# Patient Record
Sex: MALE | Race: WHITE | NOT HISPANIC OR LATINO | Employment: OTHER | URBAN - METROPOLITAN AREA
[De-identification: names, ages, dates, MRNs, and addresses within clinical notes are randomized per-mention and may not be internally consistent; named-entity substitution may affect disease eponyms.]

---

## 2017-08-15 ENCOUNTER — HOSPITAL ENCOUNTER (OUTPATIENT)
Dept: FAMILY MEDICINE CLINIC | Facility: CLINIC | Age: 62
Setting detail: SPECIMEN
Discharge: HOME | End: 2017-08-15
Attending: HOSPITALIST | Admitting: HOSPITALIST

## 2017-08-15 LAB
ALBUMIN SERPL-MCNC: 4.2 G/DL (ref 3.5–4.8)
ALBUMIN/GLOB SERPL: 1.7 {RATIO} (ref 1–1.7)
ALP SERPL-CCNC: 72 IU/L (ref 32–91)
ALT SERPL-CCNC: 29 IU/L (ref 17–63)
ANION GAP SERPL CALC-SCNC: 14.2 MMOL/L (ref 10–20)
AST SERPL-CCNC: 28 IU/L (ref 15–41)
BASOPHILS # BLD AUTO: 0.1 10*3/UL (ref 0–0.2)
BASOPHILS NFR BLD AUTO: 1 % (ref 0–2)
BILIRUB SERPL-MCNC: 1.5 MG/DL (ref 0.3–1.2)
BUN SERPL-MCNC: 20 MG/DL (ref 8–20)
BUN/CREAT SERPL: 22.2 (ref 6.2–20.3)
CALCIUM SERPL-MCNC: 8.8 MG/DL (ref 8.9–10.3)
CHLORIDE SERPL-SCNC: 104 MMOL/L (ref 101–111)
CONV CO2: 26 MMOL/L (ref 22–32)
CONV TOTAL PROTEIN: 6.7 G/DL (ref 6.1–7.9)
CREAT UR-MCNC: 0.9 MG/DL (ref 0.7–1.2)
DIFFERENTIAL METHOD BLD: (no result)
EOSINOPHIL # BLD AUTO: 0.2 10*3/UL (ref 0–0.3)
EOSINOPHIL # BLD AUTO: 2 % (ref 0–3)
ERYTHROCYTE [DISTWIDTH] IN BLOOD BY AUTOMATED COUNT: 13.8 % (ref 11.5–14.5)
FOLATE SERPL-MCNC: 23.3 NG/ML (ref 5.9–24.8)
GLOBULIN UR ELPH-MCNC: 2.5 G/DL (ref 2.5–3.8)
GLUCOSE SERPL-MCNC: 107 MG/DL (ref 65–99)
HCT VFR BLD AUTO: 43.8 % (ref 40–54)
HGB BLD-MCNC: 14.8 G/DL (ref 14–18)
LYMPHOCYTES # BLD AUTO: 0.9 10*3/UL (ref 0.8–4.8)
LYMPHOCYTES NFR BLD AUTO: 10 % (ref 18–42)
MCH RBC QN AUTO: 28.9 PG (ref 26–32)
MCHC RBC AUTO-ENTMCNC: 33.8 G/DL (ref 32–36)
MCV RBC AUTO: 85.6 FL (ref 80–94)
MONOCYTES # BLD AUTO: 0.8 10*3/UL (ref 0.1–1.3)
MONOCYTES NFR BLD AUTO: 8 % (ref 2–11)
NEUTROPHILS # BLD AUTO: 7.6 10*3/UL (ref 2.3–8.6)
NEUTROPHILS NFR BLD AUTO: 79 % (ref 50–75)
NRBC BLD AUTO-RTO: 0 /100{WBCS}
NRBC/RBC NFR BLD MANUAL: 0 10*3/UL
PLATELET # BLD AUTO: 138 10*3/UL (ref 150–450)
PMV BLD AUTO: 9.1 FL (ref 7.4–10.4)
POTASSIUM SERPL-SCNC: 4.2 MMOL/L (ref 3.6–5.1)
PSA SERPL-MCNC: 5.51 NG/ML (ref 0–4)
RBC # BLD AUTO: 5.12 10*6/UL (ref 4.6–6)
SODIUM SERPL-SCNC: 140 MMOL/L (ref 136–144)
TSH SERPL-ACNC: 1.64 UIU/ML (ref 0.34–5.6)
VIT B12 SERPL-MCNC: 404 PG/ML (ref 180–914)
WBC # BLD AUTO: 9.7 10*3/UL (ref 4.5–11.5)

## 2017-09-18 ENCOUNTER — HOSPITAL ENCOUNTER (OUTPATIENT)
Dept: FAMILY MEDICINE CLINIC | Facility: CLINIC | Age: 62
Setting detail: SPECIMEN
Discharge: HOME | End: 2017-09-18
Attending: HOSPITALIST | Admitting: HOSPITALIST

## 2017-09-18 LAB — PSA SERPL-MCNC: 4.61 NG/ML (ref 0–4)

## 2017-11-13 ENCOUNTER — HOSPITAL ENCOUNTER (OUTPATIENT)
Dept: FAMILY MEDICINE CLINIC | Facility: CLINIC | Age: 62
Setting detail: SPECIMEN
Discharge: HOME | End: 2017-11-13
Attending: HOSPITALIST | Admitting: HOSPITALIST

## 2018-05-05 ENCOUNTER — HOSPITAL ENCOUNTER (OUTPATIENT)
Dept: LAB | Facility: HOSPITAL | Age: 63
Discharge: HOME | End: 2018-05-05
Attending: INTERNAL MEDICINE | Admitting: INTERNAL MEDICINE

## 2018-05-05 LAB
ALBUMIN SERPL-MCNC: 4 G/DL (ref 3.5–4.8)
ALBUMIN/GLOB SERPL: 2 {RATIO} (ref 1–1.7)
ALP SERPL-CCNC: 67 IU/L (ref 32–91)
ALT SERPL-CCNC: 34 IU/L (ref 17–63)
ANION GAP SERPL CALC-SCNC: 11.5 MMOL/L (ref 10–20)
AST SERPL-CCNC: 26 IU/L (ref 15–41)
BASOPHILS # BLD AUTO: 0.1 10*3/UL (ref 0–0.2)
BASOPHILS NFR BLD AUTO: 1 % (ref 0–2)
BILIRUB SERPL-MCNC: 0.4 MG/DL (ref 0.3–1.2)
BUN SERPL-MCNC: 18 MG/DL (ref 8–20)
BUN/CREAT SERPL: 22.5 (ref 6.2–20.3)
CALCIUM SERPL-MCNC: 8.7 MG/DL (ref 8.9–10.3)
CHLORIDE SERPL-SCNC: 104 MMOL/L (ref 101–111)
CONV CO2: 25 MMOL/L (ref 22–32)
CONV TOTAL PROTEIN: 6 G/DL (ref 6.1–7.9)
CREAT UR-MCNC: 0.8 MG/DL (ref 0.7–1.2)
DIFFERENTIAL METHOD BLD: (no result)
EOSINOPHIL # BLD AUTO: 0.2 10*3/UL (ref 0–0.3)
EOSINOPHIL # BLD AUTO: 2 % (ref 0–3)
ERYTHROCYTE [DISTWIDTH] IN BLOOD BY AUTOMATED COUNT: 13.7 % (ref 11.5–14.5)
GLOBULIN UR ELPH-MCNC: 2 G/DL (ref 2.5–3.8)
GLUCOSE SERPL-MCNC: 189 MG/DL (ref 65–99)
HCT VFR BLD AUTO: 45.8 % (ref 40–54)
HGB BLD-MCNC: 14.8 G/DL (ref 14–18)
IRON SERPL-MCNC: 52 UG/DL (ref 45–182)
LYMPHOCYTES # BLD AUTO: 0.9 10*3/UL (ref 0.8–4.8)
LYMPHOCYTES NFR BLD AUTO: 9 % (ref 18–42)
MAGNESIUM SERPL-MCNC: 2 MG/DL (ref 1.8–2.5)
MCH RBC QN AUTO: 28.2 PG (ref 26–32)
MCHC RBC AUTO-ENTMCNC: 32.3 G/DL (ref 32–36)
MCV RBC AUTO: 87.2 FL (ref 80–94)
MONOCYTES # BLD AUTO: 0.6 10*3/UL (ref 0.1–1.3)
MONOCYTES NFR BLD AUTO: 6 % (ref 2–11)
NEUTROPHILS # BLD AUTO: 8.1 10*3/UL (ref 2.3–8.6)
NEUTROPHILS NFR BLD AUTO: 82 % (ref 50–75)
NRBC BLD AUTO-RTO: 0 /100{WBCS}
NRBC/RBC NFR BLD MANUAL: 0 10*3/UL
PLATELET # BLD AUTO: 134 10*3/UL (ref 150–450)
PMV BLD AUTO: 8.4 FL (ref 7.4–10.4)
POTASSIUM SERPL-SCNC: 3.5 MMOL/L (ref 3.6–5.1)
RBC # BLD AUTO: 5.25 10*6/UL (ref 4.6–6)
SODIUM SERPL-SCNC: 137 MMOL/L (ref 136–144)
WBC # BLD AUTO: 9.8 10*3/UL (ref 4.5–11.5)

## 2019-01-15 ENCOUNTER — OFFICE (AMBULATORY)
Dept: URBAN - METROPOLITAN AREA CLINIC 64 | Facility: CLINIC | Age: 64
End: 2019-01-15

## 2019-01-15 VITALS
WEIGHT: 197 LBS | HEIGHT: 64 IN | SYSTOLIC BLOOD PRESSURE: 106 MMHG | HEART RATE: 75 BPM | DIASTOLIC BLOOD PRESSURE: 60 MMHG

## 2019-01-15 DIAGNOSIS — G70.00 MYASTHENIA GRAVIS WITHOUT (ACUTE) EXACERBATION: ICD-10-CM

## 2019-01-15 DIAGNOSIS — K62.5 HEMORRHAGE OF ANUS AND RECTUM: ICD-10-CM

## 2019-01-15 DIAGNOSIS — K86.2 CYST OF PANCREAS: ICD-10-CM

## 2019-01-15 PROCEDURE — 99203 OFFICE O/P NEW LOW 30 MIN: CPT | Performed by: INTERNAL MEDICINE

## 2019-02-05 ENCOUNTER — OFFICE (AMBULATORY)
Dept: URBAN - METROPOLITAN AREA PATHOLOGY 4 | Facility: PATHOLOGY | Age: 64
End: 2019-02-05
Payer: COMMERCIAL

## 2019-02-05 ENCOUNTER — ON CAMPUS - OUTPATIENT (AMBULATORY)
Dept: URBAN - METROPOLITAN AREA HOSPITAL 2 | Facility: HOSPITAL | Age: 64
End: 2019-02-05

## 2019-02-05 VITALS
SYSTOLIC BLOOD PRESSURE: 121 MMHG | DIASTOLIC BLOOD PRESSURE: 68 MMHG | DIASTOLIC BLOOD PRESSURE: 70 MMHG | SYSTOLIC BLOOD PRESSURE: 103 MMHG | DIASTOLIC BLOOD PRESSURE: 86 MMHG | HEART RATE: 56 BPM | RESPIRATION RATE: 18 BRPM | HEART RATE: 68 BPM | RESPIRATION RATE: 15 BRPM | HEART RATE: 61 BPM | SYSTOLIC BLOOD PRESSURE: 116 MMHG | HEART RATE: 65 BPM | OXYGEN SATURATION: 96 % | SYSTOLIC BLOOD PRESSURE: 120 MMHG | SYSTOLIC BLOOD PRESSURE: 114 MMHG | DIASTOLIC BLOOD PRESSURE: 78 MMHG | HEIGHT: 64 IN | OXYGEN SATURATION: 94 % | DIASTOLIC BLOOD PRESSURE: 66 MMHG | HEART RATE: 59 BPM | DIASTOLIC BLOOD PRESSURE: 71 MMHG | OXYGEN SATURATION: 97 % | WEIGHT: 190 LBS | HEART RATE: 57 BPM | TEMPERATURE: 97.5 F | SYSTOLIC BLOOD PRESSURE: 102 MMHG | SYSTOLIC BLOOD PRESSURE: 105 MMHG | SYSTOLIC BLOOD PRESSURE: 119 MMHG | RESPIRATION RATE: 16 BRPM | OXYGEN SATURATION: 98 %

## 2019-02-05 DIAGNOSIS — K63.5 POLYP OF COLON: ICD-10-CM

## 2019-02-05 DIAGNOSIS — K62.5 HEMORRHAGE OF ANUS AND RECTUM: ICD-10-CM

## 2019-02-05 DIAGNOSIS — K57.30 DIVERTICULOSIS OF LARGE INTESTINE WITHOUT PERFORATION OR ABS: ICD-10-CM

## 2019-02-05 DIAGNOSIS — D12.2 BENIGN NEOPLASM OF ASCENDING COLON: ICD-10-CM

## 2019-02-05 DIAGNOSIS — K64.1 SECOND DEGREE HEMORRHOIDS: ICD-10-CM

## 2019-02-05 LAB
GI HISTOLOGY: A. UNSPECIFIED: (no result)
GI HISTOLOGY: PDF REPORT: (no result)

## 2019-02-05 PROCEDURE — 88305 TISSUE EXAM BY PATHOLOGIST: CPT | Performed by: INTERNAL MEDICINE

## 2019-02-05 PROCEDURE — 45380 COLONOSCOPY AND BIOPSY: CPT | Performed by: INTERNAL MEDICINE

## 2019-06-25 ENCOUNTER — OFFICE VISIT (OUTPATIENT)
Dept: CARDIOLOGY | Facility: CLINIC | Age: 64
End: 2019-06-25

## 2019-06-25 VITALS
WEIGHT: 195.2 LBS | HEART RATE: 54 BPM | DIASTOLIC BLOOD PRESSURE: 58 MMHG | BODY MASS INDEX: 29.58 KG/M2 | HEIGHT: 68 IN | SYSTOLIC BLOOD PRESSURE: 116 MMHG

## 2019-06-25 DIAGNOSIS — I47.1 PSVT (PAROXYSMAL SUPRAVENTRICULAR TACHYCARDIA) (HCC): ICD-10-CM

## 2019-06-25 DIAGNOSIS — I63.9 CEREBROVASCULAR ACCIDENT (CVA), UNSPECIFIED MECHANISM (HCC): ICD-10-CM

## 2019-06-25 DIAGNOSIS — R00.1 BRADYCARDIA, SINUS: ICD-10-CM

## 2019-06-25 DIAGNOSIS — I10 ESSENTIAL HYPERTENSION: Primary | ICD-10-CM

## 2019-06-25 PROBLEM — I47.10 PSVT (PAROXYSMAL SUPRAVENTRICULAR TACHYCARDIA): Status: ACTIVE | Noted: 2019-06-25

## 2019-06-25 PROCEDURE — 99213 OFFICE O/P EST LOW 20 MIN: CPT | Performed by: INTERNAL MEDICINE

## 2019-06-25 PROCEDURE — 93000 ELECTROCARDIOGRAM COMPLETE: CPT | Performed by: INTERNAL MEDICINE

## 2019-06-25 RX ORDER — ESCITALOPRAM OXALATE 10 MG/1
TABLET ORAL EVERY 24 HOURS
COMMUNITY
Start: 2018-02-09 | End: 2019-09-03 | Stop reason: SDUPTHER

## 2019-06-25 RX ORDER — SOTALOL HYDROCHLORIDE 80 MG/1
TABLET ORAL EVERY 12 HOURS
COMMUNITY
Start: 2018-09-20 | End: 2020-02-03 | Stop reason: SDUPTHER

## 2019-06-25 RX ORDER — MYCOPHENOLATE MOFETIL 500 MG/1
TABLET ORAL EVERY 12 HOURS
COMMUNITY
Start: 2017-08-29 | End: 2020-02-03 | Stop reason: SDUPTHER

## 2019-06-25 RX ORDER — AMLODIPINE BESYLATE AND ATORVASTATIN CALCIUM 5; 20 MG/1; MG/1
TABLET, FILM COATED ORAL EVERY 24 HOURS
COMMUNITY
Start: 2018-03-29 | End: 2019-08-19 | Stop reason: SDUPTHER

## 2019-06-25 RX ORDER — OMEPRAZOLE 40 MG/1
1 CAPSULE, DELAYED RELEASE ORAL EVERY 24 HOURS
COMMUNITY
Start: 2017-07-27 | End: 2020-01-13

## 2019-06-25 RX ORDER — PYRIDOSTIGMINE BROMIDE 180 MG/1
TABLET, EXTENDED RELEASE ORAL 3 TIMES DAILY
COMMUNITY
Start: 2018-08-07 | End: 2019-11-13 | Stop reason: SDUPTHER

## 2019-06-25 RX ORDER — LOSARTAN POTASSIUM AND HYDROCHLOROTHIAZIDE 12.5; 1 MG/1; MG/1
TABLET ORAL EVERY 24 HOURS
COMMUNITY
Start: 2017-12-01 | End: 2019-12-01 | Stop reason: SDUPTHER

## 2019-06-25 RX ORDER — PYRIDOSTIGMINE BROMIDE 60 MG/1
TABLET ORAL DAILY PRN
COMMUNITY
Start: 2014-05-02 | End: 2019-06-27 | Stop reason: SDUPTHER

## 2019-06-25 RX ORDER — TAMSULOSIN HYDROCHLORIDE 0.4 MG/1
1 CAPSULE ORAL EVERY 24 HOURS
COMMUNITY
Start: 2018-06-25 | End: 2020-02-03 | Stop reason: SDUPTHER

## 2019-06-25 NOTE — PROGRESS NOTES
Cardiology Office Visit Note      Referring physician:  Gaston    HPI:     Mr. Alejandre is a very pleasant 64-year-old gentleman presents annual FU prior history of PSVT as well as history of TIA/CVA with minimal residual neuropathy.  He is also known to have dyslipidemia, GERD, and myasthenia gravis.        Past Medical History:   Diagnosis Date   • CVA (cerebral vascular accident) (CMS/HCC)    • History of echocardiogram     EF 55%. No significant valvular flow abnormalities.    • History of stress test 11/2010    Stress Myoview: No ischemia 11/2010   • Hypertension    • Myasthenia gravis (CMS/HCC)    • Pancreatitis    • Paroxysmal atrial fibrillation (CMS/HCC)    • Stroke (CMS/HCC) 02/2014   • Supraventricular tachycardia (CMS/HCC)    • TIA (transient ischemic attack) 2008       Past Surgical History:   Procedure Laterality Date   • TONSILLECTOMY     • VASECTOMY           Current Outpatient Medications:   •  amLODIPine-atorvastatin (CADUET) 5-20 MG per tablet, Daily., Disp: , Rfl:   •  aspirin (ASPIRIN LOW DOSE) 81 MG tablet, Daily., Disp: , Rfl:   •  escitalopram (LEXAPRO) 10 MG tablet, Daily., Disp: , Rfl:   •  losartan-hydrochlorothiazide (HYZAAR) 100-12.5 MG per tablet, Daily., Disp: , Rfl:   •  mycophenolate (CELLCEPT) 500 MG tablet, Every 12 (Twelve) Hours., Disp: , Rfl:   •  omeprazole (priLOSEC) 40 MG capsule, 1 capsule Daily., Disp: , Rfl:   •  pyridostigmine (MESTINON) 180 MG CR tablet, 3 (Three) Times a Day., Disp: , Rfl:   •  pyridostigmine (MESTINON) 60 MG tablet, Daily As Needed., Disp: , Rfl:   •  sotalol (BETAPACE) 80 MG tablet, Every 12 (Twelve) Hours., Disp: , Rfl:   •  tamsulosin (FLOMAX) 0.4 MG capsule 24 hr capsule, 1 capsule Daily., Disp: , Rfl:     Social History     Socioeconomic History   • Marital status:      Spouse name: Not on file   • Number of children: Not on file   • Years of education: Not on file   • Highest education level: Not on file   Tobacco Use   • Smoking  status: Never Smoker   Substance and Sexual Activity   • Alcohol use: Yes   • Drug use: No       Family History   Problem Relation Age of Onset   • Hypertension Mother    • Stroke Mother    • Other Mother         Cerebral Bleed   • Hypertension Father    • Other Father          Review of Systems   Constitution: Negative for decreased appetite, diaphoresis and weakness.   HENT: Negative for congestion, hearing loss and nosebleeds.    Cardiovascular: Negative for chest pain, claudication, dyspnea on exertion, irregular heartbeat, leg swelling, near-syncope, orthopnea, palpitations, paroxysmal nocturnal dyspnea and syncope.   Respiratory: Negative for cough, shortness of breath and sleep disturbances due to breathing.    Endocrine: Negative for polyuria.   Hematologic/Lymphatic: Does not bruise/bleed easily.   Skin: Negative for itching and rash.   Musculoskeletal: Negative for back pain, muscle weakness and myalgias.   Gastrointestinal: Negative for abdominal pain, change in bowel habit and nausea.   Genitourinary: Negative for dysuria, flank pain, frequency and hesitancy.   Neurological: Negative for dizziness and tremors.   Psychiatric/Behavioral: Negative for altered mental status. The patient does not have insomnia.        Patient denies fever, cough, weight loss, hematuria or bowel changes.  Remainder of 12 point review of systems entirely negative.    Objective     Vitals:    06/25/19 1003   BP: 116/58   Pulse: 54           Physical Exam              Problem List Items Addressed This Visit        Cardiovascular and Mediastinum    HTN (hypertension) - Primary    Relevant Medications    losartan-hydrochlorothiazide (HYZAAR) 100-12.5 MG per tablet    sotalol (BETAPACE) 80 MG tablet    PSVT (paroxysmal supraventricular tachycardia) (CMS/HCC)    Relevant Medications    amLODIPine-atorvastatin (CADUET) 5-20 MG per tablet    sotalol (BETAPACE) 80 MG tablet    CVA (cerebral vascular accident) (CMS/HCC)    Bradycardia,  sinus    Relevant Medications    amLODIPine-atorvastatin (CADUET) 5-20 MG per tablet    sotalol (BETAPACE) 80 MG tablet                Parvin Childers RN  6/25/2019 10:04 AM

## 2019-06-25 NOTE — PROGRESS NOTES
Cardiology Office Visit Note      Referring physician:  Gaston  Reason For Followup:   HTN  PSVT  TIA/CVA, distant    HPI:  Enrrique Alejandre is a 64 y.o. male.  Presents annual FU prior hx HTN, PSVT, TIA/CVA with minimal residual neuropathy, dyslipidemia, GERD and myasthenia gravis.    Enrrique returns today with no specific cardiopulmonary issues.  He specifically denies any chest pain, palpitation, lightheadedness, dizziness or increasing shortness of air.  He claims his blood pressure is been well regulated.  He continues to enjoy excellent functional activity status and is thought to be compliant with medications as listed and reviewed in detail today.    Past Medical History:   Diagnosis Date   • CVA (cerebral vascular accident) (CMS/HCC)    • History of echocardiogram     EF 55%. No significant valvular flow abnormalities.    • History of stress test 11/2010    Stress Myoview: No ischemia 11/2010   • Hypertension    • Myasthenia gravis (CMS/HCC)    • Pancreatitis    • Paroxysmal atrial fibrillation (CMS/HCC)    • Stroke (CMS/HCC) 02/2014   • Supraventricular tachycardia (CMS/HCC)    • TIA (transient ischemic attack) 2008       Past Surgical History:   Procedure Laterality Date   • TONSILLECTOMY     • VASECTOMY           Current Outpatient Medications:   •  amLODIPine-atorvastatin (CADUET) 5-20 MG per tablet, Daily., Disp: , Rfl:   •  aspirin (ASPIRIN LOW DOSE) 81 MG tablet, Daily., Disp: , Rfl:   •  escitalopram (LEXAPRO) 10 MG tablet, Daily., Disp: , Rfl:   •  losartan-hydrochlorothiazide (HYZAAR) 100-12.5 MG per tablet, Daily., Disp: , Rfl:   •  mycophenolate (CELLCEPT) 500 MG tablet, Every 12 (Twelve) Hours., Disp: , Rfl:   •  omeprazole (priLOSEC) 40 MG capsule, 1 capsule Daily., Disp: , Rfl:   •  pyridostigmine (MESTINON) 180 MG CR tablet, 3 (Three) Times a Day., Disp: , Rfl:   •  pyridostigmine (MESTINON) 60 MG tablet, Daily As Needed., Disp: , Rfl:   •  sotalol (BETAPACE) 80 MG tablet, Every 12  (Twelve) Hours., Disp: , Rfl:   •  tamsulosin (FLOMAX) 0.4 MG capsule 24 hr capsule, 1 capsule Daily., Disp: , Rfl:    **He is actively taking his sotalol 80 mg - 1 tablet in a.m. and 1/2 tablet p.m.    Social History     Socioeconomic History   • Marital status:      Spouse name: Not on file   • Number of children: Not on file   • Years of education: Not on file   • Highest education level: Not on file   Tobacco Use   • Smoking status: Never Smoker   Substance and Sexual Activity   • Alcohol use: Yes   • Drug use: No       Family History   Problem Relation Age of Onset   • Hypertension Mother    • Stroke Mother    • Other Mother         Cerebral Bleed   • Hypertension Father    • Other Father          ROS    Patient denies fever, cough, weight loss, hematuria or bowel changes.  Remainder of 12 point review of systems entirely negative.    Objective     Vitals:    06/25/19 1003   BP: 116/58   Pulse: 54           Physical Exam   Constitutional: He is oriented to person, place, and time. He appears well-developed and well-nourished. No distress.   HENT:   Head: Normocephalic and atraumatic.   Mouth/Throat: Oropharynx is clear and moist.   Eyes: EOM are normal. No scleral icterus.   Neck: Neck supple. No JVD present. No tracheal deviation present. No thyromegaly present.   Cardiovascular: Normal rate, regular rhythm, normal heart sounds, intact distal pulses and normal pulses. Exam reveals no gallop and no friction rub.   No murmur heard.  Pulmonary/Chest: Breath sounds normal. He exhibits no tenderness.   Abdominal: Soft. Bowel sounds are normal. He exhibits no mass. There is no tenderness.   Musculoskeletal: Normal range of motion. He exhibits no edema.   Neurological: He is alert and oriented to person, place, and time. He has normal strength. No sensory deficit.   Skin: No rash noted.   Psychiatric: He has a normal mood and affect. Thought content normal.         EKG: Normal EKG aside from sinus bradycardia  with heart rate 54 bpm    Assessment:     Problem List Items Addressed This Visit        Cardiovascular and Mediastinum    HTN (hypertension) - Primary    Relevant Medications    losartan-hydrochlorothiazide (HYZAAR) 100-12.5 MG per tablet    sotalol (BETAPACE) 80 MG tablet  Amlodipine 5 mg (combined with atorvastatin) daily    PSVT (paroxysmal supraventricular tachycardia) (CMS/HCC)    Relevant Medications        sotalol (BETAPACE) 80 MG tablet    CVA (cerebral vascular accident) (CMS/HCC)    Bradycardia, sinus    Relevant Medications        sotalol (BETAPACE) 80 MG tablet    Myasthenia gravis-Per primary          Plan:   Decrease sotalol dose today to 40 mg of menses equals 1/2 tablet) twice daily  Continue all other medications as listed and reviewed in detail today.  Encouraged weight reduction with rather progressive exercise program  Return to clinic 1 year or sooner if needed      JAQUELIN Martinez MD  6/25/2019 10:40 AM

## 2019-06-27 RX ORDER — PYRIDOSTIGMINE BROMIDE 60 MG/1
TABLET ORAL
Qty: 90 TABLET | Refills: 2 | Status: SHIPPED | OUTPATIENT
Start: 2019-06-27 | End: 2019-08-19 | Stop reason: SDUPTHER

## 2019-07-28 PROBLEM — E83.42 HYPOMAGNESEMIA: Status: ACTIVE | Noted: 2018-05-04

## 2019-07-28 PROBLEM — R97.20 HIGH PROSTATE SPECIFIC ANTIGEN (PSA): Status: ACTIVE | Noted: 2017-08-16

## 2019-07-28 PROBLEM — E55.9 VITAMIN D DEFICIENCY: Status: ACTIVE | Noted: 2017-08-16

## 2019-07-28 PROBLEM — R00.1 BRADYCARDIA: Status: ACTIVE | Noted: 2018-06-25

## 2019-07-30 ENCOUNTER — OFFICE VISIT (OUTPATIENT)
Dept: FAMILY MEDICINE CLINIC | Facility: CLINIC | Age: 64
End: 2019-07-30

## 2019-07-30 VITALS
WEIGHT: 193.6 LBS | OXYGEN SATURATION: 96 % | TEMPERATURE: 98.5 F | HEIGHT: 64 IN | BODY MASS INDEX: 33.05 KG/M2 | DIASTOLIC BLOOD PRESSURE: 68 MMHG | SYSTOLIC BLOOD PRESSURE: 124 MMHG | RESPIRATION RATE: 16 BRPM | HEART RATE: 57 BPM

## 2019-07-30 DIAGNOSIS — R81 GLUCOSURIA: Primary | ICD-10-CM

## 2019-07-30 DIAGNOSIS — M54.10 RADICULAR LOW BACK PAIN: ICD-10-CM

## 2019-07-30 DIAGNOSIS — E55.9 VITAMIN D DEFICIENCY: ICD-10-CM

## 2019-07-30 LAB
25(OH)D3 SERPL-MCNC: 26 NG/ML (ref 30–100)
ALBUMIN SERPL-MCNC: 3.9 G/DL (ref 3.5–4.8)
ALBUMIN/GLOB SERPL: 2 G/DL (ref 1–1.7)
ALP SERPL-CCNC: 70 U/L (ref 32–91)
ALT SERPL W P-5'-P-CCNC: 40 U/L (ref 17–63)
ANION GAP SERPL CALCULATED.3IONS-SCNC: 11.9 MMOL/L (ref 5–15)
AST SERPL-CCNC: 33 U/L (ref 15–41)
BILIRUB SERPL-MCNC: 0.9 MG/DL (ref 0.3–1.2)
BUN BLD-MCNC: 19 MG/DL (ref 8–20)
BUN/CREAT SERPL: 17.3 (ref 6.2–20.3)
CALCIUM SPEC-SCNC: 9.1 MG/DL (ref 8.9–10.3)
CHLORIDE SERPL-SCNC: 104 MMOL/L (ref 101–111)
CO2 SERPL-SCNC: 27 MMOL/L (ref 22–32)
CREAT BLD-MCNC: 1.1 MG/DL (ref 0.7–1.2)
FOLATE SERPL-MCNC: >24.8 NG/ML (ref 5.9–24.8)
GFR SERPL CREATININE-BSD FRML MDRD: 67 ML/MIN/1.73
GLOBULIN UR ELPH-MCNC: 2 GM/DL (ref 2.5–3.8)
GLUCOSE BLD-MCNC: 144 MG/DL (ref 65–99)
HBA1C MFR BLD: 5.4 % (ref 3.5–5.6)
POTASSIUM BLD-SCNC: 3.9 MMOL/L (ref 3.6–5.1)
PROT SERPL-MCNC: 5.9 G/DL (ref 6.1–7.9)
SODIUM BLD-SCNC: 139 MMOL/L (ref 136–144)
TSH SERPL DL<=0.05 MIU/L-ACNC: 1.9 MIU/ML (ref 0.34–5.6)
VIT B12 BLD-MCNC: >1500 PG/ML (ref 180–914)

## 2019-07-30 PROCEDURE — 99214 OFFICE O/P EST MOD 30 MIN: CPT | Performed by: INTERNAL MEDICINE

## 2019-07-30 PROCEDURE — 83036 HEMOGLOBIN GLYCOSYLATED A1C: CPT | Performed by: INTERNAL MEDICINE

## 2019-07-30 PROCEDURE — 82607 VITAMIN B-12: CPT | Performed by: INTERNAL MEDICINE

## 2019-07-30 PROCEDURE — 82746 ASSAY OF FOLIC ACID SERUM: CPT | Performed by: INTERNAL MEDICINE

## 2019-07-30 PROCEDURE — 84443 ASSAY THYROID STIM HORMONE: CPT | Performed by: INTERNAL MEDICINE

## 2019-07-30 PROCEDURE — 80053 COMPREHEN METABOLIC PANEL: CPT | Performed by: INTERNAL MEDICINE

## 2019-07-30 PROCEDURE — 82306 VITAMIN D 25 HYDROXY: CPT | Performed by: INTERNAL MEDICINE

## 2019-07-30 NOTE — PROGRESS NOTES
"Rooming Tab(CC,VS,Pt Hx,Fall Screen)  Chief Complaint   Patient presents with   • Leg Pain       Subjective    Pt here for right thigh numbness- that is excruciating with change of position- radiates to groin area. No benefit with standing, laying. There morning to night. Doesn't radiate past the knee. takes tylenol and may help some- but not resolve the problem- flares up from time to time. No motor difference. No hip joint pain.  sleeps on either side and doesn't bother it   sees urology for PSA- which was good- however had sugar in urine  I have reviewed and updated his medications, medical history and problem list during today's office visit.     Patient Care Team:  Alexis Feldman MD as PCP - General    Problem List Tab  Medications Tab  Synopsis Tab  Chart Review Tab  Care Everywhere Tab  Immunizations Tab  Patient History Tab    Social History     Tobacco Use   • Smoking status: Never Smoker   • Smokeless tobacco: Never Used   Substance Use Topics   • Alcohol use: Yes       Review of Systems   Constitutional: Negative for fatigue and fever.   HENT: Negative for congestion.    Respiratory: Negative for apnea, cough and wheezing.    Cardiovascular: Negative for chest pain.   Gastrointestinal: Negative for abdominal distention.   Musculoskeletal: Positive for arthralgias and gait problem.   Neurological: Negative for syncope.   Psychiatric/Behavioral: Negative for behavioral problems.       Objective     Rooming Tab(CC,VS,Pt Hx,Fall Screen)  /68 (BP Location: Right arm, Patient Position: Sitting)   Pulse 57   Temp 98.5 °F (36.9 °C) (Oral)   Resp 16   Ht 162.6 cm (64\")   Wt 87.8 kg (193 lb 9.6 oz)   SpO2 96%   BMI 33.23 kg/m²     Body mass index is 33.23 kg/m².    Physical Exam   Constitutional: He is oriented to person, place, and time. He appears well-developed and well-nourished.   HENT:   Head: Normocephalic and atraumatic.   Right Ear: Tympanic membrane normal.   Left Ear: Tympanic " membrane normal.   Eyes: Pupils are equal, round, and reactive to light.   Neck: Normal range of motion. Neck supple.   Cardiovascular: Normal rate and regular rhythm.   No murmur heard.  Pulmonary/Chest: Effort normal and breath sounds normal.   Abdominal: Soft. Bowel sounds are normal. He exhibits no distension.   Musculoskeletal:   Back mild tender midline L4-S1- negative straight leg. Decreased sensation lateral thigh   Neurological: He is oriented to person, place, and time.   Skin: Capillary refill takes less than 2 seconds.   Nursing note and vitals reviewed.       Statin Choice Calculator  Data Reviewed:               Lab Results   Component Value Date    BUN 19 07/30/2019    CREATININE 1.10 07/30/2019    EGFRIFNONA 67 07/30/2019     07/30/2019    K 3.9 07/30/2019     07/30/2019    CALCIUM 9.1 07/30/2019    ALBUMIN 3.90 07/30/2019    BILITOT 0.9 07/30/2019    ALKPHOS 70 07/30/2019    AST 33 07/30/2019    ALT 40 07/30/2019    TSH 1.900 07/30/2019    SPQC24IZ 26.0 (L) 07/30/2019      Assessment/Plan   Order Review Tab  Health Maintenance Tab  Patient Plan/Order Tab  Diagnoses and all orders for this visit:    1. Glucosuria (Primary)  Comments:  will check labs as +glucose spilling. not on any DM meds  Orders:  -     Comprehensive Metabolic Panel  -     TSH  -     Vitamin B12 & Folate  -     Hemoglobin A1c    2. Vitamin D deficiency  Comments:  restart meds, check level  Orders:  -     Vitamin D 25 Hydroxy    3. Radicular low back pain  -     XR Spine Lumbar 2 or 3 View; Future  -     Gabapentin, Once-Daily, 300 MG tablet; Take 300 mg by mouth Daily.  Dispense: 30 tablet; Refill: 2        Wrapup Tab  Return in about 3 months (around 10/30/2019).         They were informed of the diagnosis and treatment plan and directed to f/u for any further problems or concerns.

## 2019-08-01 DIAGNOSIS — M54.10 RADICULAR LOW BACK PAIN: ICD-10-CM

## 2019-08-19 RX ORDER — AMLODIPINE BESYLATE AND ATORVASTATIN CALCIUM 5; 20 MG/1; MG/1
TABLET, FILM COATED ORAL
Qty: 90 TABLET | Refills: 4 | Status: SHIPPED | OUTPATIENT
Start: 2019-08-19 | End: 2020-11-12

## 2019-08-19 RX ORDER — PYRIDOSTIGMINE BROMIDE 60 MG/1
TABLET ORAL
Qty: 90 TABLET | Refills: 1 | Status: SHIPPED | OUTPATIENT
Start: 2019-08-19 | End: 2021-01-19

## 2019-09-03 RX ORDER — ESCITALOPRAM OXALATE 10 MG/1
TABLET ORAL
Qty: 30 TABLET | Refills: 1 | Status: SHIPPED | OUTPATIENT
Start: 2019-09-03 | End: 2019-11-03 | Stop reason: SDUPTHER

## 2019-10-23 DIAGNOSIS — M54.10 RADICULAR LOW BACK PAIN: ICD-10-CM

## 2019-10-23 RX ORDER — GABAPENTIN 300 MG/1
CAPSULE ORAL
Qty: 30 CAPSULE | Refills: 1 | Status: SHIPPED | OUTPATIENT
Start: 2019-10-23 | End: 2019-12-23

## 2019-10-31 ENCOUNTER — OFFICE VISIT (OUTPATIENT)
Dept: FAMILY MEDICINE CLINIC | Facility: CLINIC | Age: 64
End: 2019-10-31

## 2019-10-31 VITALS
TEMPERATURE: 98.8 F | SYSTOLIC BLOOD PRESSURE: 130 MMHG | HEART RATE: 76 BPM | BODY MASS INDEX: 33.64 KG/M2 | WEIGHT: 196 LBS | RESPIRATION RATE: 8 BRPM | DIASTOLIC BLOOD PRESSURE: 72 MMHG

## 2019-10-31 DIAGNOSIS — E11.69 TYPE 2 DIABETES MELLITUS WITH OTHER SPECIFIED COMPLICATION, WITHOUT LONG-TERM CURRENT USE OF INSULIN (HCC): ICD-10-CM

## 2019-10-31 DIAGNOSIS — I47.1 PSVT (PAROXYSMAL SUPRAVENTRICULAR TACHYCARDIA) (HCC): ICD-10-CM

## 2019-10-31 DIAGNOSIS — G70.00 MYASTHENIA GRAVIS (HCC): ICD-10-CM

## 2019-10-31 DIAGNOSIS — I10 ESSENTIAL HYPERTENSION: Primary | ICD-10-CM

## 2019-10-31 LAB
ALBUMIN SERPL-MCNC: 4.2 G/DL (ref 3.5–5.2)
ALBUMIN/GLOB SERPL: 1.7 G/DL
ALP SERPL-CCNC: 78 U/L (ref 39–117)
ALT SERPL W P-5'-P-CCNC: 29 U/L (ref 1–41)
ANION GAP SERPL CALCULATED.3IONS-SCNC: 11.9 MMOL/L (ref 5–15)
AST SERPL-CCNC: 21 U/L (ref 1–40)
BASOPHILS # BLD AUTO: 0.04 10*3/MM3 (ref 0–0.2)
BASOPHILS NFR BLD AUTO: 0.5 % (ref 0–1.5)
BILIRUB SERPL-MCNC: 0.5 MG/DL (ref 0.2–1.2)
BUN BLD-MCNC: 17 MG/DL (ref 8–23)
BUN/CREAT SERPL: 21.8 (ref 7–25)
CALCIUM SPEC-SCNC: 8.4 MG/DL (ref 8.6–10.5)
CHLORIDE SERPL-SCNC: 98 MMOL/L (ref 98–107)
CO2 SERPL-SCNC: 27.1 MMOL/L (ref 22–29)
CREAT BLD-MCNC: 0.78 MG/DL (ref 0.76–1.27)
DEPRECATED RDW RBC AUTO: 40.8 FL (ref 37–54)
EOSINOPHIL # BLD AUTO: 0.14 10*3/MM3 (ref 0–0.4)
EOSINOPHIL NFR BLD AUTO: 1.6 % (ref 0.3–6.2)
ERYTHROCYTE [DISTWIDTH] IN BLOOD BY AUTOMATED COUNT: 13.2 % (ref 12.3–15.4)
GFR SERPL CREATININE-BSD FRML MDRD: 100 ML/MIN/1.73
GLOBULIN UR ELPH-MCNC: 2.5 GM/DL
GLUCOSE BLD-MCNC: 129 MG/DL (ref 65–99)
HBA1C MFR BLD: 5.3 % (ref 3.5–5.6)
HCT VFR BLD AUTO: 42.8 % (ref 37.5–51)
HGB BLD-MCNC: 13.8 G/DL (ref 13–17.7)
IMM GRANULOCYTES # BLD AUTO: 0.03 10*3/MM3 (ref 0–0.05)
IMM GRANULOCYTES NFR BLD AUTO: 0.3 % (ref 0–0.5)
LYMPHOCYTES # BLD AUTO: 0.64 10*3/MM3 (ref 0.7–3.1)
LYMPHOCYTES NFR BLD AUTO: 7.5 % (ref 19.6–45.3)
MCH RBC QN AUTO: 27.6 PG (ref 26.6–33)
MCHC RBC AUTO-ENTMCNC: 32.2 G/DL (ref 31.5–35.7)
MCV RBC AUTO: 85.6 FL (ref 79–97)
MONOCYTES # BLD AUTO: 0.59 10*3/MM3 (ref 0.1–0.9)
MONOCYTES NFR BLD AUTO: 6.9 % (ref 5–12)
NEUTROPHILS # BLD AUTO: 7.15 10*3/MM3 (ref 1.7–7)
NEUTROPHILS NFR BLD AUTO: 83.2 % (ref 42.7–76)
NRBC BLD AUTO-RTO: 0 /100 WBC (ref 0–0.2)
PLATELET # BLD AUTO: 194 10*3/MM3 (ref 140–450)
PMV BLD AUTO: 10.7 FL (ref 6–12)
POTASSIUM BLD-SCNC: 3.9 MMOL/L (ref 3.5–5.2)
PROT SERPL-MCNC: 6.7 G/DL (ref 6–8.5)
RBC # BLD AUTO: 5 10*6/MM3 (ref 4.14–5.8)
SODIUM BLD-SCNC: 137 MMOL/L (ref 136–145)
TSH SERPL DL<=0.05 MIU/L-ACNC: 1.39 UIU/ML (ref 0.27–4.2)
WBC NRBC COR # BLD: 8.59 10*3/MM3 (ref 3.4–10.8)

## 2019-10-31 PROCEDURE — 83036 HEMOGLOBIN GLYCOSYLATED A1C: CPT | Performed by: HOSPITALIST

## 2019-10-31 PROCEDURE — 36415 COLL VENOUS BLD VENIPUNCTURE: CPT | Performed by: HOSPITALIST

## 2019-10-31 PROCEDURE — 85025 COMPLETE CBC W/AUTO DIFF WBC: CPT | Performed by: HOSPITALIST

## 2019-10-31 PROCEDURE — 99213 OFFICE O/P EST LOW 20 MIN: CPT | Performed by: HOSPITALIST

## 2019-10-31 PROCEDURE — 80053 COMPREHEN METABOLIC PANEL: CPT | Performed by: HOSPITALIST

## 2019-10-31 PROCEDURE — 84443 ASSAY THYROID STIM HORMONE: CPT | Performed by: HOSPITALIST

## 2019-10-31 NOTE — PROGRESS NOTES
Rooming Tab(CC,VS,Pt Hx,Fall Screen)    Patient Care Team:  Alexis Feldman MD as PCP - General    Chief Complaint   Patient presents with   • Follow-up     3 month       Subjective     History was provided by the patient.  Still with pain in lower back and right thigh but much better.  Still on his meds for Myesthenia flares.  Having some shortwinded with heavy exertion    Patient states other than the above problems, they are doing ok overall and has no other significant complaints    I have reviewed and updated his medications, medical/family/social history and problem list during today's office visit.       Problem List Tab  Medications Tab  Synopsis Tab  Chart Review Tab  Care Everywhere Tab  Immunizations Tab  Patient History Tab    Social History     Tobacco Use   • Smoking status: Never Smoker   • Smokeless tobacco: Never Used   Substance Use Topics   • Alcohol use: Yes       Review of Systems   Constitutional: Negative for chills and fever.   Respiratory: Negative for chest tightness and shortness of breath.    Cardiovascular: Negative for chest pain.   Gastrointestinal: Negative for abdominal pain.   Musculoskeletal: Negative for arthralgias and myalgias.   Neurological: Negative for headache.       Objective     Rooming Tab(CC,VS,Pt Hx,Fall Screen)  /72 (BP Location: Left arm, Patient Position: Sitting, Cuff Size: Adult)   Pulse 76   Temp 98.8 °F (37.1 °C) (Oral)   Resp 8   Wt 88.9 kg (196 lb)   BMI 33.64 kg/m²     Wt Readings from Last 4 Encounters:   10/31/19 88.9 kg (196 lb)   07/30/19 87.8 kg (193 lb 9.6 oz)   06/25/19 88.5 kg (195 lb 3.2 oz)   06/25/18 87.5 kg (192 lb 16 oz)       Body mass index is 33.64 kg/m².    Physical Exam   Constitutional: He appears well-developed and well-nourished.   HENT:   Head: Normocephalic.   Cardiovascular: Normal rate and regular rhythm.   No murmur heard.  Pulmonary/Chest: Breath sounds normal. He has no wheezes. He has no rales.   Abdominal:  Soft. He exhibits no distension. There is no tenderness.   Skin: Skin is warm and dry.        Statin Choice Calculator  Data Reviewed:                     Assessment/Plan   Order Review Tab  Health Maintenance Tab  Patient Plan/Order Tab  Diagnoses and all orders for this visit:    1. Essential hypertension (Primary)  -     Comprehensive Metabolic Panel  -     Hemoglobin A1c  -     CBC & Differential  -     TSH    2. Myasthenia gravis (CMS/HCC)  -     Comprehensive Metabolic Panel  -     Hemoglobin A1c  -     CBC & Differential  -     TSH    3. PSVT (paroxysmal supraventricular tachycardia) (CMS/[facility])  -     Comprehensive Metabolic Panel  -     Hemoglobin A1c  -     CBC & Differential  -     TSH    4. Type 2 diabetes mellitus with other specified complication, without long-term current use of insulin (CMS/[facility])  -     Comprehensive Metabolic Panel  -     Hemoglobin A1c  -     CBC & Differential  -     TSH        We went over all of their concerns they brought up during this office visit and they were informed of the diagnosis and treatment plan.  They were directed to f/u for any further problems or concerns.  They were given the opportunity to ask questions related to the visit.  I reinforced the need to keep up on their routine health maintenance items and healthy lifestyle.        Orders Placed This Encounter   Procedures   • Comprehensive Metabolic Panel   • Hemoglobin A1c   • TSH   • CBC & Differential     Order Specific Question:   Manual Differential     Answer:   No     Wrapup Tab  Return in about 3 months (around 1/31/2020).

## 2019-11-03 RX ORDER — ESCITALOPRAM OXALATE 10 MG/1
TABLET ORAL
Qty: 30 TABLET | Refills: 0 | Status: SHIPPED | OUTPATIENT
Start: 2019-11-03 | End: 2019-12-01 | Stop reason: SDUPTHER

## 2019-11-04 RX ORDER — ESCITALOPRAM OXALATE 10 MG/1
TABLET ORAL
Qty: 30 TABLET | Refills: 0 | Status: SHIPPED | OUTPATIENT
Start: 2019-11-04 | End: 2020-01-03

## 2019-11-13 RX ORDER — PYRIDOSTIGMINE BROMIDE 180 MG/1
TABLET, EXTENDED RELEASE ORAL
Qty: 270 TABLET | Refills: 4 | Status: SHIPPED | OUTPATIENT
Start: 2019-11-13 | End: 2020-11-25 | Stop reason: SDUPTHER

## 2019-12-01 RX ORDER — ESCITALOPRAM OXALATE 10 MG/1
TABLET ORAL
Qty: 30 TABLET | Refills: 0 | Status: SHIPPED | OUTPATIENT
Start: 2019-12-01 | End: 2020-02-03

## 2019-12-02 RX ORDER — LOSARTAN POTASSIUM AND HYDROCHLOROTHIAZIDE 12.5; 1 MG/1; MG/1
TABLET ORAL
Qty: 30 TABLET | Refills: 2 | Status: SHIPPED | OUTPATIENT
Start: 2019-12-02 | End: 2020-02-03 | Stop reason: SDUPTHER

## 2019-12-21 DIAGNOSIS — M54.10 RADICULAR LOW BACK PAIN: ICD-10-CM

## 2019-12-23 RX ORDER — GABAPENTIN 300 MG/1
CAPSULE ORAL
Qty: 30 CAPSULE | Refills: 0 | Status: SHIPPED | OUTPATIENT
Start: 2019-12-23 | End: 2020-01-06

## 2020-01-03 RX ORDER — ESCITALOPRAM OXALATE 10 MG/1
TABLET ORAL
Qty: 30 TABLET | Refills: 0 | Status: SHIPPED | OUTPATIENT
Start: 2020-01-03 | End: 2020-02-03 | Stop reason: SDUPTHER

## 2020-01-04 DIAGNOSIS — M54.10 RADICULAR LOW BACK PAIN: ICD-10-CM

## 2020-01-06 RX ORDER — GABAPENTIN 300 MG/1
CAPSULE ORAL
Qty: 30 CAPSULE | Refills: 0 | Status: SHIPPED | OUTPATIENT
Start: 2020-01-06 | End: 2020-02-03 | Stop reason: SDUPTHER

## 2020-01-13 RX ORDER — OMEPRAZOLE 40 MG/1
CAPSULE, DELAYED RELEASE ORAL
Qty: 90 CAPSULE | Refills: 4 | Status: SHIPPED | OUTPATIENT
Start: 2020-01-13 | End: 2021-09-13 | Stop reason: SDUPTHER

## 2020-02-03 ENCOUNTER — OFFICE VISIT (OUTPATIENT)
Dept: FAMILY MEDICINE CLINIC | Facility: CLINIC | Age: 65
End: 2020-02-03

## 2020-02-03 VITALS
WEIGHT: 201 LBS | BODY MASS INDEX: 34.31 KG/M2 | DIASTOLIC BLOOD PRESSURE: 70 MMHG | HEART RATE: 68 BPM | SYSTOLIC BLOOD PRESSURE: 120 MMHG | RESPIRATION RATE: 8 BRPM | HEIGHT: 64 IN | OXYGEN SATURATION: 98 % | TEMPERATURE: 98.3 F

## 2020-02-03 DIAGNOSIS — I47.1 PSVT (PAROXYSMAL SUPRAVENTRICULAR TACHYCARDIA) (HCC): ICD-10-CM

## 2020-02-03 DIAGNOSIS — I10 ESSENTIAL HYPERTENSION: Primary | ICD-10-CM

## 2020-02-03 DIAGNOSIS — N40.1 BENIGN PROSTATIC HYPERPLASIA WITH NOCTURIA: ICD-10-CM

## 2020-02-03 DIAGNOSIS — M79.89 SWELLING OF FINGER OF LEFT HAND: ICD-10-CM

## 2020-02-03 DIAGNOSIS — G70.00 MYASTHENIA GRAVIS (HCC): ICD-10-CM

## 2020-02-03 DIAGNOSIS — R35.1 BENIGN PROSTATIC HYPERPLASIA WITH NOCTURIA: ICD-10-CM

## 2020-02-03 DIAGNOSIS — F41.9 ANXIETY AND DEPRESSION: ICD-10-CM

## 2020-02-03 DIAGNOSIS — M54.10 RADICULAR LOW BACK PAIN: ICD-10-CM

## 2020-02-03 DIAGNOSIS — F32.A ANXIETY AND DEPRESSION: ICD-10-CM

## 2020-02-03 LAB
ALBUMIN SERPL-MCNC: 4.2 G/DL (ref 3.5–5.2)
ALBUMIN/GLOB SERPL: 1.9 G/DL
ALP SERPL-CCNC: 76 U/L (ref 39–117)
ALT SERPL W P-5'-P-CCNC: 32 U/L (ref 1–41)
ANION GAP SERPL CALCULATED.3IONS-SCNC: 13.3 MMOL/L (ref 5–15)
AST SERPL-CCNC: 22 U/L (ref 1–40)
BASOPHILS # BLD AUTO: 0.08 10*3/MM3 (ref 0–0.2)
BASOPHILS NFR BLD AUTO: 0.8 % (ref 0–1.5)
BILIRUB SERPL-MCNC: 0.3 MG/DL (ref 0.2–1.2)
BUN BLD-MCNC: 19 MG/DL (ref 8–23)
BUN/CREAT SERPL: 21.1 (ref 7–25)
CALCIUM SPEC-SCNC: 9 MG/DL (ref 8.6–10.5)
CHLORIDE SERPL-SCNC: 102 MMOL/L (ref 98–107)
CO2 SERPL-SCNC: 24.7 MMOL/L (ref 22–29)
CREAT BLD-MCNC: 0.9 MG/DL (ref 0.76–1.27)
DEPRECATED RDW RBC AUTO: 41.6 FL (ref 37–54)
EOSINOPHIL # BLD AUTO: 0.13 10*3/MM3 (ref 0–0.4)
EOSINOPHIL NFR BLD AUTO: 1.4 % (ref 0.3–6.2)
ERYTHROCYTE [DISTWIDTH] IN BLOOD BY AUTOMATED COUNT: 13.3 % (ref 12.3–15.4)
GFR SERPL CREATININE-BSD FRML MDRD: 85 ML/MIN/1.73
GLOBULIN UR ELPH-MCNC: 2.2 GM/DL
GLUCOSE BLD-MCNC: 141 MG/DL (ref 65–99)
HCT VFR BLD AUTO: 43.7 % (ref 37.5–51)
HGB BLD-MCNC: 14.5 G/DL (ref 13–17.7)
IMM GRANULOCYTES # BLD AUTO: 0.06 10*3/MM3 (ref 0–0.05)
IMM GRANULOCYTES NFR BLD AUTO: 0.6 % (ref 0–0.5)
LYMPHOCYTES # BLD AUTO: 0.85 10*3/MM3 (ref 0.7–3.1)
LYMPHOCYTES NFR BLD AUTO: 8.9 % (ref 19.6–45.3)
MCH RBC QN AUTO: 28.7 PG (ref 26.6–33)
MCHC RBC AUTO-ENTMCNC: 33.2 G/DL (ref 31.5–35.7)
MCV RBC AUTO: 86.4 FL (ref 79–97)
MONOCYTES # BLD AUTO: 0.71 10*3/MM3 (ref 0.1–0.9)
MONOCYTES NFR BLD AUTO: 7.4 % (ref 5–12)
NEUTROPHILS # BLD AUTO: 7.72 10*3/MM3 (ref 1.7–7)
NEUTROPHILS NFR BLD AUTO: 80.9 % (ref 42.7–76)
NRBC BLD AUTO-RTO: 0 /100 WBC (ref 0–0.2)
PLATELET # BLD AUTO: 170 10*3/MM3 (ref 140–450)
PMV BLD AUTO: 10.8 FL (ref 6–12)
POTASSIUM BLD-SCNC: 4.4 MMOL/L (ref 3.5–5.2)
PROT SERPL-MCNC: 6.4 G/DL (ref 6–8.5)
RBC # BLD AUTO: 5.06 10*6/MM3 (ref 4.14–5.8)
SODIUM BLD-SCNC: 140 MMOL/L (ref 136–145)
WBC NRBC COR # BLD: 9.55 10*3/MM3 (ref 3.4–10.8)

## 2020-02-03 PROCEDURE — 80053 COMPREHEN METABOLIC PANEL: CPT | Performed by: FAMILY MEDICINE

## 2020-02-03 PROCEDURE — 85025 COMPLETE CBC W/AUTO DIFF WBC: CPT | Performed by: FAMILY MEDICINE

## 2020-02-03 PROCEDURE — 99214 OFFICE O/P EST MOD 30 MIN: CPT | Performed by: FAMILY MEDICINE

## 2020-02-03 RX ORDER — LOSARTAN POTASSIUM AND HYDROCHLOROTHIAZIDE 12.5; 1 MG/1; MG/1
1 TABLET ORAL DAILY
Qty: 90 TABLET | Refills: 3 | Status: SHIPPED | OUTPATIENT
Start: 2020-02-03 | End: 2021-01-20 | Stop reason: HOSPADM

## 2020-02-03 RX ORDER — GABAPENTIN 300 MG/1
300 CAPSULE ORAL DAILY
Qty: 90 CAPSULE | Refills: 3 | Status: SHIPPED | OUTPATIENT
Start: 2020-02-03 | End: 2020-02-28

## 2020-02-03 RX ORDER — TAMSULOSIN HYDROCHLORIDE 0.4 MG/1
1 CAPSULE ORAL EVERY 24 HOURS
Qty: 90 CAPSULE | Refills: 3 | Status: SHIPPED | OUTPATIENT
Start: 2020-02-03 | End: 2023-02-22

## 2020-02-03 RX ORDER — MYCOPHENOLATE MOFETIL 500 MG/1
500 TABLET ORAL 2 TIMES DAILY
Qty: 180 TABLET | Refills: 3 | Status: SHIPPED | OUTPATIENT
Start: 2020-02-03 | End: 2020-05-14 | Stop reason: SDUPTHER

## 2020-02-03 RX ORDER — SOTALOL HYDROCHLORIDE 80 MG/1
40 TABLET ORAL EVERY 12 HOURS
Qty: 90 TABLET | Refills: 3 | Status: SHIPPED | OUTPATIENT
Start: 2020-02-03 | End: 2021-03-08

## 2020-02-03 RX ORDER — ESCITALOPRAM OXALATE 10 MG/1
10 TABLET ORAL DAILY
Qty: 90 TABLET | Refills: 3 | Status: SHIPPED | OUTPATIENT
Start: 2020-02-03 | End: 2021-01-27

## 2020-02-03 NOTE — PROGRESS NOTES
Chief Complaint   Patient presents with   • Follow-up     medication     HPI  Enrrique Alejandre is a 65 y.o. male that presents for follow-up of medications    HTN: 120/70. Taking amlodipine 5, losartan 100, HCTZ 12.5    PSVT: compliant w/ sotalol 80 BID. No palpitations in a few years. Follows w/ cardiology- Dr Martinez.     Myasthenia gravis: gets tired as day goes on. No lazy eye, breathing issues, swallowing issues. Taking mycophenolate 500mg BID, pyridostigmine 180 TID and pyridostigmine 60 Q4 PRN fatigue. Not requiring many PRNs.    Anxiety/depression: related to work, which is stressful. Happy w/ control on Lexapro 10mg daily    BPH: occasionally gets up at night to urinate. Much improved since starting tamsulosin 0.4mg daily 2 years ago. Follows w/ Dr Pena of Urology.    Chronic back pain (DDD): previously having radiculopathy to R leg. Managed well w/ gabapentin 300 daily    Finger swelling: progressive swelling of L index finger on each side of PIP. 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 They told him it would return and it has. Interested in referral    Review of Systems   Constitutional: Negative for chills, fever and unexpected weight loss.   HENT: Negative for congestion, rhinorrhea and sore throat.    Eyes: Negative for visual disturbance.   Respiratory: Negative for cough and shortness of breath.    Cardiovascular: Negative for chest pain and palpitations.   Gastrointestinal: Negative for abdominal pain, constipation, diarrhea, nausea and vomiting.   Genitourinary: Negative for difficulty urinating and dysuria.   Musculoskeletal: Positive for joint swelling. Negative for arthralgias.   Skin: Negative for rash and skin lesions.   Neurological: Negative for weakness and headache.   Psychiatric/Behavioral: Negative for depressed mood. The patient is not nervous/anxious.      The following portions of the patient's history were reviewed and updated as appropriate: problem list,  ICU Stay Summary Documentation:    Chava Guidry  Is a 60 year old male with PMH of extensive psychiatric history, paroxysmal Aflutter on coumadin, COPD, HTN who was transferred  from [place] on 10/29 for concern for respiratory failure. Pt was admitted to [facility] after he fell down in the group home. He was found to have Na of 118. His weakness progressed and required 7L of NC overnight. Dr Trujillo was consulted and agreed for further evaluation/ transfer. Pt on arrival- was belly breathing. RR mid 30's. We did NIF which was 16 and  cc. Pt denied fever or chills. Denies cough. No viral illness. No diarrhea. He was able to move his upper extremity better than lower extremity. Pt agreed for intubation. He has a guardian also, tried to contact but number not working. Pt was intubated on 10/29.      On 10/30, MRI demonstrated severe cervical edema and stenosis; taken by NS for emergent decompression; post-op uneventful, still weak.  On 10/31:Was on Levophed at 5 mcg/hr this AM.  Still reflexive movement in the lower extremity.  Bronchoscopy was done for persistent left lower lobe lung collapse despite chest PT and mucolytics. Purulent and copious secretion in both right and left lung, specially in the left lower lobe. Sample sent for lab.  On 11/1, Pt has excessive secretion and Fio2- increased to 70%. Bronch was done and suctioned.  Glycopyrrolate was added.  On 11/2:ICU Pt was bronched x 2 for excessive secretion and hypoxia.  Few staph aureus from the BAL.  On 11/4 : underwent trach and peg.  Pt was Fio2 40% dropped his Spo2 to mid 80's and increased Fio2- to 60%, PEEP is at 10.  CXR ordered and showed bibasilar air space opacity.  BAL (11/2/18) growing -gram positive bacili.  BAL (10/31/18)- MSSA.  Vanc was changed to.  Cefepime.  On 11/5, Bl cx and resp cx sent.  Pt had also significant dark brown secretion around the trach.  And high residual.  Abdominal distention - TF was held.  Failed weaning  past medical history, past surgical history, allergies, current medications, past social history and past family history.    Problem List Tab  Patient History Tab  Immunizations Tab  Medications Tab  Chart Review Tab  Care Everywhere Tab  Synopsis Tab    PE  Vitals:    02/03/20 0757   BP: 120/70   Pulse: 68   Resp: 8   Temp: 98.3 °F (36.8 °C)   SpO2: 98%     Body mass index is 34.5 kg/m².  General: Well nourished, NAD  Head: AT/NC  Eyes: EOMI, anicteric sclera  ENT: MMM w/o erythema  Neck: Supple, no thyromegaly or LAD  Resp: CTAB, SCR, BS equal  CV: RRR w/o m/r/g; 2+ pulses  GI: Soft, NT/ND, +BS. Obese  MSK: FROM. L index finger w/ swelling of the pad of the finger on both side of PIP. No discrete mass appreciated. No erythema. Mild limitation on ROM  Skin: Warm, dry, intact  Neuro: Alert and oriented. No focal deficits  Psych: Appropriate mood and affect    Imaging  No Images in the past 120 days found..    Assessment/Plan   Enrrique Alejandre is a 65 y.o. male that presents for   Chief Complaint   Patient presents with   • Follow-up     medication     Enrrique was seen today for follow-up.    Diagnoses and all orders for this visit:    Essential hypertension: 120/70 today. No changes  -     losartan-hydrochlorothiazide (HYZAAR) 100-12.5 MG per tablet; Take 1 tablet by mouth Daily.  - Cont home Caduet 5/20    PSVT (paroxysmal supraventricular tachycardia) (CMS/HCC)  -     sotalol (BETAPACE) 80 MG tablet; Take 0.5 tablets by mouth Every 12 (Twelve) Hours.  - Cont CARDS f/u- Dr Martinez    Myasthenia gravis (CMS/HCC): well controlled  -     mycophenolate (CELLCEPT) 500 MG tablet; Take 1 tablet by mouth 2 (Two) Times a Day.  - Cont home pyridostigmine  -     CBC & Differential  -     Comprehensive Metabolic Panel    Anxiety and depression: well controlled  -     Cont escitalopram (LEXAPRO) 10 MG tablet; Take 1 tablet by mouth Daily.    Benign prostatic hyperplasia with nocturia  -     tamsulosin (FLOMAX) 0.4 MG capsule  trials - due to desaturations.    On 11/6, afebrile, no events overnight.  On 11/7, overnight had severe episode of desaturation, went up to 100% peep of 15.  In am, down to 70% and peep of 11.  Us chest with bilateral moderate effusions.  Still spiking fever.  On 11/8, No events overnight. cxr with RLL collapse. mucmyst and lung recruitement maneuvers.  GI consulted for black tarry stools.  Underwent egd with findings consistent erosive esophagitis and questionable barrets, as well as ulcer near peg tube most likely source or oozing.  On 11/10, complete white out of L lung with increasing O2 requirements, underwent emergent bronchoscopy with resolution of collapse.  On 11/11, still copious secretions.  C/o face, chest and abdominal pain with nodding.  Noted purulent drainage and erythema around trachestomy.  RLL collapse, bronchoscopy performed emergently after O2 dropped to 86%.  On 11/12, down to 45%, peep 9, secretions copious, but less. C/o pain around trach site. Still with black tarry stools.      24-hour Summary:    11/12: Slow vent wean with FIO2 45% and PEEP 9. Trach evaluated by surgery who recommended local wound care and off loading to prevent further pressure injury.     Plan for 11/12/2018:    - LTAC placement. Can transfer on propofol. Pulmonary issues will continued to require pulmonary care. Infections have been addressed.     1. Acute flaccid paralysis             Initially unclear etiology, later found on MRI to be due to trauma and cervical cord edema             Neurosurgery- [facility]             [facility] on [facility] - [facility]     2. Cervical stenosis with acute cord contusion                Neurosurgery- [facility]             Emergent anterior decompression             S/P trach and peg              3. Atrial fibrillation             Rate controlled     Hold Coumadin due to persistent melena     4. Hyponatremia- resolved               7. UTI with enterobacter             Rocephin for total 5 days -  "24 hr capsule; Take 1 capsule by mouth Daily.    Radicular low back pain  -     gabapentin (NEURONTIN) 300 MG capsule; Take 1 capsule by mouth Daily.    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 Not in chart. He was told it would come back and it has grown to the point where it is limiting some ROM. Will refer back to hand surgery  -     Ambulatory Referral to Hand Surgery    F/U in 6 months for annual physical      " completed course 11/2     Zosyn and fluconazole for sepsis       7. Acute hypoxic respiratory failure- this is due to phrenic nerve involvement likely from cervical spine nerve compression and also atelectasis   Continues to have excessive secretion-   Bronchoscopy 10/31  Bronchoscopy x 2 on 11/1   Bronch- 11/10; 11/11  S/P trach and PEG   Lung recruitment, mucomyst     8 MSSA Pneumonia; healthcare associated pneumonia   ID recommendation:  · Fluconazole to end on November 12  · Zosyn to end November 13  · Stool for C. Diff  · May need oral Vancomycin if C. Diff positive     9. Ileus- resolved    10. Bilateral pleural effusions    S/p thoracentesis on 11/11      Above plan was developed by Dr. Whitehead.

## 2020-02-28 DIAGNOSIS — M54.10 RADICULAR LOW BACK PAIN: ICD-10-CM

## 2020-02-28 RX ORDER — GABAPENTIN 300 MG/1
CAPSULE ORAL
Qty: 30 CAPSULE | Refills: 0 | Status: SHIPPED | OUTPATIENT
Start: 2020-02-28 | End: 2021-03-08

## 2020-05-14 ENCOUNTER — TELEPHONE (OUTPATIENT)
Dept: FAMILY MEDICINE CLINIC | Facility: CLINIC | Age: 65
End: 2020-05-14

## 2020-05-14 DIAGNOSIS — G70.00 MYASTHENIA GRAVIS (HCC): ICD-10-CM

## 2020-05-14 RX ORDER — MYCOPHENOLATE MOFETIL 500 MG/1
500 TABLET ORAL 2 TIMES DAILY
Qty: 180 TABLET | Refills: 3 | Status: SHIPPED | OUTPATIENT
Start: 2020-05-14 | End: 2021-01-19

## 2020-05-14 NOTE — TELEPHONE ENCOUNTER
NEO CALLED TO SEE IF THE RX FOR mycophenolate (CELLCEPT) 500 MG tablet CAN GE SENT TO [facility] FOR A 90 DAY SUPPLY? LAST TIME IT WAS SENT TO [facility], BUT IT IS MUCH CHEAPER THROUGH [facility].      [employer]

## 2020-05-29 ENCOUNTER — OFFICE VISIT (OUTPATIENT)
Dept: FAMILY MEDICINE CLINIC | Facility: CLINIC | Age: 65
End: 2020-05-29

## 2020-05-29 DIAGNOSIS — J06.9 UPPER RESPIRATORY TRACT INFECTION, UNSPECIFIED TYPE: Primary | ICD-10-CM

## 2020-05-29 PROCEDURE — 99213 OFFICE O/P EST LOW 20 MIN: CPT | Performed by: PHYSICIAN ASSISTANT

## 2020-05-29 RX ORDER — DOXYCYCLINE 100 MG/1
100 CAPSULE ORAL 2 TIMES DAILY
Qty: 20 CAPSULE | Refills: 0 | Status: SHIPPED | OUTPATIENT
Start: 2020-05-29 | End: 2020-06-29

## 2020-05-29 RX ORDER — PREDNISONE 20 MG/1
20 TABLET ORAL 2 TIMES DAILY
Qty: 10 TABLET | Refills: 0 | Status: SHIPPED | OUTPATIENT
Start: 2020-05-29 | End: 2020-06-29

## 2020-05-29 NOTE — PROGRESS NOTES
Subjective   Enrrique Alejandre is a 65 y.o. male.     History of Present Illness patient and I had a phone visit to discuss his cough and sinus congestion.  Patient states that he began to develop this cough approximately 2 days ago which has caused him to feel mildly short of air.  He complains of chest tightness but denies any wheezing.  He denies chest pain or radiation of the pain into his extremities or jaw.  He denies fever but does complain of occasional myalgia.  He denies any exposure to COVID-19.  He is currently taking CellCept.  He does have an appointment scheduled in June to see his cardiologist for his paroxysmal supraventricular tachycardia.  He denies any tachycardia or palpitations at this time.  Denies ear pain or adenopathy.    The following portions of the patient's history were reviewed and updated as appropriate: allergies, current medications, past family history, past medical history, past social history, past surgical history and problem list.    Review of Systems   Constitutional: Positive for chills. Negative for fever.   HENT: Positive for sinus pressure. Negative for congestion, ear discharge, ear pain, sore throat and swollen glands.    Eyes: Negative for blurred vision and visual disturbance.   Respiratory: Positive for cough and chest tightness. Negative for wheezing.    Cardiovascular: Negative for chest pain and leg swelling.   Gastrointestinal: Negative for nausea and vomiting.   Neurological: Negative for weakness and headache.   Hematological: Negative for adenopathy. Does not bruise/bleed easily.       Objective   Physical Exam  Unable to perform as this was done via telephone visit.    Assessment/Plan   Problems Addressed this Visit           Acute upper respiratory infection-this visit was done over the phone.  Patient does complain of cough and congestion.  He denies fever but he is on CellCept.  He does complain of occasional myalgia.  My suspicion for COVID-19 is low but  if he develops fever he should report to the hospital for further testing.  If his chest tightness and cough develops into chest pain he is to call the clinic to either have an EKG performed or to go to the emergency department.  Due to his immunocompromise state I will prescribe doxycycline 100 mg twice daily for 10 days.  Of note he is currently on Bactrim for a small skin infection from a surgical procedure.  He just began the Bactrim approximately 2 days ago so I will have him stop the Bactrim and take the doxycycline for both the skin infection and possible upper respiratory bacterial infection.

## 2020-06-29 ENCOUNTER — OFFICE VISIT (OUTPATIENT)
Dept: CARDIOLOGY | Facility: CLINIC | Age: 65
End: 2020-06-29

## 2020-06-29 VITALS
SYSTOLIC BLOOD PRESSURE: 118 MMHG | DIASTOLIC BLOOD PRESSURE: 62 MMHG | HEIGHT: 64 IN | WEIGHT: 196 LBS | HEART RATE: 60 BPM | OXYGEN SATURATION: 98 % | BODY MASS INDEX: 33.46 KG/M2

## 2020-06-29 DIAGNOSIS — I48.0 PAROXYSMAL ATRIAL FIBRILLATION (HCC): ICD-10-CM

## 2020-06-29 DIAGNOSIS — R00.1 BRADYCARDIA, SINUS: ICD-10-CM

## 2020-06-29 DIAGNOSIS — I47.1 PSVT (PAROXYSMAL SUPRAVENTRICULAR TACHYCARDIA) (HCC): ICD-10-CM

## 2020-06-29 DIAGNOSIS — R00.1 BRADYCARDIA: ICD-10-CM

## 2020-06-29 DIAGNOSIS — I63.9 CEREBROVASCULAR ACCIDENT (CVA), UNSPECIFIED MECHANISM (HCC): ICD-10-CM

## 2020-06-29 DIAGNOSIS — I10 ESSENTIAL HYPERTENSION: Primary | ICD-10-CM

## 2020-06-29 DIAGNOSIS — J06.9 UPPER RESPIRATORY TRACT INFECTION, UNSPECIFIED TYPE: ICD-10-CM

## 2020-06-29 PROCEDURE — 93000 ELECTROCARDIOGRAM COMPLETE: CPT | Performed by: INTERNAL MEDICINE

## 2020-06-29 PROCEDURE — 99213 OFFICE O/P EST LOW 20 MIN: CPT | Performed by: INTERNAL MEDICINE

## 2020-06-29 NOTE — PROGRESS NOTES
Cardiology Office Visit Note      Referring physician: Sergio Martinez MD  Reason For Followup: 1 year follow up     HPI:  Enrrique Alejandre is a 65 y.o. male.   Presents annual FU prior hx HTN, PSVT, TIA/CVA with minimal residual neuropathy, dyslipidemia, GERD and myasthenia gravis.    Enrrique returns today with no specific cardiopulmonary issues.   He specifically denies any chest pain, palpitation, lightheadedness, dizziness or increasing shortness of air.  He claims his blood pressure is been well regulated.  Enrrique works full-time and enjoys all sorts of outdoor activities especially hunting.  He continues to enjoy excellent functional activity status and is thought to be compliant with medications as listed and reviewed in detail today.        Past Medical History:   Diagnosis Date   • CVA (cerebral vascular accident) (CMS/HCC)    • High prostate specific antigen (PSA) 8/16/2017   • History of echocardiogram     EF 55%. No significant valvular flow abnormalities.    • History of stress test 11/2010    Stress Myoview: No ischemia 11/2010   • HTN (hypertension) 6/25/2019   • Hypertension    • Myasthenia gravis (CMS/HCC)    • Pancreatitis    • Paroxysmal atrial fibrillation (CMS/HCC)    • PSVT (paroxysmal supraventricular tachycardia) (CMS/HCC) 6/25/2019   • Stroke (CMS/HCC) 02/2014   • Supraventricular tachycardia (CMS/HCC)    • TIA (transient ischemic attack) 2008       Past Surgical History:   Procedure Laterality Date   • FINGER SURGERY     • TONSILLECTOMY     • VASECTOMY           Current Outpatient Medications:   •  amLODIPine-atorvastatin (CADUET) 5-20 MG per tablet, TAKE 1 TABLET DAILY, Disp: 90 tablet, Rfl: 4  •  aspirin (ASPIRIN LOW DOSE) 81 MG tablet, Daily., Disp: , Rfl:   •  escitalopram (LEXAPRO) 10 MG tablet, Take 1 tablet by mouth Daily., Disp: 90 tablet, Rfl: 3  •  gabapentin (NEURONTIN) 300 MG capsule, TAKE ONE CAPSULE BY MOUTH DAILY, Disp: 30 capsule, Rfl: 0  •   losartan-hydrochlorothiazide (HYZAAR) 100-12.5 MG per tablet, Take 1 tablet by mouth Daily., Disp: 90 tablet, Rfl: 3  •  mycophenolate (CellCept) 500 MG tablet, Take 1 tablet by mouth 2 (Two) Times a Day., Disp: 180 tablet, Rfl: 3  •  omeprazole (priLOSEC) 40 MG capsule, TAKE 1 CAPSULE DAILY, Disp: 90 capsule, Rfl: 4  •  pyridostigmine (MESTINON) 180 MG CR tablet, TAKE 1 TABLET THREE TIMES A DAY, Disp: 270 tablet, Rfl: 4  •  pyridostigmine (MESTINON) 60 MG tablet, TAKE ONE TABLET BY MOUTH EVERY 4 HOURS AS NEEDED, Disp: 90 tablet, Rfl: 1  •  sotalol (BETAPACE) 80 MG tablet, Take 0.5 tablets by mouth Every 12 (Twelve) Hours., Disp: 90 tablet, Rfl: 3  •  tamsulosin (FLOMAX) 0.4 MG capsule 24 hr capsule, Take 1 capsule by mouth Daily., Disp: 90 capsule, Rfl: 3    Social History     Socioeconomic History   • Marital status:      Spouse name: Not on file   • Number of children: Not on file   • Years of education: Not on file   • Highest education level: Not on file   Tobacco Use   • Smoking status: Never Smoker   • Smokeless tobacco: Never Used   Substance and Sexual Activity   • Alcohol use: Yes   • Drug use: No   • Sexual activity: Defer       Family History   Problem Relation Age of Onset   • Hypertension Mother    • Stroke Mother    • Other Mother         Cerebral Bleed   • Hypertension Father    • Other Father          Review of Systems   General: denies fever, chills, anorexia, weight loss  Eyes: denies blurring, diplopia  Ear/Nose/Throat: denies ear pain, nosebleeds, hoarseness  Cardiovascular: See HPI  Respiratory: denies excessive sputum, hemoptysis, wheezing  Gastrointestinal: denies nausea, vomiting, change in bowel habits, abdominal pain  Genitourinary: No hematuria dysuria or nocturia  Musculoskeletal: No significant arthralgias or myalgias; remains very functionally active  Skin: denies rashes, itching, suspicious lesions  Neurologic: denies focal neuro deficits; myasthenia remains under good  "control  Psychiatric: denies depression, anxiety  Endocrine: denies cold intolerance, heat intolerance  Hematologic/Lymphatic: denies abnormal bruising, bleeding  Allergic/Immunologic: denies urticaria or persistent infections      Objective     Visit Vitals  /62   Pulse 60   Ht 162.6 cm (64.02\")   Wt 88.9 kg (196 lb)   SpO2 98%   BMI 33.63 kg/m²           Physical Exam  General:     Mildly Obese, well developed,, in no acute distress.    Head:     normocephalic and atraumatic.    Eyes:    PERRL/EOM intact, conjunctiva and sclera clear with out nystagmus.    Neck:    no jvd or bruits  Chest Wall:    no deformities   Lungs:    clear bilaterally to auscultation with adequate global airflow   Heart:    non-displaced PMI; regular rate and rhythm, normal S1, S2 without murmurs, rubs, or gallops  Abdomen:  Soft, nontender without HSM  Msk:    no deformity; adequate R OM  Pulses:    pulses normal in all 4 extremities.    Extremities:    no clubbing, cyanosis, edema   Neurologic:    no focal sensory or motor deficits  Skin:    intact without lesions or rashes.    Psych:    alert and cooperative; normal mood and affect; normal attention span and concentration.            ECG 12 Lead  Date/Time: 6/29/2020 8:42 AM  Performed by: JAQUELIN Martinez MD  Authorized by: JAQUELIN Martinez MD   Comparison: compared with previous ECG   Similar to previous ECG  Comparison to previous ECG: SB  HR 54  Rhythm: sinus rhythm              Assessment:   Problems Addressed this Visit        Cardiovascular and Mediastinum    HTN (hypertension) - Primary    -Remains well-regulated on current medications        PSVT (paroxysmal supraventricular tachycardia) (CMS/HCC)    Relevant Orders    ECG 12 Lead  -Maintained in sinus rhythm on low-dose sotalol    CVA (cerebral vascular accident) (CMS/HCC)    No overt residual clinical neuropathy        Bradycardia, sinus    -Improved/resolved with decreasing dose of Betapace            Paroxysmal atrial " fibrillation (CMS/HCC)    Relevant Orders    ECG 12 Lead  --Maintaining sinus rhythm on low-dose Betapace; not on anticoagulant therapy                       Plan:  Continue current medications as listed reviewed in detail with patient.  Hemodynamically well compensated and symptom-free with excellent functional activity status.  I have made no changes to current medical regimen recommend no further cardiac studies at this time.  We will be happy to see Enrrique on an annual basis or sooner if needed.    6/29/2020 13:09    This report was generated using the Dragon voice recognition system.

## 2020-06-30 NOTE — ASSESSMENT & PLAN NOTE
Maintaining sinus rhythm on low-dose Betapace; not requiring anticoagulant therapy with low chads score

## 2020-07-04 ENCOUNTER — ANESTHESIA EVENT (OUTPATIENT)
Dept: PERIOP | Facility: HOSPITAL | Age: 65
End: 2020-07-04

## 2020-07-04 ENCOUNTER — HOSPITAL ENCOUNTER (OUTPATIENT)
Facility: HOSPITAL | Age: 65
Discharge: HOME | End: 2020-07-05
Attending: EMERGENCY MEDICINE | Admitting: FAMILY MEDICINE

## 2020-07-04 ENCOUNTER — APPOINTMENT (OUTPATIENT)
Dept: ULTRASOUND IMAGING | Facility: HOSPITAL | Age: 65
End: 2020-07-04

## 2020-07-04 ENCOUNTER — ANESTHESIA (OUTPATIENT)
Dept: PERIOP | Facility: HOSPITAL | Age: 65
End: 2020-07-04

## 2020-07-04 ENCOUNTER — APPOINTMENT (OUTPATIENT)
Dept: CT IMAGING | Facility: HOSPITAL | Age: 65
End: 2020-07-04

## 2020-07-04 DIAGNOSIS — K81.9 CHOLECYSTITIS: Primary | ICD-10-CM

## 2020-07-04 LAB
ALBUMIN SERPL-MCNC: 4.4 G/DL (ref 3.5–5.2)
ALBUMIN/GLOB SERPL: 2.2 G/DL
ALP SERPL-CCNC: 72 U/L (ref 39–117)
ALT SERPL W P-5'-P-CCNC: 27 U/L (ref 1–41)
ANION GAP SERPL CALCULATED.3IONS-SCNC: 11 MMOL/L (ref 5–15)
APTT PPP: 25.5 SECONDS (ref 24–31)
AST SERPL-CCNC: 28 U/L (ref 1–40)
BASOPHILS # BLD AUTO: 0.1 10*3/MM3 (ref 0–0.2)
BASOPHILS NFR BLD AUTO: 1 % (ref 0–1.5)
BILIRUB SERPL-MCNC: 0.6 MG/DL (ref 0.2–1.2)
BUN SERPL-MCNC: 21 MG/DL (ref 8–23)
BUN SERPL-MCNC: ABNORMAL MG/DL
BUN/CREAT SERPL: ABNORMAL
CALCIUM SPEC-SCNC: 9.1 MG/DL (ref 8.6–10.5)
CHLORIDE SERPL-SCNC: 103 MMOL/L (ref 98–107)
CO2 SERPL-SCNC: 24 MMOL/L (ref 22–29)
CREAT SERPL-MCNC: 0.89 MG/DL (ref 0.76–1.27)
DEPRECATED RDW RBC AUTO: 41.1 FL (ref 37–54)
EOSINOPHIL # BLD AUTO: 0.1 10*3/MM3 (ref 0–0.4)
EOSINOPHIL NFR BLD AUTO: 1.2 % (ref 0.3–6.2)
ERYTHROCYTE [DISTWIDTH] IN BLOOD BY AUTOMATED COUNT: 14.2 % (ref 12.3–15.4)
GFR SERPL CREATININE-BSD FRML MDRD: 86 ML/MIN/1.73
GLOBULIN UR ELPH-MCNC: 2 GM/DL
GLUCOSE SERPL-MCNC: 131 MG/DL (ref 65–99)
HCT VFR BLD AUTO: 42.4 % (ref 37.5–51)
HGB BLD-MCNC: 14.3 G/DL (ref 13–17.7)
HOLD SPECIMEN: NORMAL
INR PPP: 0.99 (ref 0.9–1.1)
LIPASE SERPL-CCNC: 53 U/L (ref 13–60)
LYMPHOCYTES # BLD AUTO: 0.9 10*3/MM3 (ref 0.7–3.1)
LYMPHOCYTES NFR BLD AUTO: 9.8 % (ref 19.6–45.3)
MCH RBC QN AUTO: 28.1 PG (ref 26.6–33)
MCHC RBC AUTO-ENTMCNC: 33.6 G/DL (ref 31.5–35.7)
MCV RBC AUTO: 83.5 FL (ref 79–97)
MONOCYTES # BLD AUTO: 0.5 10*3/MM3 (ref 0.1–0.9)
MONOCYTES NFR BLD AUTO: 5.2 % (ref 5–12)
NEUTROPHILS NFR BLD AUTO: 8 10*3/MM3 (ref 1.7–7)
NEUTROPHILS NFR BLD AUTO: 82.8 % (ref 42.7–76)
NRBC BLD AUTO-RTO: 0 /100 WBC (ref 0–0.2)
PLATELET # BLD AUTO: 161 10*3/MM3 (ref 140–450)
PMV BLD AUTO: 7.6 FL (ref 6–12)
POTASSIUM SERPL-SCNC: 3.8 MMOL/L (ref 3.5–5.2)
PROT SERPL-MCNC: 6.4 G/DL (ref 6–8.5)
PROTHROMBIN TIME: 10.4 SECONDS (ref 9.6–11.7)
RBC # BLD AUTO: 5.08 10*6/MM3 (ref 4.14–5.8)
SODIUM SERPL-SCNC: 138 MMOL/L (ref 136–145)
WBC # BLD AUTO: 9.7 10*3/MM3 (ref 3.4–10.8)

## 2020-07-04 PROCEDURE — 25010000002 PIPERACILLIN SOD-TAZOBACTAM PER 1 G: Performed by: EMERGENCY MEDICINE

## 2020-07-04 PROCEDURE — G0378 HOSPITAL OBSERVATION PER HR: HCPCS

## 2020-07-04 PROCEDURE — 25010000003 CEFAZOLIN PER 500 MG: Performed by: ANESTHESIOLOGY

## 2020-07-04 PROCEDURE — 76705 ECHO EXAM OF ABDOMEN: CPT

## 2020-07-04 PROCEDURE — 99284 EMERGENCY DEPT VISIT MOD MDM: CPT

## 2020-07-04 PROCEDURE — 25010000002 MORPHINE PER 10 MG

## 2020-07-04 PROCEDURE — 96375 TX/PRO/DX INJ NEW DRUG ADDON: CPT

## 2020-07-04 PROCEDURE — 47562 LAPAROSCOPIC CHOLECYSTECTOMY: CPT | Performed by: SURGERY

## 2020-07-04 PROCEDURE — 85610 PROTHROMBIN TIME: CPT | Performed by: EMERGENCY MEDICINE

## 2020-07-04 PROCEDURE — 85730 THROMBOPLASTIN TIME PARTIAL: CPT | Performed by: EMERGENCY MEDICINE

## 2020-07-04 PROCEDURE — 85025 COMPLETE CBC W/AUTO DIFF WBC: CPT | Performed by: EMERGENCY MEDICINE

## 2020-07-04 PROCEDURE — 83690 ASSAY OF LIPASE: CPT | Performed by: EMERGENCY MEDICINE

## 2020-07-04 PROCEDURE — 99219 PR INITIAL OBSERVATION CARE/DAY 50 MINUTES: CPT | Performed by: FAMILY MEDICINE

## 2020-07-04 PROCEDURE — 0 IOPAMIDOL PER 1 ML: Performed by: EMERGENCY MEDICINE

## 2020-07-04 PROCEDURE — 25010000002 PROPOFOL 10 MG/ML EMULSION: Performed by: ANESTHESIOLOGY

## 2020-07-04 PROCEDURE — 25010000002 MORPHINE PER 10 MG: Performed by: EMERGENCY MEDICINE

## 2020-07-04 PROCEDURE — 74177 CT ABD & PELVIS W/CONTRAST: CPT

## 2020-07-04 PROCEDURE — 88304 TISSUE EXAM BY PATHOLOGIST: CPT | Performed by: SURGERY

## 2020-07-04 PROCEDURE — 80053 COMPREHEN METABOLIC PANEL: CPT | Performed by: EMERGENCY MEDICINE

## 2020-07-04 PROCEDURE — 63710000001 MYCOPHENOLATE MOFETIL PER 250 MG: Performed by: SURGERY

## 2020-07-04 PROCEDURE — 96376 TX/PRO/DX INJ SAME DRUG ADON: CPT

## 2020-07-04 PROCEDURE — 25010000002 PIPERACILLIN SOD-TAZOBACTAM PER 1 G: Performed by: SURGERY

## 2020-07-04 PROCEDURE — 99204 OFFICE O/P NEW MOD 45 MIN: CPT | Performed by: SURGERY

## 2020-07-04 PROCEDURE — 25010000002 FENTANYL CITRATE (PF) 100 MCG/2ML SOLUTION: Performed by: ANESTHESIOLOGY

## 2020-07-04 PROCEDURE — 25010000002 ONDANSETRON PER 1 MG

## 2020-07-04 PROCEDURE — 96365 THER/PROPH/DIAG IV INF INIT: CPT

## 2020-07-04 DEVICE — SEAL HEMO SURG ARISTA/AH ABS/PWDR 3GM: Type: IMPLANTABLE DEVICE | Site: ABDOMEN | Status: FUNCTIONAL

## 2020-07-04 RX ORDER — PROMETHAZINE HYDROCHLORIDE 25 MG/1
25 TABLET ORAL ONCE AS NEEDED
Status: DISCONTINUED | OUTPATIENT
Start: 2020-07-04 | End: 2020-07-04 | Stop reason: HOSPADM

## 2020-07-04 RX ORDER — MORPHINE SULFATE 4 MG/ML
2 INJECTION, SOLUTION INTRAMUSCULAR; INTRAVENOUS
Status: DISCONTINUED | OUTPATIENT
Start: 2020-07-04 | End: 2020-07-05 | Stop reason: HOSPADM

## 2020-07-04 RX ORDER — PROMETHAZINE HYDROCHLORIDE 25 MG/1
25 SUPPOSITORY RECTAL ONCE AS NEEDED
Status: DISCONTINUED | OUTPATIENT
Start: 2020-07-04 | End: 2020-07-04 | Stop reason: HOSPADM

## 2020-07-04 RX ORDER — SODIUM CHLORIDE, SODIUM LACTATE, POTASSIUM CHLORIDE, CALCIUM CHLORIDE 600; 310; 30; 20 MG/100ML; MG/100ML; MG/100ML; MG/100ML
INJECTION, SOLUTION INTRAVENOUS CONTINUOUS PRN
Status: DISCONTINUED | OUTPATIENT
Start: 2020-07-04 | End: 2020-07-04 | Stop reason: SURG

## 2020-07-04 RX ORDER — MORPHINE SULFATE 4 MG/ML
INJECTION, SOLUTION INTRAMUSCULAR; INTRAVENOUS
Status: COMPLETED
Start: 2020-07-04 | End: 2020-07-04

## 2020-07-04 RX ORDER — HYDROCODONE BITARTRATE AND ACETAMINOPHEN 5; 325 MG/1; MG/1
1 TABLET ORAL EVERY 6 HOURS PRN
Status: DISCONTINUED | OUTPATIENT
Start: 2020-07-04 | End: 2020-07-05 | Stop reason: HOSPADM

## 2020-07-04 RX ORDER — ONDANSETRON 2 MG/ML
INJECTION INTRAMUSCULAR; INTRAVENOUS
Status: COMPLETED
Start: 2020-07-04 | End: 2020-07-04

## 2020-07-04 RX ORDER — SODIUM CHLORIDE 0.9 % (FLUSH) 0.9 %
10 SYRINGE (ML) INJECTION EVERY 12 HOURS SCHEDULED
Status: DISCONTINUED | OUTPATIENT
Start: 2020-07-04 | End: 2020-07-05 | Stop reason: HOSPADM

## 2020-07-04 RX ORDER — ONDANSETRON 2 MG/ML
4 INJECTION INTRAMUSCULAR; INTRAVENOUS ONCE AS NEEDED
Status: DISCONTINUED | OUTPATIENT
Start: 2020-07-04 | End: 2020-07-04 | Stop reason: HOSPADM

## 2020-07-04 RX ORDER — PROMETHAZINE HYDROCHLORIDE 25 MG/ML
6.25 INJECTION, SOLUTION INTRAMUSCULAR; INTRAVENOUS ONCE AS NEEDED
Status: DISCONTINUED | OUTPATIENT
Start: 2020-07-04 | End: 2020-07-04 | Stop reason: HOSPADM

## 2020-07-04 RX ORDER — FENTANYL CITRATE 50 UG/ML
INJECTION, SOLUTION INTRAMUSCULAR; INTRAVENOUS AS NEEDED
Status: DISCONTINUED | OUTPATIENT
Start: 2020-07-04 | End: 2020-07-04 | Stop reason: SURG

## 2020-07-04 RX ORDER — SODIUM CHLORIDE 0.9 % (FLUSH) 0.9 %
10 SYRINGE (ML) INJECTION AS NEEDED
Status: DISCONTINUED | OUTPATIENT
Start: 2020-07-04 | End: 2020-07-05 | Stop reason: HOSPADM

## 2020-07-04 RX ORDER — MORPHINE SULFATE 4 MG/ML
2 INJECTION, SOLUTION INTRAMUSCULAR; INTRAVENOUS
Status: DISCONTINUED | OUTPATIENT
Start: 2020-07-04 | End: 2020-07-04 | Stop reason: HOSPADM

## 2020-07-04 RX ORDER — ROCURONIUM BROMIDE 10 MG/ML
INJECTION, SOLUTION INTRAVENOUS AS NEEDED
Status: DISCONTINUED | OUTPATIENT
Start: 2020-07-04 | End: 2020-07-04 | Stop reason: SURG

## 2020-07-04 RX ORDER — AMLODIPINE BESYLATE 5 MG/1
5 TABLET ORAL
Status: DISCONTINUED | OUTPATIENT
Start: 2020-07-04 | End: 2020-07-05 | Stop reason: HOSPADM

## 2020-07-04 RX ORDER — GLYCOPYRROLATE 1 MG/5 ML
SYRINGE (ML) INTRAVENOUS AS NEEDED
Status: DISCONTINUED | OUTPATIENT
Start: 2020-07-04 | End: 2020-07-04 | Stop reason: SURG

## 2020-07-04 RX ORDER — MYCOPHENOLATE MOFETIL 250 MG/1
500 CAPSULE ORAL EVERY 12 HOURS SCHEDULED
Status: DISCONTINUED | OUTPATIENT
Start: 2020-07-04 | End: 2020-07-05 | Stop reason: HOSPADM

## 2020-07-04 RX ORDER — MORPHINE SULFATE 4 MG/ML
4 INJECTION, SOLUTION INTRAMUSCULAR; INTRAVENOUS ONCE
Status: COMPLETED | OUTPATIENT
Start: 2020-07-04 | End: 2020-07-04

## 2020-07-04 RX ORDER — CEFAZOLIN SODIUM 1 G/3ML
INJECTION, POWDER, FOR SOLUTION INTRAMUSCULAR; INTRAVENOUS AS NEEDED
Status: DISCONTINUED | OUTPATIENT
Start: 2020-07-04 | End: 2020-07-04 | Stop reason: SURG

## 2020-07-04 RX ORDER — HYDROMORPHONE HCL 110MG/55ML
0.25 PATIENT CONTROLLED ANALGESIA SYRINGE INTRAVENOUS
Status: DISCONTINUED | OUTPATIENT
Start: 2020-07-04 | End: 2020-07-04 | Stop reason: HOSPADM

## 2020-07-04 RX ORDER — PYRIDOSTIGMINE BROMIDE 180 MG/1
180 TABLET, EXTENDED RELEASE ORAL EVERY 8 HOURS
Status: DISCONTINUED | OUTPATIENT
Start: 2020-07-04 | End: 2020-07-04

## 2020-07-04 RX ORDER — ONDANSETRON 2 MG/ML
4 INJECTION INTRAMUSCULAR; INTRAVENOUS EVERY 6 HOURS PRN
Status: DISCONTINUED | OUTPATIENT
Start: 2020-07-04 | End: 2020-07-05 | Stop reason: HOSPADM

## 2020-07-04 RX ORDER — PANTOPRAZOLE SODIUM 40 MG/1
40 TABLET, DELAYED RELEASE ORAL EVERY MORNING
Status: DISCONTINUED | OUTPATIENT
Start: 2020-07-04 | End: 2020-07-05 | Stop reason: HOSPADM

## 2020-07-04 RX ORDER — MEPERIDINE HYDROCHLORIDE 25 MG/ML
12.5 INJECTION INTRAMUSCULAR; INTRAVENOUS; SUBCUTANEOUS
Status: DISCONTINUED | OUTPATIENT
Start: 2020-07-04 | End: 2020-07-04 | Stop reason: HOSPADM

## 2020-07-04 RX ORDER — ONDANSETRON 2 MG/ML
4 INJECTION INTRAMUSCULAR; INTRAVENOUS ONCE
Status: COMPLETED | OUTPATIENT
Start: 2020-07-04 | End: 2020-07-04

## 2020-07-04 RX ORDER — GABAPENTIN 300 MG/1
300 CAPSULE ORAL DAILY
Status: DISCONTINUED | OUTPATIENT
Start: 2020-07-04 | End: 2020-07-05 | Stop reason: HOSPADM

## 2020-07-04 RX ORDER — TAMSULOSIN HYDROCHLORIDE 0.4 MG/1
0.8 CAPSULE ORAL DAILY
Status: DISCONTINUED | OUTPATIENT
Start: 2020-07-04 | End: 2020-07-05 | Stop reason: HOSPADM

## 2020-07-04 RX ORDER — SOTALOL HYDROCHLORIDE 80 MG/1
40 TABLET ORAL EVERY 12 HOURS
Status: DISCONTINUED | OUTPATIENT
Start: 2020-07-04 | End: 2020-07-05 | Stop reason: HOSPADM

## 2020-07-04 RX ORDER — EPHEDRINE SULFATE/0.9% NACL/PF 25 MG/5 ML
SYRINGE (ML) INTRAVENOUS AS NEEDED
Status: DISCONTINUED | OUTPATIENT
Start: 2020-07-04 | End: 2020-07-04 | Stop reason: SURG

## 2020-07-04 RX ORDER — PYRIDOSTIGMINE BROMIDE 60 MG/1
180 TABLET ORAL EVERY 8 HOURS SCHEDULED
Status: DISCONTINUED | OUTPATIENT
Start: 2020-07-04 | End: 2020-07-04

## 2020-07-04 RX ORDER — ESCITALOPRAM OXALATE 10 MG/1
10 TABLET ORAL DAILY
Status: DISCONTINUED | OUTPATIENT
Start: 2020-07-04 | End: 2020-07-05 | Stop reason: HOSPADM

## 2020-07-04 RX ORDER — BUPIVACAINE HYDROCHLORIDE 2.5 MG/ML
INJECTION, SOLUTION INFILTRATION; PERINEURAL AS NEEDED
Status: DISCONTINUED | OUTPATIENT
Start: 2020-07-04 | End: 2020-07-04 | Stop reason: HOSPADM

## 2020-07-04 RX ORDER — ASPIRIN 81 MG/1
81 TABLET, CHEWABLE ORAL DAILY
Status: DISCONTINUED | OUTPATIENT
Start: 2020-07-04 | End: 2020-07-05 | Stop reason: HOSPADM

## 2020-07-04 RX ORDER — PYRIDOSTIGMINE BROMIDE 180 MG/1
180 TABLET, EXTENDED RELEASE ORAL EVERY 8 HOURS
Status: DISCONTINUED | OUTPATIENT
Start: 2020-07-04 | End: 2020-07-05 | Stop reason: HOSPADM

## 2020-07-04 RX ORDER — PROPOFOL 10 MG/ML
VIAL (ML) INTRAVENOUS AS NEEDED
Status: DISCONTINUED | OUTPATIENT
Start: 2020-07-04 | End: 2020-07-04 | Stop reason: SURG

## 2020-07-04 RX ADMIN — SOTALOL HYDROCHLORIDE 40 MG: 80 TABLET ORAL at 21:57

## 2020-07-04 RX ADMIN — PANTOPRAZOLE SODIUM 40 MG: 40 TABLET, DELAYED RELEASE ORAL at 21:55

## 2020-07-04 RX ADMIN — ROCURONIUM BROMIDE 20 MG: 10 INJECTION, SOLUTION INTRAVENOUS at 12:55

## 2020-07-04 RX ADMIN — FENTANYL CITRATE 100 MCG: 50 INJECTION, SOLUTION INTRAMUSCULAR; INTRAVENOUS at 12:08

## 2020-07-04 RX ADMIN — MORPHINE SULFATE 4 MG: 4 INJECTION INTRAVENOUS at 04:33

## 2020-07-04 RX ADMIN — TAMSULOSIN HYDROCHLORIDE 0.8 MG: 0.4 CAPSULE ORAL at 21:56

## 2020-07-04 RX ADMIN — PROPOFOL 200 MG: 10 INJECTION, EMULSION INTRAVENOUS at 12:09

## 2020-07-04 RX ADMIN — Medication 10 MG: at 12:24

## 2020-07-04 RX ADMIN — CEFAZOLIN 2 G: 1 INJECTION, POWDER, FOR SOLUTION INTRAMUSCULAR; INTRAVENOUS at 12:36

## 2020-07-04 RX ADMIN — Medication 0.5 MG: at 12:45

## 2020-07-04 RX ADMIN — MORPHINE SULFATE 4 MG: 4 INJECTION INTRAVENOUS at 06:00

## 2020-07-04 RX ADMIN — PIPERACILLIN AND TAZOBACTAM 3.38 G: 3; .375 INJECTION, POWDER, FOR SOLUTION INTRAVENOUS at 23:48

## 2020-07-04 RX ADMIN — ONDANSETRON 4 MG: 2 INJECTION INTRAMUSCULAR; INTRAVENOUS at 04:33

## 2020-07-04 RX ADMIN — PYRIDOSTIGMINE BROMIDE 180 MG: 180 TABLET, EXTENDED RELEASE ORAL at 16:05

## 2020-07-04 RX ADMIN — SUGAMMADEX 200 MG: 100 INJECTION, SOLUTION INTRAVENOUS at 13:23

## 2020-07-04 RX ADMIN — ESCITALOPRAM OXALATE 10 MG: 10 TABLET ORAL at 21:55

## 2020-07-04 RX ADMIN — IOPAMIDOL 100 ML: 755 INJECTION, SOLUTION INTRAVENOUS at 05:08

## 2020-07-04 RX ADMIN — MYCOPHENOLATE MOFETIL 500 MG: 250 CAPSULE ORAL at 21:56

## 2020-07-04 RX ADMIN — LOSARTAN POTASSIUM: 50 TABLET, FILM COATED ORAL at 16:04

## 2020-07-04 RX ADMIN — HYDROCODONE BITARTRATE AND ACETAMINOPHEN 1 TABLET: 5; 325 TABLET ORAL at 16:04

## 2020-07-04 RX ADMIN — PIPERACILLIN AND TAZOBACTAM 3.38 G: 3; .375 INJECTION, POWDER, FOR SOLUTION INTRAVENOUS at 05:58

## 2020-07-04 RX ADMIN — Medication 10 ML: at 21:56

## 2020-07-04 RX ADMIN — PYRIDOSTIGMINE BROMIDE 180 MG: 180 TABLET, EXTENDED RELEASE ORAL at 23:48

## 2020-07-04 RX ADMIN — AMLODIPINE BESYLATE 5 MG: 5 TABLET ORAL at 16:04

## 2020-07-04 RX ADMIN — CEFAZOLIN SODIUM 2 G: 1 INJECTION, POWDER, FOR SOLUTION INTRAMUSCULAR; INTRAVENOUS at 15:35

## 2020-07-04 RX ADMIN — MORPHINE SULFATE 4 MG: 4 INJECTION, SOLUTION INTRAMUSCULAR; INTRAVENOUS at 04:33

## 2020-07-04 RX ADMIN — SODIUM CHLORIDE, SODIUM LACTATE, POTASSIUM CHLORIDE, AND CALCIUM CHLORIDE: .6; .31; .03; .02 INJECTION, SOLUTION INTRAVENOUS at 12:00

## 2020-07-04 RX ADMIN — GABAPENTIN 300 MG: 300 CAPSULE ORAL at 21:55

## 2020-07-04 RX ADMIN — PIPERACILLIN AND TAZOBACTAM 3.38 G: 3; .375 INJECTION, POWDER, FOR SOLUTION INTRAVENOUS at 16:04

## 2020-07-04 NOTE — OP NOTE
Operative Report:    Patient Name:  Enrrique Alejandre  YOB: 1955    Date of Surgery:  7/4/2020     Indications: 65-year-old man presented the emergency department chief complaint of epigastric and right upper quadrant abdominal pain associated with nausea.  On his work-up he was found to have acute cholecystitis.  I counseled him about the natural history of this disease process the treatment options and the surgical management of his condition.  After discussion about the risks and benefits of surgery he understood and was willing to proceed.    Pre-op Diagnosis:   Cholecystitis [K81.9]       Post-Op Diagnosis Codes:     * Cholecystitis [K81.9]    Procedure/CPT® Codes:      Procedure(s):  CHOLECYSTECTOMY LAPAROSCOPIC    Staff:  Surgeon(s):  Yogesh Presley MD         Anesthesia: General    Estimated Blood Loss: 30    Implants:    Implant Name Type Inv. Item Serial No.  Lot No. LRB No. Used   SE[employer] AH 3GM - [id number] Implant [employer] AH 3GM  [employer] [id number] N/A 1       Specimen:          Specimens     ID Source Type Tests Collected By Collected At Frozen?      A Gallbladder Tissue · TISSUE PATHOLOGY EXAM   Yogesh Presley MD 7/4/20 1220      Description: gallbladder              Findings: Distended gallbladder with moderate to severe inflammation and edema at the base the gallbladder    Complications: None    Description of Procedure: Patient was taken to the operating room placed on the operating table in the supine position under general anesthesia.  His abdomen was prepped and draped in normal sterile fashion.  Timeout was performed identifying the correct patient procedure site of operation.  I began the operation by entering the abdomen through an infraumbilical skin incision.  The umbilical stalk was dissected free from an approximately 1 cm umbilical hernia.  The abdomen was entered through this umbilical hernia  defect.  The Torres trocar was placed the abdomen insufflated the camera introduced there is no evidence of injury to underlying structures.  An epigastric and 2 right subcostal 5 mm ports were placed under laparoscopic visualization.  The gallbladder was grasped retracted towards the right upper quadrant.  Omental adhesions were dissected free from the gallbladder during this process the distended gallbladder was entered and evacuated bile and sludge.  There was significant edema and inflammation in this region.  I did the majority of the dissection bluntly and with some mild cautery.  I was able to create a window between the gallbladder and the liver and use this to dissected down to the infundibulum.  As the infundibulum narrowed I decided to cease the dissection towards the farhad and proceed to remove the remainder of the distal gallbladder off the liver.  Once this was done an Endoloop was placed around the infundibulum ligated and the gallbladder was cut free.  It was placed in Endo Catch bag and removed from the abdomen.  The right upper quadrant was then thoroughly irrigated and suctioned.  Hemostasis on the liver was achieved with the Bovie.  I used Luis to aid with hemostasis.  The laparoscopic ports were serially removed evidence of abdominal wall bleeding.  I then closed the umbilical hernia primarily using interrupted 0 Ethibond sutures.  The skin of these incisions were closed with 4-0 Vicryl suture and skin affix was placed on the wounds.       Yogesh Presley MD     Date: 7/4/2020  Time: 13:37    This note was created using Dragon Voice Recognition software.

## 2020-07-04 NOTE — ED PROVIDER NOTES
Subjective   Pleasant 65-year-old male complains of severe right upper quadrant pain associate with nausea and vomiting, onset at 1:30 AM.  No clear aggravating alleviating factors.          Review of Systems   Gastrointestinal: Positive for abdominal pain, nausea and vomiting.   All other systems reviewed and are negative.      Past Medical History:   Diagnosis Date   • CVA (cerebral vascular accident) (CMS/[facility])    • High prostate specific antigen (PSA) 8/16/2017   • History of echocardiogram     EF 55%. No significant valvular flow abnormalities.    • History of stress test 11/2010    Stress Myoview: No ischemia 11/2010   • HTN (hypertension) 6/25/2019   • Hypertension    • Myasthenia gravis (CMS/[facility])    • Pancreatitis    • Paroxysmal atrial fibrillation (CMS/[facility])    • PSVT (paroxysmal supraventricular tachycardia) (CMS/[facility]) 6/25/2019   • Stroke (CMS/[facility]) 02/2014   • Supraventricular tachycardia (CMS/[facility])    • TIA (transient ischemic attack) 2008       No Known Allergies    Past Surgical History:   Procedure Laterality Date   • FINGER SURGERY     • TONSILLECTOMY     • VASECTOMY         Family History   Problem Relation Age of Onset   • Hypertension Mother    • Stroke Mother    • Other Mother         Cerebral Bleed   • Hypertension Father    • Other Father        Social History     Socioeconomic History   • Marital status:      Spouse name: Not on file   • Number of children: Not on file   • Years of education: Not on file   • Highest education level: Not on file   Tobacco Use   • Smoking status: Never Smoker   • Smokeless tobacco: Never Used   Substance and Sexual Activity   • Alcohol use: Not Currently   • Drug use: No   • Sexual activity: Defer           Objective   Physical Exam   Constitutional: He is oriented to person, place, and time. He appears well-developed and well-nourished.   HENT:   Head: Normocephalic and atraumatic.   Mouth/Throat: Oropharynx is clear and moist.   Eyes: Pupils are equal,  round, and reactive to light. Conjunctivae are normal.   Neck: Normal range of motion. Neck supple.   Cardiovascular: Normal rate, regular rhythm and normal heart sounds.   Pulmonary/Chest: Effort normal and breath sounds normal.   Abdominal: Soft. Bowel sounds are normal. He exhibits no distension.   Mild right upper quadrant tenderness to palpation, no rebound or guarding   Musculoskeletal: Normal range of motion. He exhibits no edema or tenderness.   Neurological: He is alert and oriented to person, place, and time.   Skin: Skin is warm and dry. Capillary refill takes less than 2 seconds.   Psychiatric: He has a normal mood and affect. His behavior is normal.       Procedures           ED Course                                           MDM  Number of Diagnoses or Management Options  Cholecystitis:   Diagnosis management comments: Results for orders placed or performed during the hospital encounter of 07/04/20  -Comprehensive Metabolic Panel       Result                      Value             Ref Range           Glucose                     131 (H)           65 - 99 mg/dL       BUN                                                               Creatinine                  0.89              0.76 - 1.27 *       Sodium                      138               136 - 145 mm*       Potassium                   3.8               3.5 - 5.2 mm*       Chloride                    103               98 - 107 mmo*       CO2                         24.0              22.0 - 29.0 *       Calcium                     9.1               8.6 - 10.5 m*       Total Protein               6.4               6.0 - 8.5 g/*       Albumin                     4.40              3.50 - 5.20 *       ALT (SGPT)                  27                1 - 41 U/L          AST (SGOT)                  28                1 - 40 U/L          Alkaline Phosphatase        72                39 - 117 U/L        Total Bilirubin             0.6               0.2 - 1.2  mg*       eGFR Non  Amer       86                >60 mL/min/1*       Globulin                    2.0               gm/dL               A/G Ratio                   2.2               g/dL                BUN/Creatinine Ratio                                              Anion Gap                   11.0              5.0 - 15.0 m*  -Protime-INR       Result                      Value             Ref Range           Protime                     10.4              9.6 - 11.7 S*       INR                         0.99              0.90 - 1.10    -aPTT       Result                      Value             Ref Range           PTT                         25.5              24.0 - 31.0 *  -Lipase       Result                      Value             Ref Range           Lipase                      53                13 - 60 U/L    -CBC Auto Differential       Result                      Value             Ref Range           WBC                         9.70              3.40 - 10.80*       RBC                         5.08              4.14 - 5.80 *       Hemoglobin                  14.3              13.0 - 17.7 *       Hematocrit                  42.4              37.5 - 51.0 %       MCV                         83.5              79.0 - 97.0 *       MCH                         28.1              26.6 - 33.0 *       MCHC                        33.6              31.5 - 35.7 *       RDW                         14.2              12.3 - 15.4 %       RDW-SD                      41.1              37.0 - 54.0 *       MPV                         7.6               6.0 - 12.0 fL       Platelets                   161               140 - 450 10*       Neutrophil %                82.8 (H)          42.7 - 76.0 %       Lymphocyte %                9.8 (L)           19.6 - 45.3 %       Monocyte %                  5.2               5.0 - 12.0 %        Eosinophil %                1.2               0.3 - 6.2 %         Basophil %                  1.0                0.0 - 1.5 %         Neutrophils, Absolute       8.00 (H)          1.70 - 7.00 *       Lymphocytes, Absolute       0.90              0.70 - 3.10 *       Monocytes, Absolute         0.50              0.10 - 0.90 *       Eosinophils, Absolute       0.10              0.00 - 0.40 *       Basophils, Absolute         0.10              0.00 - 0.20 *       nRBC                        0.0               0.0 - 0.2 /1*  -BUN       Result                      Value             Ref Range           BUN                         21                8 - 23 mg/dL   -Gold Top - SST       Result                      Value             Ref Range           Extra Tube                                                    Hold for add-ons.  Ct Abdomen Pelvis With Contrast    Result Date: 7/4/2020   1.  Distended gallbladder with minimal wall thickening adjacent fat stranding. Findings most likely represent acute cholecystitis. Consider ultrasound if there is clinical ambiguity. 2.  Chronic findings as above.. Electronically signed by:  Josué Diaz M.D.  7/4/2020 3:20 AM      Dr. King accepts for Dr Martinez       Amount and/or Complexity of Data Reviewed  Clinical lab tests: reviewed  Tests in the radiology section of CPT®: reviewed        Final diagnoses:   Cholecystitis            Chucho Orozco MD  07/04/20 0549

## 2020-07-04 NOTE — ANESTHESIA PREPROCEDURE EVALUATION
Anesthesia Evaluation     Patient summary reviewed and Nursing notes reviewed   NPO Solid Status: > 6 hours  NPO Liquid Status: > 6 hours           Airway   Mallampati: I  TM distance: >3 FB  Neck ROM: full  No difficulty expected  Dental - normal exam     Pulmonary - normal exam   (+) recent URI,   Cardiovascular - normal exam    (+) hypertension well controlled 2 medications or greater, dysrhythmias Atrial Fib,       Neuro/Psych  (+) TIA, CVA, numbness, psychiatric history Depression,     GI/Hepatic/Renal/Endo - negative ROS     Musculoskeletal (-) negative ROS    Abdominal  - normal exam    Bowel sounds: normal.   Substance History - negative use     OB/GYN negative ob/gyn ROS         Other                        Anesthesia Plan    ASA 3 - emergent     general     intravenous induction     Anesthetic plan, all risks, benefits, and alternatives have been provided, discussed and informed consent has been obtained with: patient.

## 2020-07-04 NOTE — H&P
Patient Care Team:  Sergio Martinez MD as PCP - General (Internal Medicine)    Chief complaint RUQ pain    HPI  Enrrique Alejandre is a 65 y.o. male with history of CVA, hypertension, and myasthenia gravis who presents with right upper quadrant pain.  Patient reports earlier in the week he had an episode of diarrhea but not think much of this.  Last night he had severe right upper quadrant pain rated 8-9 out of 10 that awoke him from sleep.  He initially thought the pain was secondary to gas but it would not resolve.  Due to increased concern he decided to present to the [facility] ED.  In the [facility] emergency department, patient obtain CT abdomen pelvis that showed slight gallbladder distention and wall thickening with stranding.  Patient received Zosyn, morphine, and Zofran.  He was admitted for further evaluation management of right upper quadrant pain concerning for acute cholecystitis.    This morning, patient has been evaluated by general surgery who agrees that this is consistent with acute cholecystitis.  Patient has not eaten since yesterday evening and will plan to take for cholecystectomy today.  Upon exam this morning, patient denies any right upper quadrant pain or nausea.  He denies this being exacerbated by eating.  Patient Dors is the medication that he received emergency department help significantly.  He is ready for surgery and has no further questions at this time.    Review of Systems  Review of Systems   Constitutional: Negative for chills, fever and unexpected weight loss.   HENT: Negative for congestion, rhinorrhea and sore throat.    Eyes: Negative for visual disturbance.   Respiratory: Negative for cough and shortness of breath.    Cardiovascular: Negative for chest pain and palpitations.   Gastrointestinal: Positive for abdominal pain, diarrhea and nausea. Negative for constipation and vomiting.   Genitourinary: Negative for difficulty urinating and dysuria.   Musculoskeletal: Negative for  arthralgias and joint swelling.   Skin: Negative for rash and skin lesions.   Neurological: Negative for weakness and headache.   Psychiatric/Behavioral: Negative for depressed mood. The patient is not nervous/anxious.        History  Past Medical History:   Diagnosis Date   • CVA (cerebral vascular accident) (CMS/HCC)    • High prostate specific antigen (PSA) 8/16/2017   • History of echocardiogram     EF 55%. No significant valvular flow abnormalities.    • History of stress test 11/2010    Stress Myoview: No ischemia 11/2010   • HTN (hypertension) 6/25/2019   • Hypertension    • Myasthenia gravis (CMS/HCC)    • Pancreatitis    • Paroxysmal atrial fibrillation (CMS/HCC)    • PSVT (paroxysmal supraventricular tachycardia) (CMS/HCC) 6/25/2019   • Stroke (CMS/[facility]) 02/2014   • Supraventricular tachycardia (CMS/[facility])    • TIA (transient ischemic attack) 2008     Past Surgical History:   Procedure Laterality Date   • FINGER SURGERY     • TONSILLECTOMY     • VASECTOMY       Patient has no known allergies.  Medications Prior to Admission   Medication Sig Dispense Refill Last Dose   • amLODIPine-atorvastatin (CADUET) 5-20 MG per tablet TAKE 1 TABLET DAILY 90 tablet 4 7/3/2020 at Unknown time   • aspirin (ASPIRIN LOW DOSE) 81 MG tablet Daily.   7/3/2020 at Unknown time   • escitalopram (LEXAPRO) 10 MG tablet Take 1 tablet by mouth Daily. 90 tablet 3 7/3/2020 at Unknown time   • gabapentin (NEURONTIN) 300 MG capsule TAKE ONE CAPSULE BY MOUTH DAILY 30 capsule 0 7/3/2020 at Unknown time   • losartan-hydrochlorothiazide (HYZAAR) 100-12.5 MG per tablet Take 1 tablet by mouth Daily. 90 tablet 3 7/3/2020 at Unknown time   • mycophenolate (CellCept) 500 MG tablet Take 1 tablet by mouth 2 (Two) Times a Day. 180 tablet 3 7/3/2020 at Unknown time   • omeprazole (priLOSEC) 40 MG capsule TAKE 1 CAPSULE DAILY 90 capsule 4 7/3/2020 at Unknown time   • pyridostigmine (MESTINON) 180 MG CR tablet TAKE 1 TABLET THREE TIMES A  tablet 4  7/3/2020 at Unknown time   • pyridostigmine (MESTINON) 60 MG tablet TAKE ONE TABLET BY MOUTH EVERY 4 HOURS AS NEEDED 90 tablet 1 7/3/2020 at Unknown time   • sotalol (BETAPACE) 80 MG tablet Take 0.5 tablets by mouth Every 12 (Twelve) Hours. 90 tablet 3 7/3/2020 at Unknown time   • tamsulosin (FLOMAX) 0.4 MG capsule 24 hr capsule Take 1 capsule by mouth Daily. (Patient taking differently: Take 1 capsule by mouth Every 12 (Twelve) Hours.) 90 capsule 3 7/3/2020 at Unknown time     Social History     Socioeconomic History   • Marital status:      Spouse name: Not on file   • Number of children: Not on file   • Years of education: Not on file   • Highest education level: Not on file   Tobacco Use   • Smoking status: Never Smoker   • Smokeless tobacco: Never Used   Substance and Sexual Activity   • Alcohol use: Not Currently   • Drug use: No   • Sexual activity: Defer     Family History   Problem Relation Age of Onset   • Hypertension Mother    • Stroke Mother    • Other Mother         Cerebral Bleed   • Hypertension Father    • Other Father        Vital Signs:   Temp:  [97.8 °F (36.6 °C)-98.3 °F (36.8 °C)] 98.3 °F (36.8 °C)  Heart Rate:  [59-67] 62  Resp:  [15-18] 18  BP: (114-145)/(61-84) 114/61    Physical Exam:  General: NAD  Head: AT/NC  Eyes: PERRL, EOMI, anicteric sclera  ENT: MMM w/o erythema.  Neck: Supple, no thyromegaly or LAD  Lungs: CTAB, SCR, BS equal  Heart: RRR w/o m/r/g, 2+ pulses, no edema  Abd: Soft, ND, Bowel sounds positive.  Right upper quadrant tenderness to palpation.  Positive Cobb  Ext: FROM, no deformity or edema  Skin: Warm, dry, intact  Neuro: A&O. CN grossly intact, no focal deficits  Psych: Appropriate mood and affect    Results Review:   Lab Results (last 24 hours)     Procedure Component Value Units Date/Time    Extra Tubes [[id number]] Collected:  07/04/20 0412    Specimen:  Blood, Venous Line Updated:  07/04/20 0515    Narrative:       The following orders were created for panel  order Extra Tubes.  Procedure                               Abnormality         Status                     ---------                               -----------         ------                     Gold Top - SST[[id number]]                                   Final result                 Please view results for these tests on the individual orders.    Gold Top - SST [[id number]] Collected:  07/04/20 0412    Specimen:  Blood Updated:  07/04/20 0515     Extra Tube Hold for add-ons.     Comment: Auto resulted.       BUN [[id number]]  (Normal) Collected:  07/04/20 0412    Specimen:  Blood Updated:  07/04/20 0446     BUN 21 mg/dL     Comprehensive Metabolic Panel [[id number]]  (Abnormal) Collected:  07/04/20 0412    Specimen:  Blood Updated:  07/04/20 0444     Glucose 131 mg/dL      BUN --     Comment: Testing performed by alternate method        Creatinine 0.89 mg/dL      Sodium 138 mmol/L      Potassium 3.8 mmol/L      Chloride 103 mmol/L      CO2 24.0 mmol/L      Calcium 9.1 mg/dL      Total Protein 6.4 g/dL      Albumin 4.40 g/dL      ALT (SGPT) 27 U/L      AST (SGOT) 28 U/L      Alkaline Phosphatase 72 U/L      Total Bilirubin 0.6 mg/dL      eGFR Non African Amer 86 mL/min/1.73      Globulin 2.0 gm/dL      A/G Ratio 2.2 g/dL      BUN/Creatinine Ratio --     Comment: Testing not performed        Anion Gap 11.0 mmol/L     Narrative:       GFR Normal >60  Chronic Kidney Disease <60  Kidney Failure <15      Lipase [[id number]]  (Normal) Collected:  07/04/20 0412    Specimen:  Blood Updated:  07/04/20 0443     Lipase 53 U/L     Protime-INR [[id number]]  (Normal) Collected:  07/04/20 0412    Specimen:  Blood Updated:  07/04/20 0427     Protime 10.4 Seconds      INR 0.99    aPTT [[id number]]  (Normal) Collected:  07/04/20 0412    Specimen:  Blood Updated:  07/04/20 0427     PTT 25.5 seconds     CBC & Differential [[id number]] Collected:  07/04/20 0412    Specimen:  Blood Updated:  07/04/20 0419    Narrative:       The following orders  were created for panel order CBC & Differential.  Procedure                               Abnormality         Status                     ---------                               -----------         ------                     CBC Auto Differential[[id number]]        Abnormal            Final result                 Please view results for these tests on the individual orders.    CBC Auto Differential [[id number]]  (Abnormal) Collected:  07/04/20 0412    Specimen:  Blood Updated:  07/04/20 0419     WBC 9.70 10*3/mm3      RBC 5.08 10*6/mm3      Hemoglobin 14.3 g/dL      Hematocrit 42.4 %      MCV 83.5 fL      MCH 28.1 pg      MCHC 33.6 g/dL      RDW 14.2 %      RDW-SD 41.1 fl      MPV 7.6 fL      Platelets 161 10*3/mm3      Neutrophil % 82.8 %      Lymphocyte % 9.8 %      Monocyte % 5.2 %      Eosinophil % 1.2 %      Basophil % 1.0 %      Neutrophils, Absolute 8.00 10*3/mm3      Lymphocytes, Absolute 0.90 10*3/mm3      Monocytes, Absolute 0.50 10*3/mm3      Eosinophils, Absolute 0.10 10*3/mm3      Basophils, Absolute 0.10 10*3/mm3      nRBC 0.0 /100 WBC         Imaging Results (Last 24 Hours)     Procedure Component Value Units Date/Time    US Gallbladder [[id number]] Collected:  07/04/20 1025     Updated:  07/04/20 1030    Narrative:       US GALLBLADDER-     Date of Exam: 7/4/2020 9:15 AM     Indication: pain ruq; K81.9-Cholecystitis, unspecified.     Comparison: CT abdomen pelvis 07/04/2020     Technique: Transverse and sagittal ultrasound images of the right upper  quadrant were obtained. Doppler evaluation was also conducted.     FINDINGS:  Visualized portions of the head and body of the pancreas are normal.   Bowel gas limits evaluation of the pancreatic tail.     Liver has homogenous echogenicity and normal echotexture.  No focal  hepatic lesions.  Portal and hepatic veins are patent and have normal  flow direction and waveforms.      Gallbladder is distended. No stones are seen. The wall is upper limits  of normal  in thickness measuring about 4 mm. No pericholecystic fluid.  The biliary system is nondilated. Negative sonographic Cobb sign.     The right kidney is sonographically normal.    The common bile duct measures 4 mm.             Impression:       Gallbladder wall is upper limits normal in thickness. No pericholecystic  fluid or gallstones. Negative sonographic Cobb sign. Acalculous  cholecystitis is thought to be unlikely given this appearance. Correlate  with clinical exam and laboratory parameters..     Electronically Signed By-Batool Garcia MD On:7/4/2020 10:28 AM  This report was finalized on [id number] by  Batool Garcia MD.    CT Abdomen Pelvis With Contrast [[id number]] Collected:  07/04/20 0315     Updated:  07/04/20 0522    Narrative:       EXAMINATION:  CT ABDOMEN PELVIS W CONTRAST    DATE: 7/4/2020 4:57 AM    INDICATION: Right upper quadrant abdominal pain, nausea ;    COMPARISON: March 25, 2015 unenhanced CT abdomen and pelvis.    PROCEDURE:  CT of the abdomen and pelvis was performed following the intravenous administration of 100 ml Isovue 370.  CT dose lowering techniques were used, to include: automated exposure control, adjustment for patient size, and or use of iterative   reconstruction.    FINDINGS:    Visualized lower chest: Moderate coronary artery atherosclerotic calcifications.     ABDOMEN:    Liver and Biliary system:  Subcentimeter hypodensities are present; these are too small to characterize. No biliary ductal dilation.    Gallbladder:  The gallbladder is distended. There is some fat stranding about the gallbladder. No radiopaque gallstone..    Spleen:  Subcentimeter.    Pancreas:  Pancreas divisum..    Adrenal glands:  Normal.    Kidneys and ureters:  Kidneys enhance symmetrically. No suspicious mass. No hydronephrosis.    Aorta/IVC:  Minimal atherosclerotic calcifications. No aortic aneurysm or dissection.  IVC normal.    Lymph nodes:  No lymphadenopathy.    Gastrointestinal tract:   Stomach normal.  Small bowel nondilated.  Appendix normal. No colonic wall thickening or dilation.    Peritoneum/Mesentery: No pneumoperitoneum.  No ascites.    Abdominal wall: Moderate fat-containing left inguinal hernia..    PELVIS:    Urinary bladder: 11 mm bladder calculus..    Reproductive organs: Markedly enlarged prostate gland.    Lymph Nodes: No pelvic lymphadenopathy.    BONES:  Unremarkable.    ADDITIONAL SIGNIFICANT FINDINGS:  None.        Impression:          1.  Distended gallbladder with minimal wall thickening adjacent fat stranding. Findings most likely represent acute cholecystitis. Consider ultrasound if there is clinical ambiguity.  2.  Chronic findings as above..          Electronically signed by:  Josué Diaz M.D.    7/4/2020 3:20 AM        No orders to display        Assessment/Plan:  Enrrique Alejandre is a 65 y.o. male with history of CVA, hypertension, and myasthenia gravis who presents with right upper quadrant pain, concerning for acute cholecystitis    Active Problems:    Cholecystitis: CT abdomen with concern for gallbladder distention, mild wall thickening, and fat stranding.  General surgery consulted and plan for cholecystectomy now.   - NPO   -General surgery consulted, appreciate recommendations    --Headed to the OR now   -Defer further need for antibiotics to surgery   -Morphine and Zofran as needed   -CBC and CMP daily    Hypertension:    -Continue home amlodipine 5, losartan 100, hydrochlorothiazide 12.5 daily    PSVT:    - Continue home sotalol    Myasthenia gravis:    - Continue home mycophenolate 500 twice daily and for to stick mean 180 mg 3 times daily    Anxiety:    - Continue home escitalopram 10 mg daily    BPH:    - Continue Flomax 0.8 mg daily    FENGI  -NPO for cholecystectomy  -Okay for regular diet afterwards    PPX: Lovenox tomorrow  Full code    Dispo: Admit for further management of acute cholecystitis.  Potential discharge tomorrow pending cholecystectomy  today.  Do not anticipate any discharge needs    Sergio Martinez MD  07/04/20  11:17

## 2020-07-04 NOTE — CONSULTS
IP Consult to General Surgery  Consult performed by: Yogesh Presley MD  Consult ordered by: Chucho Orozco MD  Reason for consult: acute cholecystitis          General Surgery Consult Note      Subjective     65-year-old man admitted to the emergency department chief complaint of epigastric and right upper quadrant abdominal pain that woke him from sleep at 1:30 in the morning.  The pain was sharp and progressive.  It is now intermittent since he has been getting pain medication it was associated with nausea and vomiting.  He has never had this particular pain before.  He says over the last week he has had some intermittent loose stool.  He denies any fevers or chills.  He has a history of myasthenia gravis and a history of pancreatitis believed to have been caused by medications to treat his myasthenia several years ago.  On arrival to the emergency department he was found to have a tender abdominal exam.  No significant leukocytosis but he did have a shift.  CT scan demonstrates a distended gallbladder with some mild pericholecystic inflammation suggestive of acute cholecystitis.  I have been asked to see him for management of this condition    History  Past Medical History:   Diagnosis Date   • CVA (cerebral vascular accident) (CMS/HCC)    • High prostate specific antigen (PSA) 8/16/2017   • History of echocardiogram     EF 55%. No significant valvular flow abnormalities.    • History of stress test 11/2010    Stress Myoview: No ischemia 11/2010   • HTN (hypertension) 6/25/2019   • Hypertension    • Myasthenia gravis (CMS/HCC)    • Pancreatitis    • Paroxysmal atrial fibrillation (CMS/HCC)    • PSVT (paroxysmal supraventricular tachycardia) (CMS/HCC) 6/25/2019   • Stroke (CMS/HCC) 02/2014   • Supraventricular tachycardia (CMS/HCC)    • TIA (transient ischemic attack) 2008     Past Surgical History:   Procedure Laterality Date   • FINGER SURGERY     • TONSILLECTOMY     • VASECTOMY       Family History   Problem  Relation Age of Onset   • Hypertension Mother    • Stroke Mother    • Other Mother         Cerebral Bleed   • Hypertension Father    • Other Father      Social History     Tobacco Use   • Smoking status: Never Smoker   • Smokeless tobacco: Never Used   Substance Use Topics   • Alcohol use: Not Currently   • Drug use: No     Medications Prior to Admission   Medication Sig Dispense Refill Last Dose   • amLODIPine-atorvastatin (CADUET) 5-20 MG per tablet TAKE 1 TABLET DAILY 90 tablet 4 7/3/2020 at Unknown time   • aspirin (ASPIRIN LOW DOSE) 81 MG tablet Daily.   7/3/2020 at Unknown time   • escitalopram (LEXAPRO) 10 MG tablet Take 1 tablet by mouth Daily. 90 tablet 3 7/3/2020 at Unknown time   • gabapentin (NEURONTIN) 300 MG capsule TAKE ONE CAPSULE BY MOUTH DAILY 30 capsule 0 7/3/2020 at Unknown time   • losartan-hydrochlorothiazide (HYZAAR) 100-12.5 MG per tablet Take 1 tablet by mouth Daily. 90 tablet 3 7/3/2020 at Unknown time   • mycophenolate (CellCept) 500 MG tablet Take 1 tablet by mouth 2 (Two) Times a Day. 180 tablet 3 7/3/2020 at Unknown time   • omeprazole (priLOSEC) 40 MG capsule TAKE 1 CAPSULE DAILY 90 capsule 4 7/3/2020 at Unknown time   • pyridostigmine (MESTINON) 180 MG CR tablet TAKE 1 TABLET THREE TIMES A  tablet 4 7/3/2020 at Unknown time   • pyridostigmine (MESTINON) 60 MG tablet TAKE ONE TABLET BY MOUTH EVERY 4 HOURS AS NEEDED 90 tablet 1 7/3/2020 at Unknown time   • sotalol (BETAPACE) 80 MG tablet Take 0.5 tablets by mouth Every 12 (Twelve) Hours. 90 tablet 3 7/3/2020 at Unknown time   • tamsulosin (FLOMAX) 0.4 MG capsule 24 hr capsule Take 1 capsule by mouth Daily. (Patient taking differently: Take 1 capsule by mouth Every 12 (Twelve) Hours.) 90 capsule 3 7/3/2020 at Unknown time     Allergies:  Patient has no known allergies.    Review of Systems   Constitutional: Negative for chills and fever.   HENT: Negative for sore throat and trouble swallowing.    Eyes: Negative for blurred vision  and double vision.   Respiratory: Negative for cough and shortness of breath.    Cardiovascular: Negative for chest pain and leg swelling.   Gastrointestinal: Positive for abdominal pain, diarrhea, nausea and vomiting. Negative for abdominal distention, blood in stool and constipation.   Genitourinary: Negative for dysuria and hematuria.   Skin: Negative for rash and bruise.   Neurological: Negative for dizziness and confusion.   Psychiatric/Behavioral: Negative for agitation and depressed mood.        Objective     Vital Signs  Temp:  [97.8 °F (36.6 °C)-98.3 °F (36.8 °C)] 98.3 °F (36.8 °C)  Heart Rate:  [59-67] 62  Resp:  [15-18] 18  BP: (114-145)/(61-84) 114/61    Physical Exam   Constitutional: He appears well-developed and well-nourished.   HENT:   Head: Normocephalic and atraumatic.   Eyes: Conjunctivae are normal. No scleral icterus.   Neck: No tracheal deviation present.   Cardiovascular: Normal rate and intact distal pulses.   Pulmonary/Chest: Effort normal. No respiratory distress. He has no wheezes.   Abdominal: Soft. He exhibits no distension.   Focal tenderness to palpation right upper quadrant without peritonitis   Musculoskeletal: He exhibits no edema or tenderness.   Lymphadenopathy:     He has no cervical adenopathy.   Neurological: He is alert. No cranial nerve deficit.   Skin: Skin is warm and dry.   Psychiatric: He has a normal mood and affect. His behavior is normal.       Results Review:  Lab Results (last 24 hours)     Procedure Component Value Units Date/Time    Extra Tubes [[id number]] Collected:  07/04/20 0412    Specimen:  Blood, Venous Line Updated:  07/04/20 0515    Narrative:       The following orders were created for panel order Extra Tubes.  Procedure                               Abnormality         Status                     ---------                               -----------         ------                     [facility][[id number]]                                   Final result                  Please view results for these tests on the individual orders.    [facility] - [facility] [[id number]] Collected:  07/04/20 0412    Specimen:  Blood Updated:  07/04/20 0515     Extra Tube Hold for add-ons.     Comment: Auto resulted.       BUN [[id number]]  (Normal) Collected:  07/04/20 0412    Specimen:  Blood Updated:  07/04/20 0446     BUN 21 mg/dL     Comprehensive Metabolic Panel [[id number]]  (Abnormal) Collected:  07/04/20 0412    Specimen:  Blood Updated:  07/04/20 0444     Glucose 131 mg/dL      BUN --     Comment: Testing performed by alternate method        Creatinine 0.89 mg/dL      Sodium 138 mmol/L      Potassium 3.8 mmol/L      Chloride 103 mmol/L      CO2 24.0 mmol/L      Calcium 9.1 mg/dL      Total Protein 6.4 g/dL      Albumin 4.40 g/dL      ALT (SGPT) 27 U/L      AST (SGOT) 28 U/L      Alkaline Phosphatase 72 U/L      Total Bilirubin 0.6 mg/dL      eGFR Non African Amer 86 mL/min/1.73      Globulin 2.0 gm/dL      A/G Ratio 2.2 g/dL      BUN/Creatinine Ratio --     Comment: Testing not performed        Anion Gap 11.0 mmol/L     Narrative:       GFR Normal >60  Chronic Kidney Disease <60  Kidney Failure <15      Lipase [[id number]]  (Normal) Collected:  07/04/20 0412    Specimen:  Blood Updated:  07/04/20 0443     Lipase 53 U/L     Protime-INR [[id number]]  (Normal) Collected:  07/04/20 0412    Specimen:  Blood Updated:  07/04/20 0427     Protime 10.4 Seconds      INR 0.99    aPTT [[id number]]  (Normal) Collected:  07/04/20 0412    Specimen:  Blood Updated:  07/04/20 0427     PTT 25.5 seconds     CBC & Differential [[id number]] Collected:  07/04/20 0412    Specimen:  Blood Updated:  07/04/20 0419    Narrative:       The following orders were created for panel order CBC & Differential.  Procedure                               Abnormality         Status                     ---------                               -----------         ------                     CBC Auto Differential[[id number]]         Abnormal            Final result                 Please view results for these tests on the individual orders.    CBC Auto Differential [[id number]]  (Abnormal) Collected:  07/04/20 0412    Specimen:  Blood Updated:  07/04/20 0419     WBC 9.70 10*3/mm3      RBC 5.08 10*6/mm3      Hemoglobin 14.3 g/dL      Hematocrit 42.4 %      MCV 83.5 fL      MCH 28.1 pg      MCHC 33.6 g/dL      RDW 14.2 %      RDW-SD 41.1 fl      MPV 7.6 fL      Platelets 161 10*3/mm3      Neutrophil % 82.8 %      Lymphocyte % 9.8 %      Monocyte % 5.2 %      Eosinophil % 1.2 %      Basophil % 1.0 %      Neutrophils, Absolute 8.00 10*3/mm3      Lymphocytes, Absolute 0.90 10*3/mm3      Monocytes, Absolute 0.50 10*3/mm3      Eosinophils, Absolute 0.10 10*3/mm3      Basophils, Absolute 0.10 10*3/mm3      nRBC 0.0 /100 WBC         Imaging Results (Last 24 Hours)     Procedure Component Value Units Date/Time    CT Abdomen Pelvis With Contrast [[id number]] Collected:  07/04/20 0315     Updated:  07/04/20 0522    Narrative:       EXAMINATION:  CT ABDOMEN PELVIS W CONTRAST    DATE: 7/4/2020 4:57 AM    INDICATION: Right upper quadrant abdominal pain, nausea ;    COMPARISON: March 25, 2015 unenhanced CT abdomen and pelvis.    PROCEDURE:  CT of the abdomen and pelvis was performed following the intravenous administration of 100 ml Isovue 370.  CT dose lowering techniques were used, to include: automated exposure control, adjustment for patient size, and or use of iterative   reconstruction.    FINDINGS:    Visualized lower chest: Moderate coronary artery atherosclerotic calcifications.     ABDOMEN:    Liver and Biliary system:  Subcentimeter hypodensities are present; these are too small to characterize. No biliary ductal dilation.    Gallbladder:  The gallbladder is distended. There is some fat stranding about the gallbladder. No radiopaque gallstone..    Spleen:  Subcentimeter.    Pancreas:  Pancreas divisum..    Adrenal glands:  Normal.    Kidneys and  ureters:  Kidneys enhance symmetrically. No suspicious mass. No hydronephrosis.    Aorta/IVC:  Minimal atherosclerotic calcifications. No aortic aneurysm or dissection.  IVC normal.    Lymph nodes:  No lymphadenopathy.    Gastrointestinal tract:  Stomach normal.  Small bowel nondilated.  Appendix normal. No colonic wall thickening or dilation.    Peritoneum/Mesentery: No pneumoperitoneum.  No ascites.    Abdominal wall: Moderate fat-containing left inguinal hernia..    PELVIS:    Urinary bladder: 11 mm bladder calculus..    Reproductive organs: Markedly enlarged prostate gland.    Lymph Nodes: No pelvic lymphadenopathy.    BONES:  Unremarkable.    ADDITIONAL SIGNIFICANT FINDINGS:  None.        Impression:          1.  Distended gallbladder with minimal wall thickening adjacent fat stranding. Findings most likely represent acute cholecystitis. Consider ultrasound if there is clinical ambiguity.  2.  Chronic findings as above..          Electronically signed by:  Josué Diaz M.D.    7/4/2020 3:20 AM          I reviewed the patient's other test results and agree with the interpretation  I reviewed the patient's new imaging results and agree with the interpretation.    Assessment/Plan     Active Problems:    Cholecystitis    I counseled him about the anticipated diagnosis of acute cholecystitis.  Although no gallstones were seen on his ultrasound he says this is been worked up in the past and he had biliary dyskinesia.  I talked to him about alternative diagnosis and additional work-up that we could perform if necessary.  However based on his story his physical exam findings and the CT scan I believe he has acute cholecystitis.  I talked to him about the risks of surgery including but not limited to bleeding scarring bruising hernia incidental injury including common bile duct injury and the possibility for open surgery.  We talked about the expected recovery after these operations.  After discussion he understands  the risk of surgery and wishes to proceed with cholecystectomy    This note was created using Dragon Voice Recognition software.    Yogesh Presley MD  07/04/20  09:02

## 2020-07-04 NOTE — PLAN OF CARE
Problem: Patient Care Overview  Goal: Plan of Care Review  Outcome: Ongoing (interventions implemented as appropriate)  Flowsheets (Taken 7/4/2020 4980)  Progress: improving  Plan of Care Reviewed With: patient; spouse  Outcome Summary: Pt. post-op lab sergio. Patient complains of incisional pain only. Incisions are held with skin adhesion with no bleeding or drainage noted. Will monitor closely.  Goal: Individualization and Mutuality  Outcome: Ongoing (interventions implemented as appropriate)  Goal: Discharge Needs Assessment  Outcome: Ongoing (interventions implemented as appropriate)  Goal: Interprofessional Rounds/Family Conf  Outcome: Ongoing (interventions implemented as appropriate)     Problem: Pain, Acute (Adult)  Goal: Identify Related Risk Factors and Signs and Symptoms  Outcome: Ongoing (interventions implemented as appropriate)  Goal: Acceptable Pain Control/Comfort Level  Outcome: Ongoing (interventions implemented as appropriate)

## 2020-07-04 NOTE — ANESTHESIA PROCEDURE NOTES
Airway  Urgency: elective    Date/Time: 7/4/2020 12:09 PM  Airway not difficult    General Information and Staff    Patient location during procedure: OR    Indications and Patient Condition  Indications for airway management: airway protection    Preoxygenated: yes  MILS maintained throughout  Mask difficulty assessment: 0 - not attempted    Final Airway Details  Final airway type: endotracheal airway      Successful airway: ETT    Successful intubation technique: direct laryngoscopy  Blade: Bill  Blade size: 3  ETT size (mm): 7.0  Cormack-Lehane Classification: grade I - full view of glottis  Placement verified by: chest auscultation and capnometry   Measured from: lips  ETT/EBT  to lips (cm): 23  Number of attempts at approach: 1  Assessment: lips, teeth, and gum same as pre-op and atraumatic intubation

## 2020-07-04 NOTE — ANESTHESIA POSTPROCEDURE EVALUATION
Patient: Enrrique Alejandre    Procedure Summary     Date:  07/04/20 Room / Location:  [facility] OR 08 / [facility] MAIN OR    Anesthesia Start:  1200 Anesthesia Stop:  1357    Procedure:  CHOLECYSTECTOMY LAPAROSCOPIC (N/A Abdomen) Diagnosis:       Cholecystitis      (Cholecystitis [K81.9])    Surgeon:  Yogesh Presley MD Provider:  Mian Burgos DO    Anesthesia Type:  general ASA Status:  3 - Emergent          Anesthesia Type: general    Vitals  Vitals Value Taken Time   /55 7/4/2020  1:58 PM   Temp     Pulse 84 7/4/2020  1:59 PM   Resp     SpO2 99 % 7/4/2020  1:59 PM   Vitals shown include unvalidated device data.        Post Anesthesia Care and Evaluation    Patient location during evaluation: PACU  Patient participation: complete - patient participated  Level of consciousness: awake  Pain scale: See nurse's notes for pain score.  Pain management: adequate  Airway patency: patent  Anesthetic complications: No anesthetic complications  PONV Status: none  Cardiovascular status: acceptable  Respiratory status: acceptable  Hydration status: acceptable    Comments: Patient seen and examined postoperatively; vital signs stable; SpO2 greater than or equal to 90%; cardiopulmonary status stable; nausea/vomiting adequately controlled; pain adequately controlled; no apparent anesthesia complications; patient discharged from anesthesia care when discharge criteria were met

## 2020-07-05 ENCOUNTER — READMISSION MANAGEMENT (OUTPATIENT)
Dept: CALL CENTER | Facility: HOSPITAL | Age: 65
End: 2020-07-05

## 2020-07-05 VITALS
RESPIRATION RATE: 16 BRPM | WEIGHT: 190.7 LBS | HEART RATE: 69 BPM | OXYGEN SATURATION: 94 % | BODY MASS INDEX: 32.56 KG/M2 | DIASTOLIC BLOOD PRESSURE: 63 MMHG | TEMPERATURE: 99 F | SYSTOLIC BLOOD PRESSURE: 111 MMHG | HEIGHT: 64 IN

## 2020-07-05 LAB
ALBUMIN SERPL-MCNC: 3.8 G/DL (ref 3.5–5.2)
ALBUMIN/GLOB SERPL: 1.9 G/DL
ALP SERPL-CCNC: 59 U/L (ref 39–117)
ALT SERPL W P-5'-P-CCNC: 53 U/L (ref 1–41)
ANION GAP SERPL CALCULATED.3IONS-SCNC: 10 MMOL/L (ref 5–15)
AST SERPL-CCNC: 46 U/L (ref 1–40)
BASOPHILS # BLD AUTO: 0 10*3/MM3 (ref 0–0.2)
BASOPHILS NFR BLD AUTO: 0 % (ref 0–1.5)
BILIRUB SERPL-MCNC: 0.9 MG/DL (ref 0.2–1.2)
BUN SERPL-MCNC: 16 MG/DL (ref 8–23)
BUN SERPL-MCNC: ABNORMAL MG/DL
BUN/CREAT SERPL: ABNORMAL
CALCIUM SPEC-SCNC: 8 MG/DL (ref 8.6–10.5)
CHLORIDE SERPL-SCNC: 101 MMOL/L (ref 98–107)
CO2 SERPL-SCNC: 26 MMOL/L (ref 22–29)
CREAT SERPL-MCNC: 1.1 MG/DL (ref 0.76–1.27)
DEPRECATED RDW RBC AUTO: 41.6 FL (ref 37–54)
EOSINOPHIL # BLD AUTO: 0 10*3/MM3 (ref 0–0.4)
EOSINOPHIL NFR BLD AUTO: 0 % (ref 0.3–6.2)
ERYTHROCYTE [DISTWIDTH] IN BLOOD BY AUTOMATED COUNT: 14.2 % (ref 12.3–15.4)
GFR SERPL CREATININE-BSD FRML MDRD: 67 ML/MIN/1.73
GLOBULIN UR ELPH-MCNC: 2 GM/DL
GLUCOSE SERPL-MCNC: 147 MG/DL (ref 65–99)
HCT VFR BLD AUTO: 40.6 % (ref 37.5–51)
HGB BLD-MCNC: 13.5 G/DL (ref 13–17.7)
LYMPHOCYTES # BLD AUTO: 0.8 10*3/MM3 (ref 0.7–3.1)
LYMPHOCYTES NFR BLD AUTO: 4.9 % (ref 19.6–45.3)
MCH RBC QN AUTO: 28 PG (ref 26.6–33)
MCHC RBC AUTO-ENTMCNC: 33.2 G/DL (ref 31.5–35.7)
MCV RBC AUTO: 84.3 FL (ref 79–97)
MONOCYTES # BLD AUTO: 1.1 10*3/MM3 (ref 0.1–0.9)
MONOCYTES NFR BLD AUTO: 6.5 % (ref 5–12)
NEUTROPHILS NFR BLD AUTO: 14.4 10*3/MM3 (ref 1.7–7)
NEUTROPHILS NFR BLD AUTO: 88.6 % (ref 42.7–76)
NRBC BLD AUTO-RTO: 0 /100 WBC (ref 0–0.2)
PLATELET # BLD AUTO: 146 10*3/MM3 (ref 140–450)
PMV BLD AUTO: 7.8 FL (ref 6–12)
POTASSIUM SERPL-SCNC: 3.5 MMOL/L (ref 3.5–5.2)
PROT SERPL-MCNC: 5.8 G/DL (ref 6–8.5)
RBC # BLD AUTO: 4.81 10*6/MM3 (ref 4.14–5.8)
SODIUM SERPL-SCNC: 137 MMOL/L (ref 136–145)
WBC # BLD AUTO: 16.3 10*3/MM3 (ref 3.4–10.8)

## 2020-07-05 PROCEDURE — 85025 COMPLETE CBC W/AUTO DIFF WBC: CPT | Performed by: SURGERY

## 2020-07-05 PROCEDURE — 63710000001 MYCOPHENOLATE MOFETIL PER 250 MG: Performed by: SURGERY

## 2020-07-05 PROCEDURE — G0378 HOSPITAL OBSERVATION PER HR: HCPCS

## 2020-07-05 PROCEDURE — 99217 PR OBSERVATION CARE DISCHARGE MANAGEMENT: CPT | Performed by: FAMILY MEDICINE

## 2020-07-05 PROCEDURE — 25010000002 PIPERACILLIN SOD-TAZOBACTAM PER 1 G: Performed by: SURGERY

## 2020-07-05 PROCEDURE — 80053 COMPREHEN METABOLIC PANEL: CPT | Performed by: SURGERY

## 2020-07-05 PROCEDURE — 99024 POSTOP FOLLOW-UP VISIT: CPT | Performed by: SURGERY

## 2020-07-05 RX ORDER — HYDROCODONE BITARTRATE AND ACETAMINOPHEN 5; 325 MG/1; MG/1
1 TABLET ORAL EVERY 6 HOURS PRN
Qty: 30 TABLET | Refills: 0 | Status: SHIPPED | OUTPATIENT
Start: 2020-07-05 | End: 2020-08-10

## 2020-07-05 RX ORDER — AMOXICILLIN AND CLAVULANATE POTASSIUM 875; 125 MG/1; MG/1
1 TABLET, FILM COATED ORAL 2 TIMES DAILY
Qty: 10 TABLET | Refills: 0 | Status: SHIPPED | OUTPATIENT
Start: 2020-07-05 | End: 2020-07-10

## 2020-07-05 RX ADMIN — MYCOPHENOLATE MOFETIL 500 MG: 250 CAPSULE ORAL at 09:13

## 2020-07-05 RX ADMIN — PANTOPRAZOLE SODIUM 40 MG: 40 TABLET, DELAYED RELEASE ORAL at 06:29

## 2020-07-05 RX ADMIN — GABAPENTIN 300 MG: 300 CAPSULE ORAL at 09:12

## 2020-07-05 RX ADMIN — ESCITALOPRAM OXALATE 10 MG: 10 TABLET ORAL at 09:13

## 2020-07-05 RX ADMIN — TAMSULOSIN HYDROCHLORIDE 0.8 MG: 0.4 CAPSULE ORAL at 09:13

## 2020-07-05 RX ADMIN — AMLODIPINE BESYLATE 5 MG: 5 TABLET ORAL at 09:12

## 2020-07-05 RX ADMIN — PIPERACILLIN AND TAZOBACTAM 3.38 G: 3; .375 INJECTION, POWDER, FOR SOLUTION INTRAVENOUS at 09:14

## 2020-07-05 RX ADMIN — LOSARTAN POTASSIUM: 50 TABLET, FILM COATED ORAL at 09:13

## 2020-07-05 RX ADMIN — Medication 10 ML: at 09:16

## 2020-07-05 RX ADMIN — ASPIRIN 81 MG 81 MG: 81 TABLET ORAL at 09:12

## 2020-07-05 RX ADMIN — PYRIDOSTIGMINE BROMIDE 180 MG: 180 TABLET, EXTENDED RELEASE ORAL at 09:16

## 2020-07-05 RX ADMIN — SOTALOL HYDROCHLORIDE 40 MG: 80 TABLET ORAL at 09:12

## 2020-07-05 NOTE — OUTREACH NOTE
Prep Survey      Responses   [facility] patient discharged from?  [facility]   Is LACE score < 7 ?  Yes   Eligibility  [facility]  [facility]   Date of Admission  07/04/20   Date of Discharge  07/05/20   Discharge Disposition  Home or Self Care   Discharge diagnosis  Lap Negrita   COVID-19 Test Status  Not tested   Does the patient have one of the following disease processes/diagnoses(primary or secondary)?  General Surgery   Does the patient have Home health ordered?  No   Is there a DME ordered?  No   Prep survey completed?  Yes          Karissa Pena RN

## 2020-07-05 NOTE — PROGRESS NOTES
General Surgery Progress Note     LOS: 0 days   Patient Care Team:  Sergio Martinez MD as PCP - General (Internal Medicine)    Subjective     Interval History:   No significant nausea or vomiting fevers or chills.  Tolerating a diet.  Passing flatus.  Independently ambulatory.  Voiding spontaneously.    Objective     Vital Signs  Temp:  [97.1 °F (36.2 °C)-100.1 °F (37.8 °C)] 99 °F (37.2 °C)  Heart Rate:  [69-85] 69  Resp:  [12-16] 16  BP: (111-138)/(55-74) 111/63    Physical Exam   Constitutional: He appears well-developed and well-nourished.   HENT:   Head: Normocephalic and atraumatic.   Abdominal:   Soft mild appropriate tenderness to palpation some bruising around his incisions no evidence of infection          Results Review:    Lab Results (last 24 hours)     Procedure Component Value Units Date/Time    BUN [[id number]]  (Normal) Collected:  07/05/20 0333    Specimen:  Blood Updated:  07/05/20 0832     BUN 16 mg/dL     Comprehensive Metabolic Panel [[id number]]  (Abnormal) Collected:  07/05/20 0333    Specimen:  Blood Updated:  07/05/20 0415     Glucose 147 mg/dL      BUN --     Comment: Testing performed by alternate method        Creatinine 1.10 mg/dL      Sodium 137 mmol/L      Potassium 3.5 mmol/L      Chloride 101 mmol/L      CO2 26.0 mmol/L      Calcium 8.0 mg/dL      Total Protein 5.8 g/dL      Albumin 3.80 g/dL      ALT (SGPT) 53 U/L      AST (SGOT) 46 U/L      Alkaline Phosphatase 59 U/L      Total Bilirubin 0.9 mg/dL      eGFR Non African Amer 67 mL/min/1.73      Globulin 2.0 gm/dL      A/G Ratio 1.9 g/dL      BUN/Creatinine Ratio --     Comment: Testing not performed        Anion Gap 10.0 mmol/L     Narrative:       GFR Normal >60  Chronic Kidney Disease <60  Kidney Failure <15      CBC & Differential [[id number]] Collected:  07/05/20 0333    Specimen:  Blood Updated:  07/05/20 0357    Narrative:       The following orders were created for panel order CBC & Differential.  Procedure                                Abnormality         Status                     ---------                               -----------         ------                     CBC Auto Differential[[id number]]        Abnormal            Final result                 Please view results for these tests on the individual orders.    CBC Auto Differential [[id number]]  (Abnormal) Collected:  07/05/20 0333    Specimen:  Blood Updated:  07/05/20 0357     WBC 16.30 10*3/mm3      RBC 4.81 10*6/mm3      Hemoglobin 13.5 g/dL      Hematocrit 40.6 %      MCV 84.3 fL      MCH 28.0 pg      MCHC 33.2 g/dL      RDW 14.2 %      RDW-SD 41.6 fl      MPV 7.8 fL      Platelets 146 10*3/mm3      Neutrophil % 88.6 %      Lymphocyte % 4.9 %      Monocyte % 6.5 %      Eosinophil % 0.0 %      Basophil % 0.0 %      Neutrophils, Absolute 14.40 10*3/mm3      Lymphocytes, Absolute 0.80 10*3/mm3      Monocytes, Absolute 1.10 10*3/mm3      Eosinophils, Absolute 0.00 10*3/mm3      Basophils, Absolute 0.00 10*3/mm3      nRBC 0.0 /100 WBC         Imaging Results (Last 24 Hours)     ** No results found for the last 24 hours. **         I reviewed the patient's new clinical results.    Medication Review:    Current Facility-Administered Medications:   •  amLODIPine (NORVASC) tablet 5 mg, 5 mg, Oral, Q24H, Yogesh Presley MD, 5 mg at 07/05/20 0912  •  aspirin chewable tablet 81 mg, 81 mg, Oral, Daily, Yogesh Presley MD, 81 mg at 07/05/20 0912  •  enoxaparin (LOVENOX) syringe 40 mg, 40 mg, Subcutaneous, Q24H, Yogesh Presley MD  •  escitalopram (LEXAPRO) tablet 10 mg, 10 mg, Oral, Daily, Yogesh Presley MD, 10 mg at 07/05/20 0913  •  gabapentin (NEURONTIN) capsule 300 mg, 300 mg, Oral, Daily, Yogesh Presley MD, 300 mg at 07/05/20 0912  •  HYDROcodone-acetaminophen (NORCO) 5-325 MG per tablet 1 tablet, 1 tablet, Oral, Q6H PRN, Yogesh Presley MD, 1 tablet at 07/04/20 1604  •  losartan (COZAAR) 100 mg, hydroCHLOROthiazide (HYDRODIURIL) 12.5 mg for HYZAAR 100-12.5, , Oral,  Daily, Yogesh Presley MD  •  Morphine sulfate (PF) injection 2 mg, 2 mg, Intravenous, Q3H PRN, Yogesh Presley MD  •  mycophenolate (CELLCEPT) capsule 500 mg, 500 mg, Oral, Q12H, Yogesh Presley MD, 500 mg at 07/05/20 0913  •  ondansetron (ZOFRAN) injection 4 mg, 4 mg, Intravenous, Q6H PRN, Yogesh Presley MD  •  pantoprazole (PROTONIX) EC tablet 40 mg, 40 mg, Oral, QAM, Yogesh Presley MD, 40 mg at 07/05/20 0629  •  pyridostigmine (MESTINON) CR tablet 180 mg, 180 mg, Oral, Q8H, Sergio Martinez MD, 180 mg at 07/05/20 0916  •  sodium chloride 0.9 % flush 10 mL, 10 mL, Intravenous, Q12H, Yogesh Presley MD, 10 mL at 07/05/20 0916  •  sodium chloride 0.9 % flush 10 mL, 10 mL, Intravenous, PRN, Yogesh Presley MD  •  sotalol (BETAPACE) tablet 40 mg, 40 mg, Oral, Q12H, Yogesh Presley MD, 40 mg at 07/05/20 0912  •  tamsulosin (FLOMAX) 24 hr capsule 0.8 mg, 0.8 mg, Oral, Daily, Yogesh Presley MD, 0.8 mg at 07/05/20 0913    Assessment/Plan     Active Problems:    Cholecystitis      Postop day 1 laparoscopic cholecystectomy for acute cholecystitis  Symptomatically he seems to be doing pretty well.  Leukocytosis likely reactive inflammatory process at the surgical site.  If he is discharged today recommend a 5-day course total of antibiotics since he is immunosuppressed.   No lifting more than 15 pounds for 1 week  Follow-up with me in 2 weeks with a telephone visit  Okay to shower, no tub soaking    Yogesh Presley MD  12:57      This note was created using Dragon Voice Recognition software.    Yogesh Presley MD  07/05/20  12:55

## 2020-07-05 NOTE — DISCHARGE INSTRUCTIONS
Patient will need follow-up with PCP in 1 to 2 weeks with repeat labs at that time.  Please follow-up with surgery as recommended

## 2020-07-05 NOTE — DISCHARGE SUMMARY
Date of Discharge:  7/5/2020    Discharge Diagnosis: Cholecystitis    Presenting Problem/History of Present Illness  Active Hospital Problems    Diagnosis  POA   • Cholecystitis [K81.9]  Yes      Resolved Hospital Problems   No resolved problems to display.      Please see admission H&P for full details.  In brief, patient presented to the [facility] ED due to right upper quadrant pain that awakened him from sleep.  Patient felt the pain was gas but was unrelenting.  He presented to the [facility] ED where CT abdomen/pelvis revealed gallbladder enlargement with mild wall thickening and fat stranding.  Patient dose of antibiotics and surgery was consulted.  He was admitted for further evaluation and management of presumed cholecystitis    Hospital Course  Patient is a 65 y.o. male history of CVA, myasthenia gravis and hypertension presented with right upper quadrant pain consistent with acute cholecystitis.  Surgery was consulted from emergency department who mainly made the patient n.p.o.  He had not had anything to eat for several hours and patient was taken to the OR noon same day.  Patient had uneventful laparoscopic cholecystectomy.  He was continued on Zosyn for 24 hours by general surgery.  Patient is doing well on postop day 1 with minimal residual right upper quadrant pain.  He will be discharged home with Norco and 5 days Augmentin as well as instruction to follow-up with PCP and general surgery.  He will need a CBC and CMP in follow-up to assess improvement in leukocytosis and mild elevation of liver enzymes.    Procedures Performed    Procedure(s):  CHOLECYSTECTOMY LAPAROSCOPIC  -------------------     Consults:   Consults     Date and Time Order Name Status Description    7/4/2020 0538 IP Consult to General Surgery Completed     7/4/2020 0538 IP Consult to Family Practice Completed           Pertinent Test Results:   Lab Results (last 48 hours)     Procedure Component Value Units Date/Time    BUN [[id number]]   (Normal) Collected:  07/05/20 0333    Specimen:  Blood Updated:  07/05/20 0832     BUN 16 mg/dL     Comprehensive Metabolic Panel [[id number]]  (Abnormal) Collected:  07/05/20 0333    Specimen:  Blood Updated:  07/05/20 0415     Glucose 147 mg/dL      BUN --     Comment: Testing performed by alternate method        Creatinine 1.10 mg/dL      Sodium 137 mmol/L      Potassium 3.5 mmol/L      Chloride 101 mmol/L      CO2 26.0 mmol/L      Calcium 8.0 mg/dL      Total Protein 5.8 g/dL      Albumin 3.80 g/dL      ALT (SGPT) 53 U/L      AST (SGOT) 46 U/L      Alkaline Phosphatase 59 U/L      Total Bilirubin 0.9 mg/dL      eGFR Non African Amer 67 mL/min/1.73      Globulin 2.0 gm/dL      A/G Ratio 1.9 g/dL      BUN/Creatinine Ratio --     Comment: Testing not performed        Anion Gap 10.0 mmol/L     Narrative:       GFR Normal >60  Chronic Kidney Disease <60  Kidney Failure <15      CBC & Differential [[id number]] Collected:  07/05/20 0333    Specimen:  Blood Updated:  07/05/20 0357    Narrative:       The following orders were created for panel order CBC & Differential.  Procedure                               Abnormality         Status                     ---------                               -----------         ------                     CBC Auto Differential[[id number]]        Abnormal            Final result                 Please view results for these tests on the individual orders.    CBC Auto Differential [[id number]]  (Abnormal) Collected:  07/05/20 0333    Specimen:  Blood Updated:  07/05/20 0357     WBC 16.30 10*3/mm3      RBC 4.81 10*6/mm3      Hemoglobin 13.5 g/dL      Hematocrit 40.6 %      MCV 84.3 fL      MCH 28.0 pg      MCHC 33.2 g/dL      RDW 14.2 %      RDW-SD 41.6 fl      MPV 7.8 fL      Platelets 146 10*3/mm3      Neutrophil % 88.6 %      Lymphocyte % 4.9 %      Monocyte % 6.5 %      Eosinophil % 0.0 %      Basophil % 0.0 %      Neutrophils, Absolute 14.40 10*3/mm3      Lymphocytes, Absolute 0.80  10*3/mm3      Monocytes, Absolute 1.10 10*3/mm3      Eosinophils, Absolute 0.00 10*3/mm3      Basophils, Absolute 0.00 10*3/mm3      nRBC 0.0 /100 WBC     Extra Tubes [[id number]] Collected:  07/04/20 0412    Specimen:  Blood, Venous Line Updated:  07/04/20 0515    Narrative:       The following orders were created for panel order Extra Tubes.  Procedure                               Abnormality         Status                     ---------                               -----------         ------                     Gold Top - SST[[id number]]                                   Final result                 Please view results for these tests on the individual orders.    Gold Top - SST [[id number]] Collected:  07/04/20 0412    Specimen:  Blood Updated:  07/04/20 0515     Extra Tube Hold for add-ons.     Comment: Auto resulted.       BUN [[id number]]  (Normal) Collected:  07/04/20 0412    Specimen:  Blood Updated:  07/04/20 0446     BUN 21 mg/dL     Comprehensive Metabolic Panel [[id number]]  (Abnormal) Collected:  07/04/20 0412    Specimen:  Blood Updated:  07/04/20 0444     Glucose 131 mg/dL      BUN --     Comment: Testing performed by alternate method        Creatinine 0.89 mg/dL      Sodium 138 mmol/L      Potassium 3.8 mmol/L      Chloride 103 mmol/L      CO2 24.0 mmol/L      Calcium 9.1 mg/dL      Total Protein 6.4 g/dL      Albumin 4.40 g/dL      ALT (SGPT) 27 U/L      AST (SGOT) 28 U/L      Alkaline Phosphatase 72 U/L      Total Bilirubin 0.6 mg/dL      eGFR Non African Amer 86 mL/min/1.73      Globulin 2.0 gm/dL      A/G Ratio 2.2 g/dL      BUN/Creatinine Ratio --     Comment: Testing not performed        Anion Gap 11.0 mmol/L     Narrative:       GFR Normal >60  Chronic Kidney Disease <60  Kidney Failure <15      Lipase [[id number]]  (Normal) Collected:  07/04/20 0412    Specimen:  Blood Updated:  07/04/20 0443     Lipase 53 U/L     Protime-INR [[id number]]  (Normal) Collected:  07/04/20 0412    Specimen:  Blood  Updated:  07/04/20 0427     Protime 10.4 Seconds      INR 0.99    aPTT [[id number]]  (Normal) Collected:  07/04/20 0412    Specimen:  Blood Updated:  07/04/20 0427     PTT 25.5 seconds     CBC & Differential [[id number]] Collected:  07/04/20 0412    Specimen:  Blood Updated:  07/04/20 0419    Narrative:       The following orders were created for panel order CBC & Differential.  Procedure                               Abnormality         Status                     ---------                               -----------         ------                     CBC Auto Differential[[id number]]        Abnormal            Final result                 Please view results for these tests on the individual orders.    CBC Auto Differential [[id number]]  (Abnormal) Collected:  07/04/20 0412    Specimen:  Blood Updated:  07/04/20 0419     WBC 9.70 10*3/mm3      RBC 5.08 10*6/mm3      Hemoglobin 14.3 g/dL      Hematocrit 42.4 %      MCV 83.5 fL      MCH 28.1 pg      MCHC 33.6 g/dL      RDW 14.2 %      RDW-SD 41.1 fl      MPV 7.6 fL      Platelets 161 10*3/mm3      Neutrophil % 82.8 %      Lymphocyte % 9.8 %      Monocyte % 5.2 %      Eosinophil % 1.2 %      Basophil % 1.0 %      Neutrophils, Absolute 8.00 10*3/mm3      Lymphocytes, Absolute 0.90 10*3/mm3      Monocytes, Absolute 0.50 10*3/mm3      Eosinophils, Absolute 0.10 10*3/mm3      Basophils, Absolute 0.10 10*3/mm3      nRBC 0.0 /100 WBC         Imaging Results (Last 7 Days)     Procedure Component Value Units Date/Time    US Gallbladder [[id number]] Collected:  07/04/20 1025     Updated:  07/04/20 1030    Narrative:       US GALLBLADDER-     Date of Exam: 7/4/2020 9:15 AM     Indication: pain ruq; K81.9-Cholecystitis, unspecified.     Comparison: CT abdomen pelvis 07/04/2020     Technique: Transverse and sagittal ultrasound images of the right upper  quadrant were obtained. Doppler evaluation was also conducted.     FINDINGS:  Visualized portions of the head and body of the pancreas  are normal.   Bowel gas limits evaluation of the pancreatic tail.     Liver has homogenous echogenicity and normal echotexture.  No focal  hepatic lesions.  Portal and hepatic veins are patent and have normal  flow direction and waveforms.      Gallbladder is distended. No stones are seen. The wall is upper limits  of normal in thickness measuring about 4 mm. No pericholecystic fluid.  The biliary system is nondilated. Negative sonographic Cobb sign.     The right kidney is sonographically normal.    The common bile duct measures 4 mm.             Impression:       Gallbladder wall is upper limits normal in thickness. No pericholecystic  fluid or gallstones. Negative sonographic Cobb sign. Acalculous  cholecystitis is thought to be unlikely given this appearance. Correlate  with clinical exam and laboratory parameters..     Electronically Signed By-Batool Garcia MD On:7/4/2020 10:28 AM  This report was finalized on [id number] by  Batool Garcia MD.    CT Abdomen Pelvis With Contrast [[id number]] Collected:  07/04/20 0315     Updated:  07/04/20 0522    Narrative:       EXAMINATION:  CT ABDOMEN PELVIS W CONTRAST    DATE: 7/4/2020 4:57 AM    INDICATION: Right upper quadrant abdominal pain, nausea ;    COMPARISON: March 25, 2015 unenhanced CT abdomen and pelvis.    PROCEDURE:  CT of the abdomen and pelvis was performed following the intravenous administration of 100 ml Isovue 370.  CT dose lowering techniques were used, to include: automated exposure control, adjustment for patient size, and or use of iterative   reconstruction.    FINDINGS:    Visualized lower chest: Moderate coronary artery atherosclerotic calcifications.     ABDOMEN:    Liver and Biliary system:  Subcentimeter hypodensities are present; these are too small to characterize. No biliary ductal dilation.    Gallbladder:  The gallbladder is distended. There is some fat stranding about the gallbladder. No radiopaque gallstone..    Spleen:   Subcentimeter.    Pancreas:  Pancreas divisum..    Adrenal glands:  Normal.    Kidneys and ureters:  Kidneys enhance symmetrically. No suspicious mass. No hydronephrosis.    Aorta/IVC:  Minimal atherosclerotic calcifications. No aortic aneurysm or dissection.  IVC normal.    Lymph nodes:  No lymphadenopathy.    Gastrointestinal tract:  Stomach normal.  Small bowel nondilated.  Appendix normal. No colonic wall thickening or dilation.    Peritoneum/Mesentery: No pneumoperitoneum.  No ascites.    Abdominal wall: Moderate fat-containing left inguinal hernia..    PELVIS:    Urinary bladder: 11 mm bladder calculus..    Reproductive organs: Markedly enlarged prostate gland.    Lymph Nodes: No pelvic lymphadenopathy.    BONES:  Unremarkable.    ADDITIONAL SIGNIFICANT FINDINGS:  None.        Impression:          1.  Distended gallbladder with minimal wall thickening adjacent fat stranding. Findings most likely represent acute cholecystitis. Consider ultrasound if there is clinical ambiguity.  2.  Chronic findings as above..          Electronically signed by:  Josué Diaz M.D.    7/4/2020 3:20 AM        No orders to display       Condition on Discharge: Stable, improved    Vital Signs  Temp:  [97.1 °F (36.2 °C)-100.1 °F (37.8 °C)] 99 °F (37.2 °C)  Heart Rate:  [56-85] 69  Resp:  [12-16] 16  BP: (106-138)/(55-74) 111/63    Physical Exam:  HEENT: NC/AT, no significant changes  Lungs: CTAB  Heart: RRR  Abd: Soft, Bowel sounds positive.  Diffuse abdominal tenderness with four 1 inch incisions to the central and right side abdomen.  Incisions are well approximated with glue in place.  No drainage.  Continues to have right upper quadrant tenderness  Ext: no significant findings  Neuro: CN grossly intact, no focal deficits    Discharge Disposition: Home    Discharge Medications     Discharge Medications      ASK your doctor about these medications      Instructions Start Date   amLODIPine-atorvastatin 5-20 MG per  tablet  Commonly known as:  CADUET   TAKE 1 TABLET DAILY      Aspirin Low Dose 81 MG tablet  Generic drug:  aspirin   Daily      escitalopram 10 MG tablet  Commonly known as:  LEXAPRO   10 mg, Oral, Daily      gabapentin 300 MG capsule  Commonly known as:  NEURONTIN   TAKE ONE CAPSULE BY MOUTH DAILY      losartan-hydrochlorothiazide 100-12.5 MG per tablet  Commonly known as:  HYZAAR   1 tablet, Oral, Daily      mycophenolate 500 MG tablet  Commonly known as:  CellCept   500 mg, Oral, 2 Times Daily      omeprazole 40 MG capsule  Commonly known as:  priLOSEC   TAKE 1 CAPSULE DAILY      pyridostigmine 60 MG tablet  Commonly known as:  MESTINON   TAKE ONE TABLET BY MOUTH EVERY 4 HOURS AS NEEDED      pyridostigmine 180 MG CR tablet  Commonly known as:  MESTINON   TAKE 1 TABLET THREE TIMES A DAY      sotalol 80 MG tablet  Commonly known as:  BETAPACE   40 mg, Oral, Every 12 Hours      tamsulosin 0.4 MG capsule 24 hr capsule  Commonly known as:  FLOMAX   0.4 mg, Oral, Every 24 Hours           Discharge Diet: Heart healthy diet    Activity at Discharge: As tolerated    Follow-up Appointments  Future Appointments   Date Time Provider Department Center   8/10/2020 10:15 AM Sergio Martinez MD [facility] None   6/29/2021  8:30 AM JAQUELIN Martinez MD [facility]     Test Results Pending at Discharge   Order Current Status    Tissue Pathology Exam Collected (07/04/20 1220)           Sergio Martinez MD  07/05/20  09:43    Time Spent: 35 minutes spent reviewing labs and data, seeing patient, coordinating care w/ surgery, preparing discharge medications/instructions and documentation

## 2020-07-06 ENCOUNTER — TRANSITIONAL CARE MANAGEMENT TELEPHONE ENCOUNTER (OUTPATIENT)
Dept: CALL CENTER | Facility: HOSPITAL | Age: 65
End: 2020-07-06

## 2020-07-06 NOTE — OUTREACH NOTE
Call Center TCM Note      Responses   [facility] patient discharged from?  [facility]   Does the patient have one of the following disease processes/diagnoses(primary or secondary)?  General Surgery   TCM attempt successful?  Yes   Call start time  1426   Call end time  1429   Meds reviewed with patient/caregiver?  Yes   Is the patient having any side effects they believe may be caused by any medication additions or changes?  No   Does the patient have all medications related to this admission filled (includes all antibiotics, pain medications, etc.)  Yes   Is the patient taking all medications as directed (includes completed medication regime)?  Yes   Does the patient have a follow up appointment scheduled with their surgeon?  No   What is preventing the patient from scheduling follow up appointments?  Haven't had time   Nursing Interventions  Educated patient on importance of making appointment, Advised patient to make appointment   Has the patient kept scheduled appointments due by today?  N/A   Comments  PATIENT STATES HE WILL CALL TO MAKE HIS FOLLOW UP APPOINTMENT WITH DR. SADLER. PATIENT HAS A FOLLOW UP APPOINTMENT WITH HIS PCP SCHEDULED FOR 8/10   Has home health visited the patient within 72 hours of discharge?  N/A   Did the patient receive a copy of their discharge instructions?  Yes   Nursing interventions  Reviewed instructions with patient   What is the patient's perception of their health status since discharge?  Improving   Nursing interventions  Nurse provided patient education   Is the patient /caregiver able to teach back basic post-op care?  Continue use of incentive spirometry at least 1 week post discharge, Practice 'cough and deep breath', Drive as instructed by MD in discharge instructions, Take showers only when approved by MD-sponge bathe until then, No tub bath, swimming, or hot tub until instructed by MD, Keep incision areas clean,dry and protected, Do not remove steri-strips, Lifting as  instructed by MD in discharge instructions   Is the patient/caregiver able to teach back signs and symptoms of incisional infection?  Increased redness, swelling or pain at the incisonal site, Increased drainage or bleeding, Incisional warmth, Pus or odor from incision, Fever   Is the patient/caregiver able to teach back steps to recovery at home?  Set small, achievable goals for return to baseline health, Rest and rebuild strength, gradually increase activity, Make a list of questions for surgeon's appointment   Is the patient/caregiver able to teach back the hierarchy of who to call/visit for symptoms/problems? PCP, Specialist, Home health nurse, Urgent Care, ED, 911  Yes   TCM call completed?  Yes          Sabiha Alexandre LPN    7/6/2020, 14:31

## 2020-07-07 LAB
LAB AP CASE REPORT: NORMAL
PATH REPORT.FINAL DX SPEC: NORMAL
PATH REPORT.GROSS SPEC: NORMAL

## 2020-07-09 ENCOUNTER — NURSE TRIAGE (OUTPATIENT)
Dept: CALL CENTER | Facility: HOSPITAL | Age: 65
End: 2020-07-09

## 2020-07-09 NOTE — TELEPHONE ENCOUNTER
"Caller had GB surgery 7/4.  He states that his naval incision is red, swollen, hard, and painful.  He states that it is the size of a hard boiled egg and also feels like one.  He has tried to contact his surgeon but only got the answering machine.  Recommended calling back, leaving a detailed message about symptoms and that if he has not received a call back by 1330 he will go to an urgent care for evaluation.    Reason for Disposition  • [1] Incision looks infected (spreading redness, pain) AND [2] large red area (> 2 in. or 5 cm)    Additional Information  • Negative: [1] Major abdominal surgical incision AND [2] wound gaping open AND [3] visible internal organs  • Negative: Sounds like a life-threatening emergency to the triager  • Negative: [1] Bleeding from incision AND [2] won't stop after 10 minutes of direct pressure  • Negative: [1] Widespread rash AND [2] bright red, sunburn-like  • Negative: Severe pain in the incision  • Negative: [1] Incision gaping open AND [2] < 48 hours since wound re-opened  • Negative: [1] Incision gaping open AND [2] length of opening > 2 inches (5 cm)  • Negative: Patient sounds very sick or weak to the triager  • Negative: Sounds like a serious complication to the triager  • Negative: Fever > 100.4 F (38.0 C)  • Negative: [1] Incision looks infected (spreading redness, pain) AND [2] fever > 99.5 F (37.5 C)    Answer Assessment - Initial Assessment Questions  1. SYMPTOM: \"What's the main symptom you're concerned about?\" (e.g., redness, pain, drainage)      Swelling and pain  2. ONSET: \"When did swelling and pain  start?\"      yesterday  3. SURGERY: \"What surgery was performed?\"      GB  4. DATE of SURGERY: \"When was surgery performed?\"       7/4/20  5. INCISION SITE: \"Where is the incision located?\"       Naval incision  6. REDNESS: \"Is there any redness at the incision site?\" If yes, ask: \"How wide across is the redness?\" (Inches, centimeters)       yes  7. PAIN: \"Is there any " "pain?\" If so, ask: \"How bad is it?\"  (Scale 1-10; or mild, moderate, severe)      Yes, moderate  8. BLEEDING: \"Is there any bleeding?\" If so, ask: \"How much?\" and \"Where?\"      no  9. DRAINAGE: \"Is there any drainage from the incision site?\" If yes, ask: \"What color and how much?\" (e.g., red, cloudy, pus; drops, teaspoon)      no  10. FEVER: \"Do you have a fever?\" If so, ask: \"What is your temperature, how was it measured, and when did it start?\"        Low grade  11. OTHER SYMPTOMS: \"Do you have any other symptoms?\" (e.g., shaking chills, weakness, rash elsewhere on body)        no    Protocols used: POST-OP INCISION SYMPTOMS AND QUESTIONS-ADULT-AH      "

## 2020-07-10 ENCOUNTER — DOCUMENTATION (OUTPATIENT)
Dept: SURGERY | Facility: CLINIC | Age: 65
End: 2020-07-10

## 2020-07-10 ENCOUNTER — TELEPHONE (OUTPATIENT)
Dept: SURGERY | Facility: CLINIC | Age: 65
End: 2020-07-10

## 2020-07-10 ENCOUNTER — OFFICE VISIT (OUTPATIENT)
Dept: FAMILY MEDICINE CLINIC | Facility: CLINIC | Age: 65
End: 2020-07-10

## 2020-07-10 VITALS
TEMPERATURE: 97.8 F | OXYGEN SATURATION: 18 % | SYSTOLIC BLOOD PRESSURE: 124 MMHG | RESPIRATION RATE: 18 BRPM | WEIGHT: 191.2 LBS | BODY MASS INDEX: 32.82 KG/M2 | HEART RATE: 79 BPM | DIASTOLIC BLOOD PRESSURE: 66 MMHG

## 2020-07-10 DIAGNOSIS — K42.9 UMBILICAL HERNIA WITHOUT OBSTRUCTION AND WITHOUT GANGRENE: Primary | ICD-10-CM

## 2020-07-10 PROCEDURE — 99496 TRANSJ CARE MGMT HIGH F2F 7D: CPT | Performed by: PHYSICIAN ASSISTANT

## 2020-07-10 NOTE — PROGRESS NOTES
Subjective   Enrrique Alejandre is a 65 y.o. male.     Chief Complaint   Patient presents with   • Transitional Care Management     gallbladder surgery       /66 (BP Location: Right arm, Patient Position: Sitting, Cuff Size: Adult)   Pulse 79   Temp 97.8 °F (36.6 °C) (Temporal)   Resp 18   Wt 86.7 kg (191 lb 3.2 oz)   SpO2 (!) 18%   BMI 32.82 kg/m²     BP Readings from Last 3 Encounters:   07/10/20 124/66   07/05/20 111/63   06/29/20 118/62       Wt Readings from Last 3 Encounters:   07/10/20 86.7 kg (191 lb 3.2 oz)   07/05/20 86.5 kg (190 lb 11.2 oz)   06/29/20 88.9 kg (196 lb)       HPI patient presents to the clinic with complaints of umbilical abdominal pain that is been present for the past 1 to 2 days.  Patient had a recent cholecystectomy performed on 7/4/2020.  He states the surgery went well but when he went home he noticed a large amount of bruising in his abdominal area and also a painful mass in the umbilical region.  His surgery was performed laparoscopically and he was discharged on the same day.  He is having normal bowel movements and is not vomiting.  He denies fevers or chills.  He denies cough, shortness of air, lower extremity pain or edema.  His surgeon is Dr. Presley and he is unsure when his follow-up for his surgery will be.  He states that he attempted to call the office but was unable to get through at this time.    The following portions of the patient's history were reviewed and updated as appropriate: allergies, current medications, past family history, past medical history, past social history, past surgical history and problem list.    Review of Systems   Constitutional: Negative for activity change, appetite change and fatigue.   HENT: Negative for congestion, rhinorrhea and sore throat.    Eyes: Negative for blurred vision, pain and visual disturbance.   Respiratory: Negative for cough, chest tightness and shortness of breath.    Cardiovascular: Negative for chest pain and  leg swelling.   Gastrointestinal: Negative for abdominal pain, blood in stool and nausea.        Pain and mass at the umbilicus    Endocrine: Negative for polydipsia and polyuria.   Genitourinary: Negative for dysuria and urgency.   Musculoskeletal: Negative for arthralgias and back pain.   Skin: Negative for rash and bruise.   Allergic/Immunologic: Negative for environmental allergies.   Neurological: Negative for headache and confusion.   Hematological: Negative for adenopathy. Does not bruise/bleed easily.   Psychiatric/Behavioral: Negative for stress. The patient is not nervous/anxious.        Objective   Physical Exam   Constitutional: He is oriented to person, place, and time. He appears well-developed and well-nourished.   HENT:   Head: Normocephalic and atraumatic.   Eyes: Pupils are equal, round, and reactive to light. Conjunctivae and EOM are normal.   Neck: Normal range of motion. Neck supple.   Cardiovascular: Normal rate and regular rhythm.   No murmur heard.  Pulmonary/Chest: Effort normal and breath sounds normal.   Abdominal: Soft. Bowel sounds are normal. There is no tenderness. A hernia (non reducible mass located at the umbilicus. ) is present.   Significant amount of lower abdominal bruising 3 small incisions from previous laparoscopic surgery with no surrounding erythema or drainage.   Musculoskeletal: Normal range of motion. He exhibits no deformity.   Lymphadenopathy:     He has no cervical adenopathy.   Neurological: He is alert and oriented to person, place, and time. No cranial nerve deficit.   Skin: Skin is warm and dry. Capillary refill takes less than 2 seconds. No rash noted.   Psychiatric: He has a normal mood and affect. His behavior is normal. Judgment and thought content normal.         Diagnoses and all orders for this visit:    1. Umbilical hernia without obstruction, without gangrene (Primary)  -     US Abdomen Limited; Future    Spoke with colleague from Dr. Presley.  Dr. Presley's  office called the patient while he was in the office and spoke to him and arranged for follow-up on the office Tuesday.  He is to contact Dr. Presley's office if his condition worsens.  He is to watch for fevers, worsening abdominal pain, constipation, or vomiting.    Return if symptoms worsen or fail to improve.

## 2020-07-10 NOTE — PROGRESS NOTES
General Surgery:    Patient called office to ask about umbilical incision after sergio. Was sent to answering service, so decided to see pcp as the office I believe is closer.    Says he has been doing pretty well after the operation. No fevers or chills, nausea or vomiting. Is gradually getting diet back. Passing flatus and having bowel function. He says the umbilical incision is swollen and about 3/10 pain. No redness or drainage.     He has an umbilical hernia that I we through for port pacement and closed with interrupted ethibond sutures. Firmness and swelling is typical after that process. I suspect his incision is healing normally. We discussed the clinical signs of bowel obstruction that could suggest and alternate problem, and we discussed the warning signs of when to go to the ER this weekend.     Will have him follow up on Tuesday for post op check.   Yogesh Presley MD  14:37

## 2020-07-10 NOTE — TELEPHONE ENCOUNTER
DR GARCIA CALLED ABOUT MR HÉCTOR WHO HAD LAP CHOLECYSTECTOMY LAST WEEKEND BY DR SADLER. SHE STATED THAT DR LEE HAD CALLED HER STATING PATIENT WAS HAVING SOME PROBLEMS. SHE ASKED THAT I CALL PATIENT TO TAKE CARE OF THIS. I CALLED PATIENT, HE WAS AT DR LEE OFFICE WHEN I CALLED. HE STATED THAT HE IS HAVING PAIN AND PROTRUSION AT HIS NAVEL AREA. HE SATED THAT THE PA TOLD HIM THIS COULD BE A STRANGULATED UMBILICAL HERNIA. I CONTACTED DR SADLER FOR FURTHER INSTRUCTIONS. I GAVE DR SADLER PATIENT'S PHONE NUMBER TO CONTACT PATIENT. DR SADLER DID SEND ME A MSG TO PUT THE PATIENT ON THE OFFICE SCHEDULE FOR Tuesday. I CALLED THE PATIENT TO SCHEDULE AN APPOINTMENT FOR 07/14/20 @ 9:20 AM- HAD TO LEAVE A MSG ON HIS VM REGARDING THIS APPOINTMENT.

## 2020-07-14 ENCOUNTER — OFFICE VISIT (OUTPATIENT)
Dept: SURGERY | Facility: CLINIC | Age: 65
End: 2020-07-14

## 2020-07-14 VITALS
DIASTOLIC BLOOD PRESSURE: 65 MMHG | WEIGHT: 196.2 LBS | HEART RATE: 74 BPM | OXYGEN SATURATION: 95 % | HEIGHT: 64 IN | TEMPERATURE: 97.3 F | SYSTOLIC BLOOD PRESSURE: 113 MMHG | BODY MASS INDEX: 33.49 KG/M2

## 2020-07-14 DIAGNOSIS — K81.9 CHOLECYSTITIS: Primary | ICD-10-CM

## 2020-07-14 PROCEDURE — 99024 POSTOP FOLLOW-UP VISIT: CPT | Performed by: SURGERY

## 2020-07-14 NOTE — PROGRESS NOTES
"Subjective   Enrrique Alejandre is a 65 y.o. male.   65-year-old gentleman who is just over a week out from laparoscopic cholecystectomy for acute cholecystitis.  He says he has been doing pretty well denies any fevers or chills nausea or vomiting he has been having regular bowel function softer than normal but no significant diarrhea urgency.  He did have quite a bit of bruising after his operation extending over his entire abdomen that has largely resolved.  His umbilical incision which was the site of a umbilical hernia that was closed primarily had quite a bit of swelling which she sought medical attention on Friday.  I talked him over the phone and sounded like normal postoperative healing.  Today he says that the inflammation at that site has gone down he has not had any spontaneous drainage it is mildly sore to palpation.    Objective   /65 (BP Location: Right arm, Patient Position: Sitting, Cuff Size: Adult)   Pulse 74   Temp 97.3 °F (36.3 °C) (Oral)   Ht 162.6 cm (64\")   Wt 89 kg (196 lb 3.2 oz)   SpO2 95%   BMI 33.68 kg/m²   Physical Exam   Constitutional: He appears well-developed and well-nourished.   HENT:   Head: Normocephalic and atraumatic.   Abdominal:   Soft appropriately tender incisions are healing well there is diffuse ecchymosis around the abdomen.  The umbilical site has some purple discoloration mild tenderness to palpation appropriate for the umbilical hernia repair.  No evidence of infection   Neurological: No cranial nerve deficit.   Skin: Skin is warm and dry.   Psychiatric: He has a normal mood and affect. His behavior is normal.       Assessment/Plan   Enrrique was seen today for post-op.    Diagnoses and all orders for this visit:    Cholecystitis    Status post laparoscopic cholecystectomy for acute cholecystitis.  Overall seems to be recovering well from the operation.  The bruising and induration along the incision should resolve over the next few weeks.  Okay to return to " work without restrictions.  Follow-up as needed.    Yogesh Presley MD  7/14/2020  9:38 AM    This note was created using Dragon Voice Recognition software.

## 2020-08-10 ENCOUNTER — OFFICE VISIT (OUTPATIENT)
Dept: FAMILY MEDICINE CLINIC | Facility: CLINIC | Age: 65
End: 2020-08-10

## 2020-08-10 VITALS
SYSTOLIC BLOOD PRESSURE: 102 MMHG | BODY MASS INDEX: 32.44 KG/M2 | HEIGHT: 64 IN | DIASTOLIC BLOOD PRESSURE: 72 MMHG | HEART RATE: 54 BPM | RESPIRATION RATE: 16 BRPM | WEIGHT: 190 LBS | OXYGEN SATURATION: 98 % | TEMPERATURE: 98.2 F

## 2020-08-10 DIAGNOSIS — I63.9 CEREBROVASCULAR ACCIDENT (CVA), UNSPECIFIED MECHANISM (HCC): ICD-10-CM

## 2020-08-10 DIAGNOSIS — G70.00 MYASTHENIA GRAVIS (HCC): ICD-10-CM

## 2020-08-10 DIAGNOSIS — I10 ESSENTIAL HYPERTENSION: Primary | ICD-10-CM

## 2020-08-10 DIAGNOSIS — I47.1 PSVT (PAROXYSMAL SUPRAVENTRICULAR TACHYCARDIA) (HCC): ICD-10-CM

## 2020-08-10 DIAGNOSIS — R97.20 HIGH PROSTATE SPECIFIC ANTIGEN (PSA): ICD-10-CM

## 2020-08-10 DIAGNOSIS — Z00.00 PREVENTATIVE HEALTH CARE: ICD-10-CM

## 2020-08-10 PROCEDURE — G0402 INITIAL PREVENTIVE EXAM: HCPCS | Performed by: FAMILY MEDICINE

## 2020-08-10 PROCEDURE — 90732 PPSV23 VACC 2 YRS+ SUBQ/IM: CPT | Performed by: FAMILY MEDICINE

## 2020-08-10 PROCEDURE — 99213 OFFICE O/P EST LOW 20 MIN: CPT | Performed by: FAMILY MEDICINE

## 2020-08-10 PROCEDURE — G0009 ADMIN PNEUMOCOCCAL VACCINE: HCPCS | Performed by: FAMILY MEDICINE

## 2020-08-10 RX ORDER — DOXYCYCLINE HYCLATE 100 MG/1
100 CAPSULE ORAL 2 TIMES DAILY
COMMUNITY
End: 2021-01-19

## 2020-08-10 NOTE — PROGRESS NOTES
The ABCs of the Annual Wellness Visit  Initial Medicare Wellness Visit    Chief Complaint   Patient presents with   • Welcome To Medicare       Subjective   History of Present Illness:  Enrrique Alejandre is a 65 y.o. male who presents for an Initial Medicare Wellness Visit.    Elevated PSA: Enlarged prostate w/ previous negative biopsy. Getting up at night to urinate- one to three times per night. Taking Flomax BID. Follows w/ Jean, taking doxycycline for prostatitis    HTN: 102/72 today. Maintained on amlodipine 5mg, losartan/HCTZ 100/12.5mg daily. Not taking BP at home. No LH/dizziness    MG: occasional tiredness late in the day. NO difficulty breathing, drooping eyes, etc.. Maintained on mycophenolate 500 BID and pyridostigmine 180 TID w/ 60mg PRN. He does note some increased tearing, runny nose, and looser stools.     PSVT: no issues in quite some time. Follows w/ [facility]- [facility]. Taking sotalol 40mg BID    HEALTH RISK ASSESSMENT    Recent Hospitalizations:  Recently treated at the following:  [facility]   - CHOLECYSTECTOMY 7/2020    Current Medical Providers:  Patient Care Team:  Sergio Martinez MD as PCP - General (Internal Medicine)    Smoking Status:  Social History     Tobacco Use   Smoking Status Never Smoker   Smokeless Tobacco Never Used       Alcohol Consumption:  Social History     Substance and Sexual Activity   Alcohol Use Not Currently       Depression Screen:   PHQ-2/PHQ-9 Depression Screening 8/10/2020   Little interest or pleasure in doing things 0   Feeling down, depressed, or hopeless 0   Total Score 0     Fall Risk Screen:  STEADI Fall Risk Assessment has not been completed.  - No falls in the last year    Health Habits and Functional and Cognitive Screening:  Functional & Cognitive Status 8/10/2020   Do you have difficulty preparing food and eating? No   Do you have difficulty bathing yourself, getting dressed or grooming yourself? No   Do you have difficulty using the  toilet? No   Do you have difficulty moving around from place to place? No   Do you have trouble with steps or getting out of a bed or a chair? No   Current Diet Well Balanced Diet   Dental Exam Up to date   Eye Exam Up to date   Exercise (times per week) 3 times per week   Current Exercise Activities Include Yard Work   Do you need help using the phone?  No   Are you deaf or do you have serious difficulty hearing?  Yes   Do you need help with transportation? No   Do you need help shopping? No   Do you need help preparing meals?  No   Do you need help with housework?  No   Do you need help with laundry? No   Do you need help taking your medications? No   Do you need help managing money? No   Do you ever drive or ride in a car without wearing a seat belt? No   Have you felt unusual stress, anger or loneliness in the last month? No   Who do you live with? Spouse   If you need help, do you have trouble finding someone available to you? No   Have you been bothered in the last four weeks by sexual problems? Yes   Do you have difficulty concentrating, remembering or making decisions? No   - Reports ED but not interested in medication    Does the patient have evidence of cognitive impairment? No    Asprin use counseling:Taking ASA appropriately as indicated    Age-appropriate Screening Schedule:  Refer to the list below for future screening recommendations based on patient's age, sex and/or medical conditions. Orders for these recommended tests are listed in the plan section. The patient has been provided with a written plan.    Health Maintenance   Topic Date Due   • URINE MICROALBUMIN  1955   • TDAP/TD VACCINES (1 - Tdap) 01/08/1966   • ZOSTER VACCINE (1 of 2) 01/08/2005   • DIABETIC FOOT EXAM  06/10/2019   • DIABETIC EYE EXAM  06/10/2019   • HEMOGLOBIN A1C  04/30/2020   • INFLUENZA VACCINE  08/01/2020   • COLONOSCOPY  02/05/2024          The following portions of the patient's history were reviewed and updated as  appropriate: allergies, current medications, past family history, past medical history, past social history, past surgical history and problem list.    Outpatient Medications Prior to Visit   Medication Sig Dispense Refill   • amLODIPine-atorvastatin (CADUET) 5-20 MG per tablet TAKE 1 TABLET DAILY 90 tablet 4   • aspirin (ASPIRIN LOW DOSE) 81 MG tablet Daily.     • doxycycline (VIBRAMYCIN) 100 MG capsule Take 100 mg by mouth 2 (Two) Times a Day.     • escitalopram (LEXAPRO) 10 MG tablet Take 1 tablet by mouth Daily. 90 tablet 3   • gabapentin (NEURONTIN) 300 MG capsule TAKE ONE CAPSULE BY MOUTH DAILY 30 capsule 0   • losartan-hydrochlorothiazide (HYZAAR) 100-12.5 MG per tablet Take 1 tablet by mouth Daily. 90 tablet 3   • mycophenolate (CellCept) 500 MG tablet Take 1 tablet by mouth 2 (Two) Times a Day. 180 tablet 3   • omeprazole (priLOSEC) 40 MG capsule TAKE 1 CAPSULE DAILY 90 capsule 4   • pyridostigmine (MESTINON) 180 MG CR tablet TAKE 1 TABLET THREE TIMES A  tablet 4   • pyridostigmine (MESTINON) 60 MG tablet TAKE ONE TABLET BY MOUTH EVERY 4 HOURS AS NEEDED 90 tablet 1   • sotalol (BETAPACE) 80 MG tablet Take 0.5 tablets by mouth Every 12 (Twelve) Hours. 90 tablet 3   • tamsulosin (FLOMAX) 0.4 MG capsule 24 hr capsule Take 1 capsule by mouth Daily. (Patient taking differently: Take 1 capsule by mouth Every 12 (Twelve) Hours.) 90 capsule 3   • HYDROcodone-acetaminophen (NORCO) 5-325 MG per tablet Take 1 tablet by mouth Every 6 (Six) Hours As Needed for Moderate Pain  or Severe Pain . 30 tablet 0     No facility-administered medications prior to visit.        Patient Active Problem List   Diagnosis   • HTN (hypertension)   • PSVT (paroxysmal supraventricular tachycardia) (CMS/HCC)   • CVA (cerebral vascular accident) (CMS/HCC)   • Adjustment disorder with depressed mood   • Chronic pancreatitis (CMS/HCC)   • Hematuria   • High prostate specific antigen (PSA)   • Hypomagnesemia   • Vitamin D deficiency   •  "Myasthenia gravis (CMS/[facility])   • Radicular low back pain   • Glucosuria   • Anxiety and depression   • Benign prostatic hyperplasia with nocturia   • Upper respiratory infection   • Cholecystitis       Advanced Care Planning:  ACP discussion was held with the patient during this visit. Patient has an advance directive in EMR which is still valid.     Review of Systems   Constitutional: Negative for chills, fatigue and unexpected weight change.   HENT: Negative for congestion and rhinorrhea.    Eyes: Positive for discharge (Increased tearing). Negative for visual disturbance.   Respiratory: Negative for cough and shortness of breath.    Cardiovascular: Negative for chest pain and palpitations.   Gastrointestinal: Negative for abdominal pain, constipation, diarrhea, nausea and vomiting.   Genitourinary: Positive for difficulty urinating.   Musculoskeletal: Positive for arthralgias. Negative for joint swelling.   Skin: Negative for rash.   Neurological: Negative for weakness and headaches.   Psychiatric/Behavioral: Negative for dysphoric mood. The patient is not nervous/anxious.        Compared to one year ago, the patient feels his physical health is the same.  Compared to one year ago, the patient feels his mental health is the same.    Reviewed chart for potential of high risk medication in the elderly: yes  Reviewed chart for potential of harmful drug interactions in the elderly:yes    Objective         Vitals:    08/10/20 1033   BP: 102/72   BP Location: Left arm   Patient Position: Sitting   Cuff Size: Adult   Pulse: 54   Resp: 16   Temp: 98.2 °F (36.8 °C)   TempSrc: Temporal   SpO2: 98%   Weight: 86.2 kg (190 lb)   Height: 162.6 cm (64\")       Body mass index is 32.61 kg/m².  Discussed the patient's BMI with him. The BMI is above average; BMI management plan is completed.    Physical Exam   Constitutional: He is oriented to person, place, and time. He appears well-developed and well-nourished. No distress. "   HENT:   Head: Normocephalic and atraumatic.   Right Ear: External ear normal.   Left Ear: External ear normal.   Nose: Nose normal.   Mouth/Throat: Oropharynx is clear and moist. No oropharyngeal exudate.   Eyes: Pupils are equal, round, and reactive to light. Conjunctivae and EOM are normal. Right eye exhibits no discharge. Left eye exhibits no discharge. No scleral icterus.   Neck: Normal range of motion. Neck supple. No thyromegaly present.   No carotid bruit   Cardiovascular: Normal rate, regular rhythm, normal heart sounds and intact distal pulses.   No murmur heard.  Pulmonary/Chest: Effort normal and breath sounds normal. No respiratory distress. He has no wheezes. He has no rales.   Abdominal: Soft. Bowel sounds are normal. He exhibits no distension. There is no tenderness.   Obese   Musculoskeletal: Normal range of motion. He exhibits no edema or deformity.   Lymphadenopathy:     He has no cervical adenopathy.   Neurological: He is alert and oriented to person, place, and time. No cranial nerve deficit or sensory deficit.   Skin: Skin is warm and dry. Capillary refill takes less than 2 seconds. No rash noted.   Psychiatric: He has a normal mood and affect. His behavior is normal. Thought content normal.         Assessment/Plan   Medicare Risks and Personalized Health Plan  CMS Preventative Services Quick Reference  Advance Directive Discussion  Cardiovascular risk  Chronic Pain   Colon Cancer Screening  Dementia/Memory   Depression/Dysphoria  Diabetic Lab Screening   Fall Risk  Hearing Problem  Immunizations Discussed/Encouraged (specific immunizations; Pneumococcal 23 and Shingrix )  Obesity/Overweight   Prostate Cancer Screening     The above risks/problems have been discussed with the patient.  Pertinent information has been shared with the patient in the After Visit Summary.  Follow up plans and orders are seen below in the Assessment/Plan Section.    Diagnoses and all orders for this visit:    1.  Essential hypertension (Primary): 102/72 today.  Blood pressure is low normal and patient was counseled today regarding need to reduce medication if he is developing lightheadedness or dizziness   -Continue home meds for now-amlodipine 5 mg daily, losartan 1 mg daily, hydrochlorothiazide 12.5 mg daily    2. PSVT (paroxysmal supraventricular tachycardia) (CMS/[facility]): Heart rate 54 today.  Patient again counseled regarding the need to call for symptoms of fatigue, lightheadedness, dizziness as he may need to reduce the sotalol.  However, he is asymptomatic at this time   -Continue cardiology [id number]   -Continue sotalol 40 mg twice daily    3. Cerebrovascular accident (CVA), unspecified mechanism (CMS/[facility]): No residual deficits on exam today.   -Continue home aspirin 81 mg daily and atorvastatin 20 mg daily    4. Myasthenia gravis (CMS/[facility]): Patient reports some mild increased hearing and oral secretions.  Curious that he is being overmedicated with pyridostigmine.  I have recommended that he substitute his afternoon dose of pravastatin aiming for 60 mg tablets of 180 mg tablet.  This will effectively reduce his daily dosing from 540mg to 420 mg.  He was instructed to return to his usual dosing developing ptosis or shortness of breath   -Start pyridostigmine 60 mg every afternoon 180 mg    5. High prostate specific antigen (PSA): History of negative biopsy.  We will repeat PSA today  -     PSA Diagnostic    6. Preventative health care  -     CBC & Differential  -     Comprehensive Metabolic Panel  -     Hemoglobin A1c  -     Lipid Panel  -     T4, Free  -     TSH  -     Vitamin D 25 Hydroxy  -     Vitamin B12  -     Pneumococcal Polysaccharide Vaccine 23-Valent Greater Than or Equal To 3yo Subcutaneous / IM  - Counseled regarding diet, exercise, weight loss, and preventative health maintenance items/immunizations below    Follow Up:  Return in about 6 months (around 2/10/2021) for Recheck.     An After Visit  Summary and PPPS were given to the patient.     Preventative:  Colonoscopy: 2/2019, due in 5 years  PSA: 12- follows w/ urology. Will repeat today  Shingles: Recommended  Pneumonia: Pneumovax ordered  Tdap: 2013  Influenza: Recommended    Follow-up in 6 months for recheck

## 2020-08-12 ENCOUNTER — LAB (OUTPATIENT)
Dept: FAMILY MEDICINE CLINIC | Facility: CLINIC | Age: 65
End: 2020-08-12

## 2020-08-12 PROCEDURE — 83036 HEMOGLOBIN GLYCOSYLATED A1C: CPT | Performed by: FAMILY MEDICINE

## 2020-08-12 PROCEDURE — 36415 COLL VENOUS BLD VENIPUNCTURE: CPT | Performed by: FAMILY MEDICINE

## 2020-08-12 PROCEDURE — 85025 COMPLETE CBC W/AUTO DIFF WBC: CPT | Performed by: FAMILY MEDICINE

## 2020-08-12 PROCEDURE — 80053 COMPREHEN METABOLIC PANEL: CPT | Performed by: FAMILY MEDICINE

## 2020-08-12 PROCEDURE — 84443 ASSAY THYROID STIM HORMONE: CPT | Performed by: FAMILY MEDICINE

## 2020-08-12 PROCEDURE — 80061 LIPID PANEL: CPT | Performed by: FAMILY MEDICINE

## 2020-08-12 PROCEDURE — 84153 ASSAY OF PSA TOTAL: CPT | Performed by: FAMILY MEDICINE

## 2020-08-12 PROCEDURE — 84439 ASSAY OF FREE THYROXINE: CPT | Performed by: FAMILY MEDICINE

## 2020-08-12 PROCEDURE — 82607 VITAMIN B-12: CPT | Performed by: FAMILY MEDICINE

## 2020-08-12 PROCEDURE — 82306 VITAMIN D 25 HYDROXY: CPT | Performed by: FAMILY MEDICINE

## 2020-08-13 LAB
25(OH)D3 SERPL-MCNC: 86.6 NG/ML (ref 30–100)
ALBUMIN SERPL-MCNC: 4.2 G/DL (ref 3.5–5.2)
ALBUMIN/GLOB SERPL: 1.9 G/DL
ALP SERPL-CCNC: 65 U/L (ref 39–117)
ALT SERPL W P-5'-P-CCNC: 24 U/L (ref 1–41)
ANION GAP SERPL CALCULATED.3IONS-SCNC: 7 MMOL/L (ref 5–15)
AST SERPL-CCNC: 25 U/L (ref 1–40)
BASOPHILS # BLD AUTO: 0.04 10*3/MM3 (ref 0–0.2)
BASOPHILS NFR BLD AUTO: 0.5 % (ref 0–1.5)
BILIRUB SERPL-MCNC: 0.9 MG/DL (ref 0–1.2)
BUN SERPL-MCNC: 17 MG/DL (ref 8–23)
BUN/CREAT SERPL: 16.8 (ref 7–25)
CALCIUM SPEC-SCNC: 9.1 MG/DL (ref 8.6–10.5)
CHLORIDE SERPL-SCNC: 104 MMOL/L (ref 98–107)
CHOLEST SERPL-MCNC: 96 MG/DL (ref 0–200)
CO2 SERPL-SCNC: 27 MMOL/L (ref 22–29)
CREAT SERPL-MCNC: 1.01 MG/DL (ref 0.76–1.27)
DEPRECATED RDW RBC AUTO: 43.1 FL (ref 37–54)
EOSINOPHIL # BLD AUTO: 0.16 10*3/MM3 (ref 0–0.4)
EOSINOPHIL NFR BLD AUTO: 2 % (ref 0.3–6.2)
ERYTHROCYTE [DISTWIDTH] IN BLOOD BY AUTOMATED COUNT: 13.7 % (ref 12.3–15.4)
GFR SERPL CREATININE-BSD FRML MDRD: 74 ML/MIN/1.73
GLOBULIN UR ELPH-MCNC: 2.2 GM/DL
GLUCOSE SERPL-MCNC: 95 MG/DL (ref 65–99)
HBA1C MFR BLD: 5.3 % (ref 3.5–5.6)
HCT VFR BLD AUTO: 40.8 % (ref 37.5–51)
HDLC SERPL-MCNC: 34 MG/DL (ref 40–60)
HGB BLD-MCNC: 13.4 G/DL (ref 13–17.7)
IMM GRANULOCYTES # BLD AUTO: 0.03 10*3/MM3 (ref 0–0.05)
IMM GRANULOCYTES NFR BLD AUTO: 0.4 % (ref 0–0.5)
LDLC SERPL CALC-MCNC: 40 MG/DL (ref 0–100)
LDLC/HDLC SERPL: 1.17 {RATIO}
LYMPHOCYTES # BLD AUTO: 1.18 10*3/MM3 (ref 0.7–3.1)
LYMPHOCYTES NFR BLD AUTO: 14.8 % (ref 19.6–45.3)
MCH RBC QN AUTO: 28.2 PG (ref 26.6–33)
MCHC RBC AUTO-ENTMCNC: 32.8 G/DL (ref 31.5–35.7)
MCV RBC AUTO: 85.7 FL (ref 79–97)
MONOCYTES # BLD AUTO: 0.64 10*3/MM3 (ref 0.1–0.9)
MONOCYTES NFR BLD AUTO: 8 % (ref 5–12)
NEUTROPHILS NFR BLD AUTO: 5.92 10*3/MM3 (ref 1.7–7)
NEUTROPHILS NFR BLD AUTO: 74.3 % (ref 42.7–76)
NRBC BLD AUTO-RTO: 0 /100 WBC (ref 0–0.2)
PLATELET # BLD AUTO: 174 10*3/MM3 (ref 140–450)
PMV BLD AUTO: 10.5 FL (ref 6–12)
POTASSIUM SERPL-SCNC: 4.1 MMOL/L (ref 3.5–5.2)
PROT SERPL-MCNC: 6.4 G/DL (ref 6–8.5)
PSA SERPL-MCNC: 5.51 NG/ML (ref 0–4)
RBC # BLD AUTO: 4.76 10*6/MM3 (ref 4.14–5.8)
SODIUM SERPL-SCNC: 138 MMOL/L (ref 136–145)
T4 FREE SERPL-MCNC: 0.92 NG/DL (ref 0.93–1.7)
TRIGL SERPL-MCNC: 111 MG/DL (ref 0–150)
TSH SERPL DL<=0.05 MIU/L-ACNC: 1.88 UIU/ML (ref 0.27–4.2)
VIT B12 BLD-MCNC: >2000 PG/ML (ref 211–946)
VLDLC SERPL-MCNC: 22.2 MG/DL (ref 5–40)
WBC # BLD AUTO: 7.97 10*3/MM3 (ref 3.4–10.8)

## 2020-08-18 ENCOUNTER — TELEPHONE (OUTPATIENT)
Dept: FAMILY MEDICINE CLINIC | Facility: CLINIC | Age: 65
End: 2020-08-18

## 2020-08-18 NOTE — TELEPHONE ENCOUNTER
PATIENT HAD QUESTIONS REGARDING LAB RESULTS    CONCERNED ABOUT B12 LEVELS    SAID HE TAKES MULTI VITAMINS AND WONDERING IF HE SHOULD CONTINUE TAKING THEM AND IF B12 LEVEL IS SOMETHING HE SHOULD WORRY ABOUT    [phone]

## 2020-08-18 NOTE — TELEPHONE ENCOUNTER
Left message on machine. Told him nothing to worry about. Ok to take multivitamin but not to take any extra vit b12

## 2020-11-12 RX ORDER — AMLODIPINE BESYLATE AND ATORVASTATIN CALCIUM 5; 20 MG/1; MG/1
TABLET, FILM COATED ORAL
Qty: 90 TABLET | Refills: 3 | Status: SHIPPED | OUTPATIENT
Start: 2020-11-12 | End: 2021-04-23 | Stop reason: SDUPTHER

## 2020-11-25 RX ORDER — PYRIDOSTIGMINE BROMIDE 180 MG/1
180 TABLET, EXTENDED RELEASE ORAL 3 TIMES DAILY
Qty: 270 TABLET | Refills: 4 | Status: SHIPPED | OUTPATIENT
Start: 2020-11-25 | End: 2020-12-07 | Stop reason: SDUPTHER

## 2020-12-07 RX ORDER — PYRIDOSTIGMINE BROMIDE 180 MG/1
180 TABLET, EXTENDED RELEASE ORAL 3 TIMES DAILY
Qty: 270 TABLET | Refills: 4 | Status: ON HOLD | OUTPATIENT
Start: 2020-12-07 | End: 2021-01-20 | Stop reason: SDUPTHER

## 2020-12-07 NOTE — TELEPHONE ENCOUNTER
PATIENT'S WIFE, NEO CALLED FOR MED REFILL    pyridostigmine (MESTINON) 180 MG CR tablet    90 DAY SUPPLY    [facility] - [place], [place] - [address] [place] [address] - [phone] [facility] [phone]   [phone]    CALL BACK NUMBER [phone]    THIS WAS CALLED IN LAST WEEK AT [facility]. THIS WILL BE A NEW PRESCRIPTION UNDER DR. LEE

## 2021-01-12 PROBLEM — Z20.822 SUSPECTED COVID-19 VIRUS INFECTION: Status: ACTIVE | Noted: 2021-01-12

## 2021-01-12 PROCEDURE — U0003 INFECTIOUS AGENT DETECTION BY NUCLEIC ACID (DNA OR RNA); SEVERE ACUTE RESPIRATORY SYNDROME CORONAVIRUS 2 (SARS-COV-2) (CORONAVIRUS DISEASE [COVID-19]), AMPLIFIED PROBE TECHNIQUE, MAKING USE OF HIGH THROUGHPUT TECHNOLOGIES AS DESCRIBED BY CMS-2020-01-R: HCPCS | Performed by: NURSE PRACTITIONER

## 2021-01-13 ENCOUNTER — TELEPHONE (OUTPATIENT)
Dept: URGENT CARE | Facility: CLINIC | Age: 66
End: 2021-01-13

## 2021-01-14 NOTE — TELEPHONE ENCOUNTER
Called this patient and notified him of his positive coronavirus test.  He was reminded he would need to stay on home quarantine for 10 days from the time his symptoms started.  He was instructed to stay 6 feet away from others in his home.  He was instructed to wear a mask if he is in the same room even if he is 6 feet away.  He was told to keep his hands clean and away from his face.

## 2021-01-18 ENCOUNTER — TELEPHONE (OUTPATIENT)
Dept: FAMILY MEDICINE CLINIC | Facility: CLINIC | Age: 66
End: 2021-01-18

## 2021-01-18 NOTE — TELEPHONE ENCOUNTER
We can get him a note if need be. How high is his fever? If fever greater than 101 or feels unable to go back to work w/ residual symptoms after 10 days of illness, then I recommend he be seen

## 2021-01-18 NOTE — TELEPHONE ENCOUNTER
Patient called and his supposed to come off quarantine on 1/19/21 but he is still experiencing fever, cough and headaches. Would like a call back to advise what he needs to do, does he need to be seen or not. He is supposed to return to work tomorrow. Please advise asap at [phone].

## 2021-01-19 ENCOUNTER — APPOINTMENT (OUTPATIENT)
Dept: CT IMAGING | Facility: HOSPITAL | Age: 66
End: 2021-01-19

## 2021-01-19 ENCOUNTER — OFFICE VISIT (OUTPATIENT)
Dept: FAMILY MEDICINE CLINIC | Facility: CLINIC | Age: 66
End: 2021-01-19

## 2021-01-19 ENCOUNTER — APPOINTMENT (OUTPATIENT)
Dept: GENERAL RADIOLOGY | Facility: HOSPITAL | Age: 66
End: 2021-01-19

## 2021-01-19 ENCOUNTER — HOSPITAL ENCOUNTER (OUTPATIENT)
Facility: HOSPITAL | Age: 66
Setting detail: OBSERVATION
Discharge: HOME | End: 2021-01-20
Attending: HOSPITALIST | Admitting: INTERNAL MEDICINE

## 2021-01-19 VITALS
HEIGHT: 64 IN | DIASTOLIC BLOOD PRESSURE: 50 MMHG | HEART RATE: 65 BPM | BODY MASS INDEX: 32.44 KG/M2 | OXYGEN SATURATION: 97 % | WEIGHT: 190 LBS | TEMPERATURE: 98.2 F | RESPIRATION RATE: 17 BRPM | SYSTOLIC BLOOD PRESSURE: 90 MMHG

## 2021-01-19 DIAGNOSIS — R50.9 FEVER, UNSPECIFIED FEVER CAUSE: ICD-10-CM

## 2021-01-19 DIAGNOSIS — U07.1 COVID-19: ICD-10-CM

## 2021-01-19 DIAGNOSIS — R07.9 CHEST PAIN, UNSPECIFIED TYPE: ICD-10-CM

## 2021-01-19 DIAGNOSIS — U07.1 COVID-19 VIRUS INFECTION: ICD-10-CM

## 2021-01-19 DIAGNOSIS — I95.1 HYPOTENSION, POSTURAL: ICD-10-CM

## 2021-01-19 DIAGNOSIS — R55 SYNCOPE, UNSPECIFIED SYNCOPE TYPE: Primary | ICD-10-CM

## 2021-01-19 DIAGNOSIS — R55 SYNCOPE, UNSPECIFIED SYNCOPE TYPE: ICD-10-CM

## 2021-01-19 DIAGNOSIS — I95.9 HYPOTENSION, UNSPECIFIED HYPOTENSION TYPE: Primary | ICD-10-CM

## 2021-01-19 DIAGNOSIS — R42 ORTHOSTATIC DIZZINESS: ICD-10-CM

## 2021-01-19 DIAGNOSIS — D69.6 THROMBOCYTOPENIA (HCC): ICD-10-CM

## 2021-01-19 PROBLEM — I10 ESSENTIAL HYPERTENSION: Chronic | Status: ACTIVE | Noted: 2019-06-25

## 2021-01-19 LAB
ALBUMIN SERPL-MCNC: 3.7 G/DL (ref 3.5–5.2)
ALBUMIN/GLOB SERPL: 1.4 G/DL
ALP SERPL-CCNC: 77 U/L (ref 39–117)
ALT SERPL W P-5'-P-CCNC: 29 U/L (ref 1–41)
ANION GAP SERPL CALCULATED.3IONS-SCNC: 9 MMOL/L (ref 5–15)
AST SERPL-CCNC: 30 U/L (ref 1–40)
BASOPHILS # BLD AUTO: 0 10*3/MM3 (ref 0–0.2)
BASOPHILS NFR BLD AUTO: 0.3 % (ref 0–1.5)
BILIRUB BLD-MCNC: ABNORMAL MG/DL
BILIRUB SERPL-MCNC: 0.7 MG/DL (ref 0–1.2)
BUN SERPL-MCNC: 20 MG/DL (ref 8–23)
BUN/CREAT SERPL: 19.2 (ref 7–25)
CALCIUM SPEC-SCNC: 8.4 MG/DL (ref 8.6–10.5)
CHLORIDE SERPL-SCNC: 102 MMOL/L (ref 98–107)
CLARITY, POC: ABNORMAL
CO2 SERPL-SCNC: 26 MMOL/L (ref 22–29)
COLOR UR: ABNORMAL
CREAT SERPL-MCNC: 1.04 MG/DL (ref 0.76–1.27)
D DIMER PPP FEU-MCNC: 0.35 MG/L (FEU) (ref 0–0.59)
DEPRECATED RDW RBC AUTO: 41.1 FL (ref 37–54)
EOSINOPHIL # BLD AUTO: 0 10*3/MM3 (ref 0–0.4)
EOSINOPHIL NFR BLD AUTO: 0.2 % (ref 0.3–6.2)
ERYTHROCYTE [DISTWIDTH] IN BLOOD BY AUTOMATED COUNT: 14.2 % (ref 12.3–15.4)
GFR SERPL CREATININE-BSD FRML MDRD: 71 ML/MIN/1.73
GLOBULIN UR ELPH-MCNC: 2.6 GM/DL
GLUCOSE BLDC GLUCOMTR-MCNC: 129 MG/DL (ref 70–105)
GLUCOSE SERPL-MCNC: 123 MG/DL (ref 65–99)
GLUCOSE UR STRIP-MCNC: NEGATIVE MG/DL
HCT VFR BLD AUTO: 40.4 % (ref 37.5–51)
HGB BLD-MCNC: 13.6 G/DL (ref 13–17.7)
HOLD SPECIMEN: NORMAL
HOLD SPECIMEN: NORMAL
KETONES UR QL: NEGATIVE
LEUKOCYTE EST, POC: NEGATIVE
LYMPHOCYTES # BLD AUTO: 0.4 10*3/MM3 (ref 0.7–3.1)
LYMPHOCYTES NFR BLD AUTO: 7.4 % (ref 19.6–45.3)
MCH RBC QN AUTO: 27.9 PG (ref 26.6–33)
MCHC RBC AUTO-ENTMCNC: 33.5 G/DL (ref 31.5–35.7)
MCV RBC AUTO: 83.3 FL (ref 79–97)
MONOCYTES # BLD AUTO: 0.4 10*3/MM3 (ref 0.1–0.9)
MONOCYTES NFR BLD AUTO: 7.8 % (ref 5–12)
NEUTROPHILS NFR BLD AUTO: 4 10*3/MM3 (ref 1.7–7)
NEUTROPHILS NFR BLD AUTO: 84.3 % (ref 42.7–76)
NITRITE UR-MCNC: NEGATIVE MG/ML
NRBC BLD AUTO-RTO: 0.1 /100 WBC (ref 0–0.2)
PH UR: 6 [PH] (ref 5–8)
PLATELET # BLD AUTO: 77 10*3/MM3 (ref 140–450)
PMV BLD AUTO: 9.3 FL (ref 6–12)
POTASSIUM SERPL-SCNC: 3.7 MMOL/L (ref 3.5–5.2)
PROT SERPL-MCNC: 6.3 G/DL (ref 6–8.5)
PROT UR STRIP-MCNC: ABNORMAL MG/DL
RBC # BLD AUTO: 4.85 10*6/MM3 (ref 4.14–5.8)
RBC # UR STRIP: ABNORMAL /UL
SODIUM SERPL-SCNC: 137 MMOL/L (ref 136–145)
SP GR UR: 1.02 (ref 1–1.03)
TROPONIN T SERPL-MCNC: <0.01 NG/ML (ref 0–0.03)
UROBILINOGEN UR QL: NORMAL
WBC # BLD AUTO: 4.7 10*3/MM3 (ref 3.4–10.8)
WHOLE BLOOD HOLD SPECIMEN: NORMAL

## 2021-01-19 PROCEDURE — 82962 GLUCOSE BLOOD TEST: CPT

## 2021-01-19 PROCEDURE — 25010000002 HEPARIN (PORCINE) PER 1000 UNITS: Performed by: INTERNAL MEDICINE

## 2021-01-19 PROCEDURE — 70450 CT HEAD/BRAIN W/O DYE: CPT

## 2021-01-19 PROCEDURE — 84484 ASSAY OF TROPONIN QUANT: CPT | Performed by: NURSE PRACTITIONER

## 2021-01-19 PROCEDURE — 99284 EMERGENCY DEPT VISIT MOD MDM: CPT

## 2021-01-19 PROCEDURE — G0378 HOSPITAL OBSERVATION PER HR: HCPCS

## 2021-01-19 PROCEDURE — 96360 HYDRATION IV INFUSION INIT: CPT

## 2021-01-19 PROCEDURE — 71045 X-RAY EXAM CHEST 1 VIEW: CPT

## 2021-01-19 PROCEDURE — 85379 FIBRIN DEGRADATION QUANT: CPT | Performed by: NURSE PRACTITIONER

## 2021-01-19 PROCEDURE — 85025 COMPLETE CBC W/AUTO DIFF WBC: CPT | Performed by: NURSE PRACTITIONER

## 2021-01-19 PROCEDURE — 96372 THER/PROPH/DIAG INJ SC/IM: CPT

## 2021-01-19 PROCEDURE — 99214 OFFICE O/P EST MOD 30 MIN: CPT | Performed by: FAMILY MEDICINE

## 2021-01-19 PROCEDURE — 96361 HYDRATE IV INFUSION ADD-ON: CPT

## 2021-01-19 PROCEDURE — 99220 PR INITIAL OBSERVATION CARE/DAY 70 MINUTES: CPT | Performed by: INTERNAL MEDICINE

## 2021-01-19 PROCEDURE — 93005 ELECTROCARDIOGRAM TRACING: CPT | Performed by: NURSE PRACTITIONER

## 2021-01-19 PROCEDURE — 81003 URINALYSIS AUTO W/O SCOPE: CPT | Performed by: FAMILY MEDICINE

## 2021-01-19 PROCEDURE — 80053 COMPREHEN METABOLIC PANEL: CPT | Performed by: NURSE PRACTITIONER

## 2021-01-19 RX ORDER — GABAPENTIN 300 MG/1
300 CAPSULE ORAL NIGHTLY
Status: DISCONTINUED | OUTPATIENT
Start: 2021-01-19 | End: 2021-01-20 | Stop reason: HOSPADM

## 2021-01-19 RX ORDER — SODIUM CHLORIDE 0.9 % (FLUSH) 0.9 %
10 SYRINGE (ML) INJECTION AS NEEDED
Status: DISCONTINUED | OUTPATIENT
Start: 2021-01-19 | End: 2021-01-20 | Stop reason: HOSPADM

## 2021-01-19 RX ORDER — HEPARIN SODIUM 5000 [USP'U]/ML
5000 INJECTION, SOLUTION INTRAVENOUS; SUBCUTANEOUS EVERY 12 HOURS SCHEDULED
Status: DISCONTINUED | OUTPATIENT
Start: 2021-01-19 | End: 2021-01-20 | Stop reason: HOSPADM

## 2021-01-19 RX ORDER — TAMSULOSIN HYDROCHLORIDE 0.4 MG/1
0.4 CAPSULE ORAL DAILY
Status: DISCONTINUED | OUTPATIENT
Start: 2021-01-20 | End: 2021-01-20 | Stop reason: HOSPADM

## 2021-01-19 RX ORDER — SODIUM CHLORIDE 0.9 % (FLUSH) 0.9 %
10 SYRINGE (ML) INJECTION EVERY 12 HOURS SCHEDULED
Status: DISCONTINUED | OUTPATIENT
Start: 2021-01-19 | End: 2021-01-20 | Stop reason: HOSPADM

## 2021-01-19 RX ORDER — PYRIDOSTIGMINE BROMIDE 60 MG/1
180 TABLET ORAL EVERY 8 HOURS SCHEDULED
Status: DISCONTINUED | OUTPATIENT
Start: 2021-01-19 | End: 2021-01-19

## 2021-01-19 RX ORDER — ASPIRIN 81 MG/1
324 TABLET, CHEWABLE ORAL ONCE
Status: COMPLETED | OUTPATIENT
Start: 2021-01-19 | End: 2021-01-19

## 2021-01-19 RX ORDER — ESCITALOPRAM OXALATE 10 MG/1
10 TABLET ORAL DAILY
Status: DISCONTINUED | OUTPATIENT
Start: 2021-01-20 | End: 2021-01-20 | Stop reason: HOSPADM

## 2021-01-19 RX ORDER — ACETAMINOPHEN 650 MG/1
650 SUPPOSITORY RECTAL EVERY 4 HOURS PRN
Status: DISCONTINUED | OUTPATIENT
Start: 2021-01-19 | End: 2021-01-20 | Stop reason: HOSPADM

## 2021-01-19 RX ORDER — SOTALOL HYDROCHLORIDE 80 MG/1
40 TABLET ORAL EVERY 12 HOURS
Status: DISCONTINUED | OUTPATIENT
Start: 2021-01-19 | End: 2021-01-20 | Stop reason: HOSPADM

## 2021-01-19 RX ORDER — SODIUM CHLORIDE, SODIUM LACTATE, POTASSIUM CHLORIDE, CALCIUM CHLORIDE 600; 310; 30; 20 MG/100ML; MG/100ML; MG/100ML; MG/100ML
125 INJECTION, SOLUTION INTRAVENOUS CONTINUOUS
Status: DISCONTINUED | OUTPATIENT
Start: 2021-01-19 | End: 2021-01-20 | Stop reason: HOSPADM

## 2021-01-19 RX ORDER — BENZONATATE 100 MG/1
200 CAPSULE ORAL 3 TIMES DAILY
Status: DISCONTINUED | OUTPATIENT
Start: 2021-01-19 | End: 2021-01-20 | Stop reason: HOSPADM

## 2021-01-19 RX ORDER — PANTOPRAZOLE SODIUM 40 MG/1
40 TABLET, DELAYED RELEASE ORAL DAILY
Status: DISCONTINUED | OUTPATIENT
Start: 2021-01-20 | End: 2021-01-20 | Stop reason: HOSPADM

## 2021-01-19 RX ORDER — ACETAMINOPHEN 160 MG/5ML
325 SOLUTION ORAL EVERY 4 HOURS PRN
Status: DISCONTINUED | OUTPATIENT
Start: 2021-01-19 | End: 2021-01-20 | Stop reason: HOSPADM

## 2021-01-19 RX ORDER — GUAIFENESIN/DEXTROMETHORPHAN 100-10MG/5
20 SYRUP ORAL EVERY 4 HOURS PRN
Status: DISCONTINUED | OUTPATIENT
Start: 2021-01-19 | End: 2021-01-20 | Stop reason: HOSPADM

## 2021-01-19 RX ORDER — ASPIRIN 81 MG/1
81 TABLET ORAL DAILY
Status: DISCONTINUED | OUTPATIENT
Start: 2021-01-20 | End: 2021-01-20 | Stop reason: HOSPADM

## 2021-01-19 RX ORDER — PYRIDOSTIGMINE BROMIDE 60 MG/1
60 TABLET ORAL EVERY 8 HOURS SCHEDULED
Status: DISCONTINUED | OUTPATIENT
Start: 2021-01-19 | End: 2021-01-20 | Stop reason: HOSPADM

## 2021-01-19 RX ORDER — ACETAMINOPHEN 325 MG/1
325 TABLET ORAL EVERY 4 HOURS PRN
Status: DISCONTINUED | OUTPATIENT
Start: 2021-01-19 | End: 2021-01-20 | Stop reason: HOSPADM

## 2021-01-19 RX ADMIN — SODIUM CHLORIDE, POTASSIUM CHLORIDE, SODIUM LACTATE AND CALCIUM CHLORIDE 1000 ML: 600; 310; 30; 20 INJECTION, SOLUTION INTRAVENOUS at 15:36

## 2021-01-19 RX ADMIN — Medication 10 ML: at 21:27

## 2021-01-19 RX ADMIN — ACETAMINOPHEN 325 MG: 325 TABLET, FILM COATED ORAL at 21:25

## 2021-01-19 RX ADMIN — PYRIDOSTIGMINE BROMIDE 60 MG: 60 TABLET ORAL at 21:26

## 2021-01-19 RX ADMIN — GUAIFENESIN AND DEXTROMETHORPHAN 20 ML: 100; 10 SYRUP ORAL at 21:28

## 2021-01-19 RX ADMIN — BENZONATATE 200 MG: 100 CAPSULE ORAL at 21:26

## 2021-01-19 RX ADMIN — SOTALOL HYDROCHLORIDE 40 MG: 80 TABLET ORAL at 21:26

## 2021-01-19 RX ADMIN — SODIUM CHLORIDE, POTASSIUM CHLORIDE, SODIUM LACTATE AND CALCIUM CHLORIDE 125 ML/HR: 600; 310; 30; 20 INJECTION, SOLUTION INTRAVENOUS at 21:31

## 2021-01-19 RX ADMIN — HEPARIN SODIUM 5000 UNITS: 5000 INJECTION INTRAVENOUS; SUBCUTANEOUS at 21:25

## 2021-01-19 RX ADMIN — ASPIRIN 324 MG: 81 TABLET, CHEWABLE ORAL at 15:37

## 2021-01-19 RX ADMIN — TAMSULOSIN HYDROCHLORIDE 0.4 MG: 0.4 CAPSULE ORAL at 21:28

## 2021-01-19 RX ADMIN — GABAPENTIN 300 MG: 300 CAPSULE ORAL at 21:26

## 2021-01-19 RX ADMIN — ESCITALOPRAM OXALATE 10 MG: 10 TABLET ORAL at 21:27

## 2021-01-19 NOTE — ED PROVIDER NOTES
Subjective   66-year-old Covid positive on 1/8/2021 patient reports with a persistent headache, fever with T-max 101, cough, lightheadedness, syncopal episode yesterday, diffuse upper chest pain that is worse with deep inspiration and cough, and nausea.  He reports that he did have some episodes of diarrhea that resolved 3 days ago.  He reports that he was sent in by his primary care physician due to hypotension.  He denies dyspnea.  He does report a history of tachycardia and MG.    1. Location: diffuse upper chest  2. Quality: pleuritic  3. Severity: moderate  4. Worsening factors: denies  5. Alleviating factors: rest  6. Onset: 11 days  7. Radiation: denies  8. Frequency: intermittent  9. Co-morbidities: Past Medical History:  8/16/2017: High prostate specific antigen (PSA)  No date: History of echocardiogram      Comment:  EF 55%. No significant valvular flow abnormalities.   11/2010: History of stress test      Comment:  Stress Myoview: No ischemia 11/2010 6/25/2019: HTN (hypertension)  No date: Myasthenia gravis (CMS/[facility])  No date: Pancreatitis  6/25/2019: PSVT (paroxysmal supraventricular tachycardia) (CMS/[facility])  02/2014: Stroke (CMS/[facility])  No date: Supraventricular tachycardia (CMS/[facility])  1/12/2021: Suspected COVID-19 virus infection  2008: TIA (transient ischemic attack)  10. Source: patient            Review of Systems   Constitutional: Positive for fatigue and fever. Negative for activity change, chills and diaphoresis.   HENT: Negative for ear discharge, rhinorrhea and trouble swallowing.    Eyes: Negative for photophobia and visual disturbance.   Respiratory: Negative for cough, chest tightness, shortness of breath and wheezing.    Cardiovascular: Positive for chest pain. Negative for palpitations and leg swelling.   Gastrointestinal: Negative for abdominal pain, nausea and vomiting.   Skin: Negative for color change, pallor and rash.   Neurological: Positive for dizziness and headaches. Negative for  syncope, weakness and numbness.   Hematological: Does not bruise/bleed easily.   All other systems reviewed and are negative.      Past Medical History:   Diagnosis Date   • High prostate specific antigen (PSA) 8/16/2017   • History of echocardiogram     EF 55%. No significant valvular flow abnormalities.    • History of stress test 11/2010    Stress Myoview: No ischemia 11/2010   • HTN (hypertension) 6/25/2019   • Myasthenia gravis (CMS/HCC)    • Pancreatitis    • PSVT (paroxysmal supraventricular tachycardia) (CMS/HCC) 6/25/2019   • Stroke (CMS/HCC) 02/2014   • Supraventricular tachycardia (CMS/HCC)    • Suspected COVID-19 virus infection 1/12/2021   • TIA (transient ischemic attack) 2008       No Known Allergies    Past Surgical History:   Procedure Laterality Date   • CHOLECYSTECTOMY N/A 7/4/2020    Procedure: CHOLECYSTECTOMY LAPAROSCOPIC;  Surgeon: Yogesh Presley MD;  Location: [facility] MAIN OR;  Service: General;  Laterality: N/A;   • FINGER SURGERY     • PROSTATE BIOPSY  2019    [facility]   • TONSILLECTOMY     • VASECTOMY         Family History   Problem Relation Age of Onset   • Hypertension Mother    • Stroke Mother    • Other Mother         Cerebral Bleed   • Hypertension Father    • Other Father        Social History     Socioeconomic History   • Marital status:      Spouse name: Not on file   • Number of children: Not on file   • Years of education: Not on file   • Highest education level: Not on file   Tobacco Use   • Smoking status: Never Smoker   • Smokeless tobacco: Never Used   Substance and Sexual Activity   • Alcohol use: Not Currently   • Drug use: No   • Sexual activity: Not Currently           Objective   Physical Exam  Vitals signs and nursing note reviewed.   Constitutional:       General: He is awake. He is not in acute distress.     Appearance: Normal appearance. He is well-developed and normal weight. He is ill-appearing. He is not toxic-appearing.   HENT:       Head: Normocephalic and atraumatic. No raccoon eyes or Garnica's sign.      Jaw: There is normal jaw occlusion.      Right Ear: External ear normal. No drainage.      Left Ear: External ear normal. No drainage.      Nose: Nose normal.      Mouth/Throat:      Lips: Pink. No lesions.      Mouth: Mucous membranes are moist.      Pharynx: Oropharynx is clear.   Eyes:      General: Lids are normal. Vision grossly intact. Gaze aligned appropriately.      Extraocular Movements: Extraocular movements intact.      Conjunctiva/sclera: Conjunctivae normal.      Pupils: Pupils are equal, round, and reactive to light.      Slit lamp exam:     Right eye: No hyphema.      Left eye: No hyphema.   Neck:      Musculoskeletal: Full passive range of motion without pain, normal range of motion and neck supple. Normal range of motion. No erythema, neck rigidity or spinous process tenderness.      Thyroid: No thyromegaly.      Vascular: No JVD.      Trachea: Trachea and phonation normal. No tracheal deviation.   Cardiovascular:      Rate and Rhythm: Normal rate and regular rhythm.      Pulses: Normal pulses.           Radial pulses are 2+ on the right side and 2+ on the left side.        Dorsalis pedis pulses are 2+ on the right side and 2+ on the left side.        Posterior tibial pulses are 2+ on the right side and 2+ on the left side.      Heart sounds: Normal heart sounds, S1 normal and S2 normal. Heart sounds not distant. No murmur. No friction rub. No gallop.    Pulmonary:      Effort: Pulmonary effort is normal. No respiratory distress.      Breath sounds: Normal breath sounds. No wheezing or rales.   Chest:      Chest wall: Tenderness present.   Abdominal:      General: Abdomen is flat. Bowel sounds are normal. There is no distension.      Palpations: Abdomen is soft. There is no mass.      Tenderness: There is no abdominal tenderness. There is no guarding or rebound.      Hernia: No hernia is present.   Musculoskeletal: Normal  range of motion.   Skin:     General: Skin is warm and dry.      Capillary Refill: Capillary refill takes less than 2 seconds.   Neurological:      General: No focal deficit present.      Mental Status: He is alert and oriented to person, place, and time.      GCS: GCS eye subscore is 4. GCS verbal subscore is 5. GCS motor subscore is 6.      Cranial Nerves: Cranial nerves are intact. No cranial nerve deficit.      Sensory: Sensation is intact. No sensory deficit.      Motor: Motor function is intact. No abnormal muscle tone.   Psychiatric:         Mood and Affect: Mood normal.         Behavior: Behavior normal. Behavior is cooperative.         Thought Content: Thought content normal.         Judgment: Judgment normal.         Procedures           ED Course  ED Course as of Jan 19 1755   Tue Jan 19, 2021   1607 Sinus rhythm rate of 63.  Compared to previous EKG from 11/11/2016 sinus rhythm rate of 66.  Interpreted by Dr. Rowley and reviewed by me.   ECG 12 Lead [AL]      ED Course User Index  [AL] Magige Dela Cruz, NP      Ct Head Without Contrast    Result Date: 1/19/2021  No acute intercranial findings. 2. Mild ethmoid and left frontal sinus mucosal thickening.  Electronically Signed By-Radha Yates MD On:1/19/2021 4:44 PM This report was finalized on [id number] by  Radha Yates MD.    Xr Chest 1 View    Result Date: 1/19/2021  No acute chest finding.  Electronically Signed By-Radha Yates MD On:1/19/2021 3:57 PM This report was finalized on [id number] by  Radha Yates MD.    Medications   sodium chloride 0.9 % flush 10 mL (has no administration in time range)   aspirin chewable tablet 324 mg (324 mg Oral Given 1/19/21 1537)   lactated ringers bolus 1,000 mL (1,000 mL Intravenous New Bag 1/19/21 1536)     Labs Reviewed   COMPREHENSIVE METABOLIC PANEL - Abnormal; Notable for the following components:       Result Value    Glucose 123 (*)     Calcium 8.4 (*)     All other components within normal  limits    Narrative:     GFR Normal >60  Chronic Kidney Disease <60  Kidney Failure <15     CBC WITH AUTO DIFFERENTIAL - Abnormal; Notable for the following components:    Platelets 77 (*)     Neutrophil % 84.3 (*)     Lymphocyte % 7.4 (*)     Eosinophil % 0.2 (*)     Lymphocytes, Absolute 0.40 (*)     All other components within normal limits   POCT GLUCOSE FINGERSTICK - Abnormal; Notable for the following components:    Glucose 129 (*)     All other components within normal limits   TROPONIN (IN-HOUSE) - Normal    Narrative:     Troponin T Reference Range:  <= 0.03 ng/mL-   Negative for AMI  >0.03 ng/mL-     Abnormal for myocardial necrosis.  Clinicians would have to utilize clinical acumen, EKG, Troponin and serial changes to determine if it is an Acute Myocardial Infarction or myocardial injury due to an underlying chronic condition.       Results may be falsely decreased if patient taking Biotin.     D-DIMER, QUANTITATIVE - Normal    Narrative:     Reference Range  --------------------------------------------------------------------     < 0.50   Negative Predictive Value  0.50-0.59   Indeterminate    >= 0.60   Probable VTE             A very low percentage of patients with DVT may yield D-Dimer results   below the cut-off of 0.50 mg/L FEU.  This is known to be more   prevalent in patients with distal DVT.             Results of this test should always be interpreted in conjunction with   the patient's medical history, clinical presentation and other   findings.  Clinical diagnosis should not be based on the result of   INNOVANCE D-Dimer alone.   RAINBOW DRAW    Narrative:     The following orders were created for panel order [place] Draw.  Procedure                               Abnormality         Status                     ---------                               -----------         ------                     Light Blue Top[[id number]]                                   Final result               Green Top  (Gel)[[id number]]                                  Final result               Lavender Top[[id number]]                                     Final result               Gold Top - SST[[id number]]                                   Final result                 Please view results for these tests on the individual orders.   TROPONIN (IN-HOUSE)   POCT GLUCOSE FINGERSTICK   CBC AND DIFFERENTIAL    Narrative:     The following orders were created for panel order CBC & Differential.  Procedure                               Abnormality         Status                     ---------                               -----------         ------                     CBC Auto Differential[[id number]]        Abnormal            Final result                 Please view results for these tests on the individual orders.   LIGHT BLUE TOP   GREEN TOP   LAVENDER TOP   GOLD TOP - SST   EXTRA TUBES    Narrative:     The following orders were created for panel order Extra Tubes.  Procedure                               Abnormality         Status                     ---------                               -----------         ------                     Light Blue Top[[id number]]                                                              Light Blue Top[[id number]]                                   Final result               Light Blue Top[[id number]]                                   Final result                 Please view results for these tests on the individual orders.   LIGHT BLUE TOP   LIGHT BLUE TOP                                          MDM  Number of Diagnoses or Management Options  Chest pain, unspecified type:   COVID-19:   Fever, unspecified fever cause:   Orthostatic dizziness:   Syncope, unspecified syncope type:   Thrombocytopenia (CMS/[facility]):   Diagnosis management comments: Chart Review: Patient is seen today, 1/19/2021 by PCP Dr. King for relation of persistent symptoms from being diagnosed with Covid on 1/8/2021.  He was found to have  orthostatic hypotension and was sent to the ER for further evaluation.  Comorbidity: Past Medical History:  8/16/2017: High prostate specific antigen (PSA)  No date: History of echocardiogram      Comment:  EF 55%. No significant valvular flow abnormalities.   11/2010: History of stress test      Comment:  Stress Myoview: No ischemia 11/2010 6/25/2019: HTN (hypertension)  No date: Myasthenia gravis (CMS/[facility])  No date: Pancreatitis  6/25/2019: PSVT (paroxysmal supraventricular tachycardia) (CMS/[facility])  02/2014: Stroke (CMS/[facility])  No date: Supraventricular tachycardia (CMS/[facility])  1/12/2021: Suspected COVID-19 virus infection  2008: TIA (transient ischemic attack)  Imaging: Was interpreted by physician and reviewed by myself: Ct Head Without Contrast    Result Date: 1/19/2021  No acute intercranial findings. 2. Mild ethmoid and left frontal sinus mucosal thickening.  Electronically Signed By-Radha Yates MD On:1/19/2021 4:44 PM This report was finalized on [id number] by  Radha Yates MD.    Xr Chest 1 View    Result Date: 1/19/2021  No acute chest finding.  Electronically Signed By-Radha Yates MD On:1/19/2021 3:57 PM This report was finalized on [id number] by  Radha Yates MD.    Patient undressed and placed in gown for exam.  Appropriate PPE worn during patient exam. 66-year-old Covid positive on 1/8/2021 patient reports with a persistent headache, fever with T-max 101, cough, lightheadedness, syncopal episode yesterday, diffuse upper chest pain that is worse with deep inspiration and cough, and nausea.  He reports that he did have some episodes of diarrhea that resolved 3 days ago.  He reports that he was sent in by his primary care physician due to hypotension.  He denies dyspnea.  He does report a history of tachycardia and MG.  IV established and labs obtained.  Orthostatic vital signs obtained.  She was given lactated Ringer's 1 L bolus.  Nothing neuro found on exam.  Given patient's history of CVA,  CT of the head obtained with the above findings.  Chest x-ray obtained with the above findings. CMP unremarkable.  CBC was significant for thrombocytopenia otherwise essentially unremarkable.  D-dimer within normal limits.  Troponin within normal limits.  No significant change on EKG.  Chest x-ray and CT showed no acute findings patient will be admitted for syncope work-up.  Dr. Fitzgerald accepted admission.    Disposition/Treatment: Discussed results with patient, verbalized understanding.  Agreeable with plan of care.  Patient was stable upon admission.    EMR Dragon transcription disclaimer:  Some of this encounter note is an electronic transcription translation of spoken language to printed text. The electronic translation of spoken language may permit erroneous, or at times, nonsensical words or phrases to be inadvertently transcribed; Although I have reviewed the note for such errors some may still exist.              Amount and/or Complexity of Data Reviewed  Clinical lab tests: reviewed  Tests in the radiology section of CPT®: reviewed  Tests in the medicine section of CPT®: reviewed  Decide to obtain previous medical records or to obtain history from someone other than the patient: yes    Patient Progress  Patient progress: stable      Final diagnoses:   Syncope, unspecified syncope type   COVID-19   Fever, unspecified fever cause   Chest pain, unspecified type   Thrombocytopenia (CMS/[facility])   Orthostatic dizziness            Maggie Dela Cruz, NP  01/19/21 4200

## 2021-01-19 NOTE — PROGRESS NOTES
"Chief Complaint  Exposure To Known Illness    Subjective          Enrrique Alejandre presents to [facility] for   67 y/o M with hx of HTN, mild obesity, now here for COVID positive with persistent symptoms.  Pt had a fever yesterday at 8pm of 101 orally and complains of persistent cough, pleuritic chest pain, and frontal headache.  Pt c/o dizziness when sitting.  Pt blacked out 2 days ago.  His BP standing today is 82/45.  He is on sotolol, losartan, HCTZ, and amlodipine.  He denies palpitations.  He is also on cellcept for Myesthenia gravis.      URI   This is a new problem. The current episode started 1 to 4 weeks ago. Associated symptoms include coughing, joint pain, rhinorrhea and sinus pain. Pertinent negatives include no diarrhea, dysuria, nausea or vomiting. He has tried acetaminophen and decongestant for the symptoms. The treatment provided moderate relief.       Objective   Vital Signs:   BP 90/50   Pulse 65   Temp 98.2 °F (36.8 °C)   Resp 17   Ht 162.6 cm (64\")   Wt 86.2 kg (190 lb)   SpO2 97%   BMI 32.61 kg/m²     Physical Exam  Vitals signs and nursing note reviewed.   Constitutional:       General: He is not in acute distress.     Appearance: Normal appearance. He is obese. He is not ill-appearing or toxic-appearing.   HENT:      Head: Normocephalic and atraumatic.      Mouth/Throat:      Mouth: Mucous membranes are moist.      Pharynx: Posterior oropharyngeal erythema present.   Cardiovascular:      Rate and Rhythm: Normal rate and regular rhythm.      Pulses: Normal pulses.      Heart sounds: Normal heart sounds. No murmur.   Pulmonary:      Effort: Pulmonary effort is normal. No respiratory distress.      Breath sounds: Normal breath sounds. No wheezing, rhonchi or rales.      Comments: Cough with deep breath  Abdominal:      General: Abdomen is flat. There is no distension.      Palpations: Abdomen is soft.      Tenderness: There is no abdominal tenderness. "   Neurological:      Mental Status: He is alert.   Psychiatric:         Mood and Affect: Mood normal.         Behavior: Behavior normal.        Result Review :   The following data was reviewed by: Lucrecia King MD on 01/19/2021:  Covid Tests    Common Labsle 1/12/21   COVID19 Detected (A)   (A) Abnormal value       Comments are available for some flowsheets but are not being displayed.                     Assessment and Plan    Problem List Items Addressed This Visit     None      Visit Diagnoses     Postural urinary incontinence    -  Primary    Relevant Orders    POCT urinalysis dipstick, automated (Completed)    Hypotension, postural        COVID-19 virus infection        Syncope, unspecified syncope type              Follow Up     Return for sending pt to ED.  Called and spoke to ED triage. .  Patient was given instructions and counseling regarding his condition or for health maintenance advice. Please see specific information pulled into the AVS if appropriate.

## 2021-01-19 NOTE — TELEPHONE ENCOUNTER
Gave message to patient at 10:44am and scheduled an appt with Dr Lucrecia King today at 1pm.  Still with cough and fever following Covid.

## 2021-01-20 ENCOUNTER — APPOINTMENT (OUTPATIENT)
Dept: CARDIOLOGY | Facility: HOSPITAL | Age: 66
End: 2021-01-20

## 2021-01-20 ENCOUNTER — READMISSION MANAGEMENT (OUTPATIENT)
Dept: CALL CENTER | Facility: HOSPITAL | Age: 66
End: 2021-01-20

## 2021-01-20 VITALS
WEIGHT: 184.97 LBS | HEART RATE: 66 BPM | RESPIRATION RATE: 16 BRPM | SYSTOLIC BLOOD PRESSURE: 117 MMHG | BODY MASS INDEX: 31.58 KG/M2 | TEMPERATURE: 98.5 F | DIASTOLIC BLOOD PRESSURE: 70 MMHG | OXYGEN SATURATION: 98 % | HEIGHT: 64 IN

## 2021-01-20 LAB
ALBUMIN SERPL-MCNC: 3.7 G/DL (ref 3.5–5.2)
ALBUMIN/GLOB SERPL: 1.9 G/DL
ALP SERPL-CCNC: 73 U/L (ref 39–117)
ALT SERPL W P-5'-P-CCNC: 26 U/L (ref 1–41)
ANION GAP SERPL CALCULATED.3IONS-SCNC: 7 MMOL/L (ref 5–15)
AST SERPL-CCNC: 27 U/L (ref 1–40)
BASOPHILS # BLD AUTO: 0 10*3/MM3 (ref 0–0.2)
BASOPHILS NFR BLD AUTO: 0.3 % (ref 0–1.5)
BH CV XLRA MEAS LEFT CCA RATIO VEL: 99.9 CM/SEC
BH CV XLRA MEAS LEFT DIST CCA PSV: -90 CM/SEC
BH CV XLRA MEAS LEFT DIST ICA EDV: -20.9 CM/SEC
BH CV XLRA MEAS LEFT DIST ICA PSV: -71.7 CM/SEC
BH CV XLRA MEAS LEFT ICA RATIO VEL: -71.7 CM/SEC
BH CV XLRA MEAS LEFT ICA/CCA RATIO: -0.72
BH CV XLRA MEAS LEFT PROX CCA EDV: 14.3 CM/SEC
BH CV XLRA MEAS LEFT PROX CCA PSV: 99.9 CM/SEC
BH CV XLRA MEAS LEFT PROX ECA PSV: -103 CM/SEC
BH CV XLRA MEAS LEFT PROX ICA EDV: -14.3 CM/SEC
BH CV XLRA MEAS LEFT PROX ICA PSV: -67 CM/SEC
BH CV XLRA MEAS LEFT PROX SCLA PSV: 199 CM/SEC
BH CV XLRA MEAS LEFT VERTEBRAL A PSV: 62.1 CM/SEC
BH CV XLRA MEAS RIGHT CCA RATIO VEL: 98.2 CM/SEC
BH CV XLRA MEAS RIGHT DIST CCA EDV: 14.5 CM/SEC
BH CV XLRA MEAS RIGHT DIST CCA PSV: 75.1 CM/SEC
BH CV XLRA MEAS RIGHT DIST ICA EDV: -23.6 CM/SEC
BH CV XLRA MEAS RIGHT DIST ICA PSV: -72.1 CM/SEC
BH CV XLRA MEAS RIGHT ICA RATIO VEL: -96.3 CM/SEC
BH CV XLRA MEAS RIGHT ICA/CCA RATIO: -0.98
BH CV XLRA MEAS RIGHT PROX CCA EDV: 15.5 CM/SEC
BH CV XLRA MEAS RIGHT PROX CCA PSV: 98.2 CM/SEC
BH CV XLRA MEAS RIGHT PROX ECA PSV: -109 CM/SEC
BH CV XLRA MEAS RIGHT PROX ICA EDV: -20.5 CM/SEC
BH CV XLRA MEAS RIGHT PROX ICA PSV: -96.3 CM/SEC
BH CV XLRA MEAS RIGHT PROX SCLA PSV: 172 CM/SEC
BH CV XLRA MEAS RIGHT VERTEBRAL A PSV: 48.5 CM/SEC
BILIRUB SERPL-MCNC: 0.6 MG/DL (ref 0–1.2)
BUN SERPL-MCNC: 10 MG/DL (ref 8–23)
BUN/CREAT SERPL: 13 (ref 7–25)
CALCIUM SPEC-SCNC: 8.1 MG/DL (ref 8.6–10.5)
CHLORIDE SERPL-SCNC: 102 MMOL/L (ref 98–107)
CK SERPL-CCNC: 368 U/L (ref 20–200)
CO2 SERPL-SCNC: 28 MMOL/L (ref 22–29)
CREAT SERPL-MCNC: 0.77 MG/DL (ref 0.76–1.27)
CRP SERPL-MCNC: 0.43 MG/DL (ref 0–0.5)
D DIMER PPP FEU-MCNC: 0.36 MG/L (FEU) (ref 0–0.59)
DEPRECATED RDW RBC AUTO: 41.6 FL (ref 37–54)
EOSINOPHIL # BLD AUTO: 0 10*3/MM3 (ref 0–0.4)
EOSINOPHIL NFR BLD AUTO: 0.2 % (ref 0.3–6.2)
ERYTHROCYTE [DISTWIDTH] IN BLOOD BY AUTOMATED COUNT: 14.2 % (ref 12.3–15.4)
FERRITIN SERPL-MCNC: 624.9 NG/ML (ref 30–400)
FIBRINOGEN PPP-MCNC: 382 MG/DL (ref 210–450)
GFR SERPL CREATININE-BSD FRML MDRD: 101 ML/MIN/1.73
GLOBULIN UR ELPH-MCNC: 2 GM/DL
GLUCOSE SERPL-MCNC: 99 MG/DL (ref 65–99)
HCT VFR BLD AUTO: 37.8 % (ref 37.5–51)
HGB BLD-MCNC: 12.7 G/DL (ref 13–17.7)
LDH SERPL-CCNC: 234 U/L (ref 135–225)
LYMPHOCYTES # BLD AUTO: 0.5 10*3/MM3 (ref 0.7–3.1)
LYMPHOCYTES NFR BLD AUTO: 14.7 % (ref 19.6–45.3)
MCH RBC QN AUTO: 28 PG (ref 26.6–33)
MCHC RBC AUTO-ENTMCNC: 33.6 G/DL (ref 31.5–35.7)
MCV RBC AUTO: 83.4 FL (ref 79–97)
MONOCYTES # BLD AUTO: 0.4 10*3/MM3 (ref 0.1–0.9)
MONOCYTES NFR BLD AUTO: 12.5 % (ref 5–12)
NEUTROPHILS NFR BLD AUTO: 2.6 10*3/MM3 (ref 1.7–7)
NEUTROPHILS NFR BLD AUTO: 72.3 % (ref 42.7–76)
NRBC BLD AUTO-RTO: 0.1 /100 WBC (ref 0–0.2)
PLATELET # BLD AUTO: 72 10*3/MM3 (ref 140–450)
PMV BLD AUTO: 9.5 FL (ref 6–12)
POTASSIUM SERPL-SCNC: 4 MMOL/L (ref 3.5–5.2)
PROT SERPL-MCNC: 5.7 G/DL (ref 6–8.5)
RBC # BLD AUTO: 4.54 10*6/MM3 (ref 4.14–5.8)
SODIUM SERPL-SCNC: 137 MMOL/L (ref 136–145)
WBC # BLD AUTO: 3.5 10*3/MM3 (ref 3.4–10.8)

## 2021-01-20 PROCEDURE — 86140 C-REACTIVE PROTEIN: CPT | Performed by: INTERNAL MEDICINE

## 2021-01-20 PROCEDURE — 96361 HYDRATE IV INFUSION ADD-ON: CPT

## 2021-01-20 PROCEDURE — G0378 HOSPITAL OBSERVATION PER HR: HCPCS

## 2021-01-20 PROCEDURE — 85025 COMPLETE CBC W/AUTO DIFF WBC: CPT | Performed by: INTERNAL MEDICINE

## 2021-01-20 PROCEDURE — 96372 THER/PROPH/DIAG INJ SC/IM: CPT

## 2021-01-20 PROCEDURE — 93325 DOPPLER ECHO COLOR FLOW MAPG: CPT | Performed by: INTERNAL MEDICINE

## 2021-01-20 PROCEDURE — 93308 TTE F-UP OR LMTD: CPT

## 2021-01-20 PROCEDURE — 93880 EXTRACRANIAL BILAT STUDY: CPT

## 2021-01-20 PROCEDURE — 83615 LACTATE (LD) (LDH) ENZYME: CPT | Performed by: INTERNAL MEDICINE

## 2021-01-20 PROCEDURE — 82728 ASSAY OF FERRITIN: CPT | Performed by: INTERNAL MEDICINE

## 2021-01-20 PROCEDURE — 25010000002 HEPARIN (PORCINE) PER 1000 UNITS: Performed by: INTERNAL MEDICINE

## 2021-01-20 PROCEDURE — 93325 DOPPLER ECHO COLOR FLOW MAPG: CPT

## 2021-01-20 PROCEDURE — 85384 FIBRINOGEN ACTIVITY: CPT | Performed by: INTERNAL MEDICINE

## 2021-01-20 PROCEDURE — 99217 PR OBSERVATION CARE DISCHARGE MANAGEMENT: CPT | Performed by: INTERNAL MEDICINE

## 2021-01-20 PROCEDURE — 82550 ASSAY OF CK (CPK): CPT | Performed by: INTERNAL MEDICINE

## 2021-01-20 PROCEDURE — 93308 TTE F-UP OR LMTD: CPT | Performed by: INTERNAL MEDICINE

## 2021-01-20 PROCEDURE — 85379 FIBRIN DEGRADATION QUANT: CPT | Performed by: INTERNAL MEDICINE

## 2021-01-20 PROCEDURE — 80053 COMPREHEN METABOLIC PANEL: CPT | Performed by: INTERNAL MEDICINE

## 2021-01-20 RX ORDER — PYRIDOSTIGMINE BROMIDE 180 MG/1
180 TABLET, EXTENDED RELEASE ORAL 2 TIMES DAILY
Qty: 270 TABLET | Refills: 4 | Status: SHIPPED | OUTPATIENT
Start: 2021-01-20 | End: 2021-01-20 | Stop reason: SDUPTHER

## 2021-01-20 RX ORDER — PYRIDOSTIGMINE BROMIDE 180 MG/1
180 TABLET, EXTENDED RELEASE ORAL 2 TIMES DAILY
Qty: 270 TABLET | Refills: 4 | Status: SHIPPED | OUTPATIENT
Start: 2021-01-20 | End: 2021-08-02 | Stop reason: SDUPTHER

## 2021-01-20 RX ADMIN — TAMSULOSIN HYDROCHLORIDE 0.4 MG: 0.4 CAPSULE ORAL at 09:13

## 2021-01-20 RX ADMIN — PYRIDOSTIGMINE BROMIDE 60 MG: 60 TABLET ORAL at 13:17

## 2021-01-20 RX ADMIN — PYRIDOSTIGMINE BROMIDE 60 MG: 60 TABLET ORAL at 09:13

## 2021-01-20 RX ADMIN — HEPARIN SODIUM 5000 UNITS: 5000 INJECTION INTRAVENOUS; SUBCUTANEOUS at 09:14

## 2021-01-20 RX ADMIN — Medication 10 ML: at 09:13

## 2021-01-20 RX ADMIN — ASPIRIN 81 MG: 81 TABLET, COATED ORAL at 09:13

## 2021-01-20 RX ADMIN — BENZONATATE 200 MG: 100 CAPSULE ORAL at 15:38

## 2021-01-20 RX ADMIN — PANTOPRAZOLE SODIUM 40 MG: 40 TABLET, DELAYED RELEASE ORAL at 09:13

## 2021-01-20 RX ADMIN — SODIUM CHLORIDE, POTASSIUM CHLORIDE, SODIUM LACTATE AND CALCIUM CHLORIDE 125 ML/HR: 600; 310; 30; 20 INJECTION, SOLUTION INTRAVENOUS at 09:12

## 2021-01-20 RX ADMIN — BENZONATATE 200 MG: 100 CAPSULE ORAL at 09:12

## 2021-01-20 RX ADMIN — SOTALOL HYDROCHLORIDE 40 MG: 80 TABLET ORAL at 09:13

## 2021-01-20 RX ADMIN — ESCITALOPRAM OXALATE 10 MG: 10 TABLET ORAL at 09:13

## 2021-01-20 NOTE — DISCHARGE SUMMARY
Date of Admission: 1/19/2021  102/1    Date of Discharge:  1/20/2021    Length of stay:  LOS: 0 days     Patient was examined with relevant and adequate PPE keeping in mind the current coronavirus pandemic. Minimum of 10 minutes to don and doff PPE.      Presenting Problem/History of Present Illness   Present on Admission:  • Syncope  • Essential hypertension  • Chronic pancreatitis (CMS/[facility])  • Myasthenia gravis (CMS/[facility])  • Real time reverse transcriptase PCR positive for COVID-19 virus        Hospital Course  Chief complaint:  Chief Complaint   Patient presents with   • Hypotension       Enrrique Alejandre 66 y.o. male.    PCP  Sergio Martinez MD (General)    66-year-old Covid positive on 1/8/2021 patient reports with a persistent headache, fever with T-max 101, cough, lightheadedness, syncopal episode yesterday, diffuse upper chest pain that is worse with deep inspiration and cough, and nausea.  He reports that he did have some episodes of diarrhea that resolved 3 days ago.  He reports that he was sent in by his primary care physician due to hypotension.  He denies dyspnea.  He does report a history of tachycardia and MG.     1. Location: diffuse upper chest  2. Quality: pleuritic  3. Severity: moderate  4. Worsening factors: denies  5. Alleviating factors: rest  6. Onset: 11 days  7. Radiation: denies  8. Frequency: intermittent  9. Co-morbidities: Past Medical History:  8/16/2017: High prostate specific antigen (PSA)  No date: History of echocardiogram      Comment:  EF 55%. No significant valvular flow abnormalities.   11/2010: History of stress test      Comment:  Stress Myoview: No ischemia 11/2010 6/25/2019: HTN (hypertension)  No date: Myasthenia gravis (CMS/[facility])  No date: Pancreatitis  6/25/2019: PSVT (paroxysmal supraventricular tachycardia) (CMS/[facility])  02/2014: Stroke (CMS/[facility])  No date: Supraventricular tachycardia (CMS/[facility])  1/12/2021: Suspected COVID-19 virus infection  2008: TIA (transient  Dr. Mitchell will want to see him before she can do any orders as she has not seen him for this before    ischemic attack)  10. Source: patient      Ultrasound carotids normal.  Echocardiogram report still awaited.  PCP to follow-up.  Patient syncope and orthostatic hypotension appears to be excessive antihypertensives in combination with pyridostigmine high-dose and some dehydration on top of that.  I have reduced antihypertensive medication.  And pyridostigmine dose as well.  He is probably out of the need for isolation for COVID-19.  Fever and cough symptomatic treatment if needed      ROS  Constitutional: Positive for fatigue and fever. Negative for activity change, chills and diaphoresis.   HENT: Negative for ear discharge, rhinorrhea and trouble swallowing.    Eyes: Negative for photophobia and visual disturbance.   Respiratory: Negative for cough, chest tightness, shortness of breath and wheezing.    Cardiovascular: Resolved chest pain. Negative for palpitations and leg swelling.   Gastrointestinal: Negative for abdominal pain, nausea and vomiting.   Skin: Negative for color change, pallor and rash.   Neurological: Resolved dizziness and headaches. Negative for syncope, weakness and numbness.   Hematological: Does not bruise/bleed easily.   All other systems reviewed and are negative.        Family History   Problem Relation Age of Onset   • Hypertension Mother    • Stroke Mother    • Other Mother         Cerebral Bleed   • Hypertension Father    • Other Father         Past Medical History:   Diagnosis Date   • High prostate specific antigen (PSA) 8/16/2017   • History of echocardiogram     EF 55%. No significant valvular flow abnormalities.    • History of stress test 11/2010    Stress Myoview: No ischemia 11/2010   • HTN (hypertension) 6/25/2019   • Myasthenia gravis (CMS/HCC)    • Pancreatitis    • PSVT (paroxysmal supraventricular tachycardia) (CMS/HCC) 6/25/2019   • Stroke (CMS/HCC) 02/2014   • Supraventricular tachycardia (CMS/HCC)    • Suspected COVID-19 virus infection 1/12/2021   • TIA (transient  ischemic attack) 2008       Past Surgical History:   Procedure Laterality Date   • CHOLECYSTECTOMY N/A 7/4/2020    Procedure: CHOLECYSTECTOMY LAPAROSCOPIC;  Surgeon: Yogesh Presley MD;  Location: [facility] MAIN OR;  Service: General;  Laterality: N/A;   • FINGER SURGERY     • PROSTATE BIOPSY  2019    [facility]   • TONSILLECTOMY     • VASECTOMY         Social History     Socioeconomic History   • Marital status:      Spouse name: Not on file   • Number of children: Not on file   • Years of education: Not on file   • Highest education level: Not on file   Tobacco Use   • Smoking status: Never Smoker   • Smokeless tobacco: Never Used   Substance and Sexual Activity   • Alcohol use: Not Currently   • Drug use: No   • Sexual activity: Not Currently       Vital Signs  Temp:  [98.4 °F (36.9 °C)-100 °F (37.8 °C)] 98.5 °F (36.9 °C)  Heart Rate:  [63-77] 66  Resp:  [15-16] 16  BP: (103-125)/(50-70) 117/70  Weight change:     Physical Exam:  Physical Exam  Vitals signs and nursing note reviewed.   Constitutional:       General: He is not in acute distress.     Appearance: Normal appearance. He is well-developed. He is not ill-appearing, toxic-appearing or diaphoretic.   HENT:      Head: Normocephalic and atraumatic.      Right Ear: Ear canal and external ear normal.      Left Ear: Ear canal and external ear normal.      Nose: Nose normal. No congestion or rhinorrhea.      Mouth/Throat:      Mouth: Mucous membranes are moist.      Pharynx: No oropharyngeal exudate.   Eyes:      General: No scleral icterus.        Right eye: No discharge.         Left eye: No discharge.      Extraocular Movements: Extraocular movements intact.      Conjunctiva/sclera: Conjunctivae normal.      Pupils: Pupils are equal, round, and reactive to light.   Neck:      Musculoskeletal: Normal range of motion and neck supple. No neck rigidity or muscular tenderness.      Thyroid: No thyromegaly.      Vascular: No carotid bruit  or JVD.      Trachea: No tracheal deviation.   Cardiovascular:      Rate and Rhythm: Normal rate and regular rhythm.      Pulses: Normal pulses.      Heart sounds: Normal heart sounds. No murmur. No friction rub. No gallop.    Pulmonary:      Effort: Pulmonary effort is normal. No respiratory distress.      Breath sounds: Normal breath sounds. No stridor. No wheezing, rhonchi or rales.   Chest:      Chest wall: No tenderness.   Abdominal:      General: Bowel sounds are normal. There is no distension.      Palpations: Abdomen is soft. There is no mass.      Tenderness: There is no abdominal tenderness. There is no guarding or rebound.      Hernia: No hernia is present.   Musculoskeletal: Normal range of motion.         General: No swelling, tenderness, deformity or signs of injury.      Right lower leg: No edema.      Left lower leg: No edema.   Lymphadenopathy:      Cervical: No cervical adenopathy.   Skin:     General: Skin is warm and dry.      Coloration: Skin is not jaundiced or pale.      Findings: No bruising, erythema or rash.   Neurological:      General: No focal deficit present.      Mental Status: He is alert and oriented to person, place, and time. Mental status is at baseline.      Cranial Nerves: No cranial nerve deficit.      Sensory: No sensory deficit.      Motor: No weakness or abnormal muscle tone.      Coordination: Coordination normal.   Psychiatric:         Mood and Affect: Mood normal.         Behavior: Behavior normal.         Thought Content: Thought content normal.         Judgment: Judgment normal.    Reviewed, no change in above data from the prior day.            Discharge Diagnosis:     Active Hospital Problems    Diagnosis  POA   • **Syncope [R55]  Yes     Priority: High   • Real time reverse transcriptase PCR positive for COVID-19 virus [U07.1]  Yes     Priority: Medium   • Essential hypertension [I10]  Yes   • Chronic pancreatitis (CMS/HCC) [K86.1]  Yes   • Myasthenia gravis  (CMS/[facility]) [G70.00]  Yes      Resolved Hospital Problems   No resolved problems to display.       Estimated Creatinine Clearance: 88.8 mL/min (by C-G formula based on SCr of 0.77 mg/dL).    Discharge Disposition    Continued Care and Services - Admitted Since 1/19/2021    Coordination has not been started for this encounter.             PT Recommendation and Plan          Home or Self Care           Discharge Medications      Changes to Medications      Instructions Start Date   gabapentin 300 MG capsule  Commonly known as: NEURONTIN  What changed: when to take this   TAKE ONE CAPSULE BY MOUTH DAILY      pyridostigmine 180 MG CR tablet  Commonly known as: MESTINON  What changed: when to take this   180 mg, Oral, 2 Times Daily      tamsulosin 0.4 MG capsule 24 hr capsule  Commonly known as: FLOMAX  What changed: when to take this   0.4 mg, Oral, Every 24 Hours         Continue These Medications      Instructions Start Date   amLODIPine-atorvastatin 5-20 MG per tablet  Commonly known as: CADUET   TAKE 1 TABLET DAILY      Aspirin Low Dose 81 MG tablet  Generic drug: aspirin   81 mg, Oral, Every Night at Bedtime      benzonatate 200 MG capsule  Commonly known as: TESSALON   200 mg, Oral, 3 Times Daily PRN      escitalopram 10 MG tablet  Commonly known as: LEXAPRO   10 mg, Oral, Daily      omeprazole 40 MG capsule  Commonly known as: priLOSEC   TAKE 1 CAPSULE DAILY      sotalol 80 MG tablet  Commonly known as: BETAPACE   40 mg, Oral, Every 12 Hours         Stop These Medications    losartan-hydrochlorothiazide 100-12.5 MG per tablet  Commonly known as: HYZAAR          Discharge medications personally reviewed by me and med rec done by me personally.  01/20/21, 3:13 PM EST        Consults:   Consults     No orders found for last 30 day(s).          Procedures Performed:    * No surgery found *        Pertinent Test Results:   Results from last 7 days   Lab Units 01/20/21  0400 01/19/21  1535   WBC 10*3/mm3 3.50 4.70      HEMOGLOBIN g/dL 12.7* 13.6   HEMATOCRIT % 37.8 40.4   MCV fL 83.4 83.3   MCH pg 28.0 27.9   PLATELETS 10*3/mm3 72* 77*     Results from last 7 days   Lab Units 01/20/21  0726 01/19/21  1535   SODIUM mmol/L 137 137   POTASSIUM mmol/L 4.0 3.7   CHLORIDE mmol/L 102 102   CO2 mmol/L 28.0 26.0   BUN mg/dL 10 20   CREATININE mg/dL 0.77 1.04   CALCIUM mg/dL 8.1* 8.4*   BILIRUBIN mg/dL 0.6 0.7   ALK PHOS U/L 73 77   ALT (SGPT) U/L 26 29   AST (SGOT) U/L 27 30   GLUCOSE mg/dL 99 123*     Lab Results   Component Value Date    CALCIUM 8.1 (L) 01/20/2021     No results found for: HGBA1C  Lab Results   Component Value Date    CHOL 96 08/12/2020    TRIG 111 08/12/2020    HDL 34 (L) 08/12/2020    LDL 40 08/12/2020     Lab Results   Component Value Date    LIPASE 53 07/04/2020         Pathology  Lab Results   Lab Value Date/Time    FINALDX  07/04/2020 1220     Gallbladder, cholecystectomy:    Chronic and focal acute cholecystitis    No stones identified    ERUM/sms        Inflammatory Biomarkers  Results from last 7 days   Lab Units 01/20/21  0726 01/20/21  0400   CRP mg/dL  --  0.43   FERRITIN ng/mL  --  624.90*   LDH U/L 234*  --    FIBRINOGEN mg/dL  --  382     SARS-CoV-2, ALLIE   Date Value Ref Range Status   01/12/2021 Detected (A) Not Detected Final     Comment:     This nucleic acid amplification test was developed and its performance  characteristics determined by [facility]. Nucleic acid  amplification tests include PCR and TMA. This test has not been FDA  cleared or approved. This test has been authorized by FDA under an  Emergency Use Authorization (EUA). This test is only authorized for  the duration of time the declaration that circumstances exist  justifying the authorization of the emergency use of in vitro  diagnostic tests for detection of SARS-CoV-2 virus and/or diagnosis  of COVID-19 infection under section 564(b)(1) of the Act, 21 U.S.C.  360bbb-3(b) (1), unless the authorization is terminated or  revoked  sooner.  When diagnostic testing is negative, the possibility of a false  negative result should be considered in the context of a patient's  recent exposures and the presence of clinical signs and symptoms  consistent with COVID-19. An individual without symptoms of COVID-19  and who is not shedding SARS-CoV-2 virus would expect to have a  negative (not detected) result in this assay.        Microbiology Results (last 10 days)     Procedure Component Value - Date/Time    COVID-19,[facility],NP/OP Swab in Transport Media or ESwab 72 HR TAT - Swab, Nasopharynx [[id number]]  (Abnormal) Collected: 01/12/21 1244    Lab Status: Final result Specimen: Swab from Nasopharynx Updated: 01/13/21 1908    Narrative:      The following orders were created for panel order COVID-19,[facility],NP/OP Swab in Transport Media or ESwab 72 HR TAT - Swab, Nasopharynx.  Procedure                               Abnormality         Status                     ---------                               -----------         ------                     COVID-19,[facility] ROUTINE...[[id number]]  Abnormal            Final result                 Please view results for these tests on the individual orders.    COVID-19,[facility] ROUTINE, NP/OP SWAB IN TRANSPORT MEDIA OR ESWAB 72 HR TAT - Swab, Nasopharynx [[id number]]  (Abnormal) Collected: 01/12/21 1244    Lab Status: Final result Specimen: Swab from Nasopharynx Updated: 01/13/21 1908     SARS-CoV-2, ALLIE Detected     Comment: This nucleic acid amplification test was developed and its performance  characteristics determined by [facility]. Nucleic acid  amplification tests include PCR and TMA. This test has not been FDA  cleared or approved. This test has been authorized by FDA under an  Emergency Use Authorization (EUA). This test is only authorized for  the duration of time the declaration that circumstances exist  justifying the authorization of the emergency use of in vitro  diagnostic tests for  detection of SARS-CoV-2 virus and/or diagnosis  of COVID-19 infection under section 564(b)(1) of the Act, 21 U.S.C.  360bbb-3(b) (1), unless the authorization is terminated or revoked  sooner.  When diagnostic testing is negative, the possibility of a false  negative result should be considered in the context of a patient's  recent exposures and the presence of clinical signs and symptoms  consistent with COVID-19. An individual without symptoms of COVID-19  and who is not shedding SARS-CoV-2 virus would expect to have a  negative (not detected) result in this assay.       Narrative:      Performed at:  01 - [facility]  [address] [place], [place]  [id number]  : Robin Wall [facility], Phone:  [phone]          ECG/EMG Results (most recent)     Procedure Component Value Units Date/Time    ECG 12 Lead [[id number]] Collected: 01/19/21 1607     Updated: 01/19/21 1609     QT Interval 401 ms     Narrative:      HEART RATE= 63  bpm  RR Interval= 948  ms  AR Interval= 163  ms  P Horizontal Axis= 5  deg  P Front Axis= 48  deg  QRSD Interval= 93  ms  QT Interval= 401  ms  QRS Axis= -11  deg  T Wave Axis= 34  deg  - NORMAL ECG -  Sinus rhythm  Electronically Signed By:   Date and Time of Study: 2021-01-19 16:07:22    Adult Transthoracic Echo Limited W/ Cont if Necessary Per Protocol [[id number]] Collected: 01/20/21 0710     Updated: 01/20/21 0739          Results for orders placed during the hospital encounter of 01/19/21   Duplex Carotid Ultrasound CAR    Narrative · Right internal carotid artery is normal.  · Left internal carotid artery is normal.               Ct Head Without Contrast    Result Date: 1/19/2021  No acute intercranial findings. 2. Mild ethmoid and left frontal sinus mucosal thickening.  Electronically Signed By-Radha Yates MD On:1/19/2021 4:44 PM This report was finalized on [id number] by  Radha Yates MD.    Xr Chest 1 View    Result Date: 1/19/2021  No acute chest finding.   Electronically Signed By-Radha Yates MD On:1/19/2021 3:57 PM This report was finalized on [id number] by  Radha Yates MD.      Xrays, labs reviewed personally by me.  01/20/21  3:13 PM EST      Condition on Discharge:    Stable    Discharge Diet:   Dietary Orders (From admission, onward)     Start     Ordered    01/19/21 2010  Diet Regular  Diet Effective Now     Question:  Diet / Texture / Consistency  Answer:  Regular    01/19/21 2011                Activity at Discharge:   Activity Instructions     Activity as Tolerated            Follow-up Appointments    Future Appointments   Date Time Provider Department Center   2/8/2021  9:00 AM Sergio Martinez MD [facility]   6/29/2021  8:30 AM JAQUELIN Martinez MD [facility]       Additional Instructions for the Follow-ups that You Need to Schedule     Discharge Follow-up with PCP   As directed       Currently Documented PCP:    Sergio Martinez MD    PCP Phone Number:    [phone]     Follow Up Details: If no PCP, call MD finder at [phone]           Follow-up Information     Sergio Martinez MD Follow up in 1 week(s).    Specialty: Internal Medicine  Contact information:  [address]  [address]  [place] [address] [phone]                    Test Results Pending at Discharge       Risk for Readmission (LACE) Score: 2 (1/20/2021  6:01 AM)          Amrik Fitzgerald MD  01/20/21  15:13 EST

## 2021-01-20 NOTE — PROGRESS NOTES
Discharge Planning Assessment  [facility]     Patient Name: Enrrique Alejandre  MRN: [id number]  Today's Date: 1/20/2021    Admit Date: 1/19/2021    Discharge Needs Assessment     Row Name 01/20/21 1434       Living Environment    Lives With  spouse    Current Living Arrangements  home/apartment/condo    Primary Care Provided by  self    Provides Primary Care For  no one    Family Caregiver if Needed  none    Able to Return to Prior Arrangements  yes       Resource/Environmental Concerns    Resource/Environmental Concerns  none       Transition Planning    Patient/Family Anticipates Transition to  home with family    Patient/Family Anticipated Services at Transition  none    Transportation Anticipated  family or friend will provide       Discharge Needs Assessment    Readmission Within the Last 30 Days  no previous admission in last 30 days    Equipment Currently Used at Home  none    Concerns to be Addressed  no discharge needs identified;denies needs/concerns at this time    Equipment Needed After Discharge  none        Discharge Plan     Row Name 01/20/21 1435       Plan    Plan  DC Plan: Anticipate Routine Home    Patient/Family in Agreement with Plan  yes    Plan Comments  S/w pt via telephone d/t covid precautions. Independent with spouse at home. No DME. Works full time and drives. Confirms is able to afford medications/food. Denies needs for dc at this time.        Continued Care and Services - Admitted Since 1/19/2021    Coordination has not been started for this encounter.           Functional Status     Row Name 01/20/21 1438       Functional Status, IADL    Medications  independent    Meal Preparation  independent    Housekeeping  independent    Laundry  independent    Shopping  independent       Employment/    Employment Status  employed full-time        Phone communication only - no physical contact with patient or family.        Violeta Lynch RN

## 2021-01-20 NOTE — H&P
[facility] HOSPITALIST     Sergio Martinez MD    102/1  Patient Care Team:  Sergio Martinez MD as PCP - General (Internal Medicine)    Patient was examined with relevant and adequate PPE keeping in mind the current coronavirus pandemic. Minimum of 10 minutes to don and doff PPE.    CHIEF COMPLAINT:     Chief Complaint   Patient presents with   • Hypotension       HISTORY OF PRESENT ILLNESS:    66-year-old Covid positive on 1/8/2021 patient reports with a persistent headache, fever with T-max 101, cough, lightheadedness, syncopal episode yesterday, diffuse upper chest pain that is worse with deep inspiration and cough, and nausea.  He reports that he did have some episodes of diarrhea that resolved 3 days ago.  He reports that he was sent in by his primary care physician due to hypotension.  He denies dyspnea.  He does report a history of tachycardia and MG.     1. Location: diffuse upper chest  2. Quality: pleuritic  3. Severity: moderate  4. Worsening factors: denies  5. Alleviating factors: rest  6. Onset: 11 days  7. Radiation: denies  8. Frequency: intermittent  9. Co-morbidities: Past Medical History:  8/16/2017: High prostate specific antigen (PSA)  No date: History of echocardiogram      Comment:  EF 55%. No significant valvular flow abnormalities.   11/2010: History of stress test      Comment:  Stress Myoview: No ischemia 11/2010 6/25/2019: HTN (hypertension)  No date: Myasthenia gravis (CMS/[facility])  No date: Pancreatitis  6/25/2019: PSVT (paroxysmal supraventricular tachycardia) (CMS/[facility])  02/2014: Stroke (CMS/[facility])  No date: Supraventricular tachycardia (CMS/[facility])  1/12/2021: Suspected COVID-19 virus infection  2008: TIA (transient ischemic attack)  10. Source: patient    Diarrhea for 2 days  Headache and fever since diagnosis of Covid on January 8  Also note patient is on multiple antihypertensives and pyridostigmine 01/19/21, 8:14 PM EST        PCP  Sergio Martinez MD  (General)     Past Medical History:   Diagnosis Date   • High prostate specific antigen (PSA) 8/16/2017   • History of echocardiogram     EF 55%. No significant valvular flow abnormalities.    • History of stress test 11/2010    Stress Myoview: No ischemia 11/2010   • HTN (hypertension) 6/25/2019   • Myasthenia gravis (CMS/HCC)    • Pancreatitis    • PSVT (paroxysmal supraventricular tachycardia) (CMS/HCC) 6/25/2019   • Stroke (CMS/HCC) 02/2014   • Supraventricular tachycardia (CMS/HCC)    • Suspected COVID-19 virus infection 1/12/2021   • TIA (transient ischemic attack) 2008     Past Surgical History:   Procedure Laterality Date   • CHOLECYSTECTOMY N/A 7/4/2020    Procedure: CHOLECYSTECTOMY LAPAROSCOPIC;  Surgeon: Yogesh Presley MD;  Location: [facility] OR;  Service: General;  Laterality: N/A;   • FINGER SURGERY     • PROSTATE BIOPSY  2019    [facility]   • TONSILLECTOMY     • VASECTOMY       Family History   Problem Relation Age of Onset   • Hypertension Mother    • Stroke Mother    • Other Mother         Cerebral Bleed   • Hypertension Father    • Other Father      Social History     Tobacco Use   • Smoking status: Never Smoker   • Smokeless tobacco: Never Used   Substance Use Topics   • Alcohol use: Not Currently   • Drug use: No     Medications Prior to Admission   Medication Sig Dispense Refill Last Dose   • amLODIPine-atorvastatin (CADUET) 5-20 MG per tablet TAKE 1 TABLET DAILY 90 tablet 3 1/19/2021 at 0800   • aspirin (ASPIRIN LOW DOSE) 81 MG tablet Take 81 mg by mouth every night at bedtime.   1/18/2021 at Unknown time   • benzonatate (TESSALON) 200 MG capsule Take 1 capsule by mouth 3 (Three) Times a Day As Needed for Cough. 30 capsule 0 1/19/2021 at 0900   • escitalopram (LEXAPRO) 10 MG tablet Take 1 tablet by mouth Daily. 90 tablet 3 1/19/2021 at 0900   • gabapentin (NEURONTIN) 300 MG capsule TAKE ONE CAPSULE BY MOUTH DAILY (Patient taking differently: Take 300 mg by mouth every  night at bedtime.) 30 capsule 0 1/18/2021 at Unknown time   • losartan-hydrochlorothiazide (HYZAAR) 100-12.5 MG per tablet Take 1 tablet by mouth Daily. 90 tablet 3 1/19/2021 at 0900   • omeprazole (priLOSEC) 40 MG capsule TAKE 1 CAPSULE DAILY 90 capsule 4 1/19/2021 at 0900   • pyridostigmine (MESTINON) 180 MG CR tablet Take 1 tablet by mouth 3 (Three) Times a Day. 270 tablet 4 1/19/2021 at 0800   • sotalol (BETAPACE) 80 MG tablet Take 0.5 tablets by mouth Every 12 (Twelve) Hours. 90 tablet 3 1/19/2021 at 0900   • tamsulosin (FLOMAX) 0.4 MG capsule 24 hr capsule Take 1 capsule by mouth Daily. (Patient taking differently: Take 1 capsule by mouth Every 12 (Twelve) Hours.) 90 capsule 3 1/19/2021 at 0800     Allergies:  Patient has no known allergies.    Immunization History   Administered Date(s) Administered   • Flu Vaccine Intradermal Quad 18-64YR 10/27/2014   • Fluzone High Dose =>65 Years ([facility] ONLY) 11/09/2020   • Influenza Quad Vaccine (Inpatient) 10/27/2014   • Pneumococcal Polysaccharide (PPSV23) 08/10/2020           REVIEW OF SYSTEMS:     ROS  Constitutional: Positive for fatigue and fever. Negative for activity change, chills and diaphoresis.   HENT: Negative for ear discharge, rhinorrhea and trouble swallowing.    Eyes: Negative for photophobia and visual disturbance.   Respiratory: Negative for cough, chest tightness, shortness of breath and wheezing.    Cardiovascular: Positive for chest pain. Negative for palpitations and leg swelling.   Gastrointestinal: Negative for abdominal pain, nausea and vomiting.   Skin: Negative for color change, pallor and rash.   Neurological: Positive for dizziness and headaches. Negative for syncope, weakness and numbness.   Hematological: Does not bruise/bleed easily.   All other systems reviewed and are negative.    Noted      Vital Signs  Temp:  [98.2 °F (36.8 °C)-99 °F (37.2 °C)] 99 °F (37.2 °C)  Heart Rate:  [] 63  Resp:  [16-17] 16  BP: ()/(43-67)  "112/58    Flowsheet Rows      First Filed Value   Admission Height  162.6 cm (64\") Documented at 01/19/2021 1448   Admission Weight  86.7 kg (191 lb 3.2 oz) Documented at 01/19/2021 1448           Physical Exam:    Physical Exam  Vitals signs and nursing note reviewed.   Constitutional:       General: He is not in acute distress.     Appearance: Normal appearance. He is well-developed. He is not ill-appearing, toxic-appearing or diaphoretic.   HENT:      Head: Normocephalic and atraumatic.      Right Ear: Ear canal and external ear normal.      Left Ear: Ear canal and external ear normal.      Nose: Nose normal. No congestion or rhinorrhea.      Mouth/Throat:      Mouth: Mucous membranes are moist.      Pharynx: No oropharyngeal exudate.   Eyes:      General: No scleral icterus.        Right eye: No discharge.         Left eye: No discharge.      Extraocular Movements: Extraocular movements intact.      Conjunctiva/sclera: Conjunctivae normal.      Pupils: Pupils are equal, round, and reactive to light.   Neck:      Musculoskeletal: Normal range of motion and neck supple. No neck rigidity or muscular tenderness.      Thyroid: No thyromegaly.      Vascular: No carotid bruit or JVD.      Trachea: No tracheal deviation.   Cardiovascular:      Rate and Rhythm: Normal rate and regular rhythm.      Pulses: Normal pulses.      Heart sounds: Normal heart sounds. No murmur. No friction rub. No gallop.    Pulmonary:      Effort: Pulmonary effort is normal. No respiratory distress.      Breath sounds: Normal breath sounds. No stridor. No wheezing, rhonchi or rales.   Chest:      Chest wall: No tenderness.   Abdominal:      General: Bowel sounds are normal. There is no distension.      Palpations: Abdomen is soft. There is no mass.      Tenderness: There is no abdominal tenderness. There is no guarding or rebound.      Hernia: No hernia is present.   Musculoskeletal: Normal range of motion.         General: No swelling, " tenderness, deformity or signs of injury.      Right lower leg: No edema.      Left lower leg: No edema.   Lymphadenopathy:      Cervical: No cervical adenopathy.   Skin:     General: Skin is warm and dry.      Coloration: Skin is not jaundiced or pale.      Findings: No bruising, erythema or rash.   Neurological:      General: No focal deficit present.      Mental Status: He is alert and oriented to person, place, and time. Mental status is at baseline.      Cranial Nerves: No cranial nerve deficit.      Sensory: No sensory deficit.      Motor: No weakness or abnormal muscle tone.      Coordination: Coordination normal.   Psychiatric:         Mood and Affect: Mood normal.         Behavior: Behavior normal.         Thought Content: Thought content normal.         Judgment: Judgment normal.               Lab Results (most recent)     Procedure Component Value Units Date/Time    Extra Tubes [[id number]] Collected: 01/19/21 1559    Specimen: Blood, Venous Line Updated: 01/19/21 1700    Narrative:      The following orders were created for panel order Extra Tubes.  Procedure                               Abnormality         Status                     ---------                               -----------         ------                     Light Blue Top[[id number]]                                                              Light Blue Top[[id number]]                                   Final result               Light Blue Top[[id number]]                                   Final result                 Please view results for these tests on the individual orders.    Light Blue Top [[id number]] Collected: 01/19/21 1559    Specimen: Blood Updated: 01/19/21 1700     Extra Tube hold for add-on     Comment: Auto resulted       Light Blue Top [[id number]] Collected: 01/19/21 1559    Specimen: Blood Updated: 01/19/21 1700     Extra Tube hold for add-on     Comment: Auto resulted       [place] Draw [[id number]] Collected: 01/19/21 7695     Specimen: Blood Updated: 01/19/21 1645    Narrative:      The following orders were created for panel order [place] Draw.  Procedure                               Abnormality         Status                     ---------                               -----------         ------                     Light Blue Top[[id number]]                                   Final result               Green Top (Gel)[[id number]]                                  Final result               Lavender Top[[id number]]                                     Final result               Gold Top - SST[[id number]]                                   Final result                 Please view results for these tests on the individual orders.    Green Top (Gel) [[id number]] Collected: 01/19/21 1535    Specimen: Blood Updated: 01/19/21 1645     Extra Tube Hold for add-ons.     Comment: Auto resulted.       Gold Top - SST [[id number]] Collected: 01/19/21 1535    Specimen: Blood Updated: 01/19/21 1645     Extra Tube Hold for add-ons.     Comment: Auto resulted.       D-dimer, Quantitative [[id number]]  (Normal) Collected: 01/19/21 1559    Specimen: Blood Updated: 01/19/21 1618     D-Dimer, Quantitative 0.35 mg/L (FEU)     Narrative:      Reference Range  --------------------------------------------------------------------     < 0.50   Negative Predictive Value  0.50-0.59   Indeterminate    >= 0.60   Probable VTE             A very low percentage of patients with DVT may yield D-Dimer results   below the cut-off of 0.50 mg/L FEU.  This is known to be more   prevalent in patients with distal DVT.             Results of this test should always be interpreted in conjunction with   the patient's medical history, clinical presentation and other   findings.  Clinical diagnosis should not be based on the result of   INNOVANCE D-Dimer alone.    Comprehensive Metabolic Panel [[id number]]  (Abnormal) Collected: 01/19/21 1535    Specimen: Blood Updated: 01/19/21 1614      Glucose 123 mg/dL      BUN 20 mg/dL      Creatinine 1.04 mg/dL      Sodium 137 mmol/L      Potassium 3.7 mmol/L      Chloride 102 mmol/L      CO2 26.0 mmol/L      Calcium 8.4 mg/dL      Total Protein 6.3 g/dL      Albumin 3.70 g/dL      ALT (SGPT) 29 U/L      AST (SGOT) 30 U/L      Alkaline Phosphatase 77 U/L      Total Bilirubin 0.7 mg/dL      eGFR Non African Amer 71 mL/min/1.73      Globulin 2.6 gm/dL      A/G Ratio 1.4 g/dL      BUN/Creatinine Ratio 19.2     Anion Gap 9.0 mmol/L     Narrative:      GFR Normal >60  Chronic Kidney Disease <60  Kidney Failure <15      Troponin [[id number]]  (Normal) Collected: 01/19/21 1535    Specimen: Blood Updated: 01/19/21 1614     Troponin T <0.010 ng/mL     Narrative:      Troponin T Reference Range:  <= 0.03 ng/mL-   Negative for AMI  >0.03 ng/mL-     Abnormal for myocardial necrosis.  Clinicians would have to utilize clinical acumen, EKG, Troponin and serial changes to determine if it is an Acute Myocardial Infarction or myocardial injury due to an underlying chronic condition.       Results may be falsely decreased if patient taking Biotin.      Lavender Top [[id number]] Collected: 01/19/21 1535    Specimen: Blood Updated: 01/19/21 1606     Extra Tube In storage.    CBC & Differential [[id number]]  (Abnormal) Collected: 01/19/21 1535    Specimen: Blood Updated: 01/19/21 1549    Narrative:      The following orders were created for panel order CBC & Differential.  Procedure                               Abnormality         Status                     ---------                               -----------         ------                     CBC Auto Differential[[id number]]        Abnormal            Final result                 Please view results for these tests on the individual orders.    CBC Auto Differential [[id number]]  (Abnormal) Collected: 01/19/21 1535    Specimen: Blood Updated: 01/19/21 1549     WBC 4.70 10*3/mm3      RBC 4.85 10*6/mm3      Hemoglobin 13.6 g/dL       Hematocrit 40.4 %      MCV 83.3 fL      MCH 27.9 pg      MCHC 33.5 g/dL      RDW 14.2 %      RDW-SD 41.1 fl      MPV 9.3 fL      Platelets 77 10*3/mm3      Neutrophil % 84.3 %      Lymphocyte % 7.4 %      Monocyte % 7.8 %      Eosinophil % 0.2 %      Basophil % 0.3 %      Neutrophils, Absolute 4.00 10*3/mm3      Lymphocytes, Absolute 0.40 10*3/mm3      Monocytes, Absolute 0.40 10*3/mm3      Eosinophils, Absolute 0.00 10*3/mm3      Basophils, Absolute 0.00 10*3/mm3      nRBC 0.1 /100 WBC     POC Glucose Once [[id number]]  (Abnormal) Collected: 01/19/21 1538    Specimen: Blood Updated: 01/19/21 1539     Glucose 129 mg/dL      Comment: Serial Number: [id number]:  [id number]            Results from last 7 days   Lab Units 01/19/21  1535   WBC 10*3/mm3 4.70   HEMOGLOBIN g/dL 13.6   HEMATOCRIT % 40.4   MCV fL 83.3   MCH pg 27.9   PLATELETS 10*3/mm3 77*     Results from last 7 days   Lab Units 01/19/21  1535   SODIUM mmol/L 137   POTASSIUM mmol/L 3.7   CHLORIDE mmol/L 102   CO2 mmol/L 26.0   BUN mg/dL 20   CREATININE mg/dL 1.04   CALCIUM mg/dL 8.4*   BILIRUBIN mg/dL 0.7   ALK PHOS U/L 77   ALT (SGPT) U/L 29   AST (SGOT) U/L 30   GLUCOSE mg/dL 123*     Lab Results   Component Value Date    CALCIUM 8.4 (L) 01/19/2021     No results found for: HGBA1C  Lab Results   Component Value Date    CHOL 96 08/12/2020    TRIG 111 08/12/2020    HDL 34 (L) 08/12/2020    LDL 40 08/12/2020     Lab Results   Component Value Date    LIPASE 53 07/04/2020         Pathology  Lab Results   Lab Value Date/Time    FINALDX  07/04/2020 1220     Gallbladder, cholecystectomy:    Chronic and focal acute cholecystitis    No stones identified    ERUM/sms        Inflammatory Biomarkers        Invalid input(s): ESR, D-DIMER QUANTITATIVE,  PROCALCITONIN  SARS-CoV-2, ALLIE   Date Value Ref Range Status   01/12/2021 Detected (A) Not Detected Final     Comment:     This nucleic acid amplification test was developed and its performance  characteristics  determined by [employer]. Nucleic acid  amplification tests include PCR and TMA. This test has not been FDA  cleared or approved. This test has been authorized by FDA under an  Emergency Use Authorization (EUA). This test is only authorized for  the duration of time the declaration that circumstances exist  justifying the authorization of the emergency use of in vitro  diagnostic tests for detection of SARS-CoV-2 virus and/or diagnosis  of COVID-19 infection under section 564(b)(1) of the Act, 21 U.S.C.  360bbb-3(b) (1), unless the authorization is terminated or revoked  sooner.  When diagnostic testing is negative, the possibility of a false  negative result should be considered in the context of a patient's  recent exposures and the presence of clinical signs and symptoms  consistent with COVID-19. An individual without symptoms of COVID-19  and who is not shedding SARS-CoV-2 virus would expect to have a  negative (not detected) result in this assay.        Microbiology Results (last 10 days)     Procedure Component Value - Date/Time    COVID-19,[facility],NP/OP Swab in Transport Media or ESwab 72 HR TAT - Swab, Nasopharynx [[id number]]  (Abnormal) Collected: 01/12/21 1244    Lab Status: Final result Specimen: Swab from Nasopharynx Updated: 01/13/21 1908    Narrative:      The following orders were created for panel order COVID-19,[facility],NP/OP Swab in Transport Media or ESwab 72 HR TAT - Swab, Nasopharynx.  Procedure                               Abnormality         Status                     ---------                               -----------         ------                     COVID-19,[facility] ROUTINE...[[id number]]  Abnormal            Final result                 Please view results for these tests on the individual orders.    COVID-19,[facility] ROUTINE, NP/OP SWAB IN TRANSPORT MEDIA OR ESWAB 72 HR TAT - Swab, Nasopharynx [[id number]]  (Abnormal) Collected: 01/12/21 1244    Lab Status: Final result Specimen:  Swab from Nasopharynx Updated: 01/13/21 1908     SARS-CoV-2, ALLIE Detected     Comment: This nucleic acid amplification test was developed and its performance  characteristics determined by [employer]. Nucleic acid  amplification tests include PCR and TMA. This test has not been FDA  cleared or approved. This test has been authorized by FDA under an  Emergency Use Authorization (EUA). This test is only authorized for  the duration of time the declaration that circumstances exist  justifying the authorization of the emergency use of in vitro  diagnostic tests for detection of SARS-CoV-2 virus and/or diagnosis  of COVID-19 infection under section 564(b)(1) of the Act, 21 U.S.C.  360bbb-3(b) (1), unless the authorization is terminated or revoked  sooner.  When diagnostic testing is negative, the possibility of a false  negative result should be considered in the context of a patient's  recent exposures and the presence of clinical signs and symptoms  consistent with COVID-19. An individual without symptoms of COVID-19  and who is not shedding SARS-CoV-2 virus would expect to have a  negative (not detected) result in this assay.       Narrative:      Performed at:  [facility] - [facility]  [address] [place], [place]  [id number]  : Robin Wall [facility], Phone:  [phone]          ECG/EMG Results (most recent)     Procedure Component Value Units Date/Time    ECG 12 Lead [[id number]] Collected: 01/19/21 1607     Updated: 01/19/21 1609     QT Interval 401 ms     Narrative:      HEART RATE= 63  bpm  RR Interval= 948  ms  NJ Interval= 163  ms  P Horizontal Axis= 5  deg  P Front Axis= 48  deg  QRSD Interval= 93  ms  QT Interval= 401  ms  QRS Axis= -11  deg  T Wave Axis= 34  deg  - NORMAL ECG -  Sinus rhythm  Electronically Signed By:   Date and Time of Study: 2021-01-19 16:07:22                    Ct Head Without Contrast    Result Date: 1/19/2021  No acute intercranial findings. 2. Mild ethmoid and left  frontal sinus mucosal thickening.  Electronically Signed By-Radha Yates MD On:1/19/2021 4:44 PM This report was finalized on [id number] by  Radha Yates MD.    Xr Chest 1 View    Result Date: 1/19/2021  No acute chest finding.  Electronically Signed By-Radha Yates MD On:1/19/2021 3:57 PM This report was finalized on [id number] by  Radha Yates MD.      Results Review:    I reviewed the patient's new clinical results.    Assessment/Plan     Active Hospital Problems    Diagnosis  POA   • **Syncope [R55]  Yes     Priority: High   • Real time reverse transcriptase PCR positive for COVID-19 virus [U07.1]  Yes     Priority: Medium   • Essential hypertension [I10]  Yes   • Chronic pancreatitis (CMS/HCC) [K86.1]  Yes   • Myasthenia gravis (CMS/HCC) [G70.00]  Yes      Resolved Hospital Problems   No resolved problems to display.        MEDICAL DECISION MAKING COMPLEXITY BY PROBLEM:       Syncope:  IV fluids  Supportive treatment  Review antihypertensives  Ultrasound carotids  2D echocardiogram  Cardiac monitor if needed  Appears to be secondary to dehydration multiple antihypertensives and neostigmine  Reduce dose of neostigmine    COVID-19  No treatment necessary  Patient oxygenating well  And well outside the treatment window    Hold antihypertensives      Plan    Care coordination with nursing.  Discussed with patient regarding management and he is in agreement 01/19/21 8:12 PM EST    Estimated Creatinine Clearance: 68.3 mL/min (by C-G formula based on SCr of 1.04 mg/dL).    Code Status and Medical Interventions:   Ordered at: 01/19/21 2011     Code Status:    CPR     Medical Interventions (Level of Support Prior to Arrest):    Full       DVT prophylaxis  Mechanical Order History:     None      Pharmalogical Order History:      Ordered     Dose Route Frequency Stop    01/19/21 2011  heparin (porcine) 5000 UNIT/ML injection 5,000 Units      5,000 Units SC Every 12 Hours Scheduled --                I  personally reviewed patient's x-ray films and my findings are: 0    I personally reviewed patient's EKG strip and my findings are: 0        Disposition        Continued Care and Services - Admitted Since 1/19/2021    Coordination has not been started for this encounter.         Amrik Fitzgerald MD  01/19/21  20:17 EST

## 2021-01-21 ENCOUNTER — TRANSITIONAL CARE MANAGEMENT TELEPHONE ENCOUNTER (OUTPATIENT)
Dept: CALL CENTER | Facility: HOSPITAL | Age: 66
End: 2021-01-21

## 2021-01-21 NOTE — PROGRESS NOTES
Sergio Calle MD  Pt has fwp already sched for 02/08/2021 & wants to keep this. Appt is out of TCM time frame but PCP has nothing sooner, I am asking ofc to review sched if PCP wants to see pt for TCM FWP to be completed by 02/03/2021

## 2021-01-21 NOTE — OUTREACH NOTE
Call Center TCM Note      Responses   [facility] patient discharged from?  [facility]   Does the patient have one of the following disease processes/diagnoses(primary or secondary)?  COVID-19   COVID-19 underlying condition?  None   TCM attempt successful?  Yes   Discharge diagnosis  Covid positive,    Syncope    Meds reviewed with patient/caregiver?  Yes   Is the patient having any side effects they believe may be caused by any medication additions or changes?  No   Does the patient have all medications ordered at discharge?  Yes   Is the patient taking all medications as directed (includes completed medication regime)?  Yes   Does the patient have a primary care provider?   Yes   Comments regarding PCP  Sergio Calle MD  Pt has fwp already sched for 02/08/2021 & wants to keep this. Appt is out of TCM time frame but PCP has nothing sooner, I am asking ofc to review sched if PCP wants to see pt for TCM FWP to be completed by 02/03/2021   Does the patient have an appointment with their PCP or specialist within 7 days of discharge?  No   Has the patient kept scheduled appointments due by today?  N/A   Has home health visited the patient within 72 hours of discharge?  N/A   Psychosocial issues?  No   Did the patient receive a copy of their discharge instructions?  Yes   Did the patient receive a copy of COVID-19 specific instructions?  Yes   Nursing interventions  Reviewed instructions with patient   What is the patient's perception of their health status since discharge?  Improving   Does the patient have any of the following symptoms?  Cough   Pulse Ox monitoring  None   If the patient is a current smoker, are they able to teach back resources for cessation?  Not a smoker   Is the patient/caregiver able to teach back the hierarchy of who to call/visit for symptoms/problems? PCP, Specialist, Home health nurse, Urgent Care, ED, 911  Yes   TCM call completed?  Yes   Wrap up additional comments  Pt feeling much better,  still mild cough but greatly improved. GI issues resolved. Intermittent very low grade temp. All new meds in place. Sergio Calle MD  Pt has fwp already sched for 02/08/2021 & wants to keep this. Appt is out of TCM time frame but PCP has nothing sooner, I am asking ofc to review sched if PCP wants to see pt for TCM FWP to be completed by 02/03/2021          Olga Corona MA    1/21/2021, 15:48 EST

## 2021-01-21 NOTE — OUTREACH NOTE
Prep Survey      Responses   [facility] patient discharged from?  [facility]   Is LACE score < 7 ?  Yes   Emergency Room discharge w/ pulse ox?  No   Eligibility  [facility]   Date of Admission  01/19/21   Date of Discharge  01/20/21   Discharge Disposition  Home or Self Care   Discharge diagnosis  Covid positive,    Syncope    Does the patient have one of the following disease processes/diagnoses(primary or secondary)?  COVID-19   Does the patient have Home health ordered?  No   Is there a DME ordered?  No   Prep survey completed?  Yes          Marielos Ruano RN

## 2021-01-22 ENCOUNTER — READMISSION MANAGEMENT (OUTPATIENT)
Dept: CALL CENTER | Facility: HOSPITAL | Age: 66
End: 2021-01-22

## 2021-01-22 NOTE — OUTREACH NOTE
COVID-19 Week 1 Survey      Responses   [facility] patient discharged from?  [facility]   Does the patient have one of the following disease processes/diagnoses(primary or secondary)?  COVID-19   COVID-19 underlying condition?  None   Call Number  Call 2   Week 1 Call successful?  No   Discharge diagnosis  Covid positive,    Syncope           Gisselle Glass RN

## 2021-01-23 ENCOUNTER — READMISSION MANAGEMENT (OUTPATIENT)
Dept: CALL CENTER | Facility: HOSPITAL | Age: 66
End: 2021-01-23

## 2021-01-23 LAB — QT INTERVAL: 401 MS

## 2021-01-23 NOTE — OUTREACH NOTE
COVID-19 Week 1 Survey      Responses   [facility] patient discharged from?  [facility]   Does the patient have one of the following disease processes/diagnoses(primary or secondary)?  COVID-19   COVID-19 underlying condition?  None   Call Number  Call 3   Week 1 Call successful?  Yes   Call start time  1302   Call end time  1304   Meds reviewed with patient/caregiver?  Yes   Is the patient having any side effects they believe may be caused by any medication additions or changes?  No   Does the patient have all medications ordered at discharge?  Yes   Is the patient taking all medications as directed (includes completed medication regime)?  Yes   Does the patient have a primary care provider?   Yes   Has the patient kept scheduled appointments due by today?  N/A   Has home health visited the patient within 72 hours of discharge?  N/A   Psychosocial issues?  No   What is the patient's perception of their health status since discharge?  Improving   Does the patient have any of the following symptoms?  Cough   Nursing Interventions  Nurse provided patient education   Pulse Ox monitoring  None   Is the patient/caregiver able to teach back steps to recovery at home?  Set small, achievable goals for return to baseline health, Eat a well-balance diet, Rest and rebuild strength, gradually increase activity   Is the patient/caregiver able to teach back the hierarchy of who to call/visit for symptoms/problems? PCP, Specialist, Home health nurse, Urgent Care, ED, 911  Yes   COVID-19 call completed?  Yes   Wrap up additional comments  States feeling much better. States cough is improving. Denies any fevers now or any needs today.          Marie Hudson RN

## 2021-01-25 RX ORDER — MYCOPHENOLATE MOFETIL 500 MG/1
500 TABLET ORAL 2 TIMES DAILY
Qty: 180 TABLET | Refills: 1 | Status: SHIPPED | OUTPATIENT
Start: 2021-01-25 | End: 2021-08-08

## 2021-01-25 NOTE — TELEPHONE ENCOUNTER
Caller: Enrrique Alejandre    Relationship: Self    Best call back number: [phone]    Medication needed:   Requested Prescriptions      No prescriptions requested or ordered in this encounter     MYCOPHENOLATE MOFETIL TABS 500 MG QTY 60    When do you need the refill by: ASAP    What details did the patient provide when requesting the medication: PT STATES HE IS OUT    Does the patient have less than a 3 day supply:  [x] Yes  [] No    What is the patient's preferred pharmacy: [facility] - [place], [place]  [address] [phone] [facility] [phone] FX

## 2021-01-27 DIAGNOSIS — F32.A ANXIETY AND DEPRESSION: ICD-10-CM

## 2021-01-27 DIAGNOSIS — F41.9 ANXIETY AND DEPRESSION: ICD-10-CM

## 2021-01-27 RX ORDER — ESCITALOPRAM OXALATE 10 MG/1
TABLET ORAL
Qty: 90 TABLET | Refills: 3 | Status: SHIPPED | OUTPATIENT
Start: 2021-01-27 | End: 2022-01-30

## 2021-01-29 ENCOUNTER — READMISSION MANAGEMENT (OUTPATIENT)
Dept: CALL CENTER | Facility: HOSPITAL | Age: 66
End: 2021-01-29

## 2021-01-29 NOTE — OUTREACH NOTE
COVID-19 Week 2 Survey      Responses   [facility] patient discharged from?  [facility]   Does the patient have one of the following disease processes/diagnoses(primary or secondary)?  COVID-19   COVID-19 underlying condition?  None   Call Number  Call 1   COVID-19 Week 2: Call 1 attempt successful?  No   Discharge diagnosis  Covid positive,    Syncope           Dottie Fisher RN

## 2021-02-08 ENCOUNTER — OFFICE VISIT (OUTPATIENT)
Dept: FAMILY MEDICINE CLINIC | Facility: CLINIC | Age: 66
End: 2021-02-08

## 2021-02-08 VITALS
WEIGHT: 198 LBS | TEMPERATURE: 97.3 F | BODY MASS INDEX: 33.8 KG/M2 | HEART RATE: 78 BPM | HEIGHT: 64 IN | RESPIRATION RATE: 16 BRPM | OXYGEN SATURATION: 96 % | DIASTOLIC BLOOD PRESSURE: 60 MMHG | SYSTOLIC BLOOD PRESSURE: 118 MMHG

## 2021-02-08 DIAGNOSIS — I10 ESSENTIAL HYPERTENSION: Primary | Chronic | ICD-10-CM

## 2021-02-08 DIAGNOSIS — R25.1 TREMOR: ICD-10-CM

## 2021-02-08 DIAGNOSIS — G70.00 MYASTHENIA GRAVIS (HCC): ICD-10-CM

## 2021-02-08 PROBLEM — J06.9 UPPER RESPIRATORY INFECTION: Status: RESOLVED | Noted: 2020-05-29 | Resolved: 2021-02-08

## 2021-02-08 PROCEDURE — 99214 OFFICE O/P EST MOD 30 MIN: CPT | Performed by: FAMILY MEDICINE

## 2021-02-08 RX ORDER — PYRIDOSTIGMINE BROMIDE 60 MG/1
60 TABLET ORAL AS NEEDED
COMMUNITY
End: 2022-08-17 | Stop reason: SDUPTHER

## 2021-02-08 RX ORDER — CHOLECALCIFEROL (VITAMIN D3) 125 MCG
CAPSULE ORAL
COMMUNITY
End: 2022-12-28

## 2021-02-08 NOTE — PROGRESS NOTES
Chief Complaint   Patient presents with   • Hypertension     6mo     HPI  Enrrique Alejandre is a 66 y.o. male that presents for   Chief Complaint   Patient presents with   • Hypertension     6mo     HTN: 118/60 today. Patient recently hospitalized for hypotension and losartan was discontinued. He is still amlodipine 5 and sotalol 40mg BID. No LH/dizziness, CP, palpitations or SOB.     Myasthenia gravis: following recent hospitalization, pyridostigmine was reduced from 180mg TID to BID. He has not noticed any issues w/ this change. No increased fatigue or other symptoms w/ this medication change    Tremor: patient has noticed some mild hand tremor w/ intention. Not related to recent medication change as has been ongoing for about 1 year. Seems to be worse at the end of the day. Not related to pyridostigmine use     Review of Systems   Constitutional: Negative for fever and unexpected weight loss.   Eyes: Negative for visual disturbance.   Respiratory: Negative for shortness of breath.    Cardiovascular: Negative for chest pain.   Neurological: Positive for tremors. Negative for dizziness, weakness and light-headedness.     The following portions of the patient's history were reviewed and updated as appropriate: problem list, past medical history, past surgical history, allergies, current medications    Problem List Tab  Patient History Tab  Immunizations Tab  Medications Tab  Chart Review Tab  Care Everywhere Tab  Synopsis Tab    PE  Vitals:    02/08/21 0909   BP: 118/60   Pulse: 78   Resp: 16   Temp: 97.3 °F (36.3 °C)   SpO2: 96%     Body mass index is 33.99 kg/m².  General: Obese, NAD  Head: AT/NC  Eyes: EOMI, anicteric sclera  Resp: CTAB, SCR, BS equal  CV: RRR w/o m/r/g; 2+ pulses  GI: Soft, NT/ND, +BS  MSK: FROM, no deformity, no edema  Skin: Warm, dry, intact  Neuro: Alert and oriented. No focal deficits  Psych: Appropriate mood and affect    Imaging  Ct Head Without Contrast    Result Date: 1/19/2021  No acute  intercranial findings. 2. Mild ethmoid and left frontal sinus mucosal thickening.  Electronically Signed By-Radha Yates MD On:1/19/2021 4:44 PM This report was finalized on [id number] by  Radha Yates MD.    Xr Chest 1 View    Result Date: 1/19/2021  No acute chest finding.  Electronically Signed By-Radha Yates MD On:1/19/2021 3:57 PM This report was finalized on [id number] by  Radha Yates MD.      Assessment/Plan   Enrrique Alejandre is a 66 y.o. male that presents for   Chief Complaint   Patient presents with   • Hypertension     6mo     Diagnoses and all orders for this visit:    1. Essential hypertension (Primary): 118/60 today   -Continue home amlodipine 5 as well as sotalol 80 in AM and 40 in p.m.    2. Myasthenia gravis (CMS/HCC): Recently reduced and doing fine on lower dose   -Continue home pyridostigmine 180 twice daily    3. Tremor: Likely essential tremor.  Medication not necessary at this time.  Will consider primidone in the future if progressive and not responding to as needed pyridostigmine   -Recommend regular exercise, working to increase upper extremity and specifically  strength   -Consider primidone if not improving    Follow-up in 6 months for annual physical     Answers for HPI/ROS submitted by the patient on 2/1/2021   What is the primary reason for your visit?: Physical

## 2021-02-14 DIAGNOSIS — I10 ESSENTIAL HYPERTENSION: ICD-10-CM

## 2021-02-15 RX ORDER — LOSARTAN POTASSIUM AND HYDROCHLOROTHIAZIDE 12.5; 1 MG/1; MG/1
TABLET ORAL
Qty: 30 TABLET | Refills: 2 | OUTPATIENT
Start: 2021-02-15

## 2021-03-06 DIAGNOSIS — M54.10 RADICULAR LOW BACK PAIN: ICD-10-CM

## 2021-03-06 DIAGNOSIS — I47.1 PSVT (PAROXYSMAL SUPRAVENTRICULAR TACHYCARDIA) (HCC): ICD-10-CM

## 2021-03-08 RX ORDER — SOTALOL HYDROCHLORIDE 80 MG/1
TABLET ORAL
Qty: 90 TABLET | Refills: 1 | Status: SHIPPED | OUTPATIENT
Start: 2021-03-08 | End: 2021-09-10

## 2021-03-08 RX ORDER — GABAPENTIN 300 MG/1
300 CAPSULE ORAL
Qty: 90 CAPSULE | Refills: 1 | Status: SHIPPED | OUTPATIENT
Start: 2021-03-08 | End: 2021-03-10

## 2021-03-10 DIAGNOSIS — M54.10 RADICULAR LOW BACK PAIN: ICD-10-CM

## 2021-03-10 RX ORDER — GABAPENTIN 300 MG/1
CAPSULE ORAL
Qty: 90 CAPSULE | Refills: 1 | Status: SHIPPED | OUTPATIENT
Start: 2021-03-10 | End: 2022-04-05

## 2021-04-23 RX ORDER — AMLODIPINE BESYLATE AND ATORVASTATIN CALCIUM 5; 20 MG/1; MG/1
1 TABLET, FILM COATED ORAL DAILY
Qty: 90 TABLET | Refills: 1 | Status: SHIPPED | OUTPATIENT
Start: 2021-04-23 | End: 2021-11-15 | Stop reason: SDUPTHER

## 2021-06-29 ENCOUNTER — OFFICE VISIT (OUTPATIENT)
Dept: CARDIOLOGY | Facility: CLINIC | Age: 66
End: 2021-06-29

## 2021-06-29 VITALS
SYSTOLIC BLOOD PRESSURE: 120 MMHG | HEIGHT: 64 IN | OXYGEN SATURATION: 99 % | WEIGHT: 199 LBS | HEART RATE: 59 BPM | BODY MASS INDEX: 33.97 KG/M2 | DIASTOLIC BLOOD PRESSURE: 70 MMHG

## 2021-06-29 DIAGNOSIS — I10 ESSENTIAL HYPERTENSION: Primary | Chronic | ICD-10-CM

## 2021-06-29 DIAGNOSIS — I47.1 PSVT (PAROXYSMAL SUPRAVENTRICULAR TACHYCARDIA) (HCC): ICD-10-CM

## 2021-06-29 PROCEDURE — 93000 ELECTROCARDIOGRAM COMPLETE: CPT | Performed by: INTERNAL MEDICINE

## 2021-06-29 PROCEDURE — 99213 OFFICE O/P EST LOW 20 MIN: CPT | Performed by: INTERNAL MEDICINE

## 2021-06-29 NOTE — PROGRESS NOTES
Cardiology Office Visit Note      Referring physician: Sergio Martinez MD     Reason For Followup: Annual check up    HPI:  Enrrique Alejandre is a 66 y.o. male who returns to the office for an annual check up.  prior hx HTN, PSVT, TIA/CVA with minimal residual neuropathy, dyslipidemia, GERD and myasthenia gravis. He was seen in the ER in January for Hypotension and syncope and while he was there an Echo was performed. He has been feeling fine since, no residual dizziness or episodes of syncope to report    Today he presents with no specific complaints- he is doing well.  Mr. Alejandre retired from his job in April of this year and is now enjoying home repairs and outdoor hobbies with no functional limitations.  He claims he has had no breakthroughs of dysrhythmia or problems with his myasthenia.  He denies other constitutional complaints.    Ms. Alejandre has completed Covid vaccination.    Medications were reviewed in detail today.    Past Medical History:   Diagnosis Date   • High prostate specific antigen (PSA) 8/16/2017   • History of echocardiogram     EF 55%. No significant valvular flow abnormalities.    • History of stress test 11/2010    Stress Myoview: No ischemia 11/2010   • HTN (hypertension) 6/25/2019   • Myasthenia gravis (CMS/[facility])    • Pancreatitis    • PSVT (paroxysmal supraventricular tachycardia) (CMS/[facility]) 6/25/2019   • Stroke (CMS/HCC) 02/2014   • Supraventricular tachycardia (CMS/[facility])    • Suspected COVID-19 virus infection 1/12/2021   • TIA (transient ischemic attack) 2008       Past Surgical History:   Procedure Laterality Date   • CHOLECYSTECTOMY N/A 7/4/2020    Procedure: CHOLECYSTECTOMY LAPAROSCOPIC;  Surgeon: Yogesh Presley MD;  Location: [facility] OR;  Service: General;  Laterality: N/A;   • ECHO - CONVERTED  01/20/2021   • FINGER SURGERY     • PROSTATE BIOPSY  2019    [facility]   • TONSILLECTOMY     • VASECTOMY           Current Outpatient Medications:   •   amLODIPine-atorvastatin (CADUET) 5-20 MG per tablet, Take 1 tablet by mouth Daily., Disp: 90 tablet, Rfl: 1  •  aspirin (ASPIRIN LOW DOSE) 81 MG tablet, Take 81 mg by mouth every night at bedtime., Disp: , Rfl:   •  Cholecalciferol (Vitamin D3) 50 MCG (2000 UT) tablet, Take  by mouth., Disp: , Rfl:   •  escitalopram (LEXAPRO) 10 MG tablet, TAKE ONE TABLET BY MOUTH DAILY, Disp: 90 tablet, Rfl: 3  •  gabapentin (NEURONTIN) 300 MG capsule, TAKE ONE CAPSULE BY MOUTH DAILY, Disp: 90 capsule, Rfl: 1  •  mycophenolate (CELLCEPT) 500 MG tablet, Take 1 tablet by mouth 2 (Two) Times a Day., Disp: 180 tablet, Rfl: 1  •  omeprazole (priLOSEC) 40 MG capsule, TAKE 1 CAPSULE DAILY, Disp: 90 capsule, Rfl: 4  •  pyridostigmine (MESTINON) 180 MG CR tablet, Take 1 tablet by mouth 2 (Two) Times a Day., Disp: 270 tablet, Rfl: 4  •  pyridostigmine (MESTINON) 60 MG tablet, Take 60 mg by mouth As Needed. Take q 4hrs prn, Disp: , Rfl:   •  sotalol (BETAPACE AF) 80 MG tablet tablet, TAKE ONE-HALF TABLET BY MOUTH EVERY 12 HOURS, Disp: 90 tablet, Rfl: 1  •  tamsulosin (FLOMAX) 0.4 MG capsule 24 hr capsule, Take 1 capsule by mouth Daily. (Patient taking differently: Take 1 capsule by mouth Every 12 (Twelve) Hours.), Disp: 90 capsule, Rfl: 3    Social History     Socioeconomic History   • Marital status:      Spouse name: Not on file   • Number of children: Not on file   • Years of education: Not on file   • Highest education level: Not on file   Tobacco Use   • Smoking status: Never Smoker   • Smokeless tobacco: Never Used   Vaping Use   • Vaping Use: Never used   Substance and Sexual Activity   • Alcohol use: Not Currently   • Drug use: No   • Sexual activity: Not Currently       Family History   Problem Relation Age of Onset   • Hypertension Mother    • Stroke Mother    • Other Mother         Cerebral Bleed   • Hypertension Father    • Other Father          Review of Systems   General: denies fever, chills, anorexia, weight loss  Eyes:  "denies blurring, diplopia  Ear/Nose/Throat: denies ear pain, nosebleeds, hoarseness  Cardiovascular: See HPI  Respiratory: denies excessive sputum, hemoptysis, wheezing  Gastrointestinal: denies nausea, vomiting, change in bowel habits, abdominal pain  Genitourinary: Denies hematuria or dysuria  Musculoskeletal: Minor generalized arthralgias/myalgias, none are functionally limiting  Skin: denies rashes, itching, suspicious lesions  Neurologic: denies focal neuro deficits  Psychiatric: denies depression, anxiety  Endocrine: denies cold intolerance, heat intolerance  Hematologic/Lymphatic: denies abnormal bruising, bleeding  Allergic/Immunologic: denies urticaria or persistent infections      Objective     Visit Vitals  /70   Pulse 59   Ht 162.6 cm (64\")   Wt 90.3 kg (199 lb)   SpO2 99%   BMI 34.16 kg/m²           Physical Exam  General:     Obese, well developed,, in no acute distress.    Head:     normocephalic and atraumatic.    Eyes:    PERRL/EOM intact, conjunctiva and sclera clear with out nystagmus.    Neck:    no jvd or bruits  Chest Wall:    no deformities   Lungs:    clear bilaterally to auscultation with adequate global airflow   Heart:    non-displaced PMI; regular rate and rhythm, normal S1, S2 without murmurs, rubs, or gallops  Abdomen:  Soft, nontender without HSM  Msk:    no deformity; adequate R OM  Pulses:    pulses normal in all 4 extremities.    Extremities:    no clubbing, cyanosis, edema   Neurologic:    no focal sensory or motor deficits  Skin:    intact without lesions or rashes.    Psych:    alert and cooperative; normal mood and affect; normal attention span and concentration.            ECG 12 Lead    Date/Time: 6/29/2021 7:10 AM  Performed by: JAQUELIN Martinez MD  Authorized by: JAQUELIN Martinez MD   Comparison: compared with previous ECG from 1/19/2021  Similar to previous ECG  Rhythm: sinus rhythm    Clinical impression: normal ECG              Assessment:   Problems Addressed this " Visit        Other    Essential hypertension - Primary (Chronic)  -Well-regulated on current medication listed reviewed in detail      PSVT (paroxysmal supraventricular tachycardia) (CMS/HCC)  -No known breakthrough SVT episodes; well maintained on low-dose sotalol    Hyperlipidemia-maintained on atorvastatin    Myasthenia gravis-managed per PCP      Diagnoses       Codes Comments    Essential hypertension    -  Primary ICD-10-CM: I10  ICD-9-CM: 401.9     PSVT (paroxysmal supraventricular tachycardia) (CMS/HCC)     ICD-10-CM: I47.1  ICD-9-CM: 427.0             Plan:  Overall Mr. Alejandre is doing quite well from a clinical standpoint.  He is encouraged to continue with current medication listed and reviewed in detail today, along with continued efforts toward weight reduction and regular progressive exercise.  We will proceed as for follow-up in 1 year or sooner if needed.    JAQUELIN Martinez MD  6/29/2021 09:46 EDT    This report was generated using the Dragon voice recognition system.

## 2021-08-02 ENCOUNTER — TELEPHONE (OUTPATIENT)
Dept: FAMILY MEDICINE CLINIC | Facility: CLINIC | Age: 66
End: 2021-08-02

## 2021-08-02 RX ORDER — PYRIDOSTIGMINE BROMIDE 180 MG/1
180 TABLET, EXTENDED RELEASE ORAL 2 TIMES DAILY
Qty: 270 TABLET | Refills: 4 | Status: SHIPPED | OUTPATIENT
Start: 2021-08-02 | End: 2022-08-03

## 2021-08-02 NOTE — TELEPHONE ENCOUNTER
Caller: NEO ANAYA    Relationship: Emergency Contact    Best call back number: [phone]    Medication needed:   Requested Prescriptions     Pending Prescriptions Disp Refills   • pyridostigmine (MESTINON) 180 MG CR tablet 270 tablet 4     Sig: Take 1 tablet by mouth 2 (Two) Times a Day.       When do you need the refill by: 08/06/21    What additional details did the patient provide when requesting the medication: SINCE THIS IS CONSIDERED A NEW PRESCRIPTION WITH [facility], IT MAY TAKE AROUND 10 BUSINESS DAYS FOR [facility] TO GET IT READY     SO THEY ARE WANTING TO SEE IF THEY CAN GET 10 DAYS WORTH SENT TO THE [facility], HE WILL RUN OUT BEFORE [facility] CAN GET IT TO HIM     [place] [id number] - [place], [place] - [address] - [phone] [place] [phone] [phone]    Does the patient have less than a 3 day supply:  [] Yes  [x] No    What is the patient's preferred pharmacy: [facility] - [address] [phone] [facility] [phone] FX

## 2021-08-08 RX ORDER — MYCOPHENOLATE MOFETIL 500 MG/1
TABLET ORAL
Qty: 180 TABLET | Refills: 1 | Status: SHIPPED | OUTPATIENT
Start: 2021-08-08 | End: 2022-02-14

## 2021-08-10 ENCOUNTER — LAB (OUTPATIENT)
Dept: FAMILY MEDICINE CLINIC | Facility: CLINIC | Age: 66
End: 2021-08-10

## 2021-08-10 ENCOUNTER — OFFICE VISIT (OUTPATIENT)
Dept: FAMILY MEDICINE CLINIC | Facility: CLINIC | Age: 66
End: 2021-08-10

## 2021-08-10 VITALS
HEIGHT: 64 IN | DIASTOLIC BLOOD PRESSURE: 69 MMHG | WEIGHT: 184 LBS | TEMPERATURE: 97.3 F | HEART RATE: 62 BPM | OXYGEN SATURATION: 100 % | BODY MASS INDEX: 31.41 KG/M2 | RESPIRATION RATE: 18 BRPM | SYSTOLIC BLOOD PRESSURE: 113 MMHG

## 2021-08-10 DIAGNOSIS — E55.9 VITAMIN D DEFICIENCY, UNSPECIFIED: ICD-10-CM

## 2021-08-10 DIAGNOSIS — G47.00 INSOMNIA, UNSPECIFIED TYPE: ICD-10-CM

## 2021-08-10 DIAGNOSIS — R97.20 HIGH PROSTATE SPECIFIC ANTIGEN (PSA): ICD-10-CM

## 2021-08-10 DIAGNOSIS — L21.9 SEBORRHEIC DERMATITIS: ICD-10-CM

## 2021-08-10 DIAGNOSIS — Z11.59 NEED FOR HEPATITIS C SCREENING TEST: ICD-10-CM

## 2021-08-10 DIAGNOSIS — G70.00 MYASTHENIA GRAVIS (HCC): ICD-10-CM

## 2021-08-10 DIAGNOSIS — Z12.5 ENCOUNTER FOR SCREENING FOR MALIGNANT NEOPLASM OF PROSTATE: ICD-10-CM

## 2021-08-10 DIAGNOSIS — H10.9 CONJUNCTIVITIS OF LEFT EYE, UNSPECIFIED CONJUNCTIVITIS TYPE: ICD-10-CM

## 2021-08-10 DIAGNOSIS — I10 ESSENTIAL HYPERTENSION: Chronic | ICD-10-CM

## 2021-08-10 DIAGNOSIS — Z00.00 ANNUAL PHYSICAL EXAM: Primary | ICD-10-CM

## 2021-08-10 PROBLEM — Z20.822 SUSPECTED COVID-19 VIRUS INFECTION: Status: RESOLVED | Noted: 2021-01-12 | Resolved: 2021-08-10

## 2021-08-10 LAB
25(OH)D3 SERPL-MCNC: 74.3 NG/ML (ref 30–100)
ALBUMIN SERPL-MCNC: 4.4 G/DL (ref 3.5–5.2)
ALBUMIN/GLOB SERPL: 2.6 G/DL
ALP SERPL-CCNC: 81 U/L (ref 39–117)
ALT SERPL W P-5'-P-CCNC: 20 U/L (ref 1–41)
ANION GAP SERPL CALCULATED.3IONS-SCNC: 8.5 MMOL/L (ref 5–15)
AST SERPL-CCNC: 20 U/L (ref 1–40)
BASOPHILS # BLD AUTO: 0.03 10*3/MM3 (ref 0–0.2)
BASOPHILS NFR BLD AUTO: 0.5 % (ref 0–1.5)
BILIRUB SERPL-MCNC: 0.6 MG/DL (ref 0–1.2)
BUN SERPL-MCNC: 11 MG/DL (ref 8–23)
BUN/CREAT SERPL: 11.6 (ref 7–25)
CALCIUM SPEC-SCNC: 9.1 MG/DL (ref 8.6–10.5)
CHLORIDE SERPL-SCNC: 101 MMOL/L (ref 98–107)
CHOLEST SERPL-MCNC: 72 MG/DL (ref 0–200)
CO2 SERPL-SCNC: 28.5 MMOL/L (ref 22–29)
CREAT SERPL-MCNC: 0.95 MG/DL (ref 0.76–1.27)
DEPRECATED RDW RBC AUTO: 44.1 FL (ref 37–54)
EOSINOPHIL # BLD AUTO: 0.11 10*3/MM3 (ref 0–0.4)
EOSINOPHIL NFR BLD AUTO: 1.8 % (ref 0.3–6.2)
ERYTHROCYTE [DISTWIDTH] IN BLOOD BY AUTOMATED COUNT: 13.8 % (ref 12.3–15.4)
GFR SERPL CREATININE-BSD FRML MDRD: 79 ML/MIN/1.73
GLOBULIN UR ELPH-MCNC: 1.7 GM/DL
GLUCOSE SERPL-MCNC: 76 MG/DL (ref 65–99)
HBA1C MFR BLD: 5.5 % (ref 3.5–5.6)
HCT VFR BLD AUTO: 44.1 % (ref 37.5–51)
HCV AB SER DONR QL: NORMAL
HDLC SERPL-MCNC: 35 MG/DL (ref 40–60)
HGB BLD-MCNC: 14.4 G/DL (ref 13–17.7)
IMM GRANULOCYTES # BLD AUTO: 0.02 10*3/MM3 (ref 0–0.05)
IMM GRANULOCYTES NFR BLD AUTO: 0.3 % (ref 0–0.5)
LDLC SERPL CALC-MCNC: 22 MG/DL (ref 0–100)
LDLC/HDLC SERPL: 0.69 {RATIO}
LYMPHOCYTES # BLD AUTO: 0.75 10*3/MM3 (ref 0.7–3.1)
LYMPHOCYTES NFR BLD AUTO: 12.4 % (ref 19.6–45.3)
MCH RBC QN AUTO: 28.7 PG (ref 26.6–33)
MCHC RBC AUTO-ENTMCNC: 32.7 G/DL (ref 31.5–35.7)
MCV RBC AUTO: 88 FL (ref 79–97)
MONOCYTES # BLD AUTO: 0.55 10*3/MM3 (ref 0.1–0.9)
MONOCYTES NFR BLD AUTO: 9.1 % (ref 5–12)
NEUTROPHILS NFR BLD AUTO: 4.61 10*3/MM3 (ref 1.7–7)
NEUTROPHILS NFR BLD AUTO: 75.9 % (ref 42.7–76)
NRBC BLD AUTO-RTO: 0 /100 WBC (ref 0–0.2)
PLATELET # BLD AUTO: 174 10*3/MM3 (ref 140–450)
PMV BLD AUTO: 11.1 FL (ref 6–12)
POTASSIUM SERPL-SCNC: 4.5 MMOL/L (ref 3.5–5.2)
PROT SERPL-MCNC: 6.1 G/DL (ref 6–8.5)
PSA SERPL-MCNC: 6.18 NG/ML (ref 0–4)
RBC # BLD AUTO: 5.01 10*6/MM3 (ref 4.14–5.8)
SODIUM SERPL-SCNC: 138 MMOL/L (ref 136–145)
T4 FREE SERPL-MCNC: 0.76 NG/DL (ref 0.93–1.7)
TRIGL SERPL-MCNC: 65 MG/DL (ref 0–150)
TSH SERPL DL<=0.05 MIU/L-ACNC: 2.74 UIU/ML (ref 0.27–4.2)
VIT B12 BLD-MCNC: 767 PG/ML (ref 211–946)
VLDLC SERPL-MCNC: 15 MG/DL (ref 5–40)
WBC # BLD AUTO: 6.07 10*3/MM3 (ref 3.4–10.8)

## 2021-08-10 PROCEDURE — G0402 INITIAL PREVENTIVE EXAM: HCPCS | Performed by: FAMILY MEDICINE

## 2021-08-10 PROCEDURE — 82306 VITAMIN D 25 HYDROXY: CPT | Performed by: FAMILY MEDICINE

## 2021-08-10 PROCEDURE — G0103 PSA SCREENING: HCPCS | Performed by: FAMILY MEDICINE

## 2021-08-10 PROCEDURE — 84481 FREE ASSAY (FT-3): CPT | Performed by: FAMILY MEDICINE

## 2021-08-10 PROCEDURE — 80061 LIPID PANEL: CPT | Performed by: FAMILY MEDICINE

## 2021-08-10 PROCEDURE — 80053 COMPREHEN METABOLIC PANEL: CPT | Performed by: FAMILY MEDICINE

## 2021-08-10 PROCEDURE — 83036 HEMOGLOBIN GLYCOSYLATED A1C: CPT | Performed by: FAMILY MEDICINE

## 2021-08-10 PROCEDURE — 84443 ASSAY THYROID STIM HORMONE: CPT | Performed by: FAMILY MEDICINE

## 2021-08-10 PROCEDURE — 84439 ASSAY OF FREE THYROXINE: CPT | Performed by: FAMILY MEDICINE

## 2021-08-10 PROCEDURE — 85025 COMPLETE CBC W/AUTO DIFF WBC: CPT | Performed by: FAMILY MEDICINE

## 2021-08-10 PROCEDURE — 86803 HEPATITIS C AB TEST: CPT | Performed by: FAMILY MEDICINE

## 2021-08-10 PROCEDURE — 36415 COLL VENOUS BLD VENIPUNCTURE: CPT | Performed by: FAMILY MEDICINE

## 2021-08-10 PROCEDURE — 82607 VITAMIN B-12: CPT | Performed by: FAMILY MEDICINE

## 2021-08-10 PROCEDURE — 99214 OFFICE O/P EST MOD 30 MIN: CPT | Performed by: FAMILY MEDICINE

## 2021-08-10 RX ORDER — FLUTICASONE PROPIONATE 50 MCG
2 SPRAY, SUSPENSION (ML) NASAL DAILY
Qty: 16 G | Refills: 3 | Status: SHIPPED | OUTPATIENT
Start: 2021-08-10 | End: 2022-06-28

## 2021-08-10 RX ORDER — DESONIDE 0.5 MG/ML
LOTION TOPICAL 2 TIMES DAILY
Qty: 60 ML | Refills: 2 | Status: SHIPPED | OUTPATIENT
Start: 2021-08-10 | End: 2022-10-05

## 2021-08-10 RX ORDER — ERYTHROMYCIN 5 MG/G
OINTMENT OPHTHALMIC EVERY 6 HOURS
Qty: 3.5 G | Refills: 0 | Status: SHIPPED | OUTPATIENT
Start: 2021-08-10 | End: 2021-08-17

## 2021-08-10 NOTE — PROGRESS NOTES
The ABCs of the Annual Wellness Visit  Subsequent Medicare Wellness Visit    Chief Complaint   Patient presents with   • Medicare Wellness-subsequent       Subjective   History of Present Illness:  Enrrique Alejandre is a 66 y.o. male who presents for a Subsequent Medicare Wellness Visit.    Stye: patient reports that he has had a stye to his L eye for the last few weeks. Eyelid is not painful but conjunctiva is bloodshot and he awakens w/ the eyelid fused shut.    Insomnia: patient reports awakening routinely at 2AM and can't go back to sleep. Occasional issue falling asleep but generally not the problem. Does use tablet at 2AM. NO caffeine. Wife reports that he does snore and occasionally takes afternoon nap.     Elevated PSA: pt w/ PSA of 12 last year but saw urology (Jean) and was not concerned about prostate cancer due to other lab values. Has had prostate biopsy in the past that was negative. He is getting up 1-3 times per night to urinate. Maintained on Flomax 0.4mg BID    HTN: 113/69 today. Maintained on amlodipine 5 daily. No LH/dizziness, CP or SOB.    MG: maintained on pyridostigmine 180 BID and 60mg in the afternoon as needed as well as mycophenolate 500 BID. Energy and strength are appropriate. Happy w/ current control    HEALTH RISK ASSESSMENT    Recent Hospitalizations:  Recently treated at the following:  [facility]   Syncope/COVID- 1/2021    Current Medical Providers:  Patient Care Team:  Sergio Martinez MD as PCP - General (Internal Medicine)    Smoking Status:  Social History     Tobacco Use   Smoking Status Never Smoker   Smokeless Tobacco Never Used     Alcohol Consumption:  Social History     Substance and Sexual Activity   Alcohol Use Not Currently   • Alcohol/week: 0.0 standard drinks     Depression Screen:   PHQ-2/PHQ-9 Depression Screening 8/10/2021   Little interest or pleasure in doing things 0   Feeling down, depressed, or hopeless 0   Total Score 0     Fall Risk  Screen:  BUCK Fall Risk Assessment was completed, and patient is at LOW risk for falls.Assessment completed on:2/8/2021    Health Habits and Functional and Cognitive Screening:  Functional & Cognitive Status 8/10/2020   Do you have difficulty preparing food and eating? No   Do you have difficulty bathing yourself, getting dressed or grooming yourself? No   Do you have difficulty using the toilet? No   Do you have difficulty moving around from place to place? No   Do you have trouble with steps or getting out of a bed or a chair? No   Current Diet Well Balanced Diet   Dental Exam Up to date   Eye Exam Up to date   Exercise (times per week) 3 times per week   Current Exercise Activities Include Yard Work   Do you need help using the phone?  No   Are you deaf or do you have serious difficulty hearing?  Yes   Do you need help with transportation? No   Do you need help shopping? No   Do you need help preparing meals?  No   Do you need help with housework?  No   Do you need help with laundry? No   Do you need help taking your medications? No   Do you need help managing money? No   Do you ever drive or ride in a car without wearing a seat belt? No   Have you felt unusual stress, anger or loneliness in the last month? No   Who do you live with? Spouse   If you need help, do you have trouble finding someone available to you? No   Have you been bothered in the last four weeks by sexual problems? Yes   Do you have difficulty concentrating, remembering or making decisions? No     Does the patient have evidence of cognitive impairment? No    Asprin use counseling:Taking ASA appropriately as indicated    Age-appropriate Screening Schedule:  Refer to the list below for future screening recommendations based on patient's age, sex and/or medical conditions. Orders for these recommended tests are listed in the plan section. The patient has been provided with a written plan.    Health Maintenance   Topic Date Due   • URINE  MICROALBUMIN  Never done   • TDAP/TD VACCINES (1 - Tdap) Never done   • DIABETIC FOOT EXAM  Never done   • ZOSTER VACCINE (1 of 2) 02/17/2022 (Originally 1/8/2005)   • INFLUENZA VACCINE  10/01/2021   • DIABETIC EYE EXAM  01/03/2022   • HEMOGLOBIN A1C  02/10/2022          The following portions of the patient's history were reviewed and updated as appropriate: allergies, current medications, past family history, past medical history, past social history, past surgical history and problem list.    Outpatient Medications Prior to Visit   Medication Sig Dispense Refill   • amLODIPine-atorvastatin (CADUET) 5-20 MG per tablet Take 1 tablet by mouth Daily. 90 tablet 1   • aspirin (ASPIRIN LOW DOSE) 81 MG tablet Take 81 mg by mouth every night at bedtime.     • Cholecalciferol (Vitamin D3) 50 MCG (2000 UT) tablet Take  by mouth.     • escitalopram (LEXAPRO) 10 MG tablet TAKE ONE TABLET BY MOUTH DAILY (Patient taking differently: Take 10 mg by mouth Daily.) 90 tablet 3   • gabapentin (NEURONTIN) 300 MG capsule TAKE ONE CAPSULE BY MOUTH DAILY 90 capsule 1   • mycophenolate (CELLCEPT) 500 MG tablet TAKE ONE TABLET BY MOUTH TWICE A  tablet 1   • omeprazole (priLOSEC) 40 MG capsule TAKE 1 CAPSULE DAILY 90 capsule 4   • pyridostigmine (MESTINON) 180 MG CR tablet Take 1 tablet by mouth 2 (Two) Times a Day. 270 tablet 4   • pyridostigmine (MESTINON) 60 MG tablet Take 60 mg by mouth As Needed. Take q 4hrs prn     • sotalol (BETAPACE AF) 80 MG tablet tablet TAKE ONE-HALF TABLET BY MOUTH EVERY 12 HOURS 90 tablet 1   • tamsulosin (FLOMAX) 0.4 MG capsule 24 hr capsule Take 1 capsule by mouth Daily. (Patient taking differently: Take 1 capsule by mouth 2 (two) times a day.) 90 capsule 3     No facility-administered medications prior to visit.       Patient Active Problem List   Diagnosis   • Essential hypertension   • PSVT (paroxysmal supraventricular tachycardia) (CMS/HCC)   • CVA (cerebral vascular accident) (CMS/HCC)   • Chronic  "pancreatitis (CMS/HCC)   • Hematuria   • High prostate specific antigen (PSA)   • Hypomagnesemia   • Vitamin D deficiency   • Myasthenia gravis (CMS/HCC)   • Radicular low back pain   • Glucosuria   • Anxiety and depression   • Benign prostatic hyperplasia with nocturia   • Cholecystitis   • Syncope   • Real time reverse transcriptase PCR positive for COVID-19 virus       Advanced Care Planning:  ACP discussion was held with the patient during this visit. Patient has an advance directive in EMR which is still valid.     Review of Systems   Constitutional: Negative for chills, fatigue and unexpected weight change.   HENT: Negative for congestion and rhinorrhea.    Eyes: Positive for discharge and redness. Negative for visual disturbance.   Respiratory: Negative for cough and shortness of breath.    Cardiovascular: Negative for chest pain and palpitations.   Gastrointestinal: Negative for abdominal pain, constipation, diarrhea, nausea and vomiting.   Genitourinary: Negative for difficulty urinating.   Musculoskeletal: Positive for arthralgias. Negative for joint swelling.   Skin: Negative for rash.   Neurological: Negative for dizziness, weakness, light-headedness and headaches.   Psychiatric/Behavioral: Positive for sleep disturbance. Negative for dysphoric mood. The patient is not nervous/anxious.      Compared to one year ago, the patient feels his physical health is better.  Compared to one year ago, the patient feels his mental health is the same.    Reviewed chart for potential of high risk medication in the elderly: yes  Reviewed chart for potential of harmful drug interactions in the elderly:yes    Objective     Vitals:    08/10/21 1002   BP: 113/69   BP Location: Right arm   Patient Position: Sitting   Cuff Size: Adult   Pulse: 62   Resp: 18   Temp: 97.3 °F (36.3 °C)   TempSrc: Temporal   SpO2: 100%   Weight: 83.5 kg (184 lb)   Height: 162.6 cm (64\")       Body mass index is 31.58 kg/m².  Discussed the " patient's BMI with him. The BMI is above average; BMI management plan is completed.  Currently using Weight [employer]. Has already lost 15 lbs    Physical Exam  Constitutional:       General: He is not in acute distress.     Appearance: He is well-developed.   HENT:      Head: Normocephalic and atraumatic.      Right Ear: Tympanic membrane and external ear normal. There is no impacted cerumen.      Left Ear: Tympanic membrane and external ear normal. There is no impacted cerumen.      Nose: Nose normal.      Mouth/Throat:      Mouth: Mucous membranes are moist.      Pharynx: No oropharyngeal exudate or posterior oropharyngeal erythema.   Eyes:      General: No scleral icterus.        Right eye: No discharge.      Extraocular Movements: Extraocular movements intact.      Pupils: Pupils are equal, round, and reactive to light.      Comments: Left conjunctive injected with mild upper lid edema   Neck:      Thyroid: No thyromegaly.      Vascular: No carotid bruit.   Cardiovascular:      Rate and Rhythm: Normal rate and regular rhythm.      Heart sounds: Normal heart sounds. No murmur heard.     Pulmonary:      Effort: Pulmonary effort is normal. No respiratory distress.      Breath sounds: Normal breath sounds. No wheezing or rales.   Abdominal:      General: Bowel sounds are normal. There is no distension.      Palpations: Abdomen is soft.      Tenderness: There is no abdominal tenderness.   Musculoskeletal:         General: Swelling (Trace lower extremity edema) present. No deformity. Normal range of motion.      Cervical back: Normal range of motion and neck supple.   Lymphadenopathy:      Cervical: No cervical adenopathy.   Skin:     General: Skin is warm and dry.      Findings: No rash.      Comments: Scaly patches to brows bilaterally   Neurological:      General: No focal deficit present.      Mental Status: He is alert and oriented to person, place, and time. Mental status is at baseline.      Cranial Nerves: No  cranial nerve deficit.      Sensory: No sensory deficit.   Psychiatric:         Behavior: Behavior normal.         Thought Content: Thought content normal.       Lab Results   Component Value Date    TRIG 65 08/10/2021    HDL 35 (L) 08/10/2021    LDL 22 08/10/2021    VLDL 15 08/10/2021    HGBA1C 5.5 08/10/2021      Assessment/Plan   Medicare Risks and Personalized Health Plan  CMS Preventative Services Quick Reference  Advance Directive Discussion  Cardiovascular risk  Colon Cancer Screening  Depression/Dysphoria  Diabetic Lab Screening   Fall Risk  Immunizations Discussed/Encouraged (specific immunizations; Shingrix )  Obesity/Overweight   Polypharmacy  Prostate Cancer Screening     The above risks/problems have been discussed with the patient.  Pertinent information has been shared with the patient in the After Visit Summary.  Follow up plans and orders are seen below in the Assessment/Plan Section.    Diagnoses and all orders for this visit:    1. Annual physical exam (Primary)  -     Hepatitis C Antibody  -     CBC & Differential  -     Comprehensive Metabolic Panel  -     Hemoglobin A1c  -     Lipid Panel  -     TSH  -     T4, Free  -     Vitamin D 25 Hydroxy  -     Vitamin B12  -     PSA Screen  -  Counseled regarding diet, exercise, weight loss, and preventative health maintenance items/immunizations below    2. Conjunctivitis of left eye, unspecified conjunctivitis type  -     Start erythromycin (ROMYCIN) 5 MG/GM ophthalmic ointment; Administer  into the left eye Every 6 (Six) Hours for 7 days.  Dispense: 3.5 g; Refill: 0  - Follow-up with ophthalmology next week    3. Insomnia, unspecified type  -     Start fluticasone (FLONASE) 50 MCG/ACT nasal spray; 2 sprays into the nostril(s) as directed by provider Daily.  Dispense: 16 g; Refill: 3  -     Ambulatory Referral to Sleep Medicine  - Consider trazodone    4. High prostate specific antigen (PSA)  -     PSA Screen  - Continue urology follow-up    5.  Essential hypertension: 113/69 today  -     Comprehensive Metabolic Panel  - Continue home amlodipine 5 mg daily    6. Myasthenia gravis (CMS/HCC)   -Continue home mycophenolate 500 twice daily and pyridostigmine 180 twice daily with 60 mg pyridostigmine as needed in the afternoon    7. Seborrheic dermatitis  -     Start desonide (DesOwen) 0.05 % lotion; Apply  topically to the appropriate area as directed 2 (Two) Times a Day.  Dispense: 60 mL; Refill: 2    8. Need for hepatitis C screening test  -     Hepatitis C Antibody    9. Vitamin D deficiency, unspecified   -     Vitamin D 25 Hydroxy  - Continue vitamin D supplementation    10. Encounter for screening for malignant neoplasm of prostate   -     PSA Screen    Preventative:  Colonoscopy: 2/2019, due 2/2024  PSA: 12- follows w/ urology. Will repeat today  Shingles: Recommended  Pneumonia: Pneumovax completed 8/2020  Tdap: reported 2013  Influenza: 11/2020, Recommended  COVID: Completed 3/2021 ([employer])    Follow Up:  Return in about 6 months (around 2/10/2022) for Recheck- 30min.     An After Visit Summary and PPPS were given to the patient.     Patient has been erroneously marked as diabetic. Based on the available clinical information, he does not have diabetes and should therefore be excluded from diabetic health maintenance and quality measures for the remainder of the reporting period.

## 2021-08-11 DIAGNOSIS — R94.6 ABNORMAL THYROID FUNCTION TEST: Primary | ICD-10-CM

## 2021-08-11 LAB — T3FREE SERPL-MCNC: 2.54 PG/ML (ref 2–4.4)

## 2021-09-10 DIAGNOSIS — I47.1 PSVT (PAROXYSMAL SUPRAVENTRICULAR TACHYCARDIA) (HCC): ICD-10-CM

## 2021-09-10 RX ORDER — SOTALOL HYDROCHLORIDE 80 MG/1
TABLET ORAL
Qty: 30 TABLET | Refills: 3 | Status: SHIPPED | OUTPATIENT
Start: 2021-09-10 | End: 2021-12-12

## 2021-09-13 ENCOUNTER — TELEPHONE (OUTPATIENT)
Dept: FAMILY MEDICINE CLINIC | Facility: CLINIC | Age: 66
End: 2021-09-13

## 2021-09-13 RX ORDER — OMEPRAZOLE 40 MG/1
40 CAPSULE, DELAYED RELEASE ORAL DAILY
Qty: 90 CAPSULE | Refills: 4 | Status: SHIPPED | OUTPATIENT
Start: 2021-09-13 | End: 2022-12-06

## 2021-09-13 NOTE — TELEPHONE ENCOUNTER
NEEDS TO BE SENT TO [facility]    Caller: NEO ANAYA    Relationship: Emergency Contact    Best call back number: [phone]    Medication needed:   Requested Prescriptions     Pending Prescriptions Disp Refills   • omeprazole (priLOSEC) 40 MG capsule 90 capsule 4     Sig: Take 1 capsule by mouth Daily.       When do you need the refill by: 09/17/21    What additional details did the patient provide when requesting the medication: NEEDS THIS BEFORE Friday, 09/17/21 WHEN THEY LEAVE ON VACATION     Does the patient have less than a 3 day supply:  [] Yes  [x] No    What is the patient's preferred pharmacy: [facility] - [place], [place] - [address] [facility] - [phone] [facility] [phone] FX

## 2021-11-11 ENCOUNTER — LAB (OUTPATIENT)
Dept: LAB | Facility: HOSPITAL | Age: 66
End: 2021-11-11

## 2021-11-11 ENCOUNTER — HOSPITAL ENCOUNTER (OUTPATIENT)
Dept: CARDIOLOGY | Facility: HOSPITAL | Age: 66
Discharge: HOME | End: 2021-11-11

## 2021-11-11 ENCOUNTER — TRANSCRIBE ORDERS (OUTPATIENT)
Dept: ADMINISTRATIVE | Facility: HOSPITAL | Age: 66
End: 2021-11-11

## 2021-11-11 DIAGNOSIS — N21.8 CALCULUS OF OTHER LOWER URINARY TRACT LOCATION: Primary | ICD-10-CM

## 2021-11-11 DIAGNOSIS — N21.8 CALCULUS OF OTHER LOWER URINARY TRACT LOCATION: ICD-10-CM

## 2021-11-11 LAB
ANION GAP SERPL CALCULATED.3IONS-SCNC: 4.1 MMOL/L (ref 5–15)
BASOPHILS # BLD AUTO: 0.03 10*3/MM3 (ref 0–0.2)
BASOPHILS NFR BLD AUTO: 0.3 % (ref 0–1.5)
BUN SERPL-MCNC: 15 MG/DL (ref 8–23)
BUN/CREAT SERPL: 18.3 (ref 7–25)
CALCIUM SPEC-SCNC: 8.8 MG/DL (ref 8.6–10.5)
CHLORIDE SERPL-SCNC: 103 MMOL/L (ref 98–107)
CO2 SERPL-SCNC: 29.9 MMOL/L (ref 22–29)
CREAT SERPL-MCNC: 0.82 MG/DL (ref 0.76–1.27)
DEPRECATED RDW RBC AUTO: 39.7 FL (ref 37–54)
EOSINOPHIL # BLD AUTO: 0.13 10*3/MM3 (ref 0–0.4)
EOSINOPHIL NFR BLD AUTO: 1.4 % (ref 0.3–6.2)
ERYTHROCYTE [DISTWIDTH] IN BLOOD BY AUTOMATED COUNT: 12.8 % (ref 12.3–15.4)
GFR SERPL CREATININE-BSD FRML MDRD: 94 ML/MIN/1.73
GLUCOSE SERPL-MCNC: 96 MG/DL (ref 65–99)
HCT VFR BLD AUTO: 38 % (ref 37.5–51)
HGB BLD-MCNC: 12.6 G/DL (ref 13–17.7)
IMM GRANULOCYTES # BLD AUTO: 0.04 10*3/MM3 (ref 0–0.05)
IMM GRANULOCYTES NFR BLD AUTO: 0.4 % (ref 0–0.5)
LYMPHOCYTES # BLD AUTO: 0.78 10*3/MM3 (ref 0.7–3.1)
LYMPHOCYTES NFR BLD AUTO: 8.3 % (ref 19.6–45.3)
MCH RBC QN AUTO: 28.4 PG (ref 26.6–33)
MCHC RBC AUTO-ENTMCNC: 33.2 G/DL (ref 31.5–35.7)
MCV RBC AUTO: 85.8 FL (ref 79–97)
MONOCYTES # BLD AUTO: 0.86 10*3/MM3 (ref 0.1–0.9)
MONOCYTES NFR BLD AUTO: 9.2 % (ref 5–12)
NEUTROPHILS NFR BLD AUTO: 7.51 10*3/MM3 (ref 1.7–7)
NEUTROPHILS NFR BLD AUTO: 80.4 % (ref 42.7–76)
NRBC BLD AUTO-RTO: 0 /100 WBC (ref 0–0.2)
PLATELET # BLD AUTO: 162 10*3/MM3 (ref 140–450)
PMV BLD AUTO: 10.7 FL (ref 6–12)
POTASSIUM SERPL-SCNC: 4.4 MMOL/L (ref 3.5–5.2)
RBC # BLD AUTO: 4.43 10*6/MM3 (ref 4.14–5.8)
SODIUM SERPL-SCNC: 137 MMOL/L (ref 136–145)
WBC # BLD AUTO: 9.35 10*3/MM3 (ref 3.4–10.8)

## 2021-11-11 PROCEDURE — 93005 ELECTROCARDIOGRAM TRACING: CPT | Performed by: UROLOGY

## 2021-11-11 PROCEDURE — 80048 BASIC METABOLIC PNL TOTAL CA: CPT

## 2021-11-11 PROCEDURE — 85025 COMPLETE CBC W/AUTO DIFF WBC: CPT

## 2021-11-11 PROCEDURE — 93010 ELECTROCARDIOGRAM REPORT: CPT | Performed by: INTERNAL MEDICINE

## 2021-11-11 PROCEDURE — 36415 COLL VENOUS BLD VENIPUNCTURE: CPT

## 2021-11-13 LAB — QT INTERVAL: 429 MS

## 2021-11-15 RX ORDER — AMLODIPINE BESYLATE AND ATORVASTATIN CALCIUM 5; 20 MG/1; MG/1
1 TABLET, FILM COATED ORAL DAILY
Qty: 90 TABLET | Refills: 0 | Status: SHIPPED | OUTPATIENT
Start: 2021-11-15 | End: 2022-02-14 | Stop reason: SDUPTHER

## 2021-11-23 PROCEDURE — 88305 TISSUE EXAM BY PATHOLOGIST: CPT | Performed by: UROLOGY

## 2021-11-23 PROCEDURE — 88342 IMHCHEM/IMCYTCHM 1ST ANTB: CPT | Performed by: UROLOGY

## 2021-11-24 ENCOUNTER — LAB REQUISITION (OUTPATIENT)
Dept: LAB | Facility: HOSPITAL | Age: 66
End: 2021-11-24

## 2021-11-24 DIAGNOSIS — N21.0 CALCULUS IN BLADDER: ICD-10-CM

## 2021-11-24 DIAGNOSIS — N32.9 BLADDER DISORDER, UNSPECIFIED: ICD-10-CM

## 2021-11-25 ENCOUNTER — HOSPITAL ENCOUNTER (EMERGENCY)
Facility: HOSPITAL | Age: 66
Discharge: HOME | End: 2021-11-25
Admitting: EMERGENCY MEDICINE

## 2021-11-25 VITALS
SYSTOLIC BLOOD PRESSURE: 144 MMHG | HEIGHT: 64 IN | HEART RATE: 80 BPM | OXYGEN SATURATION: 97 % | BODY MASS INDEX: 28.79 KG/M2 | RESPIRATION RATE: 18 BRPM | TEMPERATURE: 97.9 F | DIASTOLIC BLOOD PRESSURE: 68 MMHG | WEIGHT: 168.65 LBS

## 2021-11-25 DIAGNOSIS — N39.0 URINARY TRACT INFECTION WITH HEMATURIA, SITE UNSPECIFIED: ICD-10-CM

## 2021-11-25 DIAGNOSIS — R33.8 ACUTE URINARY RETENTION: Primary | ICD-10-CM

## 2021-11-25 DIAGNOSIS — R31.9 URINARY TRACT INFECTION WITH HEMATURIA, SITE UNSPECIFIED: ICD-10-CM

## 2021-11-25 LAB
ALBUMIN SERPL-MCNC: 3.5 G/DL (ref 3.5–5.2)
ALBUMIN/GLOB SERPL: 1.5 G/DL
ALP SERPL-CCNC: 82 U/L (ref 39–117)
ALT SERPL W P-5'-P-CCNC: 24 U/L (ref 1–41)
ANION GAP SERPL CALCULATED.3IONS-SCNC: 15 MMOL/L (ref 5–15)
AST SERPL-CCNC: 28 U/L (ref 1–40)
BACTERIA UR QL AUTO: ABNORMAL /HPF
BASOPHILS # BLD AUTO: 0.1 10*3/MM3 (ref 0–0.2)
BASOPHILS NFR BLD AUTO: 0.9 % (ref 0–1.5)
BILIRUB SERPL-MCNC: 0.5 MG/DL (ref 0–1.2)
BILIRUB UR QL STRIP: NEGATIVE
BUN SERPL-MCNC: 14 MG/DL (ref 8–23)
BUN/CREAT SERPL: 15.9 (ref 7–25)
CALCIUM SPEC-SCNC: 8.4 MG/DL (ref 8.6–10.5)
CHLORIDE SERPL-SCNC: 102 MMOL/L (ref 98–107)
CLARITY UR: ABNORMAL
CO2 SERPL-SCNC: 18 MMOL/L (ref 22–29)
COLOR UR: ABNORMAL
CREAT SERPL-MCNC: 0.88 MG/DL (ref 0.76–1.27)
DEPRECATED RDW RBC AUTO: 42 FL (ref 37–54)
EOSINOPHIL # BLD AUTO: 0.1 10*3/MM3 (ref 0–0.4)
EOSINOPHIL NFR BLD AUTO: 0.7 % (ref 0.3–6.2)
ERYTHROCYTE [DISTWIDTH] IN BLOOD BY AUTOMATED COUNT: 14.2 % (ref 12.3–15.4)
GFR SERPL CREATININE-BSD FRML MDRD: 87 ML/MIN/1.73
GLOBULIN UR ELPH-MCNC: 2.4 GM/DL
GLUCOSE SERPL-MCNC: 120 MG/DL (ref 65–99)
GLUCOSE UR STRIP-MCNC: NEGATIVE MG/DL
HCT VFR BLD AUTO: 38.6 % (ref 37.5–51)
HGB BLD-MCNC: 13.1 G/DL (ref 13–17.7)
HGB UR QL STRIP.AUTO: ABNORMAL
HYALINE CASTS UR QL AUTO: ABNORMAL /LPF
KETONES UR QL STRIP: NEGATIVE
LEUKOCYTE ESTERASE UR QL STRIP.AUTO: ABNORMAL
LIPASE SERPL-CCNC: 33 U/L (ref 13–60)
LYMPHOCYTES # BLD AUTO: 0.5 10*3/MM3 (ref 0.7–3.1)
LYMPHOCYTES NFR BLD AUTO: 4.4 % (ref 19.6–45.3)
MCH RBC QN AUTO: 28.8 PG (ref 26.6–33)
MCHC RBC AUTO-ENTMCNC: 34 G/DL (ref 31.5–35.7)
MCV RBC AUTO: 84.9 FL (ref 79–97)
MONOCYTES # BLD AUTO: 0.8 10*3/MM3 (ref 0.1–0.9)
MONOCYTES NFR BLD AUTO: 6.9 % (ref 5–12)
NEUTROPHILS NFR BLD AUTO: 10.6 10*3/MM3 (ref 1.7–7)
NEUTROPHILS NFR BLD AUTO: 87.1 % (ref 42.7–76)
NITRITE UR QL STRIP: POSITIVE
NRBC BLD AUTO-RTO: 0 /100 WBC (ref 0–0.2)
PH UR STRIP.AUTO: 7.5 [PH] (ref 5–8)
PLATELET # BLD AUTO: 121 10*3/MM3 (ref 140–450)
PMV BLD AUTO: 8.2 FL (ref 6–12)
POTASSIUM SERPL-SCNC: 4 MMOL/L (ref 3.5–5.2)
PROT SERPL-MCNC: 5.9 G/DL (ref 6–8.5)
PROT UR QL STRIP: ABNORMAL
RBC # BLD AUTO: 4.55 10*6/MM3 (ref 4.14–5.8)
RBC # UR STRIP: ABNORMAL /HPF
REF LAB TEST METHOD: ABNORMAL
SODIUM SERPL-SCNC: 135 MMOL/L (ref 136–145)
SP GR UR STRIP: 1.02 (ref 1–1.03)
SQUAMOUS #/AREA URNS HPF: ABNORMAL /HPF
UROBILINOGEN UR QL STRIP: ABNORMAL
WBC # UR STRIP: ABNORMAL /HPF
WBC NRBC COR # BLD: 12.1 10*3/MM3 (ref 3.4–10.8)

## 2021-11-25 PROCEDURE — 96375 TX/PRO/DX INJ NEW DRUG ADDON: CPT

## 2021-11-25 PROCEDURE — 80053 COMPREHEN METABOLIC PANEL: CPT | Performed by: PHYSICIAN ASSISTANT

## 2021-11-25 PROCEDURE — 99283 EMERGENCY DEPT VISIT LOW MDM: CPT

## 2021-11-25 PROCEDURE — 83690 ASSAY OF LIPASE: CPT | Performed by: PHYSICIAN ASSISTANT

## 2021-11-25 PROCEDURE — 51702 INSERT TEMP BLADDER CATH: CPT

## 2021-11-25 PROCEDURE — 87086 URINE CULTURE/COLONY COUNT: CPT | Performed by: PHYSICIAN ASSISTANT

## 2021-11-25 PROCEDURE — 96374 THER/PROPH/DIAG INJ IV PUSH: CPT

## 2021-11-25 PROCEDURE — 25010000002 ONDANSETRON PER 1 MG: Performed by: PHYSICIAN ASSISTANT

## 2021-11-25 PROCEDURE — 51798 US URINE CAPACITY MEASURE: CPT

## 2021-11-25 PROCEDURE — 85025 COMPLETE CBC W/AUTO DIFF WBC: CPT | Performed by: PHYSICIAN ASSISTANT

## 2021-11-25 PROCEDURE — 0 MORPHINE SULFATE 4 MG/ML SOLUTION: Performed by: PHYSICIAN ASSISTANT

## 2021-11-25 PROCEDURE — 81001 URINALYSIS AUTO W/SCOPE: CPT | Performed by: PHYSICIAN ASSISTANT

## 2021-11-25 RX ORDER — HYDROCODONE BITARTRATE AND ACETAMINOPHEN 5; 325 MG/1; MG/1
1 TABLET ORAL EVERY 6 HOURS PRN
Qty: 12 TABLET | Refills: 0 | Status: SHIPPED | OUTPATIENT
Start: 2021-11-25 | End: 2021-12-10

## 2021-11-25 RX ORDER — MORPHINE SULFATE 4 MG/ML
4 INJECTION, SOLUTION INTRAMUSCULAR; INTRAVENOUS ONCE
Status: COMPLETED | OUTPATIENT
Start: 2021-11-25 | End: 2021-11-25

## 2021-11-25 RX ORDER — ONDANSETRON 2 MG/ML
4 INJECTION INTRAMUSCULAR; INTRAVENOUS ONCE
Status: COMPLETED | OUTPATIENT
Start: 2021-11-25 | End: 2021-11-25

## 2021-11-25 RX ORDER — SODIUM CHLORIDE 0.9 % (FLUSH) 0.9 %
10 SYRINGE (ML) INJECTION AS NEEDED
Status: DISCONTINUED | OUTPATIENT
Start: 2021-11-25 | End: 2021-11-25 | Stop reason: HOSPADM

## 2021-11-25 RX ADMIN — MORPHINE SULFATE 4 MG: 4 INJECTION INTRAVENOUS at 09:44

## 2021-11-25 RX ADMIN — ONDANSETRON 4 MG: 2 INJECTION INTRAMUSCULAR; INTRAVENOUS at 09:44

## 2021-11-26 LAB — BACTERIA SPEC AEROBE CULT: NO GROWTH

## 2021-11-27 ENCOUNTER — HOSPITAL ENCOUNTER (OUTPATIENT)
Facility: HOSPITAL | Age: 66
Setting detail: OBSERVATION
Discharge: HOME | End: 2021-11-29
Attending: EMERGENCY MEDICINE | Admitting: EMERGENCY MEDICINE

## 2021-11-27 DIAGNOSIS — R31.9 URINARY TRACT INFECTION WITH HEMATURIA, SITE UNSPECIFIED: ICD-10-CM

## 2021-11-27 DIAGNOSIS — N39.0 URINARY TRACT INFECTION WITH HEMATURIA, SITE UNSPECIFIED: ICD-10-CM

## 2021-11-27 DIAGNOSIS — R33.9 URINARY RETENTION: ICD-10-CM

## 2021-11-27 DIAGNOSIS — N32.89 BLADDER SPASM: ICD-10-CM

## 2021-11-27 DIAGNOSIS — R10.30 LOWER ABDOMINAL PAIN: Primary | ICD-10-CM

## 2021-11-27 LAB
ANION GAP SERPL CALCULATED.3IONS-SCNC: 13 MMOL/L (ref 5–15)
BACTERIA UR QL AUTO: ABNORMAL /HPF
BILIRUB UR QL STRIP: NEGATIVE
BUN SERPL-MCNC: 16 MG/DL (ref 8–23)
BUN/CREAT SERPL: 19.8 (ref 7–25)
CALCIUM SPEC-SCNC: 8.6 MG/DL (ref 8.6–10.5)
CHLORIDE SERPL-SCNC: 102 MMOL/L (ref 98–107)
CLARITY UR: ABNORMAL
CO2 SERPL-SCNC: 19 MMOL/L (ref 22–29)
COLOR UR: ABNORMAL
CREAT SERPL-MCNC: 0.81 MG/DL (ref 0.76–1.27)
DEPRECATED RDW RBC AUTO: 42.9 FL (ref 37–54)
EOSINOPHIL # BLD MANUAL: 0.09 10*3/MM3 (ref 0–0.4)
EOSINOPHIL NFR BLD MANUAL: 1 % (ref 0.3–6.2)
ERYTHROCYTE [DISTWIDTH] IN BLOOD BY AUTOMATED COUNT: 14.5 % (ref 12.3–15.4)
GFR SERPL CREATININE-BSD FRML MDRD: 95 ML/MIN/1.73
GLUCOSE SERPL-MCNC: 146 MG/DL (ref 65–99)
GLUCOSE UR STRIP-MCNC: NEGATIVE MG/DL
HCT VFR BLD AUTO: 40.8 % (ref 37.5–51)
HGB BLD-MCNC: 13.8 G/DL (ref 13–17.7)
HGB UR QL STRIP.AUTO: ABNORMAL
HYALINE CASTS UR QL AUTO: ABNORMAL /LPF
KETONES UR QL STRIP: NEGATIVE
LEUKOCYTE ESTERASE UR QL STRIP.AUTO: ABNORMAL
LYMPHOCYTES # BLD MANUAL: 0.37 10*3/MM3 (ref 0.7–3.1)
LYMPHOCYTES NFR BLD MANUAL: 6 % (ref 5–12)
MCH RBC QN AUTO: 29 PG (ref 26.6–33)
MCHC RBC AUTO-ENTMCNC: 33.9 G/DL (ref 31.5–35.7)
MCV RBC AUTO: 85.6 FL (ref 79–97)
MONOCYTES # BLD: 0.55 10*3/MM3 (ref 0.1–0.9)
NEUTROPHILS # BLD AUTO: 8.19 10*3/MM3 (ref 1.7–7)
NEUTROPHILS NFR BLD MANUAL: 84 % (ref 42.7–76)
NEUTS BAND NFR BLD MANUAL: 5 % (ref 0–5)
NITRITE UR QL STRIP: NEGATIVE
PH UR STRIP.AUTO: 7.5 [PH] (ref 5–8)
PLATELET # BLD AUTO: 123 10*3/MM3 (ref 140–450)
PMV BLD AUTO: 8.2 FL (ref 6–12)
POTASSIUM SERPL-SCNC: 4.1 MMOL/L (ref 3.5–5.2)
PROT UR QL STRIP: ABNORMAL
RBC # BLD AUTO: 4.76 10*6/MM3 (ref 4.14–5.8)
RBC # UR STRIP: ABNORMAL /HPF
RBC MORPH BLD: NORMAL
REF LAB TEST METHOD: ABNORMAL
SARS-COV-2 RNA PNL SPEC NAA+PROBE: NOT DETECTED
SCAN SLIDE: NORMAL
SMALL PLATELETS BLD QL SMEAR: ABNORMAL
SODIUM SERPL-SCNC: 134 MMOL/L (ref 136–145)
SP GR UR STRIP: 1.02 (ref 1–1.03)
SQUAMOUS #/AREA URNS HPF: ABNORMAL /HPF
UROBILINOGEN UR QL STRIP: ABNORMAL
VARIANT LYMPHS NFR BLD MANUAL: 4 % (ref 19.6–45.3)
WBC # UR STRIP: ABNORMAL /HPF
WBC MORPH BLD: NORMAL
WBC NRBC COR # BLD: 9.2 10*3/MM3 (ref 3.4–10.8)

## 2021-11-27 PROCEDURE — C9803 HOPD COVID-19 SPEC COLLECT: HCPCS

## 2021-11-27 PROCEDURE — 51702 INSERT TEMP BLADDER CATH: CPT

## 2021-11-27 PROCEDURE — 25010000002 CEFTRIAXONE PER 250 MG: Performed by: NURSE PRACTITIONER

## 2021-11-27 PROCEDURE — 96374 THER/PROPH/DIAG INJ IV PUSH: CPT

## 2021-11-27 PROCEDURE — 87086 URINE CULTURE/COLONY COUNT: CPT | Performed by: NURSE PRACTITIONER

## 2021-11-27 PROCEDURE — 36415 COLL VENOUS BLD VENIPUNCTURE: CPT

## 2021-11-27 PROCEDURE — 85025 COMPLETE CBC W/AUTO DIFF WBC: CPT | Performed by: NURSE PRACTITIONER

## 2021-11-27 PROCEDURE — 85007 BL SMEAR W/DIFF WBC COUNT: CPT | Performed by: NURSE PRACTITIONER

## 2021-11-27 PROCEDURE — 87635 SARS-COV-2 COVID-19 AMP PRB: CPT | Performed by: EMERGENCY MEDICINE

## 2021-11-27 PROCEDURE — G0378 HOSPITAL OBSERVATION PER HR: HCPCS

## 2021-11-27 PROCEDURE — 80048 BASIC METABOLIC PNL TOTAL CA: CPT | Performed by: NURSE PRACTITIONER

## 2021-11-27 PROCEDURE — 87040 BLOOD CULTURE FOR BACTERIA: CPT | Performed by: NURSE PRACTITIONER

## 2021-11-27 PROCEDURE — 99283 EMERGENCY DEPT VISIT LOW MDM: CPT

## 2021-11-27 PROCEDURE — 81001 URINALYSIS AUTO W/SCOPE: CPT | Performed by: NURSE PRACTITIONER

## 2021-11-27 RX ORDER — HYDROCODONE BITARTRATE AND ACETAMINOPHEN 5; 325 MG/1; MG/1
1 TABLET ORAL ONCE AS NEEDED
Status: COMPLETED | OUTPATIENT
Start: 2021-11-27 | End: 2021-11-27

## 2021-11-27 RX ORDER — SODIUM CHLORIDE 0.9 % (FLUSH) 0.9 %
10 SYRINGE (ML) INJECTION AS NEEDED
Status: DISCONTINUED | OUTPATIENT
Start: 2021-11-27 | End: 2021-11-29 | Stop reason: HOSPADM

## 2021-11-27 RX ORDER — SODIUM CHLORIDE 0.9 % (FLUSH) 0.9 %
10 SYRINGE (ML) INJECTION EVERY 12 HOURS SCHEDULED
Status: DISCONTINUED | OUTPATIENT
Start: 2021-11-27 | End: 2021-11-29 | Stop reason: HOSPADM

## 2021-11-27 RX ORDER — ESCITALOPRAM OXALATE 10 MG/1
10 TABLET ORAL NIGHTLY
Status: DISCONTINUED | OUTPATIENT
Start: 2021-11-28 | End: 2021-11-29 | Stop reason: HOSPADM

## 2021-11-27 RX ORDER — ONDANSETRON 2 MG/ML
4 INJECTION INTRAMUSCULAR; INTRAVENOUS EVERY 6 HOURS PRN
Status: DISCONTINUED | OUTPATIENT
Start: 2021-11-27 | End: 2021-11-29 | Stop reason: HOSPADM

## 2021-11-27 RX ORDER — TAMSULOSIN HYDROCHLORIDE 0.4 MG/1
0.4 CAPSULE ORAL EVERY 24 HOURS
Status: DISCONTINUED | OUTPATIENT
Start: 2021-11-27 | End: 2021-11-29 | Stop reason: HOSPADM

## 2021-11-27 RX ORDER — PYRIDOSTIGMINE BROMIDE 60 MG/1
180 TABLET ORAL 2 TIMES DAILY
Status: DISCONTINUED | OUTPATIENT
Start: 2021-11-28 | End: 2021-11-29 | Stop reason: HOSPADM

## 2021-11-27 RX ORDER — ACETAMINOPHEN 325 MG/1
650 TABLET ORAL EVERY 4 HOURS PRN
Status: DISCONTINUED | OUTPATIENT
Start: 2021-11-27 | End: 2021-11-29 | Stop reason: HOSPADM

## 2021-11-27 RX ORDER — PANTOPRAZOLE SODIUM 40 MG/1
40 TABLET, DELAYED RELEASE ORAL EVERY MORNING
Status: DISCONTINUED | OUTPATIENT
Start: 2021-11-28 | End: 2021-11-29 | Stop reason: HOSPADM

## 2021-11-27 RX ORDER — MYCOPHENOLATE MOFETIL 250 MG/1
500 CAPSULE ORAL EVERY 12 HOURS SCHEDULED
Status: DISCONTINUED | OUTPATIENT
Start: 2021-11-27 | End: 2021-11-29 | Stop reason: HOSPADM

## 2021-11-27 RX ORDER — CHOLECALCIFEROL (VITAMIN D3) 125 MCG
5 CAPSULE ORAL NIGHTLY PRN
Status: DISCONTINUED | OUTPATIENT
Start: 2021-11-27 | End: 2021-11-29 | Stop reason: HOSPADM

## 2021-11-27 RX ORDER — HYDROCODONE BITARTRATE AND ACETAMINOPHEN 5; 325 MG/1; MG/1
1 TABLET ORAL EVERY 6 HOURS PRN
Status: DISCONTINUED | OUTPATIENT
Start: 2021-11-27 | End: 2021-11-29 | Stop reason: HOSPADM

## 2021-11-27 RX ORDER — GABAPENTIN 300 MG/1
300 CAPSULE ORAL DAILY
Status: DISCONTINUED | OUTPATIENT
Start: 2021-11-28 | End: 2021-11-29 | Stop reason: HOSPADM

## 2021-11-27 RX ORDER — SOTALOL HYDROCHLORIDE 80 MG/1
40 TABLET ORAL EVERY 12 HOURS
Status: DISCONTINUED | OUTPATIENT
Start: 2021-11-27 | End: 2021-11-29 | Stop reason: HOSPADM

## 2021-11-27 RX ADMIN — WATER 1 G: 100 INJECTION, SOLUTION INTRAVENOUS at 21:00

## 2021-11-27 RX ADMIN — HYDROCODONE BITARTRATE AND ACETAMINOPHEN 1 TABLET: 5; 325 TABLET ORAL at 20:52

## 2021-11-28 ENCOUNTER — ANESTHESIA (OUTPATIENT)
Dept: TELEMETRY | Facility: HOSPITAL | Age: 66
End: 2021-11-28

## 2021-11-28 ENCOUNTER — ANESTHESIA EVENT (OUTPATIENT)
Dept: TELEMETRY | Facility: HOSPITAL | Age: 66
End: 2021-11-28

## 2021-11-28 PROBLEM — R33.9 URINARY RETENTION: Status: ACTIVE | Noted: 2021-11-28

## 2021-11-28 LAB
ANION GAP SERPL CALCULATED.3IONS-SCNC: 9 MMOL/L (ref 5–15)
BACTERIA SPEC AEROBE CULT: NO GROWTH
BASOPHILS # BLD AUTO: 0 10*3/MM3 (ref 0–0.2)
BASOPHILS NFR BLD AUTO: 0.4 % (ref 0–1.5)
BUN SERPL-MCNC: 14 MG/DL (ref 8–23)
BUN/CREAT SERPL: 16.1 (ref 7–25)
CALCIUM SPEC-SCNC: 8.6 MG/DL (ref 8.6–10.5)
CHLORIDE SERPL-SCNC: 103 MMOL/L (ref 98–107)
CO2 SERPL-SCNC: 26 MMOL/L (ref 22–29)
CREAT SERPL-MCNC: 0.87 MG/DL (ref 0.76–1.27)
DEPRECATED RDW RBC AUTO: 44.2 FL (ref 37–54)
EOSINOPHIL # BLD AUTO: 0.1 10*3/MM3 (ref 0–0.4)
EOSINOPHIL NFR BLD AUTO: 0.8 % (ref 0.3–6.2)
ERYTHROCYTE [DISTWIDTH] IN BLOOD BY AUTOMATED COUNT: 14.7 % (ref 12.3–15.4)
GFR SERPL CREATININE-BSD FRML MDRD: 88 ML/MIN/1.73
GLUCOSE SERPL-MCNC: 118 MG/DL (ref 65–99)
HCT VFR BLD AUTO: 40.4 % (ref 37.5–51)
HGB BLD-MCNC: 13.8 G/DL (ref 13–17.7)
LYMPHOCYTES # BLD AUTO: 1 10*3/MM3 (ref 0.7–3.1)
LYMPHOCYTES NFR BLD AUTO: 11 % (ref 19.6–45.3)
MCH RBC QN AUTO: 29.3 PG (ref 26.6–33)
MCHC RBC AUTO-ENTMCNC: 34.1 G/DL (ref 31.5–35.7)
MCV RBC AUTO: 85.9 FL (ref 79–97)
MONOCYTES # BLD AUTO: 0.8 10*3/MM3 (ref 0.1–0.9)
MONOCYTES NFR BLD AUTO: 8.8 % (ref 5–12)
NEUTROPHILS NFR BLD AUTO: 7.4 10*3/MM3 (ref 1.7–7)
NEUTROPHILS NFR BLD AUTO: 79 % (ref 42.7–76)
NRBC BLD AUTO-RTO: 0 /100 WBC (ref 0–0.2)
PLATELET # BLD AUTO: 122 10*3/MM3 (ref 140–450)
PMV BLD AUTO: 8.1 FL (ref 6–12)
POTASSIUM SERPL-SCNC: 4.8 MMOL/L (ref 3.5–5.2)
RBC # BLD AUTO: 4.71 10*6/MM3 (ref 4.14–5.8)
SODIUM SERPL-SCNC: 138 MMOL/L (ref 136–145)
WBC NRBC COR # BLD: 9.4 10*3/MM3 (ref 3.4–10.8)

## 2021-11-28 PROCEDURE — 96375 TX/PRO/DX INJ NEW DRUG ADDON: CPT

## 2021-11-28 PROCEDURE — 25010000002 ONDANSETRON PER 1 MG: Performed by: NURSE PRACTITIONER

## 2021-11-28 PROCEDURE — 80048 BASIC METABOLIC PNL TOTAL CA: CPT | Performed by: NURSE PRACTITIONER

## 2021-11-28 PROCEDURE — 85025 COMPLETE CBC W/AUTO DIFF WBC: CPT | Performed by: NURSE PRACTITIONER

## 2021-11-28 PROCEDURE — 63710000001 MYCOPHENOLATE MOFETIL PER 250 MG: Performed by: NURSE PRACTITIONER

## 2021-11-28 PROCEDURE — G0378 HOSPITAL OBSERVATION PER HR: HCPCS

## 2021-11-28 RX ORDER — NITROFURANTOIN 25; 75 MG/1; MG/1
100 CAPSULE ORAL EVERY 12 HOURS SCHEDULED
Status: DISCONTINUED | OUTPATIENT
Start: 2021-11-28 | End: 2021-11-29 | Stop reason: HOSPADM

## 2021-11-28 RX ORDER — AMLODIPINE BESYLATE 5 MG/1
5 TABLET ORAL
Status: DISCONTINUED | OUTPATIENT
Start: 2021-11-28 | End: 2021-11-29 | Stop reason: HOSPADM

## 2021-11-28 RX ORDER — NITROFURANTOIN 25; 75 MG/1; MG/1
100 CAPSULE ORAL 2 TIMES DAILY
Qty: 14 CAPSULE | Refills: 0 | Status: SHIPPED | OUTPATIENT
Start: 2021-11-28 | End: 2022-02-16

## 2021-11-28 RX ORDER — NITROFURANTOIN 25; 75 MG/1; MG/1
100 CAPSULE ORAL NIGHTLY
Status: DISCONTINUED | OUTPATIENT
Start: 2021-11-28 | End: 2021-11-28

## 2021-11-28 RX ORDER — ATORVASTATIN CALCIUM 20 MG/1
20 TABLET, FILM COATED ORAL NIGHTLY
Status: DISCONTINUED | OUTPATIENT
Start: 2021-11-28 | End: 2021-11-29 | Stop reason: HOSPADM

## 2021-11-28 RX ORDER — ATROPA BELLADONNA AND OPIUM 16.2; 3 MG/1; MG/1
30 SUPPOSITORY RECTAL EVERY 8 HOURS PRN
Status: DISCONTINUED | OUTPATIENT
Start: 2021-11-28 | End: 2021-11-29 | Stop reason: HOSPADM

## 2021-11-28 RX ADMIN — NITROFURANTOIN (MONOHYDRATE/MACROCRYSTALS) 100 MG: 75; 25 CAPSULE ORAL at 14:59

## 2021-11-28 RX ADMIN — AMLODIPINE BESYLATE 5 MG: 5 TABLET ORAL at 09:33

## 2021-11-28 RX ADMIN — SODIUM CHLORIDE, PRESERVATIVE FREE 10 ML: 5 INJECTION INTRAVENOUS at 08:10

## 2021-11-28 RX ADMIN — TAMSULOSIN HYDROCHLORIDE 0.4 MG: 0.4 CAPSULE ORAL at 00:12

## 2021-11-28 RX ADMIN — PANTOPRAZOLE SODIUM 40 MG: 40 TABLET, DELAYED RELEASE ORAL at 06:02

## 2021-11-28 RX ADMIN — ESCITALOPRAM OXALATE 10 MG: 10 TABLET ORAL at 21:22

## 2021-11-28 RX ADMIN — MYCOPHENOLATE MOFETIL 500 MG: 250 CAPSULE ORAL at 20:30

## 2021-11-28 RX ADMIN — ATROPA BELLADONNA AND OPIUM 30 MG: 16.2; 3 SUPPOSITORY RECTAL at 14:59

## 2021-11-28 RX ADMIN — SOTALOL HYDROCHLORIDE 40 MG: 80 TABLET ORAL at 10:33

## 2021-11-28 RX ADMIN — GABAPENTIN 300 MG: 300 CAPSULE ORAL at 08:09

## 2021-11-28 RX ADMIN — SOTALOL HYDROCHLORIDE 40 MG: 80 TABLET ORAL at 00:00

## 2021-11-28 RX ADMIN — PYRIDOSTIGMINE BROMIDE 180 MG: 60 TABLET ORAL at 20:30

## 2021-11-28 RX ADMIN — ESCITALOPRAM OXALATE 10 MG: 10 TABLET ORAL at 00:01

## 2021-11-28 RX ADMIN — MYCOPHENOLATE MOFETIL 500 MG: 250 CAPSULE ORAL at 08:09

## 2021-11-28 RX ADMIN — NITROFURANTOIN (MONOHYDRATE/MACROCRYSTALS) 100 MG: 75; 25 CAPSULE ORAL at 20:30

## 2021-11-28 RX ADMIN — HYDROCODONE BITARTRATE AND ACETAMINOPHEN 1 TABLET: 5; 325 TABLET ORAL at 13:55

## 2021-11-28 RX ADMIN — ATORVASTATIN CALCIUM 20 MG: 20 TABLET, FILM COATED ORAL at 20:30

## 2021-11-28 RX ADMIN — PYRIDOSTIGMINE BROMIDE 180 MG: 60 TABLET ORAL at 09:33

## 2021-11-28 RX ADMIN — ONDANSETRON 4 MG: 2 INJECTION INTRAMUSCULAR; INTRAVENOUS at 13:55

## 2021-11-28 RX ADMIN — SODIUM CHLORIDE, PRESERVATIVE FREE 10 ML: 5 INJECTION INTRAVENOUS at 00:13

## 2021-11-28 RX ADMIN — PYRIDOSTIGMINE BROMIDE 180 MG: 60 TABLET ORAL at 00:02

## 2021-11-28 RX ADMIN — SODIUM CHLORIDE, PRESERVATIVE FREE 10 ML: 5 INJECTION INTRAVENOUS at 20:30

## 2021-11-29 ENCOUNTER — READMISSION MANAGEMENT (OUTPATIENT)
Dept: CALL CENTER | Facility: HOSPITAL | Age: 66
End: 2021-11-29

## 2021-11-29 VITALS
SYSTOLIC BLOOD PRESSURE: 113 MMHG | HEIGHT: 64 IN | BODY MASS INDEX: 27.48 KG/M2 | RESPIRATION RATE: 16 BRPM | HEART RATE: 61 BPM | WEIGHT: 160.94 LBS | TEMPERATURE: 98.1 F | DIASTOLIC BLOOD PRESSURE: 64 MMHG | OXYGEN SATURATION: 96 %

## 2021-11-29 LAB
LAB AP CASE REPORT: NORMAL
LAB AP DIAGNOSIS COMMENT: NORMAL
PATH REPORT.FINAL DX SPEC: NORMAL
PATH REPORT.GROSS SPEC: NORMAL

## 2021-11-29 PROCEDURE — 63710000001 MYCOPHENOLATE MOFETIL PER 250 MG: Performed by: NURSE PRACTITIONER

## 2021-11-29 PROCEDURE — G0378 HOSPITAL OBSERVATION PER HR: HCPCS

## 2021-11-29 PROCEDURE — 93005 ELECTROCARDIOGRAM TRACING: CPT | Performed by: EMERGENCY MEDICINE

## 2021-11-29 PROCEDURE — 93010 ELECTROCARDIOGRAM REPORT: CPT | Performed by: INTERNAL MEDICINE

## 2021-11-29 RX ORDER — ATROPA BELLADONNA AND OPIUM 16.2; 3 MG/1; MG/1
30 SUPPOSITORY RECTAL EVERY 8 HOURS PRN
Qty: 12 SUPPOSITORY | Refills: 0 | Status: SHIPPED | OUTPATIENT
Start: 2021-11-29 | End: 2021-12-05

## 2021-11-29 RX ADMIN — TAMSULOSIN HYDROCHLORIDE 0.4 MG: 0.4 CAPSULE ORAL at 00:08

## 2021-11-29 RX ADMIN — GABAPENTIN 300 MG: 300 CAPSULE ORAL at 08:42

## 2021-11-29 RX ADMIN — MYCOPHENOLATE MOFETIL 500 MG: 250 CAPSULE ORAL at 08:42

## 2021-11-29 RX ADMIN — SOTALOL HYDROCHLORIDE 40 MG: 80 TABLET ORAL at 12:36

## 2021-11-29 RX ADMIN — SODIUM CHLORIDE, PRESERVATIVE FREE 10 ML: 5 INJECTION INTRAVENOUS at 08:42

## 2021-11-29 RX ADMIN — AMLODIPINE BESYLATE 5 MG: 5 TABLET ORAL at 08:42

## 2021-11-29 RX ADMIN — PYRIDOSTIGMINE BROMIDE 180 MG: 60 TABLET ORAL at 10:06

## 2021-11-29 RX ADMIN — ATROPA BELLADONNA AND OPIUM 30 MG: 16.2; 3 SUPPOSITORY RECTAL at 12:36

## 2021-11-29 RX ADMIN — PANTOPRAZOLE SODIUM 40 MG: 40 TABLET, DELAYED RELEASE ORAL at 06:06

## 2021-11-29 RX ADMIN — SOTALOL HYDROCHLORIDE 40 MG: 80 TABLET ORAL at 00:08

## 2021-11-29 RX ADMIN — NITROFURANTOIN (MONOHYDRATE/MACROCRYSTALS) 100 MG: 75; 25 CAPSULE ORAL at 08:42

## 2021-11-29 NOTE — OUTREACH NOTE
Prep Survey      Responses   Denominational facility patient discharged from? [facility]   Is LACE score < 7 ? Yes   Emergency Room discharge w/ pulse ox? No   Eligibility TCM   H[facility]   Date of Admission 11/27/21   Date of Discharge 11/29/21   Discharge Disposition Home or Self Care   Discharge diagnosis UTI, urinary retention   Does the patient have one of the following disease processes/diagnoses(primary or secondary)? Other   Does the patient have Home health ordered? No   Is there a DME ordered? No   Prep survey completed? Yes          Karissa Pena RN

## 2021-11-30 ENCOUNTER — TRANSITIONAL CARE MANAGEMENT TELEPHONE ENCOUNTER (OUTPATIENT)
Dept: CALL CENTER | Facility: HOSPITAL | Age: 66
End: 2021-11-30

## 2021-11-30 LAB — QT INTERVAL: 438 MS

## 2021-11-30 NOTE — OUTREACH NOTE
"Call Center TCM Note      Responses   [facility] [place] patient discharged from? [facility]   Does the patient have one of the following disease processes/diagnoses(primary or secondary)? Other   TCM attempt successful? Yes  [Wife]   Call start time 0943   Call end time 0947   Discharge diagnosis UTI, urinary retention   Meds reviewed with patient/caregiver? Yes   Is the patient having any side effects they believe may be caused by any medication additions or changes? No   Does the patient have all medications ordered at discharge? Yes   Is the patient taking all medications as directed (includes completed medication regime)? Yes   Does the patient have a primary care provider?  Yes   Does the patient have an appointment with their PCP within 7 days of discharge? No   What is preventing the patient from scheduling follow up appointments within 7 days of discharge? --  [Pt will call to set up PCP followup appt once things \"settle down\" per the pt. ]   Nursing Interventions Advised patient to make appointment   Has the patient kept scheduled appointments due by today? N/A   Has home health visited the patient within 72 hours of discharge? N/A   Psychosocial issues? No   Did the patient receive a copy of their discharge instructions? Yes   Nursing interventions Reviewed instructions with patient   What is the patient's perception of their health status since discharge? Improving   Is the patient/caregiver able to teach back signs and symptoms related to disease process for when to call PCP? Yes   Is the patient/caregiver able to teach back signs and symptoms related to disease process for when to call 911? Yes   Is the patient/caregiver able to teach back the hierarchy of who to call/visit for symptoms/problems? PCP, Specialist, Home health nurse, Urgent Care, ED, 911 Yes   If the patient is a current smoker, are they able to teach back resources for cessation? Not a smoker   TCM call completed? Yes          Tiarra " ANAT Payne    11/30/2021, 09:48 EST

## 2021-12-02 LAB
BACTERIA SPEC AEROBE CULT: NORMAL
BACTERIA SPEC AEROBE CULT: NORMAL

## 2021-12-05 ENCOUNTER — HOSPITAL ENCOUNTER (EMERGENCY)
Facility: HOSPITAL | Age: 66
Discharge: HOME | End: 2021-12-06
Admitting: EMERGENCY MEDICINE

## 2021-12-05 DIAGNOSIS — R33.9 URINARY RETENTION: Primary | ICD-10-CM

## 2021-12-05 DIAGNOSIS — N32.89 BLADDER SPASMS: ICD-10-CM

## 2021-12-05 PROCEDURE — 99283 EMERGENCY DEPT VISIT LOW MDM: CPT

## 2021-12-05 RX ORDER — MORPHINE SULFATE 4 MG/ML
4 INJECTION, SOLUTION INTRAMUSCULAR; INTRAVENOUS ONCE
Status: COMPLETED | OUTPATIENT
Start: 2021-12-06 | End: 2021-12-06

## 2021-12-05 RX ORDER — ONDANSETRON 4 MG/1
4 TABLET, ORALLY DISINTEGRATING ORAL ONCE
Status: COMPLETED | OUTPATIENT
Start: 2021-12-06 | End: 2021-12-06

## 2021-12-05 RX ORDER — ATROPA BELLADONNA AND OPIUM 16.2; 3 MG/1; MG/1
30 SUPPOSITORY RECTAL ONCE
Status: COMPLETED | OUTPATIENT
Start: 2021-12-05 | End: 2021-12-05

## 2021-12-05 RX ADMIN — ATROPA BELLADONNA AND OPIUM 30 MG: 16.2; 3 SUPPOSITORY RECTAL at 23:28

## 2021-12-06 VITALS
HEART RATE: 64 BPM | OXYGEN SATURATION: 99 % | RESPIRATION RATE: 20 BRPM | WEIGHT: 165.34 LBS | SYSTOLIC BLOOD PRESSURE: 134 MMHG | DIASTOLIC BLOOD PRESSURE: 71 MMHG | TEMPERATURE: 98.2 F | HEIGHT: 64 IN | BODY MASS INDEX: 28.23 KG/M2

## 2021-12-06 PROCEDURE — 0 MORPHINE SULFATE 4 MG/ML SOLUTION: Performed by: NURSE PRACTITIONER

## 2021-12-06 PROCEDURE — 96372 THER/PROPH/DIAG INJ SC/IM: CPT

## 2021-12-06 PROCEDURE — 63710000001 ONDANSETRON ODT 4 MG TABLET DISPERSIBLE: Performed by: NURSE PRACTITIONER

## 2021-12-06 RX ADMIN — MORPHINE SULFATE 4 MG: 4 INJECTION INTRAVENOUS at 00:06

## 2021-12-06 RX ADMIN — ONDANSETRON 4 MG: 4 TABLET, ORALLY DISINTEGRATING ORAL at 00:05

## 2021-12-06 NOTE — DISCHARGE INSTRUCTIONS
Use home pain medication and  your prescription for the BNO suppositories to help control pain    If your pain is not controlled you should see Dr. Pena tomorrow before your appointment on Tuesday-    Return if worse

## 2021-12-06 NOTE — ED PROVIDER NOTES
Subjective   Patient is a 66-year-old white male with history of BPH, erectile dysfunction, kidney stone, indwelling catheter, and recent bladder spasms presents to the ER today with onset of bladder spasms this evening at 8 PM.  Patient states the pain feels like fire and a squeeze originating in his bladder with radiation through urethra to tip of penis.  Patient states he has associated bladder pain, dysuria, urgency, hematuria, and nausea.  Patient states he has a headache, which he attributes to his strenuous urinary pain.  Patient states he has an extensive urologic history which is managed outpatient by Dr. Enrrique Pena.  Patient has scheduled TURP procedure this Tuesday with Dr. Pena.  Patient states he was recently seen at this ER for urinary retention and bladder spasms with discharge November 29, 2021.  Patient states he was previously treated with B&O suppository for spasms.  Patient denies fever, chills, recent infection, chest pain, shortness of breath, or dizziness.  Patient rates his pain a 9/10.            Review of Systems   Constitutional: Negative for chills, fatigue and fever.   HENT: Negative for congestion, tinnitus and trouble swallowing.    Eyes: Negative for photophobia, discharge and redness.   Respiratory: Negative for cough and shortness of breath.    Cardiovascular: Negative for chest pain and palpitations.   Gastrointestinal: Positive for nausea. Negative for abdominal pain, diarrhea and vomiting.   Genitourinary: Positive for difficulty urinating, dysuria, frequency, hematuria, penile pain and urgency.   Musculoskeletal: Negative for back pain, joint swelling and myalgias.   Skin: Negative for rash.   Neurological: Positive for headaches. Negative for dizziness, weakness and light-headedness.   Psychiatric/Behavioral: Negative for confusion.   All other systems reviewed and are negative.      Past Medical History:   Diagnosis Date   • Arthritis    • Benign prostatic hyperplasia    •  Erectile dysfunction    • High prostate specific antigen (PSA) 8/16/2017   • History of echocardiogram     EF 55%. No significant valvular flow abnormalities.    • History of stress test 11/2010    Stress Myoview: No ischemia 11/2010   • HL (hearing loss)    • HTN (hypertension) 6/25/2019   • Kidney stone    • Myasthenia gravis (HCC)    • Pancreatitis    • PSVT (paroxysmal supraventricular tachycardia) (HCC) 6/25/2019   • Stroke (HCC) 02/2014   • Supraventricular tachycardia (HCC)    • Suspected COVID-19 virus infection 1/12/2021   • TIA (transient ischemic attack) 2008       No Known Allergies    Past Surgical History:   Procedure Laterality Date   • CHOLECYSTECTOMY N/A 7/4/2020    Procedure: CHOLECYSTECTOMY LAPAROSCOPIC;  Surgeon: Yogesh Presley MD;  Location: [facility] OR;  Service: General;  Laterality: N/A;   • ECHO - CONVERTED  01/20/2021   • FINGER SURGERY     • PROSTATE BIOPSY  2019    [facility]   • TONSILLECTOMY     • VASECTOMY         Family History   Problem Relation Age of Onset   • Hypertension Mother    • Stroke Mother    • Other Mother         Cerebral Bleed   • Hypertension Father    • Other Father        Social History     Socioeconomic History   • Marital status:    Tobacco Use   • Smoking status: Never Smoker   • Smokeless tobacco: Never Used   Vaping Use   • Vaping Use: Never used   Substance and Sexual Activity   • Alcohol use: Not Currently     Alcohol/week: 0.0 standard drinks   • Drug use: No   • Sexual activity: Not Currently     Partners: Female     Birth control/protection: Condom     Comment: Vasectomie           Objective   Physical Exam  Exam conducted with a chaperone present.   Constitutional:       Appearance: He is well-developed.   HENT:      Head: Normocephalic and atraumatic.   Eyes:      Conjunctiva/sclera: Conjunctivae normal.      Pupils: Pupils are equal, round, and reactive to light.   Cardiovascular:      Rate and Rhythm: Normal rate and  regular rhythm.      Heart sounds: Normal heart sounds.   Pulmonary:      Effort: Pulmonary effort is normal.      Breath sounds: Normal breath sounds.   Abdominal:      General: Bowel sounds are normal.      Palpations: Abdomen is soft.   Genitourinary:     Penis: Tenderness present. No erythema.       Testes: Normal.       Musculoskeletal:         General: Normal range of motion.      Cervical back: Normal range of motion and neck supple.   Skin:     General: Skin is warm and dry.   Neurological:      General: No focal deficit present.      Mental Status: He is alert and oriented to person, place, and time.      GCS: GCS eye subscore is 4. GCS verbal subscore is 5. GCS motor subscore is 6.      Cranial Nerves: Cranial nerves are intact.   Psychiatric:         Attention and Perception: Attention normal.         Mood and Affect: Mood is anxious.         Speech: Speech normal.         Behavior: Behavior normal. Behavior is cooperative.         Procedures           ED Course                                                 MDM  Number of Diagnoses or Management Options  Bladder spasms  Urinary retention  Diagnosis management comments: Patient's catheter was irrigated and found to not be obstructed bladder scanner was performed and patient does not have a distended bladder does not show any signs of retention at this time.  The catheter is draining well has clear urine there were a few blood clots but it is draining clear now.  He states that the B&O suppositories worked well for him but he has been unable to secure them at a retail pharmacy he is requesting one at this time-he was given the suppository and also 4 of morphine subcutaneous for pain control.  I discussed the need for him to get the suppositories filled and to call Dr. Pena first thing in the morning for further evaluation and treatment he and his wife verbalized understood discharge instructions he was feeling much better at discharge rated his pain a  4/10    Risk of Complications, Morbidity, and/or Mortality  Presenting problems: low  Diagnostic procedures: low  Management options: low    Patient Progress  Patient progress: improved      Final diagnoses:   Urinary retention   Bladder spasms       ED Disposition  ED Disposition     ED Disposition Condition Comment    Discharge Stable           Enrrique Pena MD  [address] [place] [address] [place] [id number]  [phone]      see him tomorrow -         Medication List      Changed    tamsulosin 0.4 MG capsule 24 hr capsule  Commonly known as: FLOMAX  Take 1 capsule by mouth Daily.  What changed: when to take this        Stop    opium-belladonna 16.2-30 MG suppository  Commonly known as: B&O SUPPRETTES             Lou Chew, APRN  12/06/21 0047

## 2021-12-07 PROCEDURE — 88305 TISSUE EXAM BY PATHOLOGIST: CPT | Performed by: UROLOGY

## 2021-12-08 ENCOUNTER — LAB REQUISITION (OUTPATIENT)
Dept: LAB | Facility: HOSPITAL | Age: 66
End: 2021-12-08

## 2021-12-08 DIAGNOSIS — C67.4 MALIGNANT NEOPLASM OF POSTERIOR WALL OF BLADDER (HCC): ICD-10-CM

## 2021-12-08 DIAGNOSIS — N40.1 BENIGN PROSTATIC HYPERPLASIA WITH LOWER URINARY TRACT SYMPTOMS: ICD-10-CM

## 2021-12-09 ENCOUNTER — APPOINTMENT (OUTPATIENT)
Dept: CT IMAGING | Facility: HOSPITAL | Age: 66
End: 2021-12-09

## 2021-12-09 ENCOUNTER — HOSPITAL ENCOUNTER (EMERGENCY)
Facility: HOSPITAL | Age: 66
Discharge: HOME | End: 2021-12-09
Attending: EMERGENCY MEDICINE | Admitting: EMERGENCY MEDICINE

## 2021-12-09 VITALS
SYSTOLIC BLOOD PRESSURE: 121 MMHG | HEART RATE: 71 BPM | BODY MASS INDEX: 27.66 KG/M2 | WEIGHT: 162 LBS | TEMPERATURE: 98 F | DIASTOLIC BLOOD PRESSURE: 60 MMHG | HEIGHT: 64 IN | OXYGEN SATURATION: 98 % | RESPIRATION RATE: 13 BRPM

## 2021-12-09 DIAGNOSIS — Z90.79 S/P TURP: Primary | ICD-10-CM

## 2021-12-09 DIAGNOSIS — N39.0 ACUTE URINARY TRACT INFECTION: ICD-10-CM

## 2021-12-09 DIAGNOSIS — R10.9 ACUTE RIGHT FLANK PAIN: ICD-10-CM

## 2021-12-09 LAB
ALBUMIN SERPL-MCNC: 3.5 G/DL (ref 3.5–5.2)
ALBUMIN/GLOB SERPL: 1.4 G/DL
ALP SERPL-CCNC: 80 U/L (ref 39–117)
ALT SERPL W P-5'-P-CCNC: 52 U/L (ref 1–41)
ANION GAP SERPL CALCULATED.3IONS-SCNC: 10 MMOL/L (ref 5–15)
AST SERPL-CCNC: 20 U/L (ref 1–40)
BACTERIA UR QL AUTO: ABNORMAL /HPF
BASOPHILS # BLD AUTO: 0.1 10*3/MM3 (ref 0–0.2)
BASOPHILS NFR BLD AUTO: 0.7 % (ref 0–1.5)
BILIRUB SERPL-MCNC: 0.4 MG/DL (ref 0–1.2)
BILIRUB UR QL STRIP: NEGATIVE
BUN SERPL-MCNC: 8 MG/DL (ref 8–23)
BUN/CREAT SERPL: 9 (ref 7–25)
CALCIUM SPEC-SCNC: 8.4 MG/DL (ref 8.6–10.5)
CHLORIDE SERPL-SCNC: 102 MMOL/L (ref 98–107)
CLARITY UR: ABNORMAL
CO2 SERPL-SCNC: 23 MMOL/L (ref 22–29)
COLOR UR: ABNORMAL
CREAT SERPL-MCNC: 0.89 MG/DL (ref 0.76–1.27)
DEPRECATED RDW RBC AUTO: 42.9 FL (ref 37–54)
EOSINOPHIL # BLD AUTO: 0 10*3/MM3 (ref 0–0.4)
EOSINOPHIL NFR BLD AUTO: 0 % (ref 0.3–6.2)
ERYTHROCYTE [DISTWIDTH] IN BLOOD BY AUTOMATED COUNT: 14.3 % (ref 12.3–15.4)
GFR SERPL CREATININE-BSD FRML MDRD: 86 ML/MIN/1.73
GLOBULIN UR ELPH-MCNC: 2.5 GM/DL
GLUCOSE SERPL-MCNC: 97 MG/DL (ref 65–99)
GLUCOSE UR STRIP-MCNC: NEGATIVE MG/DL
HCT VFR BLD AUTO: 37.6 % (ref 37.5–51)
HGB BLD-MCNC: 12.3 G/DL (ref 13–17.7)
HGB UR QL STRIP.AUTO: ABNORMAL
HYALINE CASTS UR QL AUTO: ABNORMAL /LPF
KETONES UR QL STRIP: ABNORMAL
LAB AP CASE REPORT: NORMAL
LEUKOCYTE ESTERASE UR QL STRIP.AUTO: ABNORMAL
LYMPHOCYTES # BLD AUTO: 0.9 10*3/MM3 (ref 0.7–3.1)
LYMPHOCYTES NFR BLD AUTO: 11.7 % (ref 19.6–45.3)
MCH RBC QN AUTO: 28 PG (ref 26.6–33)
MCHC RBC AUTO-ENTMCNC: 32.8 G/DL (ref 31.5–35.7)
MCV RBC AUTO: 85.2 FL (ref 79–97)
MONOCYTES # BLD AUTO: 0.8 10*3/MM3 (ref 0.1–0.9)
MONOCYTES NFR BLD AUTO: 10.2 % (ref 5–12)
NEUTROPHILS NFR BLD AUTO: 5.9 10*3/MM3 (ref 1.7–7)
NEUTROPHILS NFR BLD AUTO: 77.4 % (ref 42.7–76)
NITRITE UR QL STRIP: NEGATIVE
NRBC BLD AUTO-RTO: 0.1 /100 WBC (ref 0–0.2)
PATH REPORT.FINAL DX SPEC: NORMAL
PATH REPORT.GROSS SPEC: NORMAL
PH UR STRIP.AUTO: 8 [PH] (ref 5–8)
PLATELET # BLD AUTO: 152 10*3/MM3 (ref 140–450)
PMV BLD AUTO: 7.3 FL (ref 6–12)
POTASSIUM SERPL-SCNC: 3.5 MMOL/L (ref 3.5–5.2)
PROT SERPL-MCNC: 6 G/DL (ref 6–8.5)
PROT UR QL STRIP: ABNORMAL
RBC # BLD AUTO: 4.42 10*6/MM3 (ref 4.14–5.8)
RBC # UR STRIP: ABNORMAL /HPF
REF LAB TEST METHOD: ABNORMAL
SODIUM SERPL-SCNC: 135 MMOL/L (ref 136–145)
SP GR UR STRIP: <=1.005 (ref 1–1.03)
SQUAMOUS #/AREA URNS HPF: ABNORMAL /HPF
UROBILINOGEN UR QL STRIP: ABNORMAL
WBC # UR STRIP: ABNORMAL /HPF
WBC NRBC COR # BLD: 7.7 10*3/MM3 (ref 3.4–10.8)

## 2021-12-09 PROCEDURE — 71275 CT ANGIOGRAPHY CHEST: CPT

## 2021-12-09 PROCEDURE — 96375 TX/PRO/DX INJ NEW DRUG ADDON: CPT

## 2021-12-09 PROCEDURE — 25010000002 CEFTRIAXONE PER 250 MG: Performed by: EMERGENCY MEDICINE

## 2021-12-09 PROCEDURE — 74177 CT ABD & PELVIS W/CONTRAST: CPT

## 2021-12-09 PROCEDURE — 96374 THER/PROPH/DIAG INJ IV PUSH: CPT

## 2021-12-09 PROCEDURE — 85025 COMPLETE CBC W/AUTO DIFF WBC: CPT | Performed by: EMERGENCY MEDICINE

## 2021-12-09 PROCEDURE — 25010000002 ONDANSETRON PER 1 MG: Performed by: EMERGENCY MEDICINE

## 2021-12-09 PROCEDURE — 96376 TX/PRO/DX INJ SAME DRUG ADON: CPT

## 2021-12-09 PROCEDURE — 81001 URINALYSIS AUTO W/SCOPE: CPT | Performed by: EMERGENCY MEDICINE

## 2021-12-09 PROCEDURE — 0 IOPAMIDOL PER 1 ML: Performed by: EMERGENCY MEDICINE

## 2021-12-09 PROCEDURE — 0 MORPHINE SULFATE 4 MG/ML SOLUTION: Performed by: EMERGENCY MEDICINE

## 2021-12-09 PROCEDURE — 80053 COMPREHEN METABOLIC PANEL: CPT | Performed by: EMERGENCY MEDICINE

## 2021-12-09 PROCEDURE — 25010000002 HYDROMORPHONE PER 4 MG: Performed by: EMERGENCY MEDICINE

## 2021-12-09 PROCEDURE — 99283 EMERGENCY DEPT VISIT LOW MDM: CPT

## 2021-12-09 RX ORDER — OXYCODONE HYDROCHLORIDE AND ACETAMINOPHEN 5; 325 MG/1; MG/1
1 TABLET ORAL EVERY 6 HOURS PRN
Qty: 12 TABLET | Refills: 0 | Status: SHIPPED | OUTPATIENT
Start: 2021-12-09 | End: 2021-12-11

## 2021-12-09 RX ORDER — ONDANSETRON 2 MG/ML
4 INJECTION INTRAMUSCULAR; INTRAVENOUS ONCE
Status: COMPLETED | OUTPATIENT
Start: 2021-12-09 | End: 2021-12-09

## 2021-12-09 RX ORDER — HYDROMORPHONE HCL 110MG/55ML
0.5 PATIENT CONTROLLED ANALGESIA SYRINGE INTRAVENOUS ONCE
Status: COMPLETED | OUTPATIENT
Start: 2021-12-09 | End: 2021-12-09

## 2021-12-09 RX ORDER — MORPHINE SULFATE 4 MG/ML
4 INJECTION, SOLUTION INTRAMUSCULAR; INTRAVENOUS ONCE
Status: COMPLETED | OUTPATIENT
Start: 2021-12-09 | End: 2021-12-09

## 2021-12-09 RX ADMIN — SODIUM CHLORIDE 1000 ML: 9 INJECTION, SOLUTION INTRAVENOUS at 14:24

## 2021-12-09 RX ADMIN — WATER 1 G: 100 INJECTION, SOLUTION INTRAVENOUS at 18:25

## 2021-12-09 RX ADMIN — MORPHINE SULFATE 4 MG: 4 INJECTION INTRAVENOUS at 14:25

## 2021-12-09 RX ADMIN — IOPAMIDOL 100 ML: 755 INJECTION, SOLUTION INTRAVENOUS at 15:34

## 2021-12-09 RX ADMIN — ONDANSETRON 4 MG: 2 INJECTION INTRAMUSCULAR; INTRAVENOUS at 14:25

## 2021-12-09 RX ADMIN — HYDROMORPHONE HYDROCHLORIDE 0.5 MG: 2 INJECTION, SOLUTION INTRAMUSCULAR; INTRAVENOUS; SUBCUTANEOUS at 18:25

## 2021-12-09 RX ADMIN — ONDANSETRON 4 MG: 2 INJECTION INTRAMUSCULAR; INTRAVENOUS at 18:25

## 2021-12-09 NOTE — DISCHARGE INSTRUCTIONS
Continue your antibiotic twice a day  Plenty of fluids  You can also use Tylenol or ibuprofen for discomfort and fever  Use oxycodone for more severe pain  Follow-up with your urologist

## 2021-12-09 NOTE — ED PROVIDER NOTES
Subjective   66-year-old male sent in by the office for possible blood clot.  Patient states he has pain on his flank on the right side.  He states that he has had no hemoptysis.  He denies night sweats.  Reports history of recent TURP.  He states he has no difficulty making urine.  He states he has been drinking liquids as requested.  He denies retention.  The patient reports that he does not have a change in stream.  He denies scrotal discomfort.  He reports that he has had subjective fever but no chilling          Review of Systems   HENT: Negative for trouble swallowing.    Respiratory: Negative for chest tightness and shortness of breath.    Cardiovascular: Negative for palpitations and leg swelling.   Gastrointestinal: Positive for abdominal pain and nausea.   Genitourinary: Positive for flank pain. Negative for testicular pain.   Musculoskeletal: Positive for back pain.   Hematological: Does not bruise/bleed easily.       Past Medical History:   Diagnosis Date   • Arthritis    • Benign prostatic hyperplasia    • Erectile dysfunction    • High prostate specific antigen (PSA) 8/16/2017   • History of echocardiogram     EF 55%. No significant valvular flow abnormalities.    • History of stress test 11/2010    Stress Myoview: No ischemia 11/2010   • HL (hearing loss)    • HTN (hypertension) 6/25/2019   • Kidney stone    • Myasthenia gravis (HCC)    • Pancreatitis    • PSVT (paroxysmal supraventricular tachycardia) ([facility]) 6/25/2019   • Stroke (HCC) 02/2014   • Supraventricular tachycardia (HCC)    • Suspected COVID-19 virus infection 1/12/2021   • TIA (transient ischemic attack) 2008       No Known Allergies    Past Surgical History:   Procedure Laterality Date   • CHOLECYSTECTOMY N/A 7/4/2020    Procedure: CHOLECYSTECTOMY LAPAROSCOPIC;  Surgeon: Yogesh Presley MD;  Location: [facility] OR;  Service: General;  Laterality: N/A;   • ECHO - CONVERTED  01/20/2021   • FINGER SURGERY     • PROSTATE BIOPSY  2019      [facility] UROLOGY-BENIGN   • TONSILLECTOMY     • VASECTOMY         Family History   Problem Relation Age of Onset   • Hypertension Mother    • Stroke Mother    • Other Mother         Cerebral Bleed   • Hypertension Father    • Other Father        Social History     Socioeconomic History   • Marital status:    Tobacco Use   • Smoking status: Never Smoker   • Smokeless tobacco: Never Used   Vaping Use   • Vaping Use: Never used   Substance and Sexual Activity   • Alcohol use: Not Currently     Alcohol/week: 0.0 standard drinks   • Drug use: No   • Sexual activity: Not Currently     Partners: Female     Birth control/protection: Condom     Comment: Vasectomie           Objective   Physical Exam  Alert Charles Coma Scale 15   HEENT: Pupils equal and reactive to light. Conjunctivae are not injected. normal tympanic membranes. Oropharynx and nares are normal.   Neck: Supple. Midline trachea. No JVD. No goiter.   Chest: Clear and equal breath sounds bilaterally regular rate and rhythm without murmur or rub.   Abdomen: Positive bowel sounds nontender nondistended. No rebound or peritoneal signs.  Right-sided CVA tenderness.   Extremities no clubbing cyanosis or edema motor sensory exam is normal the full range of motion is intact   skin: Warm and dry, no rashes or petechia.   Lymphatic: No regional lymphadenopathy. No calf pain, swelling or Marielos's sign    Procedures           ED Course                                         Labs Reviewed   COMPREHENSIVE METABOLIC PANEL - Abnormal; Notable for the following components:       Result Value    Sodium 135 (*)     Calcium 8.4 (*)     ALT (SGPT) 52 (*)     All other components within normal limits    Narrative:     GFR Normal >60  Chronic Kidney Disease <60  Kidney Failure <15     URINALYSIS W/ MICROSCOPIC IF INDICATED (NO CULTURE) - Abnormal; Notable for the following components:    Color, UA Red (*)     Appearance, UA Turbid (*)     Ketones, UA Trace (*)     Blood,  UA Large (3+) (*)     Protein,  mg/dL (2+) (*)     Leuk Esterase, UA Moderate (2+) (*)     All other components within normal limits   CBC WITH AUTO DIFFERENTIAL - Abnormal; Notable for the following components:    Hemoglobin 12.3 (*)     Neutrophil % 77.4 (*)     Lymphocyte % 11.7 (*)     Eosinophil % 0.0 (*)     All other components within normal limits   URINALYSIS, MICROSCOPIC ONLY - Abnormal; Notable for the following components:    RBC, UA Too Numerous to Count (*)     WBC, UA 13-20 (*)     All other components within normal limits   CBC AND DIFFERENTIAL    Narrative:     The following orders were created for panel order CBC & Differential.  Procedure                               Abnormality         Status                     ---------                               -----------         ------                     CBC Auto Differential[[id number]]        Abnormal            Final result                 Please view results for these tests on the individual orders.     Medications   ondansetron (ZOFRAN) injection 4 mg (has no administration in time range)   HYDROmorphone (DILAUDID) injection 0.5 mg (has no administration in time range)   cefTRIAXone (ROCEPHIN) in SWFI 1 gram/10ml IV PUSH syringe (has no administration in time range)   sodium chloride 0.9 % bolus 1,000 mL (0 mL Intravenous Stopped 12/9/21 1454)   ondansetron (ZOFRAN) injection 4 mg (4 mg Intravenous Given 12/9/21 1425)   Morphine sulfate (PF) injection 4 mg (4 mg Intravenous Given 12/9/21 1425)   iopamidol (ISOVUE-370) 76 % injection 100 mL (100 mL Intravenous Given 12/9/21 1534)     CT Abdomen Pelvis With Contrast    Result Date: 12/9/2021  1. Abnormal urinary bladder wall thickening and perivesical stranding suggesting active cystitis. Correlate with clinical symptoms. High density fluid is seen within the dependent urinary bladder lumen thought to represent a hemorrhagic products. 2. No drainable fluid collection or abscess is seen within  the pelvis. 3. Prostatomegaly. Nonspecific 1.7 cm enhancing prostatic nodule. This could represent a benign hyperplastic nodule. Malignancy not excluded. Correlate with PSA findings. 4. No acute findings within the chest. No pulmonary embolism. 5. No obstructing right renal stone. 6. Moderate coronary artery calcifications. Correlate with cardiac history. 7. Additional CT findings include: Hepatic cysts, renal cysts, cholecystectomy, small fat-containing left internal hernia.  Electronically Signed By-Radha Yates MD On:12/9/2021 3:49 PM This report was finalized on [id number] by  Radha Yates MD.    CT Chest Pulmonary Embolism    Result Date: 12/9/2021  1. Abnormal urinary bladder wall thickening and perivesical stranding suggesting active cystitis. Correlate with clinical symptoms. High density fluid is seen within the dependent urinary bladder lumen thought to represent a hemorrhagic products. 2. No drainable fluid collection or abscess is seen within the pelvis. 3. Prostatomegaly. Nonspecific 1.7 cm enhancing prostatic nodule. This could represent a benign hyperplastic nodule. Malignancy not excluded. Correlate with PSA findings. 4. No acute findings within the chest. No pulmonary embolism. 5. No obstructing right renal stone. 6. Moderate coronary artery calcifications. Correlate with cardiac history. 7. Additional CT findings include: Hepatic cysts, renal cysts, cholecystectomy, small fat-containing left internal hernia.  Electronically Signed By-Radha Yates MD On:12/9/2021 3:49 PM This report was finalized on [id number] by  Radha Yates MD.            MDM  Number of Diagnoses or Management Options     Amount and/or Complexity of Data Reviewed  Clinical lab tests: reviewed  Tests in the radiology section of CPT®: reviewed    Risk of Complications, Morbidity, and/or Mortality  Presenting problems: high  Diagnostic procedures: high  Management options: high  General comments: Although not  reported initially, the patient was found to be on ciprofloxacin.  The patient will be injected with ceftriaxone pending urine culture results.  The patient stated that hydrocodone was not helping his pain and will be started on oxycodone.  The case was discussed with Dr. Pena from urology.  The pain was relieved the emergency department and the family vocalized understanding of discharge instructions and warnings        Final diagnoses:   Acute urinary tract infection   S/P TURP   Acute right flank pain       ED Disposition  ED Disposition     ED Disposition Condition Comment    Discharge Stable           No follow-up provider specified.       Medication List      No changes were made to your prescriptions during this visit.          Dane Dickson MD  12/09/21 [id number]

## 2021-12-10 ENCOUNTER — OFFICE VISIT (OUTPATIENT)
Dept: FAMILY MEDICINE CLINIC | Facility: CLINIC | Age: 66
End: 2021-12-10

## 2021-12-10 VITALS
RESPIRATION RATE: 16 BRPM | HEART RATE: 70 BPM | OXYGEN SATURATION: 100 % | SYSTOLIC BLOOD PRESSURE: 124 MMHG | DIASTOLIC BLOOD PRESSURE: 70 MMHG | HEIGHT: 64 IN | BODY MASS INDEX: 28.34 KG/M2 | WEIGHT: 166 LBS

## 2021-12-10 DIAGNOSIS — M54.6 ACUTE RIGHT-SIDED THORACIC BACK PAIN: Primary | ICD-10-CM

## 2021-12-10 DIAGNOSIS — M62.838 MUSCLE SPASM: ICD-10-CM

## 2021-12-10 PROCEDURE — 99213 OFFICE O/P EST LOW 20 MIN: CPT | Performed by: PHYSICIAN ASSISTANT

## 2021-12-10 RX ORDER — LIDOCAINE 50 MG/G
1 PATCH TOPICAL EVERY 24 HOURS
Qty: 5 PATCH | Refills: 0 | OUTPATIENT
Start: 2021-12-10 | End: 2021-12-18

## 2021-12-10 RX ORDER — DIAZEPAM 5 MG/1
5 TABLET ORAL 2 TIMES DAILY PRN
Qty: 10 TABLET | Refills: 0 | Status: SHIPPED | OUTPATIENT
Start: 2021-12-10 | End: 2022-02-16

## 2021-12-10 NOTE — PROGRESS NOTES
"Subjective   Enrrique Alejandre is a 66 y.o. male.     Chief Complaint   Patient presents with   • Back Pain       /70   Pulse 70   Resp 16   Ht 162.6 cm (64\")   Wt 75.3 kg (166 lb)   SpO2 100%   BMI 28.49 kg/m²     BP Readings from Last 3 Encounters:   12/10/21 124/70   12/09/21 121/60   12/06/21 134/71       Wt Readings from Last 3 Encounters:   12/10/21 75.3 kg (166 lb)   12/09/21 73.5 kg (162 lb)   12/05/21 75 kg (165 lb 5.5 oz)       HPI patient presents to the clinic with complaints of right-sided flank pain that has been present over the past 3 days.  Patient had a TURP procedure performed by Dr. Osuna on Tuesday of this week.  On Wednesday when he woke up he had right-sided back pain that was worse with movement and was relieved with heating pad.  He went to the emergency department last evening for the back pain and had a CT scan of the abdomen and pelvis and of the chest that did not reveal any acute process for his right-sided back pain.  He is urinating on a regular basis and has equal output to his input.  Dr. Pena was contacted last night while he was in the emergency department and the case was discussed with him.  He is currently on oxycodone and is also taking ciprofloxacin.  He was given 1 dose of ceftriaxone in the emergency department last night.  He denies having any fevers or chills.  He does have a kidney stone in the right kidney but it has not passed into the ureter.  There was no signs of hydroureter or hydronephrosis on CT scan.    The following portions of the patient's history were reviewed and updated as appropriate: allergies, current medications, past family history, past medical history, past social history, past surgical history and problem list.    Review of Systems    Objective   Physical Exam  Constitutional:       Appearance: Normal appearance.   Eyes:      Extraocular Movements: Extraocular movements intact.      Pupils: Pupils are equal, round, and reactive to " light.   Musculoskeletal:         General: Tenderness present.      Comments: Point tenderness over the right flank in the musculature.  No rib tenderness.  No abdominal pain.   Neurological:      General: No focal deficit present.      Mental Status: He is alert and oriented to person, place, and time.   Psychiatric:         Mood and Affect: Mood normal.         Behavior: Behavior normal.           Diagnoses and all orders for this visit:    1. Acute right-sided thoracic back pain (Primary)  -     diazePAM (Valium) 5 MG tablet; Take 1 tablet by mouth 2 (Two) Times a Day As Needed for Anxiety or Muscle Spasms.  Dispense: 10 tablet; Refill: 0    2. Muscle spasm  -     diazePAM (Valium) 5 MG tablet; Take 1 tablet by mouth 2 (Two) Times a Day As Needed for Anxiety or Muscle Spasms.  Dispense: 10 tablet; Refill: 0    Other orders  -     lidocaine (Lidoderm) 5 %; Place 1 patch on the skin as directed by provider Daily. Remove & Discard patch within 12 hours or as directed by MD  Dispense: 5 patch; Refill: 0    After reviewing the CT scan of the abdomen and doing a physical exam it does appear that this is likely muscular pain.  He does have some findings on his CT scan that need to be followed up in the clinic with Dr. Martinez including coronary artery calcifications.  We will place a patch on his back here in the clinic and he can pick these up at the pharmacy.  I will prescribe him a short course of diazepam for muscle relaxant.  He is to follow-up in the clinic if there are any changes including worsening flank, abdominal pain, or fever.  Follow-up with Dr. Pena for routine postoperative care.    Return if symptoms worsen or fail to improve.

## 2021-12-11 DIAGNOSIS — I47.1 PSVT (PAROXYSMAL SUPRAVENTRICULAR TACHYCARDIA) (HCC): ICD-10-CM

## 2021-12-11 RX ORDER — NAPROXEN 500 MG/1
500 TABLET ORAL 2 TIMES DAILY WITH MEALS
Qty: 60 TABLET | Refills: 1 | Status: SHIPPED | OUTPATIENT
Start: 2021-12-11 | End: 2022-02-07

## 2021-12-11 RX ORDER — TIZANIDINE 4 MG/1
4 TABLET ORAL EVERY 6 HOURS PRN
Qty: 30 TABLET | Refills: 1 | Status: SHIPPED | OUTPATIENT
Start: 2021-12-11 | End: 2021-12-12

## 2021-12-11 RX ORDER — GABAPENTIN 300 MG/1
CAPSULE ORAL
Qty: 90 CAPSULE | Refills: 1 | Status: SHIPPED | OUTPATIENT
Start: 2021-12-11 | End: 2022-02-07

## 2021-12-12 ENCOUNTER — DOCUMENTATION (OUTPATIENT)
Dept: FAMILY MEDICINE CLINIC | Facility: CLINIC | Age: 66
End: 2021-12-12

## 2021-12-12 DIAGNOSIS — M54.9 ACUTE BACK PAIN, UNSPECIFIED BACK LOCATION, UNSPECIFIED BACK PAIN LATERALITY: Primary | ICD-10-CM

## 2021-12-12 RX ORDER — CYCLOBENZAPRINE HCL 10 MG
10 TABLET ORAL 3 TIMES DAILY PRN
Qty: 30 TABLET | Refills: 1 | Status: SHIPPED | OUTPATIENT
Start: 2021-12-12 | End: 2022-02-16

## 2021-12-12 RX ORDER — SOTALOL HYDROCHLORIDE 80 MG/1
TABLET ORAL
Qty: 90 TABLET | Refills: 1 | Status: SHIPPED | OUTPATIENT
Start: 2021-12-12 | End: 2022-06-06

## 2021-12-13 ENCOUNTER — TELEPHONE (OUTPATIENT)
Dept: FAMILY MEDICINE CLINIC | Facility: CLINIC | Age: 66
End: 2021-12-13

## 2021-12-13 DIAGNOSIS — M54.9 ACUTE BACK PAIN, UNSPECIFIED BACK LOCATION, UNSPECIFIED BACK PAIN LATERALITY: Primary | ICD-10-CM

## 2021-12-13 RX ORDER — OXYCODONE AND ACETAMINOPHEN 10; 325 MG/1; MG/1
1 TABLET ORAL EVERY 6 HOURS PRN
Qty: 28 TABLET | Refills: 0 | Status: SHIPPED | OUTPATIENT
Start: 2021-12-13 | End: 2022-02-16

## 2021-12-13 NOTE — TELEPHONE ENCOUNTER
Caller: NEO ANAYA    Relationship: Emergency Contact    Best call back number: [phone]    Requested Prescriptions:   Requested Prescriptions      No prescriptions requested or ordered in this encounter    THE MEDICATION THAT WAS CALLED IN FOR BACK PAIN. NEO DOES NOT RECALL THE NAME BUT IT HAD OXY AT THE BEGINNING OF THE NAME. CRISTOBAL WAS GIVEN A DOSE ON Saturday AND WAS ADVISED TO CALL FOR A REFILL IF IT WORKED WELL.      Pharmacy where request should be sent: [facility] - [place], [place] - [address] [phone] [facility] [phone] FX     Laverne Arvizu, RegRashad Rep   12/13/21 14:41 EST

## 2021-12-18 ENCOUNTER — HOSPITAL ENCOUNTER (EMERGENCY)
Facility: HOSPITAL | Age: 66
Discharge: HOME | End: 2021-12-18
Admitting: EMERGENCY MEDICINE

## 2021-12-18 ENCOUNTER — APPOINTMENT (OUTPATIENT)
Dept: CT IMAGING | Facility: HOSPITAL | Age: 66
End: 2021-12-18

## 2021-12-18 VITALS
TEMPERATURE: 98.3 F | BODY MASS INDEX: 28.17 KG/M2 | DIASTOLIC BLOOD PRESSURE: 65 MMHG | HEART RATE: 76 BPM | OXYGEN SATURATION: 99 % | HEIGHT: 64 IN | RESPIRATION RATE: 20 BRPM | SYSTOLIC BLOOD PRESSURE: 115 MMHG | WEIGHT: 165 LBS

## 2021-12-18 DIAGNOSIS — M54.50 RIGHT-SIDED LOW BACK PAIN WITHOUT SCIATICA, UNSPECIFIED CHRONICITY: Primary | ICD-10-CM

## 2021-12-18 DIAGNOSIS — K59.00 CONSTIPATION, UNSPECIFIED CONSTIPATION TYPE: ICD-10-CM

## 2021-12-18 LAB
BACTERIA UR QL AUTO: ABNORMAL /HPF
BILIRUB UR QL STRIP: NEGATIVE
CLARITY UR: ABNORMAL
COLOR UR: ABNORMAL
GLUCOSE UR STRIP-MCNC: ABNORMAL MG/DL
HGB UR QL STRIP.AUTO: ABNORMAL
HYALINE CASTS UR QL AUTO: ABNORMAL /LPF
KETONES UR QL STRIP: NEGATIVE
LEUKOCYTE ESTERASE UR QL STRIP.AUTO: ABNORMAL
NITRITE UR QL STRIP: NEGATIVE
PH UR STRIP.AUTO: 7 [PH] (ref 5–8)
PROT UR QL STRIP: ABNORMAL
RBC # UR STRIP: ABNORMAL /HPF
REF LAB TEST METHOD: ABNORMAL
SP GR UR STRIP: 1.02 (ref 1–1.03)
SQUAMOUS #/AREA URNS HPF: ABNORMAL /HPF
UROBILINOGEN UR QL STRIP: ABNORMAL
WBC # UR STRIP: ABNORMAL /HPF

## 2021-12-18 PROCEDURE — 81001 URINALYSIS AUTO W/SCOPE: CPT

## 2021-12-18 PROCEDURE — 74176 CT ABD & PELVIS W/O CONTRAST: CPT

## 2021-12-18 PROCEDURE — 99283 EMERGENCY DEPT VISIT LOW MDM: CPT

## 2021-12-18 RX ORDER — LIDOCAINE 50 MG/G
1 PATCH TOPICAL EVERY 24 HOURS
Qty: 15 PATCH | Refills: 0 | Status: SHIPPED | OUTPATIENT
Start: 2021-12-18 | End: 2022-02-16

## 2021-12-18 RX ORDER — METHOCARBAMOL 750 MG/1
750 TABLET, FILM COATED ORAL 4 TIMES DAILY
Status: DISCONTINUED | OUTPATIENT
Start: 2021-12-18 | End: 2021-12-18

## 2021-12-18 RX ORDER — METHOCARBAMOL 750 MG/1
750 TABLET, FILM COATED ORAL 3 TIMES DAILY PRN
Qty: 30 TABLET | Refills: 0 | Status: SHIPPED | OUTPATIENT
Start: 2021-12-18 | End: 2022-01-01

## 2021-12-18 RX ORDER — LIDOCAINE 50 MG/G
1 PATCH TOPICAL
Status: DISCONTINUED | OUTPATIENT
Start: 2021-12-18 | End: 2021-12-18 | Stop reason: HOSPADM

## 2021-12-18 RX ORDER — METHOCARBAMOL 750 MG/1
750 TABLET, FILM COATED ORAL ONCE
Status: COMPLETED | OUTPATIENT
Start: 2021-12-18 | End: 2021-12-18

## 2021-12-18 RX ORDER — IBUPROFEN 800 MG/1
800 TABLET ORAL EVERY 8 HOURS PRN
Qty: 20 TABLET | Refills: 0 | Status: SHIPPED | OUTPATIENT
Start: 2021-12-18 | End: 2022-02-16

## 2021-12-18 RX ORDER — IBUPROFEN 400 MG/1
800 TABLET ORAL ONCE
Status: COMPLETED | OUTPATIENT
Start: 2021-12-18 | End: 2021-12-18

## 2021-12-18 RX ADMIN — IBUPROFEN 800 MG: 400 TABLET ORAL at 10:00

## 2021-12-18 RX ADMIN — METHOCARBAMOL 750 MG: 750 TABLET ORAL at 10:08

## 2021-12-18 RX ADMIN — LIDOCAINE 1 PATCH: 50 PATCH TOPICAL at 10:00

## 2021-12-18 NOTE — ED NOTES
Pt denies any c/o at this time. Reports some relief to back. Anticipates discharge to home. Wife at beside.     Joleen Salas RN  12/18/21 [id number]

## 2021-12-18 NOTE — ED PROVIDER NOTES
Subjective   Chief Complaint: Right-sided back pain    HPI: Patient is a 66-year-old male who presents to the ER with right-sided low back pain that radiates into his abdomen.  He states that he has had chronic back pain on this side following an injury years ago.  He also reports that he had a TURP done approximately 2 weeks ago, he has had back pain since that time.  He was seen by his primary care 3 days ago and prescribed Percocets as well as gabapentin, pain was tolerable until this morning when he states that he had a dream and jerked pulling his back.  He has had no urinary issues since the time of the TURP.  He states he has had no pain medicine this morning.  With last dose reported at 8:00 last night.  He has had no weakness, saddle anesthesia, no changes in bowel or bladder.  He has been ambulatory since pulling his back this morning but this does exacerbate pain.  Patient reports he was seen in the ER on 12/9/2021 for his back pain and a CT of the abdomen and pelvis was completed.    PCP: Michelle          Review of Systems   Constitutional: Negative for fatigue and fever.   HENT: Negative.    Respiratory: Negative for shortness of breath.    Cardiovascular: Negative for chest pain.   Gastrointestinal: Negative for abdominal pain, constipation, diarrhea, nausea and vomiting.   Endocrine: Negative.    Genitourinary: Negative.    Musculoskeletal: Positive for back pain and myalgias. Negative for gait problem and joint swelling.   Skin: Negative.    Allergic/Immunologic: Negative.    Neurological: Negative for weakness and numbness.   Hematological: Negative.    Psychiatric/Behavioral: Negative.        Past Medical History:   Diagnosis Date   • Arthritis    • Benign prostatic hyperplasia    • Erectile dysfunction    • High prostate specific antigen (PSA) 8/16/2017   • History of echocardiogram     EF 55%. No significant valvular flow abnormalities.    • History of stress test 11/2010    Stress Myoview: No  ischemia 11/2010   • HL (hearing loss)    • HTN (hypertension) 6/25/2019   • Kidney stone    • Myasthenia gravis (HCC)    • Pancreatitis    • PSVT (paroxysmal supraventricular tachycardia) (HCC) 6/25/2019   • Stroke (HCC) 02/2014   • Supraventricular tachycardia (HCC)    • Suspected COVID-19 virus infection 1/12/2021   • TIA (transient ischemic attack) 2008       No Known Allergies    Past Surgical History:   Procedure Laterality Date   • CHOLECYSTECTOMY N/A 7/4/2020    Procedure: CHOLECYSTECTOMY LAPAROSCOPIC;  Surgeon: Yogesh Presley MD;  Location: [facility] MAIN OR;  Service: General;  Laterality: N/A;   • ECHO - CONVERTED  01/20/2021   • FINGER SURGERY     • PROSTATE BIOPSY  2019    [facility]   • TONSILLECTOMY     • VASECTOMY         Family History   Problem Relation Age of Onset   • Hypertension Mother    • Stroke Mother    • Other Mother         Cerebral Bleed   • Hypertension Father    • Other Father        Social History     Socioeconomic History   • Marital status:    Tobacco Use   • Smoking status: Never Smoker   • Smokeless tobacco: Never Used   Vaping Use   • Vaping Use: Never used   Substance and Sexual Activity   • Alcohol use: Not Currently     Alcohol/week: 0.0 standard drinks   • Drug use: No   • Sexual activity: Not Currently     Partners: Female     Birth control/protection: Condom     Comment: Vasectomie           Objective   Physical Exam  Vitals reviewed.   Constitutional:       Appearance: Normal appearance. He is not ill-appearing.   HENT:      Head: Normocephalic.   Cardiovascular:      Rate and Rhythm: Normal rate and regular rhythm.   Musculoskeletal:         General: Tenderness present.      Cervical back: Normal, normal range of motion and neck supple. No rigidity or tenderness.      Thoracic back: Normal.      Lumbar back: Tenderness present. No bony tenderness. Negative right straight leg raise test and negative left straight leg raise test.         "Back:    Neurological:      Mental Status: He is alert.         Procedures           ED Course  ED Course as of 12/18/21 1422   Sat Dec 18, 2021   0907 Patient was evaluated after being placed in a room  []   0919 Inspect completed, patient has had 7 prescribers, most recent prescription 12/13/2021 Percocet 10/3/2025 7-day supply, 12/11/2021 Neurontin 300 mg 30-day supply, diazepam 5 mg 5-day supply, 12/9/2021 Percocet 5/3/2025 3-day supply. []   1019 Patient reports significant improvement of pain.  []   1205 As we attempted to discharge the patient, he stood up and there was swelling noted to his right abdomen.  He has no pain at the site at this time, but states that the swelling is new.  Additional orders have been placed.  []   1310 Pending CT results []   1421 CT results were indicative of moderate to severe constipation.  This was discussed with patient and wife who is at bedside and they were advised to take laxatives and increase fiber intake.  Discussed the use of narcotics and constipation.  They gave verbal understanding and were agreeable to discharge []      ED Course User Index  [] Oneida Dickson, APRN           /65 (BP Location: Left arm, Patient Position: Lying)   Pulse 76   Temp 98.3 °F (36.8 °C) (Oral)   Resp 20   Ht 162.6 cm (64\")   Wt 74.8 kg (165 lb)   SpO2 99%   BMI 28.32 kg/m²   Labs Reviewed   URINALYSIS W/ MICROSCOPIC IF INDICATED (NO CULTURE) - Abnormal; Notable for the following components:       Result Value    Color, UA Other (*)     Appearance, UA Cloudy (*)     Glucose,  mg/dL (1+) (*)     Blood, UA Large (3+) (*)     Protein,  mg/dL (2+) (*)     Leuk Esterase, UA Small (1+) (*)     All other components within normal limits   URINALYSIS, MICROSCOPIC ONLY - Abnormal; Notable for the following components:    RBC, UA Too Numerous to Count (*)     WBC, UA Too Numerous to Count (*)     All other components within normal limits     Medications "   lidocaine (LIDODERM) 5 % 1 patch (1 patch Transdermal Medication Applied 12/18/21 1000)   ibuprofen (ADVIL,MOTRIN) tablet 800 mg (800 mg Oral Given 12/18/21 1000)   methocarbamol (ROBAXIN) tablet 750 mg (750 mg Oral Given 12/18/21 1008)     CT Abdomen Pelvis Without Contrast    Result Date: 12/18/2021   1. Diffuse increased stool throughout the colon consistent with moderate-severe constipation. 2. Small tiny nonobstructing stones in the right and left kidneys. 3. The prostate gland is enlarged and bulges into the base the urinary bladder. Correlation with PSA level would BE helpful. 4. Increased fat in the left inguinal canal consistent with left inguinal hernia.  Electronically Signed By-Shakeel Stern MD On:12/18/2021 1:16 PM This report was finalized on [id number] by  Shakeel Stern MD.                                          MDM  Number of Diagnoses or Management Options  Constipation, unspecified constipation type  Right-sided low back pain without sciatica, unspecified chronicity  Diagnosis management comments: While in the emergency room patient was given a dose of ibuprofen, Robaxin and a Lidoderm patch was applied to his back.  He reports significant improvement of pain.  CT results were discussed from 12/9/2021.  Patient does not want additional testing.  He was advised to follow up with physical therapy, he states he has an appt in January but would like earlier follow up, and PCP.  He will be given prescriptions for Robaxin, Ibuprofen and lidoderm patches.     CT was essentially unchanged from CT completed on 12 9 of 2021.  With some moderate to severe constipation.  They were educated on the use of laxatives and fiber daily especially with daily narcotic use.  Patient and family gave verbal understanding and were agreeable to discharge.  He denied further questions at discharge   he was alert oriented and ambulatory.  Chart review:  CT abd/pelvis and PE protocol  12/09/2021IMPRESSION:  1. Abnormal urinary bladder wall thickening and perivesical stranding  suggesting active cystitis. Correlate with clinical symptoms. High  density fluid is seen within the dependent urinary bladder lumen thought  to represent a hemorrhagic products.  2. No drainable fluid collection or abscess is seen within the pelvis.  3. Prostatomegaly. Nonspecific 1.7 cm enhancing prostatic nodule. This  could represent a benign hyperplastic nodule. Malignancy not excluded.  Correlate with PSA findings.  4. No acute findings within the chest. No pulmonary embolism.  5. No obstructing right renal stone.  6. Moderate coronary artery calcifications. Correlate with cardiac  history.  7. Additional CT findings include: Hepatic cysts, renal cysts,  cholecystectomy, small fat-containing left internal hernia.    CT abd/pelvis  1. Diffuse increased stool throughout the colon consistent with  moderate-severe constipation.  2. Small tiny nonobstructing stones in the right and left kidneys.  3. The prostate gland is enlarged and bulges into the base the urinary  bladder. Correlation with PSA level would BE helpful.  4. Increased fat in the left inguinal canal consistent with left  inguinal hernia.    Comorbidities: DDD, Chronic low back pain, Hypertension, myasthenia gravis.      Differentials: myalgias, back pain, UTI   not all inclusive of differentials considered    Radiology interpretation:  X-rays reviewed by me and interpreted by radiologist, None required at this time.    Lab interpretation:  Labs viewed by me significant for, urinalysis positive for leukocytes, blood, this appears improved from a urinalysis completed 9 days ago.    Appropriate PPE worn during exam.           Amount and/or Complexity of Data Reviewed  Clinical lab tests: reviewed    Patient Progress  Patient progress: improved      Final diagnoses:   Right-sided low back pain without sciatica, unspecified chronicity   Constipation, unspecified  constipation type       ED Disposition  ED Disposition     ED Disposition Condition Comment    Discharge Stable           Sergio Martinez MD  [address] [facility]  [address]  [place] [address]  [phone]    Schedule an appointment as soon as possible for a visit in 1 week  As needed, If symptoms worsen    [facility] PHYSICAL THERAPY [address]  [place] [address] [phone]  Schedule an appointment as soon as possible for a visit in 3 days  As needed, If symptoms worsen         Medication List      New Prescriptions    ibuprofen 800 MG tablet  Commonly known as: ADVIL,MOTRIN  Take 1 tablet by mouth Every 8 (Eight) Hours As Needed for Mild Pain .     methocarbamol 750 MG tablet  Commonly known as: ROBAXIN  Take 1 tablet by mouth 3 (Three) Times a Day As Needed for Muscle Spasms for up to 14 days.        Changed    tamsulosin 0.4 MG capsule 24 hr capsule  Commonly known as: FLOMAX  Take 1 capsule by mouth Daily.  What changed: when to take this           Where to Get Your Medications      These medications were sent to [facility] [address] [place] [address] [place] - [address] [facility] - [phone]  - [phone] FX  [address] [facility] [place] [address]    Phone: [phone]   · ibuprofen 800 MG tablet  · lidocaine 5 %  · methocarbamol 750 MG tablet          Oneida Dickson, APRN  12/18/21 1425

## 2021-12-18 NOTE — DISCHARGE INSTRUCTIONS
When taking ibuprofen 800 do not take any additional NSAIDs such as naproxen, Advil, Aleve.  When taking the Robaxin suspend taking the Flexeril.  Lidoderm patches as needed  Make sure that you wash the area where the patches have been applied before or after lying heat.  Use low back exercises that have been provided and follow-up with physical therapy    Phone numbers for [facility] and [place] have been provided in your discharge instructions    Return to the ER for new or worsening symptoms

## 2021-12-18 NOTE — ED NOTES
Presents with lower back pain. Recent turp.Urinating without difficulty.     Nata Mathew RN  12/18/21 1436

## 2022-01-14 ENCOUNTER — TREATMENT (OUTPATIENT)
Dept: PHYSICAL THERAPY | Facility: CLINIC | Age: 67
End: 2022-01-14

## 2022-01-14 DIAGNOSIS — M54.50 ACUTE LOW BACK PAIN, UNSPECIFIED BACK PAIN LATERALITY, UNSPECIFIED WHETHER SCIATICA PRESENT: Primary | ICD-10-CM

## 2022-01-14 PROCEDURE — 97110 THERAPEUTIC EXERCISES: CPT | Performed by: PHYSICAL THERAPIST

## 2022-01-14 PROCEDURE — 97161 PT EVAL LOW COMPLEX 20 MIN: CPT | Performed by: PHYSICAL THERAPIST

## 2022-01-14 NOTE — PROGRESS NOTES
Physical Therapy Initial Evaluation and Plan of Care      Patient: Enrrique Alejandre   : 1955  Diagnosis/ICD-10 Code:  Acute low back pain, unspecified back pain laterality, unspecified whether sciatica present [M54.50]  Referring practitioner: Sergio Martinez MD  Date of Initial Visit: 2022  Today's Date: 2022  Patient seen for 1 sessions           Subjective Questionnaire: Oswestry: 26%      Subjective Evaluation    History of Present Illness  Mechanism of injury: Patient presents to physical therapy with cc of right-sided lower back pain that has been present for many years.  Reports that ~30 years ago he had an injury where he had a torn muscle in his lower back and has had occasional bouts of back pain.     Patient had surgery on prostate on 21 and had some complications where his bladder was in spasm.  Reports that when he was laying in bed he had moved (flexed trunk and rotated left) and has significant increase in back pain.  Patient has been put on some pain medication and muscle relaxer and this has lessened his symptoms, as well as using a lumbar stabilization brace.  Patient reports that currently his symptoms are better, however wishes to be educated on preventative program for abdominal stabilization in order to lessen chances of pain returning.     Pain  Current pain ratin  At worst pain ratin  Quality: sharp and knife-like  Relieving factors: medications and heat  Aggravating factors: prolonged positioning, lifting and standing  Progression: improved    Social Support  Lives with: spouse    Treatments  Previous treatment: medication  Patient Goals  Patient goals for therapy: decreased pain, increased motion and return to sport/leisure activities             Objective          Palpation   Left   Hypertonic in the lumbar paraspinals.     Right   Hypertonic in the lumbar paraspinals.     Additional Palpation Details  No tenderness with palpation noted  today    Active Range of Motion     Lumbar   Flexion: WFL  Left lateral flexion: WFL  Right lateral flexion: WFL    Additional Active Range of Motion Details  Lumbar extension 25% limited (no pain)    Strength/Myotome Testing     Lumbar   Left   Normal strength    Right   Normal strength          Assessment & Plan     Assessment  Impairments: lacks appropriate home exercise program    Assessment details: Patient presents to physical therapy with s/s congruent with MD diagnosis of lower back pain.  Patient demonstrates mild pain and mild limitations in lumbar AROM.  Patient's s/s have improved, however patient lacks appropriate home exercise program.  Patient is appropriate for PT intervention in order to progress HEP so that he may return to full activity without high risk of re-injury.   Prognosis: good    Goals  Plan Goals: In two weeks, patient will report at least 25% reduction in pain level.    In two weeks, patient will demonstrate at least 25% improvement in AROM in  lumbar spine.     In four weeks, patient will demonstrate lumbar AROM WFL without pain.  In four weeks, patient will demonstrate decreased perceived disability by decreasing score on Oswestry by at least 12%.  In four weeks, patient will demonstrate proper technique with HEP.    Plan  Therapy options: will be seen for skilled therapy services  Planned modality interventions: thermotherapy (hydrocollator packs), cryotherapy and TENS  Planned therapy interventions: soft tissue mobilization, strengthening, stretching, therapeutic activities, home exercise program and abdominal trunk stabilization  Duration in visits: 12  Plan details: 1-2 times per week        Manual Therapy:         mins  57153;  Therapeutic Exercise:    15     mins  48128;     Neuromuscular Andrew:        mins  62583;    Therapeutic Activity:          mins  94618;     Gait Training:           mins  58079;     Ultrasound:          mins  74423;    Electrical Stimulation:         mins   97642 ( );  Dry Needling          mins self-pay    Timed Treatment:   15   mins   Total Treatment:     45   mins    PT SIGNATURE: Casey Jordan, SEAN   DATE TREATMENT INITIATED: 1/14/2022    Initial Certification  Certification Period: 4/14/2022  I certify that the therapy services are furnished while this patient is under my care.  The services outlined above are required by this patient, and will be reviewed every 90 days.     PHYSICIAN: Sergio Martinez MD      DATE:     Please sign and return via fax to [phone].. Thank you, [facility].

## 2022-01-30 DIAGNOSIS — F32.A ANXIETY AND DEPRESSION: ICD-10-CM

## 2022-01-30 DIAGNOSIS — F41.9 ANXIETY AND DEPRESSION: ICD-10-CM

## 2022-01-30 RX ORDER — ESCITALOPRAM OXALATE 10 MG/1
TABLET ORAL
Qty: 30 TABLET | Refills: 3 | Status: SHIPPED | OUTPATIENT
Start: 2022-01-30 | End: 2022-06-07

## 2022-02-02 ENCOUNTER — TREATMENT (OUTPATIENT)
Dept: PHYSICAL THERAPY | Facility: CLINIC | Age: 67
End: 2022-02-02

## 2022-02-02 DIAGNOSIS — M54.50 ACUTE LOW BACK PAIN, UNSPECIFIED BACK PAIN LATERALITY, UNSPECIFIED WHETHER SCIATICA PRESENT: Primary | ICD-10-CM

## 2022-02-02 PROCEDURE — 97110 THERAPEUTIC EXERCISES: CPT | Performed by: PHYSICAL THERAPIST

## 2022-02-02 PROCEDURE — 97140 MANUAL THERAPY 1/> REGIONS: CPT | Performed by: PHYSICAL THERAPIST

## 2022-02-02 NOTE — PROGRESS NOTES
Physical Therapy Daily Progress Note    Patient: Enrrique Alejandre   : 1955  Diagnosis/ICD-10 Code:  Acute low back pain, unspecified back pain laterality, unspecified whether sciatica present [M54.50]  Referring practitioner: Sergio Martinez MD  Date of Initial Visit: Type: THERAPY  Noted: 2022  Today's Date: 2022  Patient seen for 2 sessions         Enrrique Alejandre reports:  Lower back feeling better.  Has been wearing brace when doing outdoor activities (chainsaw, sledge hammer).     Objective   See Exercise, Manual, and Modality Logs for complete treatment.       Assessment/Plan     Requires 2-3 verbal cues for proper exercise technique this visit.  Patient feeling well after STM.  Progressing well with HEP.  Will return for tx if needed.     Progress per Plan of Care           Manual Therapy:    10     mins  80979;  Therapeutic Exercise:    28     mins  73679;     Neuromuscular Andrew:        mins  53233;    Therapeutic Activity:          mins  78711;     Gait Training:           mins  64087;     Ultrasound:          mins  10937;    Electrical Stimulation:         mins  89529 ( );  Dry Needling          mins self-pay    Timed Treatment:   38   mins   Total Treatment:     38   mins    Casey Jordan PT  Physical Therapist

## 2022-02-07 RX ORDER — GABAPENTIN 300 MG/1
CAPSULE ORAL
Qty: 90 CAPSULE | Refills: 1 | Status: SHIPPED | OUTPATIENT
Start: 2022-02-07 | End: 2022-02-16 | Stop reason: SDUPTHER

## 2022-02-07 RX ORDER — NAPROXEN 500 MG/1
TABLET ORAL
Qty: 60 TABLET | Refills: 1 | Status: SHIPPED | OUTPATIENT
Start: 2022-02-07 | End: 2022-04-05

## 2022-02-14 RX ORDER — AMLODIPINE BESYLATE AND ATORVASTATIN CALCIUM 5; 20 MG/1; MG/1
1 TABLET, FILM COATED ORAL DAILY
Qty: 90 TABLET | Refills: 1 | Status: SHIPPED | OUTPATIENT
Start: 2022-02-14 | End: 2022-05-18

## 2022-02-14 RX ORDER — MYCOPHENOLATE MOFETIL 500 MG/1
TABLET ORAL
Qty: 180 TABLET | Refills: 1 | Status: SHIPPED | OUTPATIENT
Start: 2022-02-14 | End: 2022-08-25

## 2022-02-16 ENCOUNTER — OFFICE VISIT (OUTPATIENT)
Dept: FAMILY MEDICINE CLINIC | Facility: CLINIC | Age: 67
End: 2022-02-16

## 2022-02-16 VITALS
RESPIRATION RATE: 16 BRPM | HEART RATE: 58 BPM | DIASTOLIC BLOOD PRESSURE: 76 MMHG | HEIGHT: 64 IN | BODY MASS INDEX: 30.63 KG/M2 | SYSTOLIC BLOOD PRESSURE: 131 MMHG | WEIGHT: 179.4 LBS | OXYGEN SATURATION: 99 % | TEMPERATURE: 97.8 F

## 2022-02-16 DIAGNOSIS — R35.1 BENIGN PROSTATIC HYPERPLASIA WITH NOCTURIA: Primary | ICD-10-CM

## 2022-02-16 DIAGNOSIS — N40.1 BENIGN PROSTATIC HYPERPLASIA WITH NOCTURIA: Primary | ICD-10-CM

## 2022-02-16 DIAGNOSIS — G89.29 CHRONIC BACK PAIN, UNSPECIFIED BACK LOCATION, UNSPECIFIED BACK PAIN LATERALITY: ICD-10-CM

## 2022-02-16 DIAGNOSIS — M54.9 CHRONIC BACK PAIN, UNSPECIFIED BACK LOCATION, UNSPECIFIED BACK PAIN LATERALITY: ICD-10-CM

## 2022-02-16 DIAGNOSIS — K59.00 CONSTIPATION, UNSPECIFIED CONSTIPATION TYPE: ICD-10-CM

## 2022-02-16 PROCEDURE — 99214 OFFICE O/P EST MOD 30 MIN: CPT | Performed by: FAMILY MEDICINE

## 2022-02-16 NOTE — PROGRESS NOTES
Chief Complaint   Patient presents with   • Hypertension     HPI  Enrrique Alejandre is a 67 y.o. male that presents for   Chief Complaint   Patient presents with   • Hypertension     BPH: patient suffered recurrent episodes of urinary retention over the winter and ultimately had TURP in November. He reports this was complicated by back pain and feels he currently has UTI. Urology obtained UA last week and he has had progressive lower abdominal pain, pain w/ urination, and urinary frequency. He still reports discolored urine. Expecting call from urology about antibiotic today    Back pain: patient has long history of back pain c/w muscle strain. He was prescribed numerous medications and PT. Back is now improved. Now taking gabapentin 300/600, naproxen 500 BID. This is providing adequate control    Constipation: patient has struggled w/ constipation for the past few months and has noticed bulge in his RLQ. 12/2021 CT abd/pel reviewed today that showed moderate to severe constipation. He has been using prunes w/ modest benefit/control    Review of Systems   Constitutional: Negative for fever and unexpected weight loss.   Cardiovascular: Negative for chest pain.   Gastrointestinal: Positive for abdominal pain and constipation.   Genitourinary: Positive for dysuria and frequency.   Musculoskeletal: Positive for back pain and myalgias.   Neurological: Negative for weakness and numbness.     The following portions of the patient's history were reviewed and updated as appropriate: problem list, past medical history, past surgical history, allergies, current medication    Problem List Tab  Patient History Tab  Immunizations Tab  Medications Tab  Chart Review Tab  Care Everywhere Tab  Synopsis Tab    PE  Vitals:    02/16/22 0838   BP: 131/76   Pulse: 58   Resp: 16   Temp: 97.8 °F (36.6 °C)   SpO2: 99%     Body mass index is 30.79 kg/m².  General: Well nourished, NAD  Head: AT/NC  Eyes: EOMI, anicteric sclera  Resp: CTAB,  SCR, BS equal  CV: RRR w/o m/r/g; 2+ pulses  GI: Soft, NT/ND, +BS  MSK: FROM, no deformity, no edema  Skin: Warm, dry, intact  Neuro: Alert and oriented. No focal deficits  Psych: Appropriate mood and affect    R lumbar/thoracic paraspinal TTP    Imaging  CT Abdomen Pelvis Without Contrast    Result Date: 12/18/2021   1. Diffuse increased stool throughout the colon consistent with moderate-severe constipation. 2. Small tiny nonobstructing stones in the right and left kidneys. 3. The prostate gland is enlarged and bulges into the base the urinary bladder. Correlation with PSA level would BE helpful. 4. Increased fat in the left inguinal canal consistent with left inguinal hernia.  Electronically Signed By-Shakeel Stern MD On:12/18/2021 1:16 PM This report was finalized on [id number] by  Shakeel Stern MD.    CT Abdomen Pelvis With Contrast    Result Date: 12/9/2021  1. Abnormal urinary bladder wall thickening and perivesical stranding suggesting active cystitis. Correlate with clinical symptoms. High density fluid is seen within the dependent urinary bladder lumen thought to represent a hemorrhagic products. 2. No drainable fluid collection or abscess is seen within the pelvis. 3. Prostatomegaly. Nonspecific 1.7 cm enhancing prostatic nodule. This could represent a benign hyperplastic nodule. Malignancy not excluded. Correlate with PSA findings. 4. No acute findings within the chest. No pulmonary embolism. 5. No obstructing right renal stone. 6. Moderate coronary artery calcifications. Correlate with cardiac history. 7. Additional CT findings include: Hepatic cysts, renal cysts, cholecystectomy, small fat-containing left internal hernia.  Electronically Signed By-Radha Yates MD On:12/9/2021 3:49 PM This report was finalized on [id number] by  Radha Yates MD.    CT Chest Pulmonary Embolism    Result Date: 12/9/2021  1. Abnormal urinary bladder wall thickening and perivesical stranding  suggesting active cystitis. Correlate with clinical symptoms. High density fluid is seen within the dependent urinary bladder lumen thought to represent a hemorrhagic products. 2. No drainable fluid collection or abscess is seen within the pelvis. 3. Prostatomegaly. Nonspecific 1.7 cm enhancing prostatic nodule. This could represent a benign hyperplastic nodule. Malignancy not excluded. Correlate with PSA findings. 4. No acute findings within the chest. No pulmonary embolism. 5. No obstructing right renal stone. 6. Moderate coronary artery calcifications. Correlate with cardiac history. 7. Additional CT findings include: Hepatic cysts, renal cysts, cholecystectomy, small fat-containing left internal hernia.  Electronically Signed By-Radha Yates MD On:12/9/2021 3:49 PM This report was finalized on [id number] by  Radha Yates MD.      Assessment/Plan   Enrrique Alejandre is a 67 y.o. male that presents for   Chief Complaint   Patient presents with   • Hypertension     Diagnoses and all orders for this visit:    1. Benign prostatic hyperplasia with nocturia (Primary): Status post TURP for urinary retention.  Now improved but reports symptoms of dysuria.  Urine culture obtained by urology last week and awaiting callback.   -Recommend urology follow-up with initiation of antibiotics   -Patient instructed to call for urine sample and antibiotics if he does not hear from urology today   -Continue home Flomax 0.4 mg daily    2. Chronic back pain, unspecified back location, unspecified back pain laterality: Improved   -Continue home Naprosyn 500 twice daily and gabapentin 300/600    3. Constipation, unspecified constipation type   - Recommend Miralax     Return in about 6 months (around 8/16/2022) for Medicare Wellness.

## 2022-02-18 ENCOUNTER — PATIENT ROUNDING (BHMG ONLY) (OUTPATIENT)
Dept: FAMILY MEDICINE CLINIC | Facility: CLINIC | Age: 67
End: 2022-02-18

## 2022-02-18 NOTE — PROGRESS NOTES
February 18, 2022    [employer] message sent to Enrrique Alejandre    From [place]  [facility]  [address] [facility] [address] [facility] [address]  [place] [address].

## 2022-03-28 ENCOUNTER — TELEPHONE (OUTPATIENT)
Dept: FAMILY MEDICINE CLINIC | Facility: CLINIC | Age: 67
End: 2022-03-28

## 2022-03-28 NOTE — TELEPHONE ENCOUNTER
Caller: Enrrique Alejandre    Relationship: Self    Best call back number: [phone]    What orders are you requesting (i.e. lab or imaging): DEXA SCAN AND MAMMOGRAM    Where will you receive your lab/imaging services: [facility]

## 2022-03-29 NOTE — TELEPHONE ENCOUNTER
I actually don't remember such a conversation about mammo or DEXA for him. If you don't mind, call and inquire why he is requesting these. I feel like there might have been some breast tenderness/lump but my memory on this is fuzzy

## 2022-03-29 NOTE — TELEPHONE ENCOUNTER
So I spoke with Enrrique and he was calling for his wife and not himself and I guess this was not communicated in the original call. Angela already has these orders in her chart and I will fax these to [facility]. Sending to you for FYI only.

## 2022-04-05 DIAGNOSIS — M54.10 RADICULAR LOW BACK PAIN: ICD-10-CM

## 2022-04-05 RX ORDER — NAPROXEN 500 MG/1
TABLET ORAL
Qty: 60 TABLET | Refills: 1 | Status: SHIPPED | OUTPATIENT
Start: 2022-04-05 | End: 2022-08-17

## 2022-04-05 RX ORDER — GABAPENTIN 300 MG/1
CAPSULE ORAL
Qty: 90 CAPSULE | Refills: 1 | Status: SHIPPED | OUTPATIENT
Start: 2022-04-05 | End: 2022-06-28

## 2022-05-18 ENCOUNTER — TELEPHONE (OUTPATIENT)
Dept: FAMILY MEDICINE CLINIC | Facility: CLINIC | Age: 67
End: 2022-05-18

## 2022-05-18 RX ORDER — AMLODIPINE BESYLATE 5 MG/1
5 TABLET ORAL DAILY
Qty: 90 TABLET | Refills: 1 | Status: SHIPPED | OUTPATIENT
Start: 2022-05-18 | End: 2022-09-12

## 2022-05-18 RX ORDER — ATORVASTATIN CALCIUM 20 MG/1
20 TABLET, FILM COATED ORAL DAILY
Qty: 90 TABLET | Refills: 1 | Status: SHIPPED | OUTPATIENT
Start: 2022-05-18 | End: 2022-09-12

## 2022-05-18 NOTE — TELEPHONE ENCOUNTER
Caller: Enrrique Alejandre    Relationship: Self    Best call back number: [phone]     What medications are you currently taking:   Current Outpatient Medications on File Prior to Visit   Medication Sig Dispense Refill   • amLODIPine-atorvastatin (CADUET) 5-20 MG per tablet Take 1 tablet by mouth Daily. 90 tablet 1   • aspirin (ASPIRIN LOW DOSE) 81 MG tablet Take 81 mg by mouth every night at bedtime.     • Cholecalciferol (Vitamin D3) 50 MCG (2000 UT) tablet Take  by mouth.     • desonide (DesOwen) 0.05 % lotion Apply  topically to the appropriate area as directed 2 (Two) Times a Day. 60 mL 2   • escitalopram (LEXAPRO) 10 MG tablet TAKE ONE TABLET BY MOUTH DAILY 30 tablet 3   • fluticasone (FLONASE) 50 MCG/ACT nasal spray 2 sprays into the nostril(s) as directed by provider Daily. 16 g 3   • gabapentin (NEURONTIN) 300 MG capsule TAKE ONE CAPSULE BY MOUTH EVERY MORNING AND TAKE TWO CAPSULES BY MOUTH EVERY EVENING 90 capsule 1   • mycophenolate (CELLCEPT) 500 MG tablet TAKE ONE TABLET BY MOUTH TWICE A  tablet 1   • naproxen (NAPROSYN) 500 MG tablet TAKE ONE TABLET BY MOUTH TWICE A DAY WITH MEALS 60 tablet 1   • omeprazole (priLOSEC) 40 MG capsule Take 1 capsule by mouth Daily. 90 capsule 4   • pyridostigmine (MESTINON) 180 MG CR tablet Take 1 tablet by mouth 2 (Two) Times a Day. 270 tablet 4   • pyridostigmine (MESTINON) 60 MG tablet Take 60 mg by mouth As Needed. Take q 4hrs prn     • sotalol (BETAPACE) 80 MG tablet TAKE 1/2 TABLET BY MOUTH EVERY 12 HOURS 90 tablet 1   • tamsulosin (FLOMAX) 0.4 MG capsule 24 hr capsule Take 1 capsule by mouth Daily. (Patient taking differently: Take 1 capsule by mouth 2 (two) times a day.) 90 capsule 3     No current facility-administered medications on file prior to visit.              Which medication are you concerned about:amLODIPine-atorvastatin (CADUET) 5-20 MG per tablet    Who prescribed you this medication: DR. LEE     What are your concerns: NEO SAYS  THE PRESCRIPTION IS   COSTING $100 OUT OF POCKET, SHE WOULD LIKE TO KNOW IF THE MEDICATION CAN BE PRESCRIBED SEPERATELY, THE COST WILL DECREASE .     How long have you had these concerns: TODAY     1 WEEK SUPPLY REMAINING   [facility] - [place], [place] - [address] - [phone]  - [phone] FX  [phone]

## 2022-06-06 DIAGNOSIS — I47.1 PSVT (PAROXYSMAL SUPRAVENTRICULAR TACHYCARDIA): ICD-10-CM

## 2022-06-06 RX ORDER — SOTALOL HYDROCHLORIDE 80 MG/1
TABLET ORAL
Qty: 90 TABLET | Refills: 1 | Status: SHIPPED | OUTPATIENT
Start: 2022-06-06 | End: 2022-12-02

## 2022-06-07 DIAGNOSIS — F32.A ANXIETY AND DEPRESSION: ICD-10-CM

## 2022-06-07 DIAGNOSIS — F41.9 ANXIETY AND DEPRESSION: ICD-10-CM

## 2022-06-07 RX ORDER — ESCITALOPRAM OXALATE 10 MG/1
TABLET ORAL
Qty: 30 TABLET | Refills: 3 | Status: SHIPPED | OUTPATIENT
Start: 2022-06-07 | End: 2022-10-03

## 2022-06-28 ENCOUNTER — OFFICE VISIT (OUTPATIENT)
Dept: CARDIOLOGY | Facility: CLINIC | Age: 67
End: 2022-06-28

## 2022-06-28 VITALS
SYSTOLIC BLOOD PRESSURE: 114 MMHG | BODY MASS INDEX: 31.34 KG/M2 | WEIGHT: 183.6 LBS | RESPIRATION RATE: 18 BRPM | HEIGHT: 64 IN | HEART RATE: 59 BPM | DIASTOLIC BLOOD PRESSURE: 68 MMHG

## 2022-06-28 DIAGNOSIS — I47.1 PAROXYSMAL SVT (SUPRAVENTRICULAR TACHYCARDIA): Primary | ICD-10-CM

## 2022-06-28 PROCEDURE — 93000 ELECTROCARDIOGRAM COMPLETE: CPT | Performed by: INTERNAL MEDICINE

## 2022-06-28 PROCEDURE — 99214 OFFICE O/P EST MOD 30 MIN: CPT | Performed by: INTERNAL MEDICINE

## 2022-06-28 NOTE — PROGRESS NOTES
"Cardiology Clinic Note  Roderick Johns MD, PhD    Subjective:     Encounter Date:06/28/2022      Patient ID: Enrrique Alejandre is a 67 y.o. male.    Chief Complaint:  Chief Complaint   Patient presents with   • Follow-up       HPI:    At the pleasure to see this very pleasant 67-year-old gentleman back for 1 year follow-up with history of TIA remotely, hypertension hyperlipidemia and paroxysmal supraventricular tachycardia on low-dose sotalol.  EKG reviewed and interpreted me demonstrates sinus rhythm with normal conduction with normal QT interval.  He denies chest pain shortness of breath heart failure signs or symptoms palpitations presyncope or any other complaints.  Blood pressures well controlled 114/68 with heart rates in the 60s today.  He is largely exertional with great activity with hunting fishing manual labor chopping wood mowing the yard.  He is otherwise doing very well        Historical data copied forward from previous encounters in EMR including the history, exam, and assessment/plan has been reviewed and is unchanged unless noted otherwise.    Cardiac medicines reviewed with risk, benefits, and necessity of each discussed.    Risk and benefit of cardiac testing reviewed including death heart attack stroke pain bleeding infection need for vascular /cardiovascular surgery were discussed and the patient     Objective:         /68 (BP Location: Left arm, Patient Position: Sitting)   Pulse 59   Resp 18   Ht 162.6 cm (64\")   Wt 83.3 kg (183 lb 9.6 oz)   BMI 31.51 kg/m²     Physical Exam  Regular rate and rhythm no rubs murmurs gallops  Or heave no lift  No clubbing cyanosis or edema  Normal CV exam  Assessment:         Essential hypertension well-controlled  Hyperlipidemia continue statin therapy and aspirin  Paroxysmal SVT, sotalol low-dose, QT is normal, continue  Primary prevention goals for CAD  Secondary prevention goals for stroke with goal blood pressure less than 135 systolic, goal A1c " less than 7, goal LDL would be less than 100 optimally less than 70 on medical therapy    See him back in 1 year      The pleasure to be involved in this patient's cardiovascular care.  Please call with any questions or concerns  Roderick Johns MD, PhD    Most recent EKG as reviewed and interpreted by me:    ECG 12 Lead    Date/Time: 6/28/2022 1:01 PM  Performed by: Roderick Johns MD  Authorized by: Roderick Johns MD   Rhythm: sinus rhythm  Rate: normal  QRS axis: normal    Clinical impression: normal ECG             Most recent echo as reviewed and interpreted by me:  Results for orders placed during the hospital encounter of 01/19/21    Adult Transthoracic Echo Limited W/ Cont if Necessary Per Protocol    Interpretation Summary  · Left ventricular ejection fraction appears to be 56 - 60%.  · No pericardial effusion noted      Most recent stress test as reviewed and interpreted by me:      Most recent cardiac catheterization as reviewed interpreted by me:  No results found for this or any previous visit.    The following portions of the patient's history were reviewed and updated as appropriate: allergies, current medications, past family history, past medical history, past social history, past surgical history and problem list.      ROS:  14 point review of systems negative except as mentioned above    Current Outpatient Medications:   •  amLODIPine (NORVASC) 5 MG tablet, Take 1 tablet by mouth Daily., Disp: 90 tablet, Rfl: 1  •  aspirin 81 MG tablet, Take 81 mg by mouth every night at bedtime., Disp: , Rfl:   •  atorvastatin (LIPITOR) 20 MG tablet, Take 1 tablet by mouth Daily., Disp: 90 tablet, Rfl: 1  •  Cholecalciferol (Vitamin D3) 50 MCG (2000 UT) tablet, Take  by mouth., Disp: , Rfl:   •  desonide (DesOwen) 0.05 % lotion, Apply  topically to the appropriate area as directed 2 (Two) Times a Day., Disp: 60 mL, Rfl: 2  •  escitalopram (LEXAPRO) 10 MG tablet, TAKE ONE TABLET BY MOUTH DAILY,  Disp: 30 tablet, Rfl: 3  •  mycophenolate (CELLCEPT) 500 MG tablet, TAKE ONE TABLET BY MOUTH TWICE A DAY, Disp: 180 tablet, Rfl: 1  •  naproxen (NAPROSYN) 500 MG tablet, TAKE ONE TABLET BY MOUTH TWICE A DAY WITH MEALS, Disp: 60 tablet, Rfl: 1  •  omeprazole (priLOSEC) 40 MG capsule, Take 1 capsule by mouth Daily., Disp: 90 capsule, Rfl: 4  •  pyridostigmine (MESTINON) 180 MG CR tablet, Take 1 tablet by mouth 2 (Two) Times a Day., Disp: 270 tablet, Rfl: 4  •  pyridostigmine (MESTINON) 60 MG tablet, Take 60 mg by mouth As Needed. Take q 4hrs prn, Disp: , Rfl:   •  sotalol (BETAPACE) 80 MG tablet, TAKE HALF OF A TABLET BY MOUTH EVERY TWELVE HOURS, Disp: 90 tablet, Rfl: 1  •  tamsulosin (FLOMAX) 0.4 MG capsule 24 hr capsule, Take 1 capsule by mouth Daily. (Patient taking differently: Take 1 capsule by mouth Daily.), Disp: 90 capsule, Rfl: 3    Problem List:  Patient Active Problem List   Diagnosis   • Essential hypertension   • PSVT (paroxysmal supraventricular tachycardia) (HCC)   • CVA (cerebral vascular accident) (HCC)   • Chronic pancreatitis (HCC)   • Hematuria   • High prostate specific antigen (PSA)   • Hypomagnesemia   • Vitamin D deficiency   • Myasthenia gravis (HCC)   • Radicular low back pain   • Glucosuria   • Anxiety and depression   • Benign prostatic hyperplasia with nocturia   • Cholecystitis   • Syncope   • Real time reverse transcriptase PCR positive for COVID-19 virus   • UTI (urinary tract infection)   • Urinary retention     Past Medical History:  Past Medical History:   Diagnosis Date   • Arthritis    • Benign prostatic hyperplasia    • Erectile dysfunction    • High prostate specific antigen (PSA) 8/16/2017   • History of echocardiogram     EF 55%. No significant valvular flow abnormalities.    • History of stress test 11/2010    Stress Myoview: No ischemia 11/2010   • HL (hearing loss)    • HTN (hypertension) 6/25/2019   • Kidney stone    • Myasthenia gravis (HCC)    • Pancreatitis    • PSVT  (paroxysmal supraventricular tachycardia) (HCC) 6/25/2019   • Stroke (HCC) 02/2014   • Supraventricular tachycardia (HCC)    • Suspected COVID-19 virus infection 1/12/2021   • TIA (transient ischemic attack) 2008     Past Surgical History:  Past Surgical History:   Procedure Laterality Date   • CHOLECYSTECTOMY N/A 7/4/2020    Procedure: CHOLECYSTECTOMY LAPAROSCOPIC;  Surgeon: Yogesh Presley MD;  Location: [facility] MAIN OR;  Service: General;  Laterality: N/A;   • ECHO - CONVERTED  01/20/2021   • FINGER SURGERY     • PROSTATE BIOPSY  2019    [facility]   • TONSILLECTOMY     • VASECTOMY       Social History:  Social History     Socioeconomic History   • Marital status:    Tobacco Use   • Smoking status: Never Smoker   • Smokeless tobacco: Never Used   Vaping Use   • Vaping Use: Never used   Substance and Sexual Activity   • Alcohol use: Not Currently     Alcohol/week: 0.0 standard drinks   • Drug use: No   • Sexual activity: Not Currently     Partners: Female     Birth control/protection: Condom     Comment: Vasectomie     Allergies:  No Known Allergies  Immunizations:  Immunization History   Administered Date(s) Administered   • COVID-19 (PFIZER) PURPLE CAP 03/10/2021, 03/31/2021   • Flu Vaccine Intradermal Quad 18-64YR 10/27/2014   • Fluzone High Dose =>65 Years ([employer] ONLY) 11/09/2020   • Influenza Quad Vaccine (Inpatient) 10/27/2014   • Pneumococcal Polysaccharide (PPSV23) 08/10/2020            In-Office Procedure(s):  No orders to display        ASCVD RIsk Score::  The ASCVD Risk score (Bowie CURTIS Thomson., et al., 2013) failed to calculate for the following reasons:    The patient has a prior MI or stroke diagnosis    Imaging:    Results for orders placed in visit on 11/03/21    SCANNED - IMAGING       Results for orders placed during the hospital encounter of 12/18/21    CT Abdomen Pelvis Without Contrast    Narrative  DATE OF EXAM:  12/18/2021 12:40 PM    PROCEDURE:  CT ABDOMEN PELVIS WO  CONTRAST-    INDICATIONS:  right abdominal swelling; M54.50-Low back pain, unspecified right flank  pain today    COMPARISON:  CT the abdomen pelvis with contrast performed on December 9, 2021    TECHNIQUE:  Routine transaxial slices were obtained through the abdomen and pelvis  without the administration of intravenous contrast. Reconstructed  coronal and sagittal images were also obtained. Automated exposure  control and iterative construction methods were used.    FINDINGS:  CT the abdomen pelvis without contrast reveals that the heart size is  normal. Calcified atherosclerotic plaque is seen in the left coronary  artery and right coronary artery. No evidence of hiatal hernia. The lung  bases are normal. No evidence of liver mass. There are small benign  cysts or hemangiomas in the liver parenchyma unchanged since previous  study. The gallbladder is not visualized consistent with  cholecystectomy. No evidence of dilatation of bile ducts. The pancreas  is normal. The right and left adrenal glands are normal. The spleen is  at the upper limits of normal in size and measures 13.1 cm x 6.5 cm in  the coronal imaging plane. There is a 2 mm size nonobstructing stone in  the left kidney. There is a 4 mm size nonobstructing stone in the right  kidney. No evidence of dilatation of the right or left renal collecting  systems. No evidence of adenopathy or ascites in the abdomen. There is  diffuse increased stool throughout the colon consistent with  moderate-severe constipation. No evidence of appendicitis.    CT the pelvis reveals that the right and left inguinal regions show no  evidence of mass or adenopathy. Increased fat is seen in the left  inguinal canal. The prostate gland is enlarged and bulges into the base  the urinary bladder. Correlation with PSA level would BE helpful dropped  approximately prostate carcinoma. The distal right and left ureters are  normal. No evidence of mass or adenopathy or fluid in the  pelvis.    Impression  1. Diffuse increased stool throughout the colon consistent with  moderate-severe constipation.  2. Small tiny nonobstructing stones in the right and left kidneys.  3. The prostate gland is enlarged and bulges into the base the urinary  bladder. Correlation with PSA level would BE helpful.  4. Increased fat in the left inguinal canal consistent with left  inguinal hernia.    Electronically Signed By-Shakeel Stern MD On:12/18/2021 1:16 PM  This report was finalized on [id number] by  Shakeel Stern MD.      Results for orders placed during the hospital encounter of 12/18/21    CT Abdomen Pelvis Without Contrast    Narrative  DATE OF EXAM:  12/18/2021 12:40 PM    PROCEDURE:  CT ABDOMEN PELVIS WO CONTRAST-    INDICATIONS:  right abdominal swelling; M54.50-Low back pain, unspecified right flank  pain today    COMPARISON:  CT the abdomen pelvis with contrast performed on December 9, 2021    TECHNIQUE:  Routine transaxial slices were obtained through the abdomen and pelvis  without the administration of intravenous contrast. Reconstructed  coronal and sagittal images were also obtained. Automated exposure  control and iterative construction methods were used.    FINDINGS:  CT the abdomen pelvis without contrast reveals that the heart size is  normal. Calcified atherosclerotic plaque is seen in the left coronary  artery and right coronary artery. No evidence of hiatal hernia. The lung  bases are normal. No evidence of liver mass. There are small benign  cysts or hemangiomas in the liver parenchyma unchanged since previous  study. The gallbladder is not visualized consistent with  cholecystectomy. No evidence of dilatation of bile ducts. The pancreas  is normal. The right and left adrenal glands are normal. The spleen is  at the upper limits of normal in size and measures 13.1 cm x 6.5 cm in  the coronal imaging plane. There is a 2 mm size nonobstructing stone in  the left kidney. There  is a 4 mm size nonobstructing stone in the right  kidney. No evidence of dilatation of the right or left renal collecting  systems. No evidence of adenopathy or ascites in the abdomen. There is  diffuse increased stool throughout the colon consistent with  moderate-severe constipation. No evidence of appendicitis.    CT the pelvis reveals that the right and left inguinal regions show no  evidence of mass or adenopathy. Increased fat is seen in the left  inguinal canal. The prostate gland is enlarged and bulges into the base  the urinary bladder. Correlation with PSA level would BE helpful dropped  approximately prostate carcinoma. The distal right and left ureters are  normal. No evidence of mass or adenopathy or fluid in the pelvis.    Impression  1. Diffuse increased stool throughout the colon consistent with  moderate-severe constipation.  2. Small tiny nonobstructing stones in the right and left kidneys.  3. The prostate gland is enlarged and bulges into the base the urinary  bladder. Correlation with PSA level would BE helpful.  4. Increased fat in the left inguinal canal consistent with left  inguinal hernia.    Electronically Signed By-Shakeel Stern MD On:12/18/2021 1:16 PM  This report was finalized on [id number] by  Shakeel Stern MD.      Lab Review:   No visits with results within 6 Month(s) from this visit.   Latest known visit with results is:   Admission on 12/18/2021, Discharged on 12/18/2021   Component Date Value   • Color, UA 12/18/2021 Other (A)   • Appearance, UA 12/18/2021 Cloudy (A)   • pH, UA 12/18/2021 7.0    • Specific Gravity, UA 12/18/2021 1.018    • Glucose, UA 12/18/2021 250 mg/dL (1+) (A)   • Ketones, UA 12/18/2021 Negative    • Bilirubin, UA 12/18/2021 Negative    • Blood, UA 12/18/2021 Large (3+) (A)   • Protein, UA 12/18/2021 100 mg/dL (2+) (A)   • Leuk Esterase, UA 12/18/2021 Small (1+) (A)   • Nitrite, UA 12/18/2021 Negative    • Urobilinogen, UA 12/18/2021 0.2  E.U./dL    • RBC, UA 12/18/2021 Too Numerous to Count (A)   • WBC, UA 12/18/2021 Too Numerous to Count (A)   • Bacteria, UA 12/18/2021 None Seen    • Squamous Epithelial Cell* 12/18/2021 0-2    • Hyaline Casts, UA 12/18/2021 3-6    • Methodology 12/18/2021 Automated Microscopy      Recent labs reviewed and interpreted for clinical significance and application            Level of Care:           Roderick Johns MD  06/28/22  .

## 2022-08-03 RX ORDER — PYRIDOSTIGMINE BROMIDE 180 MG/1
TABLET, EXTENDED RELEASE ORAL
Qty: 60 TABLET | Refills: 2 | Status: SHIPPED | OUTPATIENT
Start: 2022-08-03 | End: 2022-11-04

## 2022-08-17 ENCOUNTER — LAB (OUTPATIENT)
Dept: FAMILY MEDICINE CLINIC | Facility: CLINIC | Age: 67
End: 2022-08-17

## 2022-08-17 ENCOUNTER — OFFICE VISIT (OUTPATIENT)
Dept: FAMILY MEDICINE CLINIC | Facility: CLINIC | Age: 67
End: 2022-08-17

## 2022-08-17 VITALS
RESPIRATION RATE: 16 BRPM | HEART RATE: 62 BPM | SYSTOLIC BLOOD PRESSURE: 124 MMHG | WEIGHT: 191 LBS | HEIGHT: 64 IN | DIASTOLIC BLOOD PRESSURE: 80 MMHG | BODY MASS INDEX: 32.61 KG/M2 | OXYGEN SATURATION: 98 %

## 2022-08-17 DIAGNOSIS — N40.1 BENIGN PROSTATIC HYPERPLASIA WITH NOCTURIA: ICD-10-CM

## 2022-08-17 DIAGNOSIS — I10 ESSENTIAL HYPERTENSION: Chronic | ICD-10-CM

## 2022-08-17 DIAGNOSIS — F32.A ANXIETY AND DEPRESSION: ICD-10-CM

## 2022-08-17 DIAGNOSIS — K21.9 GASTROESOPHAGEAL REFLUX DISEASE, UNSPECIFIED WHETHER ESOPHAGITIS PRESENT: ICD-10-CM

## 2022-08-17 DIAGNOSIS — Z12.5 ENCOUNTER FOR SCREENING FOR MALIGNANT NEOPLASM OF PROSTATE: ICD-10-CM

## 2022-08-17 DIAGNOSIS — I47.1 PSVT (PAROXYSMAL SUPRAVENTRICULAR TACHYCARDIA): ICD-10-CM

## 2022-08-17 DIAGNOSIS — C68.9 UROTHELIAL CARCINOMA: ICD-10-CM

## 2022-08-17 DIAGNOSIS — E78.5 HYPERLIPIDEMIA, UNSPECIFIED HYPERLIPIDEMIA TYPE: ICD-10-CM

## 2022-08-17 DIAGNOSIS — G47.00 INSOMNIA, UNSPECIFIED TYPE: ICD-10-CM

## 2022-08-17 DIAGNOSIS — E55.9 VITAMIN D DEFICIENCY, UNSPECIFIED: ICD-10-CM

## 2022-08-17 DIAGNOSIS — Z00.00 MEDICARE ANNUAL WELLNESS VISIT, SUBSEQUENT: Primary | ICD-10-CM

## 2022-08-17 DIAGNOSIS — F41.9 ANXIETY AND DEPRESSION: ICD-10-CM

## 2022-08-17 DIAGNOSIS — G70.00 MYASTHENIA GRAVIS: ICD-10-CM

## 2022-08-17 DIAGNOSIS — R35.1 BENIGN PROSTATIC HYPERPLASIA WITH NOCTURIA: ICD-10-CM

## 2022-08-17 DIAGNOSIS — Z23 ENCOUNTER FOR IMMUNIZATION: ICD-10-CM

## 2022-08-17 LAB
25(OH)D3 SERPL-MCNC: 75.1 NG/ML (ref 30–100)
ALBUMIN SERPL-MCNC: 4.3 G/DL (ref 3.5–5.2)
ALBUMIN/GLOB SERPL: 2.2 G/DL
ALP SERPL-CCNC: 94 U/L (ref 39–117)
ALT SERPL W P-5'-P-CCNC: 18 U/L (ref 1–41)
ANION GAP SERPL CALCULATED.3IONS-SCNC: 9 MMOL/L (ref 5–15)
AST SERPL-CCNC: 22 U/L (ref 1–40)
BASOPHILS # BLD AUTO: 0.03 10*3/MM3 (ref 0–0.2)
BASOPHILS NFR BLD AUTO: 0.4 % (ref 0–1.5)
BILIRUB SERPL-MCNC: 0.5 MG/DL (ref 0–1.2)
BUN SERPL-MCNC: 14 MG/DL (ref 8–23)
BUN/CREAT SERPL: 17.7 (ref 7–25)
CALCIUM SPEC-SCNC: 9.2 MG/DL (ref 8.6–10.5)
CHLORIDE SERPL-SCNC: 102 MMOL/L (ref 98–107)
CHOLEST SERPL-MCNC: 136 MG/DL (ref 0–200)
CO2 SERPL-SCNC: 27 MMOL/L (ref 22–29)
CREAT SERPL-MCNC: 0.79 MG/DL (ref 0.76–1.27)
DEPRECATED RDW RBC AUTO: 39.9 FL (ref 37–54)
EGFRCR SERPLBLD CKD-EPI 2021: 97.4 ML/MIN/1.73
EOSINOPHIL # BLD AUTO: 0.08 10*3/MM3 (ref 0–0.4)
EOSINOPHIL NFR BLD AUTO: 1 % (ref 0.3–6.2)
ERYTHROCYTE [DISTWIDTH] IN BLOOD BY AUTOMATED COUNT: 13 % (ref 12.3–15.4)
GLOBULIN UR ELPH-MCNC: 2 GM/DL
GLUCOSE SERPL-MCNC: 130 MG/DL (ref 65–99)
HBA1C MFR BLD: 5.5 % (ref 3.5–5.6)
HCT VFR BLD AUTO: 44.2 % (ref 37.5–51)
HDLC SERPL-MCNC: 38 MG/DL (ref 40–60)
HGB BLD-MCNC: 14.5 G/DL (ref 13–17.7)
IMM GRANULOCYTES # BLD AUTO: 0.05 10*3/MM3 (ref 0–0.05)
IMM GRANULOCYTES NFR BLD AUTO: 0.6 % (ref 0–0.5)
LDLC SERPL CALC-MCNC: 75 MG/DL (ref 0–100)
LDLC/HDLC SERPL: 1.91 {RATIO}
LYMPHOCYTES # BLD AUTO: 0.73 10*3/MM3 (ref 0.7–3.1)
LYMPHOCYTES NFR BLD AUTO: 9.4 % (ref 19.6–45.3)
MCH RBC QN AUTO: 28.4 PG (ref 26.6–33)
MCHC RBC AUTO-ENTMCNC: 32.8 G/DL (ref 31.5–35.7)
MCV RBC AUTO: 86.5 FL (ref 79–97)
MONOCYTES # BLD AUTO: 0.56 10*3/MM3 (ref 0.1–0.9)
MONOCYTES NFR BLD AUTO: 7.2 % (ref 5–12)
NEUTROPHILS NFR BLD AUTO: 6.32 10*3/MM3 (ref 1.7–7)
NEUTROPHILS NFR BLD AUTO: 81.4 % (ref 42.7–76)
NRBC BLD AUTO-RTO: 0 /100 WBC (ref 0–0.2)
PLATELET # BLD AUTO: 147 10*3/MM3 (ref 140–450)
PMV BLD AUTO: 10.2 FL (ref 6–12)
POTASSIUM SERPL-SCNC: 4.1 MMOL/L (ref 3.5–5.2)
PROT SERPL-MCNC: 6.3 G/DL (ref 6–8.5)
PSA SERPL-MCNC: 4.68 NG/ML (ref 0–4)
RBC # BLD AUTO: 5.11 10*6/MM3 (ref 4.14–5.8)
SODIUM SERPL-SCNC: 138 MMOL/L (ref 136–145)
T4 FREE SERPL-MCNC: 0.72 NG/DL (ref 0.93–1.7)
TRIGL SERPL-MCNC: 127 MG/DL (ref 0–150)
TSH SERPL DL<=0.05 MIU/L-ACNC: 1.65 UIU/ML (ref 0.27–4.2)
VIT B12 BLD-MCNC: 747 PG/ML (ref 211–946)
VLDLC SERPL-MCNC: 23 MG/DL (ref 5–40)
WBC NRBC COR # BLD: 7.77 10*3/MM3 (ref 3.4–10.8)

## 2022-08-17 PROCEDURE — 1160F RVW MEDS BY RX/DR IN RCRD: CPT | Performed by: FAMILY MEDICINE

## 2022-08-17 PROCEDURE — 84481 FREE ASSAY (FT-3): CPT | Performed by: FAMILY MEDICINE

## 2022-08-17 PROCEDURE — 82306 VITAMIN D 25 HYDROXY: CPT | Performed by: FAMILY MEDICINE

## 2022-08-17 PROCEDURE — G0439 PPPS, SUBSEQ VISIT: HCPCS | Performed by: FAMILY MEDICINE

## 2022-08-17 PROCEDURE — 90677 PCV20 VACCINE IM: CPT | Performed by: FAMILY MEDICINE

## 2022-08-17 PROCEDURE — 83036 HEMOGLOBIN GLYCOSYLATED A1C: CPT | Performed by: FAMILY MEDICINE

## 2022-08-17 PROCEDURE — 85025 COMPLETE CBC W/AUTO DIFF WBC: CPT | Performed by: FAMILY MEDICINE

## 2022-08-17 PROCEDURE — 80061 LIPID PANEL: CPT | Performed by: FAMILY MEDICINE

## 2022-08-17 PROCEDURE — 84439 ASSAY OF FREE THYROXINE: CPT | Performed by: FAMILY MEDICINE

## 2022-08-17 PROCEDURE — G0009 ADMIN PNEUMOCOCCAL VACCINE: HCPCS | Performed by: FAMILY MEDICINE

## 2022-08-17 PROCEDURE — 84443 ASSAY THYROID STIM HORMONE: CPT | Performed by: FAMILY MEDICINE

## 2022-08-17 PROCEDURE — 36415 COLL VENOUS BLD VENIPUNCTURE: CPT | Performed by: FAMILY MEDICINE

## 2022-08-17 PROCEDURE — 80053 COMPREHEN METABOLIC PANEL: CPT | Performed by: FAMILY MEDICINE

## 2022-08-17 PROCEDURE — G0103 PSA SCREENING: HCPCS | Performed by: FAMILY MEDICINE

## 2022-08-17 PROCEDURE — 99214 OFFICE O/P EST MOD 30 MIN: CPT | Performed by: FAMILY MEDICINE

## 2022-08-17 PROCEDURE — 1170F FXNL STATUS ASSESSED: CPT | Performed by: FAMILY MEDICINE

## 2022-08-17 PROCEDURE — 82607 VITAMIN B-12: CPT | Performed by: FAMILY MEDICINE

## 2022-08-17 RX ORDER — TRAZODONE HYDROCHLORIDE 50 MG/1
TABLET ORAL
Qty: 60 TABLET | Refills: 5 | Status: SHIPPED | OUTPATIENT
Start: 2022-08-17 | End: 2023-02-13

## 2022-08-17 RX ORDER — PYRIDOSTIGMINE BROMIDE 60 MG/1
60 TABLET ORAL AS NEEDED
Qty: 120 TABLET | Refills: 3 | Status: SHIPPED | OUTPATIENT
Start: 2022-08-17

## 2022-08-17 NOTE — PROGRESS NOTES
The ABCs of the Annual Wellness Visit  Subsequent Medicare Wellness Visit    Chief Complaint   Patient presents with   • Medicare Wellness-subsequent     Subjective    History of Present Illness:  Enrrique Alejandre is a 67 y.o. male who presents for a Subsequent Medicare Wellness Visit.    Insomnia: patient reports continued sleep difficulty. Generally goes to sleep easily around 10P but awakens routinely at 2AM and can't go back to sleep for at least an hour. Has regular bedtime routine but does watch TV at night. Reports that he needs background noise to fall asleep. Exercises regularly. No caffeine. Wife reports that he does snore and occasionally takes afternoon nap. He was referred to sleep medicine last year but this didn't occur due to UTI/prostate issues requiring hospitalization.      BPH: s/p TURP 12/2021. No further issues w/ urinary retention. Getting up once nightly to urinate. Maintained on Flomax 0.4mg daily. Following w/ urologjuan carlos Pena    Bladder cancer: 11/2021 biopsy w/ low-grade noninvasive papillary urothelial carcinoma. No blood in urine. Last cystoscopy 5/2022. Follows w/ urologjuan carlos Pena    HTN: 124/80 today. Maintained on amlodipine 5 daily. No LH/dizziness, CP or SOB.    HLD: maintained on atorvastatin 20mg daily. No issues or concerns     NSVT: maintained on sotalol 40 BID for many years. Denies chest pain or palpitations. Well controlled. Follows w/ ROSENDO Johns.     GERD: maintained on omeprazole 40 daily. NO heartburn/reflux of dysphagia on this medicine.     Anxiety: maintained on Lexapro 10 daily for stress/irritiability. Well controlled. No issues.     MG: maintained on pyridostigmine 180 BID and 60mg in the afternoon as needed as well as mycophenolate 500 BID. Energy and strength are appropriate. Happy w/ current control    The following portions of the patient's history were reviewed and updated as appropriate: allergies, current medications, past family history, past medical history,  past social history, past surgical history and problem list.    Compared to one year ago, the patient feels his physical   health is the same.    Compared to one year ago, the patient feels his mental   health is the same.    Recent Hospitalizations:  This patient has had a [facility] admission record on file within the last 365 days.  11/2021- urinary retention s/p TURP    Current Medical Providers:  Patient Care Team:  Sergio Martinez MD as PCP - General (Internal Medicine)    Outpatient Medications Prior to Visit   Medication Sig Dispense Refill   • amLODIPine (NORVASC) 5 MG tablet Take 1 tablet by mouth Daily. 90 tablet 1   • aspirin 81 MG tablet Take 81 mg by mouth every night at bedtime.     • atorvastatin (LIPITOR) 20 MG tablet Take 1 tablet by mouth Daily. 90 tablet 1   • Cholecalciferol (Vitamin D3) 50 MCG (2000 UT) tablet Take  by mouth.     • desonide (DesOwen) 0.05 % lotion Apply  topically to the appropriate area as directed 2 (Two) Times a Day. 60 mL 2   • escitalopram (LEXAPRO) 10 MG tablet TAKE ONE TABLET BY MOUTH DAILY 30 tablet 3   • mycophenolate (CELLCEPT) 500 MG tablet TAKE ONE TABLET BY MOUTH TWICE A  tablet 1   • omeprazole (priLOSEC) 40 MG capsule Take 1 capsule by mouth Daily. 90 capsule 4   • pyridostigmine (MESTINON) 180 MG CR tablet TAKE ONE TABLET BY MOUTH TWICE A DAY 60 tablet 2   • sotalol (BETAPACE) 80 MG tablet TAKE HALF OF A TABLET BY MOUTH EVERY TWELVE HOURS 90 tablet 1   • tamsulosin (FLOMAX) 0.4 MG capsule 24 hr capsule Take 1 capsule by mouth Daily. (Patient taking differently: Take 1 capsule by mouth Daily.) 90 capsule 3   • pyridostigmine (MESTINON) 60 MG tablet Take 60 mg by mouth As Needed. Take q 4hrs prn     • naproxen (NAPROSYN) 500 MG tablet TAKE ONE TABLET BY MOUTH TWICE A DAY WITH MEALS 60 tablet 1     No facility-administered medications prior to visit.     No opioid medication identified on active medication list. I have reviewed chart for other  potential  high risk medication/s and harmful drug interactions in the elderly.        Aspirin is on active medication list. Aspirin use is indicated based on review of current medical condition/s. Pros and cons of this therapy have been discussed today. Benefits of this medication outweigh potential harm.  Patient has been encouraged to continue taking this medication.  .    Patient Active Problem List   Diagnosis   • Essential hypertension   • PSVT (paroxysmal supraventricular tachycardia) (HCC)   • CVA (cerebral vascular accident) (HCC)   • Chronic pancreatitis (HCC)   • Hematuria   • High prostate specific antigen (PSA)   • Hypomagnesemia   • Vitamin D deficiency   • Myasthenia gravis (HCC)   • Radicular low back pain   • Glucosuria   • Anxiety and depression   • Benign prostatic hyperplasia with nocturia   • Cholecystitis   • Syncope   • Real time reverse transcriptase PCR positive for COVID-19 virus   • UTI (urinary tract infection)   • Urinary retention   • Hyperlipidemia   • Gastroesophageal reflux disease     Advance Care Planning  Advance Directive is on file.  ACP discussion was held with the patient during this visit. Patient has an advance directive in EMR which is still valid.   Review of Systems   Constitutional: Negative for chills, fatigue and fever.   HENT: Negative for congestion, rhinorrhea and trouble swallowing.    Eyes: Negative for visual disturbance.   Respiratory: Negative for cough and shortness of breath.    Cardiovascular: Negative for chest pain and palpitations.   Gastrointestinal: Negative for abdominal pain and vomiting.   Genitourinary: Negative for difficulty urinating and dysuria.        +nocturia   Musculoskeletal: Negative for arthralgias and back pain.   Skin: Negative for rash.   Neurological: Negative for dizziness, light-headedness and numbness.   Psychiatric/Behavioral: Positive for sleep disturbance. Negative for dysphoric mood. The patient is not nervous/anxious.        "  Objective    Vitals:    08/17/22 1011   BP: 124/80   BP Location: Left arm   Patient Position: Sitting   Pulse: 62   Resp: 16   SpO2: 98%   Weight: 86.6 kg (191 lb)   Height: 162.6 cm (64.02\")     Estimated body mass index is 32.77 kg/m² as calculated from the following:    Height as of this encounter: 162.6 cm (64.02\").    Weight as of this encounter: 86.6 kg (191 lb).    BMI is >= 30 and <35. (Class 1 Obesity). The following options were offered after discussion;: exercise counseling/recommendations and nutrition counseling/recommendations    Does the patient have evidence of cognitive impairment? No    Physical Exam  Constitutional:       General: He is not in acute distress.     Appearance: He is well-developed. He is obese.   HENT:      Head: Normocephalic and atraumatic.      Right Ear: Tympanic membrane and external ear normal. There is no impacted cerumen.      Left Ear: Tympanic membrane and external ear normal. There is no impacted cerumen.      Nose: Nose normal.      Mouth/Throat:      Mouth: Mucous membranes are moist.      Pharynx: No oropharyngeal exudate or posterior oropharyngeal erythema.   Eyes:      General: No scleral icterus.        Right eye: No discharge.         Left eye: No discharge.      Extraocular Movements: Extraocular movements intact.      Conjunctiva/sclera: Conjunctivae normal.      Pupils: Pupils are equal, round, and reactive to light.   Neck:      Thyroid: No thyromegaly.      Vascular: No carotid bruit.   Cardiovascular:      Rate and Rhythm: Normal rate and regular rhythm.      Heart sounds: Normal heart sounds. No murmur heard.  Pulmonary:      Effort: Pulmonary effort is normal. No respiratory distress.      Breath sounds: Normal breath sounds. No wheezing or rales.   Abdominal:      General: Bowel sounds are normal. There is no distension.      Palpations: Abdomen is soft.      Tenderness: There is no abdominal tenderness.   Musculoskeletal:         General: No deformity. " Normal range of motion.      Cervical back: Normal range of motion and neck supple.   Lymphadenopathy:      Cervical: No cervical adenopathy.   Skin:     General: Skin is warm and dry.      Findings: No rash.   Neurological:      General: No focal deficit present.      Mental Status: He is alert and oriented to person, place, and time. Mental status is at baseline.      Cranial Nerves: No cranial nerve deficit.      Sensory: No sensory deficit.   Psychiatric:         Behavior: Behavior normal.         Thought Content: Thought content normal.       Lab Results   Component Value Date    HGBA1C 5.5 08/17/2022        HEALTH RISK ASSESSMENT    Smoking Status:  Social History     Tobacco Use   Smoking Status Never Smoker   Smokeless Tobacco Never Used     Alcohol Consumption:  Social History     Substance and Sexual Activity   Alcohol Use Not Currently   • Alcohol/week: 0.0 standard drinks     Fall Risk Screen:  OSKARADI Fall Risk Assessment was completed, and patient is at LOW risk for falls.Assessment completed on:8/17/2022  Single fall- tripped in woods    Depression Screening:  PHQ-2/PHQ-9 Depression Screening 8/17/2022   Retired PHQ-9 Total Score -   Retired Total Score -   Little Interest or Pleasure in Doing Things 0-->not at all   Feeling Down, Depressed or Hopeless 0-->not at all   PHQ-9: Brief Depression Severity Measure Score 0     Health Habits and Functional and Cognitive Screening:  Functional & Cognitive Status 8/17/2022   Do you have difficulty preparing food and eating? No   Do you have difficulty bathing yourself, getting dressed or grooming yourself? No   Do you have difficulty using the toilet? No   Do you have difficulty moving around from place to place? No   Do you have trouble with steps or getting out of a bed or a chair? No   Current Diet Well Balanced Diet   Dental Exam Not up to date   Eye Exam Not up to date   Exercise (times per week) 4 times per week   Current Exercises Include Yard Work    Current Exercise Activities Include -   Do you need help using the phone?  No   Are you deaf or do you have serious difficulty hearing?  No   Do you need help with transportation? No   Do you need help shopping? No   Do you need help preparing meals?  No   Do you need help with housework?  No   Do you need help with laundry? No   Do you need help taking your medications? No   Do you need help managing money? No   Do you ever drive or ride in a car without wearing a seat belt? No   Have you felt unusual stress, anger or loneliness in the last month? No   Who do you live with? Spouse   If you need help, do you have trouble finding someone available to you? No   Have you been bothered in the last four weeks by sexual problems? No   Do you have difficulty concentrating, remembering or making decisions? No     Age-appropriate Screening Schedule:  Refer to the list below for future screening recommendations based on patient's age, sex and/or medical conditions. Orders for these recommended tests are listed in the plan section. The patient has been provided with a written plan.    Health Maintenance   Topic Date Due   • ZOSTER VACCINE (1 of 2) Never done   • LIPID PANEL  08/17/2022   • INFLUENZA VACCINE  10/01/2022   • TDAP/TD VACCINES (2 - Td or Tdap) 01/01/2023        Assessment & Plan   CMS Preventative Services Quick Reference  Risk Factors Identified During Encounter  Immunizations Discussed/Encouraged (specific Immunizations; Influenza, Prevnar 20 (Pneumococcal 20-valent conjugate) and Shingrix  Obesity/Overweight   The above risks/problems have been discussed with the patient.  Follow up actions/plans if indicated are seen below in the Assessment/Plan Section.  Pertinent information has been shared with the patient in the After Visit Summary.    Diagnoses and all orders for this visit:    1. Medicare annual wellness visit, subsequent (Primary)  -     CBC & Differential  -     Comprehensive Metabolic Panel  -      Hemoglobin A1c  -     Lipid Panel  -     TSH  -     T4, Free  -     Vitamin D 25 Hydroxy  -     Vitamin B12  -     PSA Screen  -     Pneumococcal Conjugate Vaccine 20-Valent (PCV20)  -  Counseled regarding diet, exercise, weight loss, and preventative health maintenance items/immunizations below    2. Insomnia, unspecified type:   -     Ambulatory Referral to Sleep Medicine  -     Start traZODone (DESYREL) 50 MG tablet; Take 1-2 tablets nightly for sleep  Dispense: 60 tablet; Refill: 5    3. Benign prostatic hyperplasia with nocturia: Status post TURP with improvement in symptoms   -Continue home Flomax 0.4 mg daily    4. Urothelial carcinoma (HCC): Status post excision.  May 22 cystoscopy negative   -Continue urology follow-up-Jean    5. Essential hypertension: 124/80 today  -     Comprehensive Metabolic Panel  - Continue home amlodipine 5 daily    6. Hyperlipidemia, unspecified hyperlipidemia type: DL from 1 year ago 22  -     Lipid Panel  - Consider discontinuing atorvastatin pending lipid panel today     7. PSVT (paroxysmal supraventricular tachycardia) (HCC)   -Continue home sotalol 40 twice daily   -Continue cardiology [id number]    8. Gastroesophageal reflux disease, unspecified whether esophagitis present   -Continue home omeprazole 40 mg daily    9. Anxiety and depression   -Continue home Lexapro 10 mg daily    10. Myasthenia gravis (HCC)  -     Continue pyridostigmine (MESTINON) 60 MG tablet; Take 1 tablet by mouth As Needed (weakness). Take q 4hrs prn  Dispense: 120 tablet; Refill: 3  - Continue home pyridostigmine 180 twice daily    11. Vitamin D deficiency, unspecified   -     Vitamin D 25 Hydroxy    12. Encounter for screening for malignant neoplasm of prostate   -     PSA Screen    13. Encounter for immunization   -     Pneumococcal Conjugate Vaccine 20-Valent (PCV20)    Preventative:  Colonoscopy: 2/2019, due 2/2024  PSA: 6.18 (8/2021)- follows w/ urology (Jean). Ordered today  Shingles:  Recommended  Pneumonia: Pneumovax 8/2020, PCV20 ordered today  Tdap: reported 2013  Influenza: 11/2020, Recommended  COVID: Completed 3 shots Pfizer (3/2022)    Follow Up:   Return in about 6 months (around 2/17/2023) for Recheck- 30min.     An After Visit Summary and PPPS were made available to the patient.          48 minutes spent on the day of service face-to-face with the patient reviewing chart, obtaining history, performing physical, placing orders, and completing documentation

## 2022-08-18 DIAGNOSIS — R79.89 ABNORMAL THYROID BLOOD TEST: Primary | ICD-10-CM

## 2022-08-18 LAB — T3FREE SERPL-MCNC: 2.7 PG/ML (ref 2–4.4)

## 2022-08-19 DIAGNOSIS — G47.00 INSOMNIA, UNSPECIFIED TYPE: ICD-10-CM

## 2022-08-19 DIAGNOSIS — R79.89 ABNORMAL THYROID BLOOD TEST: Primary | ICD-10-CM

## 2022-08-19 RX ORDER — LEVOTHYROXINE SODIUM 0.03 MG/1
25 TABLET ORAL DAILY
Qty: 90 TABLET | Refills: 1 | Status: SHIPPED | OUTPATIENT
Start: 2022-08-19 | End: 2022-10-06 | Stop reason: SDUPTHER

## 2022-08-25 RX ORDER — MYCOPHENOLATE MOFETIL 500 MG/1
TABLET ORAL
Qty: 180 TABLET | Refills: 1 | Status: SHIPPED | OUTPATIENT
Start: 2022-08-25 | End: 2023-03-05

## 2022-09-12 RX ORDER — AMLODIPINE BESYLATE 5 MG/1
TABLET ORAL
Qty: 90 TABLET | Refills: 1 | Status: SHIPPED | OUTPATIENT
Start: 2022-09-12

## 2022-09-12 RX ORDER — ATORVASTATIN CALCIUM 20 MG/1
TABLET, FILM COATED ORAL
Qty: 90 TABLET | Refills: 1 | Status: SHIPPED | OUTPATIENT
Start: 2022-09-12

## 2022-10-03 DIAGNOSIS — F41.9 ANXIETY AND DEPRESSION: ICD-10-CM

## 2022-10-03 DIAGNOSIS — F32.A ANXIETY AND DEPRESSION: ICD-10-CM

## 2022-10-03 RX ORDER — ESCITALOPRAM OXALATE 10 MG/1
TABLET ORAL
Qty: 30 TABLET | Refills: 3 | Status: SHIPPED | OUTPATIENT
Start: 2022-10-03 | End: 2023-01-31

## 2022-10-05 ENCOUNTER — LAB (OUTPATIENT)
Dept: FAMILY MEDICINE CLINIC | Facility: CLINIC | Age: 67
End: 2022-10-05

## 2022-10-05 ENCOUNTER — OFFICE VISIT (OUTPATIENT)
Dept: FAMILY MEDICINE CLINIC | Facility: CLINIC | Age: 67
End: 2022-10-05

## 2022-10-05 VITALS
WEIGHT: 185.2 LBS | HEART RATE: 51 BPM | HEIGHT: 64 IN | OXYGEN SATURATION: 100 % | SYSTOLIC BLOOD PRESSURE: 140 MMHG | TEMPERATURE: 97.7 F | DIASTOLIC BLOOD PRESSURE: 78 MMHG | BODY MASS INDEX: 31.62 KG/M2 | RESPIRATION RATE: 16 BRPM

## 2022-10-05 DIAGNOSIS — G47.00 INSOMNIA, UNSPECIFIED TYPE: ICD-10-CM

## 2022-10-05 DIAGNOSIS — E03.9 HYPOTHYROIDISM, UNSPECIFIED TYPE: Primary | ICD-10-CM

## 2022-10-05 LAB
T3FREE SERPL-MCNC: 3.19 PG/ML (ref 2–4.4)
T4 FREE SERPL-MCNC: 0.83 NG/DL (ref 0.93–1.7)
TSH SERPL DL<=0.05 MIU/L-ACNC: 1.6 UIU/ML (ref 0.27–4.2)

## 2022-10-05 PROCEDURE — 99213 OFFICE O/P EST LOW 20 MIN: CPT | Performed by: FAMILY MEDICINE

## 2022-10-05 PROCEDURE — 36415 COLL VENOUS BLD VENIPUNCTURE: CPT | Performed by: FAMILY MEDICINE

## 2022-10-05 PROCEDURE — 84439 ASSAY OF FREE THYROXINE: CPT | Performed by: FAMILY MEDICINE

## 2022-10-05 PROCEDURE — 84443 ASSAY THYROID STIM HORMONE: CPT | Performed by: FAMILY MEDICINE

## 2022-10-05 PROCEDURE — 84481 FREE ASSAY (FT-3): CPT | Performed by: FAMILY MEDICINE

## 2022-10-06 RX ORDER — LEVOTHYROXINE SODIUM 0.05 MG/1
50 TABLET ORAL DAILY
Qty: 90 TABLET | Refills: 1 | Status: SHIPPED | OUTPATIENT
Start: 2022-10-06 | End: 2023-04-03

## 2022-11-04 RX ORDER — PYRIDOSTIGMINE BROMIDE 180 MG/1
TABLET, EXTENDED RELEASE ORAL
Qty: 60 TABLET | Refills: 2 | Status: SHIPPED | OUTPATIENT
Start: 2022-11-04 | End: 2023-02-10

## 2022-11-18 ENCOUNTER — OFFICE VISIT (OUTPATIENT)
Dept: FAMILY MEDICINE CLINIC | Facility: CLINIC | Age: 67
End: 2022-11-18

## 2022-11-18 VITALS
RESPIRATION RATE: 16 BRPM | OXYGEN SATURATION: 97 % | SYSTOLIC BLOOD PRESSURE: 120 MMHG | BODY MASS INDEX: 32.43 KG/M2 | WEIGHT: 189 LBS | HEART RATE: 77 BPM | DIASTOLIC BLOOD PRESSURE: 70 MMHG

## 2022-11-18 DIAGNOSIS — M25.561 ACUTE PAIN OF RIGHT KNEE: Primary | ICD-10-CM

## 2022-11-18 PROCEDURE — 99213 OFFICE O/P EST LOW 20 MIN: CPT | Performed by: NURSE PRACTITIONER

## 2022-11-18 NOTE — PROGRESS NOTES
Chief Complaint  Pain    Subjective          Enrrique Alejandre presents to [facility]  History of Present Illness    Is here today with c/o 10-12 days h/o right knee pain  He does not recall any injury  He does walk frequently and he did spend some time on his knees cleaning up a spill    He tells me that hs has been alternating ice and heat and the pain is improved  Currently the pain is described as throbbing    He has been having difficulty sleeping due to the pain    He feels like he cannot straighten the knee all the way out  The pain is reported be medial and along the edge to the distal edge of the knee cap    Has a hunting trip planned for this weekend and intends to hun from a tree stand     Review of Systems   Constitutional: Negative.    Respiratory: Negative.    Cardiovascular: Negative.    Musculoskeletal: Positive for arthralgias.        Right knee pain x 10-12 days  Has used ice and heat alternately with relief  Has tried icy hot without any relief       Objective   Vital Signs:  /70 (BP Location: Left arm, Patient Position: Sitting)   Pulse 77   Resp 16   Wt 85.7 kg (189 lb)   SpO2 97%   BMI 32.43 kg/m²     BP Readings from Last 3 Encounters:   11/18/22 120/70   10/05/22 140/78   08/17/22 124/80        Wt Readings from Last 3 Encounters:   11/18/22 85.7 kg (189 lb)   10/05/22 84 kg (185 lb 3.2 oz)   08/17/22 86.6 kg (191 lb)              Physical Exam  Vitals reviewed.   Constitutional:       Appearance: Normal appearance.   Cardiovascular:      Rate and Rhythm: Normal rate and regular rhythm.      Pulses: Normal pulses.      Heart sounds: Normal heart sounds.   Pulmonary:      Effort: Pulmonary effort is normal.      Breath sounds: Normal breath sounds.   Musculoskeletal:      Right knee: Swelling and effusion present. No bony tenderness. Tenderness present over the medial joint line.      Left knee: Normal.      Right lower leg: No edema.      Left lower  leg: No edema.        Legs:    Skin:     General: Skin is warm.   Neurological:      Mental Status: He is alert and oriented to person, place, and time.        Result Review :                 Assessment and Plan    Diagnoses and all orders for this visit:    1. Acute pain of right knee (Primary)  -     MRI Knee Right Without Contrast; Future  -     Diclofenac Sodium (VOLTAREN) 1 % gel gel; Apply 4 g topically to the appropriate area as directed 4 (Four) Times a Day As Needed (knee pain).  Dispense: 50 g; Refill: 0    try to get MRI, we discussed that we may have to get xray first  Encouraged to continue with current care, add voltaren gel  May need ortho referral vs PT       Follow Up   Return if symptoms worsen or fail to improve.  Patient was given instructions and counseling regarding his condition or for health maintenance advice. Please see specific information pulled into the AVS if appropriate.

## 2022-11-21 DIAGNOSIS — M25.561 ACUTE PAIN OF RIGHT KNEE: ICD-10-CM

## 2022-12-02 DIAGNOSIS — I47.1 PSVT (PAROXYSMAL SUPRAVENTRICULAR TACHYCARDIA): ICD-10-CM

## 2022-12-02 RX ORDER — SOTALOL HYDROCHLORIDE 80 MG/1
TABLET ORAL
Qty: 90 TABLET | Refills: 1 | Status: SHIPPED | OUTPATIENT
Start: 2022-12-02

## 2022-12-06 RX ORDER — OMEPRAZOLE 40 MG/1
CAPSULE, DELAYED RELEASE ORAL
Qty: 90 CAPSULE | Refills: 1 | Status: SHIPPED | OUTPATIENT
Start: 2022-12-06

## 2022-12-21 ENCOUNTER — HOSPITAL ENCOUNTER (OUTPATIENT)
Dept: MRI IMAGING | Facility: HOSPITAL | Age: 67
Discharge: HOME | End: 2022-12-21
Admitting: NURSE PRACTITIONER

## 2022-12-21 DIAGNOSIS — M25.561 ACUTE PAIN OF RIGHT KNEE: Primary | ICD-10-CM

## 2022-12-21 DIAGNOSIS — M25.561 ACUTE PAIN OF RIGHT KNEE: ICD-10-CM

## 2022-12-21 PROCEDURE — 73721 MRI JNT OF LWR EXTRE W/O DYE: CPT

## 2022-12-28 ENCOUNTER — OFFICE VISIT (OUTPATIENT)
Dept: ORTHOPEDIC SURGERY | Facility: CLINIC | Age: 67
End: 2022-12-28

## 2022-12-28 VITALS — HEIGHT: 64 IN | WEIGHT: 189 LBS | BODY MASS INDEX: 32.27 KG/M2 | HEART RATE: 68 BPM | OXYGEN SATURATION: 97 %

## 2022-12-28 DIAGNOSIS — S83.211A BUCKET-HANDLE TEAR OF MEDIAL MENISCUS OF RIGHT KNEE AS CURRENT INJURY, INITIAL ENCOUNTER: Primary | ICD-10-CM

## 2022-12-28 PROCEDURE — 20610 DRAIN/INJ JOINT/BURSA W/O US: CPT | Performed by: FAMILY MEDICINE

## 2022-12-28 PROCEDURE — 99213 OFFICE O/P EST LOW 20 MIN: CPT | Performed by: FAMILY MEDICINE

## 2022-12-28 RX ORDER — TRIAMCINOLONE ACETONIDE 40 MG/ML
80 INJECTION, SUSPENSION INTRA-ARTICULAR; INTRAMUSCULAR
Status: COMPLETED | OUTPATIENT
Start: 2022-12-28 | End: 2022-12-28

## 2022-12-28 RX ADMIN — TRIAMCINOLONE ACETONIDE 80 MG: 40 INJECTION, SUSPENSION INTRA-ARTICULAR; INTRAMUSCULAR at 17:11

## 2022-12-28 NOTE — PROGRESS NOTES
Primary Care Sports Medicine Office Visit Note     Patient ID: Enrrique Alejandre is a 67 y.o. male.    Chief Complaint:  Chief Complaint   Patient presents with   • Right Knee - Pain, Initial Evaluation     HPI:    Mr. Enrrique Alejandre is a 67 y.o. male. The patient presents to the clinic today for right knee pain.    The patient reports that he is unsure if he did anything to his right knee. He states that his wife has had knee surgery and is still recovering. He reports that he was helping her around and may have twisted his right knee while moving something. He states that his right knee started hurting, swollen, and hot. He reports that he does have pain pretty much constantly. He states that he has tried ice, heat, and keeping his right knee elevated. He reports that he was given some sodium dioxide to put on it to numb it, but he has not used it today. He reports that if he gets down on his knees, it hurts. He states that his right knee does feel unstable at times. He reports that he notices if he is standing, he can not apply proper pressure to his right foot. He states that it seems like it is swollen. He denies any clicking or popping.    The patient notes that he hurt his right knee in 2019 at work when he hit it on a ledge. He states that he had an magnetic resonance imaging done at [facility], and they did find fluid in it. He reports that they drained it, and he has never had any trouble since. He states that they did say at the time there was a growth or something calcium.     Past Medical History:   Diagnosis Date   • Anxiety 2/2014   • Arthritis    • Benign prostatic hyperplasia    • Cancer (HCC) Non invasive bladder    No treatment just observation   • Erectile dysfunction    • High prostate specific antigen (PSA) 08/16/2017   • History of echocardiogram     EF 55%. No significant valvular flow abnormalities.    • History of stress test 11/2010    Stress Myoview: No ischemia 11/2010   • HL (hearing  loss)    • HTN (hypertension) 06/25/2019   • Kidney stone    • Myasthenia gravis (HCC)    • Pancreatitis    • PSVT (paroxysmal supraventricular tachycardia) (HCC) 06/25/2019   • Stroke (HCC) 02/2014   • Supraventricular tachycardia (HCC)    • Suspected COVID-19 virus infection 01/12/2021   • TIA (transient ischemic attack) 2008   • Urinary tract infection 2021       Past Surgical History:   Procedure Laterality Date   • ADENOIDECTOMY     • CHOLECYSTECTOMY N/A 07/04/2020    Procedure: CHOLECYSTECTOMY LAPAROSCOPIC;  Surgeon: Yogesh Presley MD;  Location: [facility] MAIN OR;  Service: General;  Laterality: N/A;   • ECHO - CONVERTED  01/20/2021   • FINGER SURGERY     • HERNIA REPAIR  7/4/2020   • PROSTATE BIOPSY  2019     [facility] UROLOGY-[facility]   • PROSTATE SURGERY  11/23/2021   • TONSILLECTOMY     • VASECTOMY         Family History   Problem Relation Age of Onset   • Hypertension Mother    • Stroke Mother    • Other Mother         Cerebral Bleed   • Hypertension Father    • Other Father      Social History     Occupational History   • Not on file   Tobacco Use   • Smoking status: Never   • Smokeless tobacco: Never   Vaping Use   • Vaping Use: Never used   Substance and Sexual Activity   • Alcohol use: Yes     Alcohol/week: 2.0 standard drinks     Types: 1 Glasses of wine, 1 Drinks containing 0.5 oz of alcohol per week   • Drug use: No   • Sexual activity: Not Currently     Partners: Female     Birth control/protection: Condom     Comment: Vasectomie      Review of Systems   Constitutional: Negative for activity change and fever.   Respiratory: Negative for cough and shortness of breath.    Cardiovascular: Negative for chest pain.   Gastrointestinal: Negative for constipation, diarrhea, nausea and vomiting.   Musculoskeletal: Positive for arthralgias.   Skin: Negative for color change and rash.   Neurological: Negative for weakness.   Hematological: Does not bruise/bleed easily.     Objective:    Pulse 68   Ht  "162.6 cm (64.02\")   Wt 85.7 kg (189 lb)   SpO2 97%   BMI 32.42 kg/m²     Physical Examination:  Physical Exam  Vitals and nursing note reviewed.   Constitutional:       General: He is not in acute distress.     Appearance: He is well-developed. He is not diaphoretic.   HENT:      Head: Normocephalic and atraumatic.   Eyes:      Conjunctiva/sclera: Conjunctivae normal.   Pulmonary:      Effort: Pulmonary effort is normal. No respiratory distress.   Musculoskeletal:      Right knee: No effusion.      Instability Tests: Medial Alex test negative and lateral Alex test negative.   Skin:     General: Skin is warm.      Capillary Refill: Capillary refill takes less than 2 seconds.   Neurological:      Mental Status: He is alert.       Right Ankle Exam     Range of Motion   The patient has normal right ankle ROM.      Right Knee Exam     Muscle Strength   The patient has normal right knee strength.    Tenderness   Right knee tenderness location: Mild tenderness to palpation in the medial joint line.    Range of Motion   The patient has normal right knee ROM.  Extension: normal   Flexion: normal     Tests   Alex:  Medial - negative Lateral - negative  Varus: negative Valgus: negative  Lachman:  Anterior - negative      Drawer:  Anterior - negative    Posterior - negative    Other   Erythema: absent  Sensation: normal  Pulse: present  Swelling: none  Effusion: no effusion present      Right Hip Exam     Range of Motion   The patient has normal right hip ROM.        Imaging and other tests:  Magnetic resonance imaging of the right knee dated 12/21/2022 yields the following  IMPRESSION:  1. There is a displaced bucket handle type tear of the body and posterior horn of the medial meniscus.  2. Mild patellofemoral and medial compartment osteoarthritis with cartilage loss in these compartments. The largest full thickness defect is at the femoral trochlea with subchondral cystic change at this location.  3. Small " joint effusion.  4. Nonspecific prepatellar subcutaneous collection likely due to small chronic bursal fluid measuring 1.9 cm.      Assessment and Plan:    1. Bucket handle tear of the right medial meniscus  2. Right knee pain  3. Primary osteoarthritis of the right knee    I discussed magnetic resonance imaging and reviewed images at length with the patient today. We discussed his pathology, and the absence of any mechanical symptoms. He simply has deep achy pain. For that reason, we elected to forego treatment of his meniscus tear today, and we will simply attempt corticosteroid injection for osteoarthritic therapy. He tolerated this injection well today without complaint or problems. I would like to have him return in 2 to 3 months for repeat evaluation, or sooner if mechanical symptoms develop.    Transcribed from ambient dictation for Tyler Cullen II,  by Jaleesa Valdez.  12/28/22   11:35 EST    Patient or patient representative verbalized consent to the visit recording.  I have personally performed the services described in this document as transcribed by the above individual, and it is both accurate and complete.    Disclaimer: Please note that areas of this note were completed with computer voice recognition software.  Quite often unanticipated grammatical, syntax, homophones, and other interpretive errors are inadvertently transcribed by the computer software. Please excuse any errors that have escaped final proofreading.

## 2022-12-28 NOTE — PROGRESS NOTES
Procedure   Large Joint Arthrocentesis: R knee  Date/Time: 12/28/2022 5:11 PM  Consent given by: patient  Site marked: site marked  Timeout: Immediately prior to procedure a time out was called to verify the correct patient, procedure, equipment, support staff and site/side marked as required   Supporting Documentation  Indications: pain   Procedure Details  Location: knee - R knee  Preparation: Patient was prepped and draped in the usual sterile fashion  Needle size: 25 G  Approach: anteromedial  Medications administered: 80 mg triamcinolone acetonide 40 MG/ML (2cc of 1% lidocaine without epinepherine, and 2cc of 40mg Kenalog)  Patient tolerance: patient tolerated the procedure well with no immediate complications (Blood loss negligable, pt admits to immediate decrease in pain and improved ROM with gentle ambulation post injection.)

## 2022-12-30 ENCOUNTER — PATIENT ROUNDING (BHMG ONLY) (OUTPATIENT)
Dept: ORTHOPEDIC SURGERY | Facility: CLINIC | Age: 67
End: 2022-12-30

## 2022-12-30 NOTE — PROGRESS NOTES
A my chart message has been sent to the patient for PATIENT ROUNDING with [facility] [place]

## 2023-01-31 DIAGNOSIS — F32.A ANXIETY AND DEPRESSION: ICD-10-CM

## 2023-01-31 DIAGNOSIS — F41.9 ANXIETY AND DEPRESSION: ICD-10-CM

## 2023-01-31 RX ORDER — ESCITALOPRAM OXALATE 10 MG/1
TABLET ORAL
Qty: 30 TABLET | Refills: 3 | Status: SHIPPED | OUTPATIENT
Start: 2023-01-31

## 2023-02-10 RX ORDER — PYRIDOSTIGMINE BROMIDE 180 MG/1
TABLET, EXTENDED RELEASE ORAL
Qty: 60 TABLET | Refills: 2 | Status: SHIPPED | OUTPATIENT
Start: 2023-02-10

## 2023-02-11 DIAGNOSIS — G47.00 INSOMNIA, UNSPECIFIED TYPE: ICD-10-CM

## 2023-02-13 RX ORDER — TRAZODONE HYDROCHLORIDE 50 MG/1
TABLET ORAL
Qty: 60 TABLET | Refills: 5 | Status: SHIPPED | OUTPATIENT
Start: 2023-02-13

## 2023-02-13 RX ORDER — LEVOTHYROXINE SODIUM 0.03 MG/1
TABLET ORAL
Qty: 90 TABLET | Refills: 1 | Status: SHIPPED | OUTPATIENT
Start: 2023-02-13 | End: 2023-02-22

## 2023-02-22 ENCOUNTER — OFFICE VISIT (OUTPATIENT)
Dept: FAMILY MEDICINE CLINIC | Facility: CLINIC | Age: 68
End: 2023-02-22
Payer: MEDICARE

## 2023-02-22 ENCOUNTER — LAB (OUTPATIENT)
Dept: FAMILY MEDICINE CLINIC | Facility: CLINIC | Age: 68
End: 2023-02-22
Payer: MEDICARE

## 2023-02-22 VITALS
WEIGHT: 193.4 LBS | HEART RATE: 56 BPM | SYSTOLIC BLOOD PRESSURE: 124 MMHG | OXYGEN SATURATION: 97 % | BODY MASS INDEX: 33.02 KG/M2 | DIASTOLIC BLOOD PRESSURE: 76 MMHG | HEIGHT: 64 IN | RESPIRATION RATE: 16 BRPM

## 2023-02-22 DIAGNOSIS — G47.00 INSOMNIA, UNSPECIFIED TYPE: Primary | ICD-10-CM

## 2023-02-22 DIAGNOSIS — I10 ESSENTIAL HYPERTENSION: Chronic | ICD-10-CM

## 2023-02-22 DIAGNOSIS — L71.1 RHINOPHYMA: ICD-10-CM

## 2023-02-22 DIAGNOSIS — N40.1 BENIGN PROSTATIC HYPERPLASIA WITH NOCTURIA: ICD-10-CM

## 2023-02-22 DIAGNOSIS — E03.9 HYPOTHYROIDISM, UNSPECIFIED TYPE: ICD-10-CM

## 2023-02-22 DIAGNOSIS — R35.1 BENIGN PROSTATIC HYPERPLASIA WITH NOCTURIA: ICD-10-CM

## 2023-02-22 LAB
ALBUMIN SERPL-MCNC: 4.3 G/DL (ref 3.5–5.2)
ALBUMIN/GLOB SERPL: 2.3 G/DL
ALP SERPL-CCNC: 89 U/L (ref 39–117)
ALT SERPL W P-5'-P-CCNC: 23 U/L (ref 1–41)
ANION GAP SERPL CALCULATED.3IONS-SCNC: 5 MMOL/L (ref 5–15)
AST SERPL-CCNC: 18 U/L (ref 1–40)
BILIRUB SERPL-MCNC: 0.7 MG/DL (ref 0–1.2)
BUN SERPL-MCNC: 12 MG/DL (ref 8–23)
BUN/CREAT SERPL: 12.9 (ref 7–25)
CALCIUM SPEC-SCNC: 9 MG/DL (ref 8.6–10.5)
CHLORIDE SERPL-SCNC: 105 MMOL/L (ref 98–107)
CO2 SERPL-SCNC: 31 MMOL/L (ref 22–29)
CREAT SERPL-MCNC: 0.93 MG/DL (ref 0.76–1.27)
DEPRECATED RDW RBC AUTO: 41.9 FL (ref 37–54)
EGFRCR SERPLBLD CKD-EPI 2021: 89.4 ML/MIN/1.73
ERYTHROCYTE [DISTWIDTH] IN BLOOD BY AUTOMATED COUNT: 13.2 % (ref 12.3–15.4)
GLOBULIN UR ELPH-MCNC: 1.9 GM/DL
GLUCOSE SERPL-MCNC: 94 MG/DL (ref 65–99)
HCT VFR BLD AUTO: 45.2 % (ref 37.5–51)
HGB BLD-MCNC: 14.9 G/DL (ref 13–17.7)
MCH RBC QN AUTO: 28.6 PG (ref 26.6–33)
MCHC RBC AUTO-ENTMCNC: 33 G/DL (ref 31.5–35.7)
MCV RBC AUTO: 86.8 FL (ref 79–97)
PLATELET # BLD AUTO: 141 10*3/MM3 (ref 140–450)
PMV BLD AUTO: 10.2 FL (ref 6–12)
POTASSIUM SERPL-SCNC: 4.6 MMOL/L (ref 3.5–5.2)
PROT SERPL-MCNC: 6.2 G/DL (ref 6–8.5)
RBC # BLD AUTO: 5.21 10*6/MM3 (ref 4.14–5.8)
SODIUM SERPL-SCNC: 141 MMOL/L (ref 136–145)
T3FREE SERPL-MCNC: 2.44 PG/ML (ref 2–4.4)
T4 FREE SERPL-MCNC: 1.25 NG/DL (ref 0.93–1.7)
TSH SERPL DL<=0.05 MIU/L-ACNC: 1 UIU/ML (ref 0.27–4.2)
WBC NRBC COR # BLD: 9.3 10*3/MM3 (ref 3.4–10.8)

## 2023-02-22 PROCEDURE — 36415 COLL VENOUS BLD VENIPUNCTURE: CPT | Performed by: FAMILY MEDICINE

## 2023-02-22 PROCEDURE — 84439 ASSAY OF FREE THYROXINE: CPT | Performed by: FAMILY MEDICINE

## 2023-02-22 PROCEDURE — 84481 FREE ASSAY (FT-3): CPT | Performed by: FAMILY MEDICINE

## 2023-02-22 PROCEDURE — 80053 COMPREHEN METABOLIC PANEL: CPT | Performed by: FAMILY MEDICINE

## 2023-02-22 PROCEDURE — 84443 ASSAY THYROID STIM HORMONE: CPT | Performed by: FAMILY MEDICINE

## 2023-02-22 PROCEDURE — 85027 COMPLETE CBC AUTOMATED: CPT | Performed by: FAMILY MEDICINE

## 2023-02-22 PROCEDURE — 99214 OFFICE O/P EST MOD 30 MIN: CPT | Performed by: FAMILY MEDICINE

## 2023-02-22 RX ORDER — TAMSULOSIN HYDROCHLORIDE 0.4 MG/1
2 CAPSULE ORAL EVERY 24 HOURS
Qty: 180 CAPSULE | Refills: 3 | Status: SHIPPED | OUTPATIENT
Start: 2023-02-22

## 2023-02-22 RX ORDER — METRONIDAZOLE 7.5 MG/G
GEL TOPICAL 2 TIMES DAILY
Qty: 45 G | Refills: 2 | Status: SHIPPED | OUTPATIENT
Start: 2023-02-22 | End: 2023-03-29

## 2023-03-05 RX ORDER — MYCOPHENOLATE MOFETIL 500 MG/1
TABLET ORAL
Qty: 180 TABLET | Refills: 1 | Status: SHIPPED | OUTPATIENT
Start: 2023-03-05

## 2023-03-29 ENCOUNTER — CLINICAL SUPPORT (OUTPATIENT)
Dept: ORTHOPEDIC SURGERY | Facility: CLINIC | Age: 68
End: 2023-03-29
Payer: MEDICARE

## 2023-03-29 VITALS — WEIGHT: 193 LBS | HEART RATE: 64 BPM | BODY MASS INDEX: 32.95 KG/M2 | HEIGHT: 64 IN | OXYGEN SATURATION: 96 %

## 2023-03-29 DIAGNOSIS — S83.241D ACUTE MEDIAL MENISCUS TEAR OF RIGHT KNEE, SUBSEQUENT ENCOUNTER: Primary | ICD-10-CM

## 2023-03-29 RX ADMIN — TRIAMCINOLONE ACETONIDE 80 MG: 40 INJECTION, SUSPENSION INTRA-ARTICULAR; INTRAMUSCULAR at 10:19

## 2023-03-31 RX ORDER — TRIAMCINOLONE ACETONIDE 40 MG/ML
80 INJECTION, SUSPENSION INTRA-ARTICULAR; INTRAMUSCULAR
Status: COMPLETED | OUTPATIENT
Start: 2023-03-29 | End: 2023-03-29

## 2023-03-31 NOTE — PROGRESS NOTES
Procedure   Large Joint Arthrocentesis: R knee  Date/Time: 3/29/2023 10:19 AM  Consent given by: patient  Site marked: site marked  Timeout: Immediately prior to procedure a time out was called to verify the correct patient, procedure, equipment, support staff and site/side marked as required   Supporting Documentation  Indications: pain   Procedure Details  Location: knee - R knee  Preparation: Patient was prepped and draped in the usual sterile fashion  Needle size: 25 G  Approach: anteromedial  Medications administered: 80 mg triamcinolone acetonide 40 MG/ML (2cc of 1% lidocaine without epinepherine, and 2cc of 40mg Kenalog)  Patient tolerance: patient tolerated the procedure well with no immediate complications (Blood loss negligable, pt admits to immediate decrease in pain and improved ROM with gentle ambulation post injection.)

## 2023-04-03 RX ORDER — LEVOTHYROXINE SODIUM 0.05 MG/1
TABLET ORAL
Qty: 90 TABLET | Refills: 1 | Status: SHIPPED | OUTPATIENT
Start: 2023-04-03

## 2023-05-10 RX ORDER — PYRIDOSTIGMINE BROMIDE 180 MG/1
TABLET, EXTENDED RELEASE ORAL
Qty: 60 TABLET | Refills: 2 | Status: SHIPPED | OUTPATIENT
Start: 2023-05-10

## 2023-05-20 NOTE — PROGRESS NOTES
Please tell pt nataly vD is low- needs to be on 5000 units of d3 daily. b12 is good, A1c is good, but sugar is elevated at 144- should watch carbs and sweets that he is eating English

## 2023-05-31 ENCOUNTER — TELEPHONE (OUTPATIENT)
Dept: ORTHOPEDIC SURGERY | Facility: CLINIC | Age: 68
End: 2023-05-31

## 2023-05-31 DIAGNOSIS — F41.9 ANXIETY AND DEPRESSION: ICD-10-CM

## 2023-05-31 DIAGNOSIS — F32.A ANXIETY AND DEPRESSION: ICD-10-CM

## 2023-05-31 RX ORDER — ESCITALOPRAM OXALATE 10 MG/1
TABLET ORAL
Qty: 30 TABLET | Refills: 3 | Status: SHIPPED | OUTPATIENT
Start: 2023-05-31

## 2023-06-01 DIAGNOSIS — I47.1 PSVT (PAROXYSMAL SUPRAVENTRICULAR TACHYCARDIA): ICD-10-CM

## 2023-06-01 RX ORDER — SOTALOL HYDROCHLORIDE 80 MG/1
TABLET ORAL
Qty: 90 TABLET | Refills: 1 | Status: SHIPPED | OUTPATIENT
Start: 2023-06-01

## 2023-06-15 RX ORDER — ATORVASTATIN CALCIUM 20 MG/1
TABLET, FILM COATED ORAL
Qty: 90 TABLET | Refills: 1 | Status: SHIPPED | OUTPATIENT
Start: 2023-06-15

## 2023-06-15 RX ORDER — AMLODIPINE BESYLATE 5 MG/1
TABLET ORAL
Qty: 90 TABLET | Refills: 1 | Status: SHIPPED | OUTPATIENT
Start: 2023-06-15

## 2023-07-31 RX ORDER — OMEPRAZOLE 40 MG/1
CAPSULE, DELAYED RELEASE ORAL
Qty: 90 CAPSULE | Refills: 1 | Status: SHIPPED | OUTPATIENT
Start: 2023-07-31

## 2023-08-03 RX ORDER — LEVOTHYROXINE SODIUM 0.05 MG/1
TABLET ORAL
Qty: 90 TABLET | Refills: 1 | Status: SHIPPED | OUTPATIENT
Start: 2023-08-03

## 2023-08-17 DIAGNOSIS — G47.00 INSOMNIA, UNSPECIFIED TYPE: ICD-10-CM

## 2023-08-17 RX ORDER — TRAZODONE HYDROCHLORIDE 50 MG/1
TABLET ORAL
Qty: 60 TABLET | Refills: 2 | Status: SHIPPED | OUTPATIENT
Start: 2023-08-17

## 2023-08-17 RX ORDER — PYRIDOSTIGMINE BROMIDE 180 MG/1
TABLET, EXTENDED RELEASE ORAL
Qty: 60 TABLET | Refills: 2 | Status: SHIPPED | OUTPATIENT
Start: 2023-08-17

## 2023-08-24 ENCOUNTER — LAB (OUTPATIENT)
Dept: FAMILY MEDICINE CLINIC | Facility: CLINIC | Age: 68
End: 2023-08-24
Payer: MEDICARE

## 2023-08-24 ENCOUNTER — OFFICE VISIT (OUTPATIENT)
Dept: FAMILY MEDICINE CLINIC | Facility: CLINIC | Age: 68
End: 2023-08-24
Payer: MEDICARE

## 2023-08-24 VITALS
DIASTOLIC BLOOD PRESSURE: 68 MMHG | HEIGHT: 64 IN | OXYGEN SATURATION: 94 % | SYSTOLIC BLOOD PRESSURE: 122 MMHG | RESPIRATION RATE: 17 BRPM | WEIGHT: 193 LBS | HEART RATE: 60 BPM | BODY MASS INDEX: 32.95 KG/M2

## 2023-08-24 DIAGNOSIS — N40.1 BENIGN PROSTATIC HYPERPLASIA WITH NOCTURIA: ICD-10-CM

## 2023-08-24 DIAGNOSIS — I10 ESSENTIAL HYPERTENSION: Chronic | ICD-10-CM

## 2023-08-24 DIAGNOSIS — E78.5 HYPERLIPIDEMIA, UNSPECIFIED HYPERLIPIDEMIA TYPE: ICD-10-CM

## 2023-08-24 DIAGNOSIS — Z12.5 ENCOUNTER FOR SCREENING FOR MALIGNANT NEOPLASM OF PROSTATE: ICD-10-CM

## 2023-08-24 DIAGNOSIS — G47.00 INSOMNIA, UNSPECIFIED TYPE: ICD-10-CM

## 2023-08-24 DIAGNOSIS — R35.1 BENIGN PROSTATIC HYPERPLASIA WITH NOCTURIA: ICD-10-CM

## 2023-08-24 DIAGNOSIS — K21.9 GASTROESOPHAGEAL REFLUX DISEASE, UNSPECIFIED WHETHER ESOPHAGITIS PRESENT: ICD-10-CM

## 2023-08-24 DIAGNOSIS — F41.9 ANXIETY AND DEPRESSION: ICD-10-CM

## 2023-08-24 DIAGNOSIS — Z00.00 MEDICARE ANNUAL WELLNESS VISIT, SUBSEQUENT: Primary | ICD-10-CM

## 2023-08-24 DIAGNOSIS — L71.1 RHINOPHYMA: ICD-10-CM

## 2023-08-24 DIAGNOSIS — E03.9 HYPOTHYROIDISM, UNSPECIFIED TYPE: ICD-10-CM

## 2023-08-24 DIAGNOSIS — E55.9 VITAMIN D DEFICIENCY, UNSPECIFIED: ICD-10-CM

## 2023-08-24 DIAGNOSIS — G70.00 MYASTHENIA GRAVIS: ICD-10-CM

## 2023-08-24 DIAGNOSIS — C67.9 UROTHELIAL CARCINOMA OF BLADDER: ICD-10-CM

## 2023-08-24 DIAGNOSIS — F32.A ANXIETY AND DEPRESSION: ICD-10-CM

## 2023-08-24 DIAGNOSIS — I47.1 PSVT (PAROXYSMAL SUPRAVENTRICULAR TACHYCARDIA): ICD-10-CM

## 2023-08-24 LAB
25(OH)D3 SERPL-MCNC: 57.4 NG/ML (ref 30–100)
ALBUMIN SERPL-MCNC: 4 G/DL (ref 3.5–5.2)
ALBUMIN/GLOB SERPL: 1.8 G/DL
ALP SERPL-CCNC: 80 U/L (ref 39–117)
ALT SERPL W P-5'-P-CCNC: 27 U/L (ref 1–41)
ANION GAP SERPL CALCULATED.3IONS-SCNC: 10.4 MMOL/L (ref 5–15)
AST SERPL-CCNC: 25 U/L (ref 1–40)
BASOPHILS # BLD AUTO: 0.04 10*3/MM3 (ref 0–0.2)
BASOPHILS NFR BLD AUTO: 0.5 % (ref 0–1.5)
BILIRUB SERPL-MCNC: 0.3 MG/DL (ref 0–1.2)
BUN SERPL-MCNC: 19 MG/DL (ref 8–23)
BUN/CREAT SERPL: 27.5 (ref 7–25)
CALCIUM SPEC-SCNC: 8.6 MG/DL (ref 8.6–10.5)
CHLORIDE SERPL-SCNC: 104 MMOL/L (ref 98–107)
CHOLEST SERPL-MCNC: 98 MG/DL (ref 0–200)
CO2 SERPL-SCNC: 23.6 MMOL/L (ref 22–29)
CREAT SERPL-MCNC: 0.69 MG/DL (ref 0.76–1.27)
DEPRECATED RDW RBC AUTO: 38.8 FL (ref 37–54)
EGFRCR SERPLBLD CKD-EPI 2021: 100.8 ML/MIN/1.73
EOSINOPHIL # BLD AUTO: 0.16 10*3/MM3 (ref 0–0.4)
EOSINOPHIL NFR BLD AUTO: 1.9 % (ref 0.3–6.2)
ERYTHROCYTE [DISTWIDTH] IN BLOOD BY AUTOMATED COUNT: 12.6 % (ref 12.3–15.4)
GLOBULIN UR ELPH-MCNC: 2.2 GM/DL
GLUCOSE SERPL-MCNC: 165 MG/DL (ref 65–99)
HBA1C MFR BLD: 5.6 % (ref 4.8–5.6)
HCT VFR BLD AUTO: 44.3 % (ref 37.5–51)
HDLC SERPL-MCNC: 28 MG/DL (ref 40–60)
HGB BLD-MCNC: 14.7 G/DL (ref 13–17.7)
LDLC SERPL CALC-MCNC: 30 MG/DL (ref 0–100)
LDLC/HDLC SERPL: 0.62 {RATIO}
LYMPHOCYTES # BLD AUTO: 0.77 10*3/MM3 (ref 0.7–3.1)
LYMPHOCYTES NFR BLD AUTO: 9.1 % (ref 19.6–45.3)
MCH RBC QN AUTO: 28.8 PG (ref 26.6–33)
MCHC RBC AUTO-ENTMCNC: 33.2 G/DL (ref 31.5–35.7)
MCV RBC AUTO: 86.9 FL (ref 79–97)
MONOCYTES # BLD AUTO: 0.54 10*3/MM3 (ref 0.1–0.9)
MONOCYTES NFR BLD AUTO: 6.4 % (ref 5–12)
NEUTROPHILS NFR BLD AUTO: 6.91 10*3/MM3 (ref 1.7–7)
NEUTROPHILS NFR BLD AUTO: 81.5 % (ref 42.7–76)
PLATELET # BLD AUTO: 144 10*3/MM3 (ref 140–450)
PMV BLD AUTO: 10.5 FL (ref 6–12)
POTASSIUM SERPL-SCNC: 4 MMOL/L (ref 3.5–5.2)
PROT SERPL-MCNC: 6.2 G/DL (ref 6–8.5)
PSA SERPL-MCNC: 4.45 NG/ML (ref 0–4)
RBC # BLD AUTO: 5.1 10*6/MM3 (ref 4.14–5.8)
SODIUM SERPL-SCNC: 138 MMOL/L (ref 136–145)
T3FREE SERPL-MCNC: 2.75 PG/ML (ref 2–4.4)
T4 FREE SERPL-MCNC: 0.9 NG/DL (ref 0.93–1.7)
TRIGL SERPL-MCNC: 263 MG/DL (ref 0–150)
TSH SERPL DL<=0.05 MIU/L-ACNC: 1.07 UIU/ML (ref 0.27–4.2)
VIT B12 BLD-MCNC: 477 PG/ML (ref 211–946)
VLDLC SERPL-MCNC: 40 MG/DL (ref 5–40)
WBC NRBC COR # BLD: 8.47 10*3/MM3 (ref 3.4–10.8)

## 2023-08-24 PROCEDURE — G0103 PSA SCREENING: HCPCS | Performed by: FAMILY MEDICINE

## 2023-08-24 PROCEDURE — 84443 ASSAY THYROID STIM HORMONE: CPT | Performed by: FAMILY MEDICINE

## 2023-08-24 PROCEDURE — 84481 FREE ASSAY (FT-3): CPT | Performed by: FAMILY MEDICINE

## 2023-08-24 PROCEDURE — 80053 COMPREHEN METABOLIC PANEL: CPT | Performed by: FAMILY MEDICINE

## 2023-08-24 PROCEDURE — 86800 THYROGLOBULIN ANTIBODY: CPT | Performed by: FAMILY MEDICINE

## 2023-08-24 PROCEDURE — 82607 VITAMIN B-12: CPT | Performed by: FAMILY MEDICINE

## 2023-08-24 PROCEDURE — 85025 COMPLETE CBC W/AUTO DIFF WBC: CPT | Performed by: FAMILY MEDICINE

## 2023-08-24 PROCEDURE — 84439 ASSAY OF FREE THYROXINE: CPT | Performed by: FAMILY MEDICINE

## 2023-08-24 PROCEDURE — 86376 MICROSOMAL ANTIBODY EACH: CPT | Performed by: FAMILY MEDICINE

## 2023-08-24 PROCEDURE — 82306 VITAMIN D 25 HYDROXY: CPT | Performed by: FAMILY MEDICINE

## 2023-08-24 PROCEDURE — 83036 HEMOGLOBIN GLYCOSYLATED A1C: CPT | Performed by: FAMILY MEDICINE

## 2023-08-24 PROCEDURE — 36415 COLL VENOUS BLD VENIPUNCTURE: CPT | Performed by: FAMILY MEDICINE

## 2023-08-24 PROCEDURE — 80061 LIPID PANEL: CPT | Performed by: FAMILY MEDICINE

## 2023-08-24 RX ORDER — PYRIDOSTIGMINE BROMIDE 60 MG/1
60 TABLET ORAL 4 TIMES DAILY PRN
COMMUNITY

## 2023-08-24 RX ORDER — DOXYCYCLINE HYCLATE 100 MG/1
100 CAPSULE ORAL 2 TIMES DAILY
Qty: 60 CAPSULE | Refills: 5 | Status: SHIPPED | OUTPATIENT
Start: 2023-08-24

## 2023-08-24 NOTE — PROGRESS NOTES
The ABCs of the Annual Wellness Visit  Subsequent Medicare Wellness Visit    Subjective    Enrrique Alejandre is a 68 y.o. male who presents for a Subsequent Medicare Wellness Visit.    The following portions of the patient's history were reviewed and   updated as appropriate: allergies, current medications, past family history, past medical history, past social history, past surgical history, and problem list.    Compared to one year ago, the patient feels his physical   health is the same.    Compared to one year ago, the patient feels his mental   health is the same.    Recent Hospitalizations:  He was not admitted to the hospital during the last year.     Current Medical Providers:  Patient Care Team:  Sergio Martinez MD as PCP - General (Internal Medicine)    Outpatient Medications Prior to Visit   Medication Sig Dispense Refill    amLODIPine (NORVASC) 5 MG tablet TAKE ONE TABLET BY MOUTH DAILY 90 tablet 1    aspirin 81 MG tablet Take 1 tablet by mouth every night at bedtime.      atorvastatin (LIPITOR) 20 MG tablet TAKE ONE TABLET BY MOUTH DAILY 90 tablet 1    escitalopram (LEXAPRO) 10 MG tablet Take 1 tablet by mouth Daily. 90 tablet 1    levothyroxine (SYNTHROID, LEVOTHROID) 50 MCG tablet TAKE ONE TABLET BY MOUTH DAILY 90 tablet 1    mycophenolate (CELLCEPT) 500 MG tablet TAKE ONE TABLET BY MOUTH TWICE A  tablet 1    omeprazole (priLOSEC) 40 MG capsule TAKE ONE CAPSULE BY MOUTH DAILY 90 capsule 1    pyridostigmine (MESTINON) 180 MG CR tablet TAKE ONE TABLET BY MOUTH TWICE A DAY 60 tablet 2    sotalol (BETAPACE) 80 MG tablet TAKE 1/2 TABLET BY MOUTH EVERY 12 HOURS 90 tablet 1    tamsulosin (FLOMAX) 0.4 MG capsule 24 hr capsule Take 2 capsules by mouth Daily. 180 capsule 3    traZODone (DESYREL) 50 MG tablet TAKE 1-2 TABLETS BY MOUTH ONCE NIGHTLY FOR SLEEP 60 tablet 2    pyridostigmine (MESTINON) 60 MG tablet Take 1 tablet by mouth 4 (Four) Times a Day As Needed (fatigue).       No  "facility-administered medications prior to visit.     No opioid medication identified on active medication list. I have reviewed chart for other potential  high risk medication/s and harmful drug interactions in the elderly.      Aspirin is on active medication list. Aspirin use is indicated based on review of current medical condition/s. Pros and cons of this therapy have been discussed today. Benefits of this medication outweigh potential harm.  Patient has been encouraged to continue taking this medication.  .    Patient Active Problem List   Diagnosis    Essential hypertension    PSVT (paroxysmal supraventricular tachycardia)    CVA (cerebral vascular accident)    Chronic pancreatitis    Hematuria    High prostate specific antigen (PSA)    Hypomagnesemia    Vitamin D deficiency    Myasthenia gravis    Radicular low back pain    Glucosuria    Anxiety and depression    Benign prostatic hyperplasia with nocturia    Cholecystitis    Syncope    Real time reverse transcriptase PCR positive for COVID-19 virus    UTI (urinary tract infection)    Urinary retention    Hyperlipidemia    Gastroesophageal reflux disease    Hypothyroidism    Urothelial carcinoma of bladder    Insomnia     Advance Care Planning   Advance Care Planning     Advance Directive is on file.  ACP discussion was held with the patient during this visit. Patient has an advance directive in EMR which is still valid.      Objective    Vitals:    08/24/23 0958   BP: 122/68   Pulse: 60   Resp: 17   SpO2: 94%   Weight: 87.5 kg (193 lb)   Height: 162.6 cm (64\")     Estimated body mass index is 33.13 kg/mý as calculated from the following:    Height as of this encounter: 162.6 cm (64\").    Weight as of this encounter: 87.5 kg (193 lb).    BMI is >= 30 and <35. (Class 1 Obesity). The following options were offered after discussion;: weight loss educational material (shared in after visit summary) and exercise counseling/recommendations    Does the patient have " evidence of cognitive impairment? No  Lab Results   Component Value Date    TRIG 263 (H) 2023    HDL 28 (L) 2023    LDL 30 2023    VLDL 40 2023    HGBA1C 5.60 2023        HEALTH RISK ASSESSMENT    Smoking Status:  Social History     Tobacco Use   Smoking Status Never   Smokeless Tobacco Never     Alcohol Consumption:  Social History     Substance and Sexual Activity   Alcohol Use Yes    Alcohol/week: 3.0 standard drinks    Types: 1 Glasses of wine, 1 Cans of beer, 1 Drinks containing 0.5 oz of alcohol per week     Fall Risk Screen:  BUCK Fall Risk Assessment was completed, and patient is at LOW risk for falls.Assessment completed on:2023    Depression Screenin/24/2023     9:54 AM   PHQ-2/PHQ-9 Depression Screening   Little Interest or Pleasure in Doing Things 0-->not at all   Feeling Down, Depressed or Hopeless 0-->not at all   PHQ-9: Brief Depression Severity Measure Score 0     Health Habits and Functional and Cognitive Screenin/24/2023     9:55 AM   Functional & Cognitive Status   Do you have difficulty preparing food and eating? No   Do you have difficulty bathing yourself, getting dressed or grooming yourself? No   Do you have difficulty using the toilet? No   Do you have difficulty moving around from place to place? No   Do you have trouble with steps or getting out of a bed or a chair? No   Current Diet Well Balanced Diet   Dental Exam Not up to date   Eye Exam Not up to date   Exercise (times per week) 0 times per week   Current Exercises Include No Regular Exercise   Do you need help using the phone?  No   Are you deaf or do you have serious difficulty hearing?  Yes   Do you need help to go to places out of walking distance? No   Do you need help shopping? No   Do you need help preparing meals?  No   Do you need help with housework?  No   Do you need help with laundry? No   Do you need help taking your medications? No   Do you need help managing money?  No   Do you ever drive or ride in a car without wearing a seat belt? No     Age-appropriate Screening Schedule:  Refer to the list below for future screening recommendations based on patient's age, sex and/or medical conditions. Orders for these recommended tests are listed in the plan section. The patient has been provided with a written plan.    Health Maintenance   Topic Date Due    ZOSTER VACCINE (1 of 2) Never done    COVID-19 Vaccine (4 - Pfizer risk series) 04/26/2022    TDAP/TD VACCINES (2 - Td or Tdap) 01/01/2023    INFLUENZA VACCINE  10/01/2023    COLORECTAL CANCER SCREENING  02/05/2024    ANNUAL WELLNESS VISIT  08/24/2024    LIPID PANEL  08/24/2024    HEPATITIS C SCREENING  Completed    Pneumococcal Vaccine 65+  Completed        CMS Preventative Services Quick Reference  Risk Factors Identified During Encounter  Immunizations Discussed/Encouraged: Influenza and Shingrix  The above risks/problems have been discussed with the patient.  Pertinent information has been shared with the patient in the After Visit Summary.  An After Visit Summary and PPPS were made available to the patient.    Preventative:  Colonoscopy: 2/2019, due 2/2024  PSA: 4.68 (8/2022)- follows w/ urology (Jean). Ordered today  Shingles: Recommended  Pneumonia: Pneumovax 8/2020, PCV20 8/2022  Tdap: reported 2013  Influenza: 11/2020, Recommended  COVID: Completed 3 shots Pfizer (3/2022) and illness before shots    Follow Up:   Next Medicare Wellness visit to be scheduled in 1 year.       Additional E&M Note during same encounter follows:  Patient has multiple medical problems which are significant and separately identifiable that require additional work above and beyond the Medicare Wellness Visit.      Chief Complaint  Medicare Wellness-subsequent    Subjective        HPI  Enrrique Alejandre is also being seen today for multiple medical problems    Insomnia: maintained on trazodone 100mg nightly and this works well. Does awaken once to  urinate but goes right back to sleep     BPH: s/p TURP 12/2021. Maintained on Flomax 0.8mg daily. No further issues w/ urinary retention. Getting up once nightly to urinate. Following w/ urologjuan carlos Pena     Bladder cancer: 11/2021 biopsy w/ low-grade noninvasive papillary urothelial carcinoma. Occasional blood in urine- monitoring closely. Last cystoscopy 5/2023. Follows gustavo/ marvin Pena     HTN: 122/68 today. Maintained on amlodipine 5 daily. No LH/dizziness, CP or SOB.     HLD: maintained on atorvastatin 20mg daily. No issues or concerns      NSVT: maintained on sotalol 40 BID for many years. Denies chest pain or palpitations. Well controlled. Follows w/ ROSENDO Johns.     Hypothyroidism: maintained on levothyroxine 50mcg daily. This has helped w/ his fatigue/tiredness. No complaints. Weight up slightly from 1 year ago.     GERD: maintained on omeprazole 40 daily. No heartburn/reflux or dysphagia on this medicine.      Anxiety: maintained on Lexapro 10 daily for stress/irritiability. No particular stressors but get easily agitated and medicine helped. Well controlled. No issues.      MG: maintained on pyridostigmine 180 BID and 60mg in the afternoon as needed as well as mycophenolate 500 BID. Energy and strength are appropriate. Happy w/ current control    Rhinophyma: previously given topical metronidazole. This seemed to help but never really knocked it out and continued to get sores. Patient is interested in alternative today    Review of Systems   Constitutional:  Negative for chills, fatigue and fever.   HENT:  Negative for congestion, rhinorrhea and trouble swallowing.    Eyes:  Negative for visual disturbance.   Respiratory:  Negative for cough and shortness of breath.    Cardiovascular:  Negative for chest pain and palpitations.   Gastrointestinal:  Negative for abdominal pain and vomiting.   Genitourinary:  Negative for difficulty urinating, dysuria and hematuria.   Musculoskeletal:  Negative for  "arthralgias and back pain.   Skin:  Positive for rash.   Neurological:  Negative for dizziness, light-headedness and numbness.   Psychiatric/Behavioral:  Negative for agitation, dysphoric mood and sleep disturbance. The patient is not nervous/anxious.      Objective   Vital Signs:  /68   Pulse 60   Resp 17   Ht 162.6 cm (64\")   Wt 87.5 kg (193 lb)   SpO2 94%   BMI 33.13 kg/mý     Physical Exam  Constitutional:       General: He is not in acute distress.     Appearance: He is well-developed. He is obese.   HENT:      Head: Normocephalic and atraumatic.      Right Ear: Tympanic membrane and external ear normal. There is no impacted cerumen.      Left Ear: Tympanic membrane and external ear normal. There is no impacted cerumen.      Nose: Nose normal.      Mouth/Throat:      Mouth: Mucous membranes are moist.      Pharynx: No oropharyngeal exudate or posterior oropharyngeal erythema.   Eyes:      General: No scleral icterus.        Right eye: No discharge.         Left eye: No discharge.      Extraocular Movements: Extraocular movements intact.      Conjunctiva/sclera: Conjunctivae normal.      Pupils: Pupils are equal, round, and reactive to light.   Neck:      Thyroid: No thyromegaly.      Vascular: No carotid bruit.   Cardiovascular:      Rate and Rhythm: Normal rate and regular rhythm.      Heart sounds: Normal heart sounds. No murmur heard.  Pulmonary:      Effort: Pulmonary effort is normal. No respiratory distress.      Breath sounds: Normal breath sounds. No wheezing or rales.   Abdominal:      General: Bowel sounds are normal. There is no distension.      Palpations: Abdomen is soft.      Tenderness: There is no abdominal tenderness.   Musculoskeletal:         General: No deformity. Normal range of motion.      Cervical back: Normal range of motion and neck supple.   Lymphadenopathy:      Cervical: No cervical adenopathy.   Skin:     General: Skin is warm and dry.      Findings: No rash. "   Neurological:      General: No focal deficit present.      Mental Status: He is alert and oriented to person, place, and time. Mental status is at baseline.      Cranial Nerves: No cranial nerve deficit.      Sensory: No sensory deficit.   Psychiatric:         Behavior: Behavior normal.         Thought Content: Thought content normal.           Assessment and Plan   Diagnoses and all orders for this visit:    1. Medicare annual wellness visit, subsequent (Primary)  -     CBC & Differential  -     Comprehensive Metabolic Panel  -     Hemoglobin A1c  -     Lipid Panel  -     TSH  -     T4, Free  -     Vitamin D,25-Hydroxy  -     Vitamin B12  -     PSA Screen  -     Cancel: Manual Differential  -  Counseled regarding diet, exercise, weight loss, and preventative health maintenance items/immunizations below    2. Insomnia, unspecified type   - Cont home trazodone 100mg nightly    3. Benign prostatic hyperplasia with nocturia: s/p TURP   - Cont home Flomax 0.8mg daily   - Cont urology f/u    4. Urothelial carcinoma of bladder: 1/2021 biopsy w/ low-grade noninvasive papillary urothelial carcinoma. Last cystoscopy 5/2023   - Cont urology f/u    5. Essential hypertension: 122/68 today  -     Comprehensive Metabolic Panel  - Cont home amlodipine 5 daily    6. Hyperlipidemia, unspecified hyperlipidemia type  -     Lipid Panel  - Cont home atorvastatin 20 daily    7. PSVT (paroxysmal supraventricular tachycardia): no palpitations or recurrence in many years   - Cont home sotalol 40mg BID   - Cont CARDS f/u- Andreas    8. Hypothyroidism, unspecified type  -     TSH  -     T4, Free  -     T3, Free  -     Thyroid Antibodies  - Cont home levothyroxine 50mcg daily pending labs    9. Gastroesophageal reflux disease, unspecified whether esophagitis present   - Cont home omeprazole 40 daily    10. Anxiety and depression   - Cont home Lexapro 10mg daily w/ trazodone 100mg nightly    11. Myasthenia gravis: well controlled. Begins  slurring speech w/ med inadequate and showing fatigue   - Cont home pyridostigmine 180mg BID w/ 60mg QID PRN    12. Rhinophyma: inadequate response to topical metronidazole  -     Start doxycycline (VIBRAMYCIN) 100 MG capsule; Take 1 capsule by mouth 2 (Two) Times a Day. Take twice daily for 1 month, then reduce to daily  Dispense: 60 capsule; Refill: 5    13. Vitamin D deficiency, unspecified  -     Vitamin D,25-Hydroxy    14. Encounter for screening for malignant neoplasm of prostate  -     PSA Screen       Follow Up   Return in about 6 months (around 2/24/2024) for Recheck- 30min.  Patient was given instructions and counseling regarding his condition or for health maintenance advice. Please see specific information pulled into the AVS if appropriate.

## 2023-08-25 ENCOUNTER — TELEPHONE (OUTPATIENT)
Dept: FAMILY MEDICINE CLINIC | Facility: CLINIC | Age: 68
End: 2023-08-25
Payer: MEDICARE

## 2023-08-25 LAB
THYROGLOB AB SERPL-ACNC: <1 IU/ML (ref 0–0.9)
THYROPEROXIDASE AB SERPL-ACNC: <9 IU/ML (ref 0–34)

## 2023-08-25 NOTE — TELEPHONE ENCOUNTER
HUB to Share the following:  Left detailed voicemail for Enrrique Alejandre as per verbal release. Advised Enrrique Alejandre to call the office with any additional questions. ----- Message from Sergio Martinez MD sent at 8/25/2023 12:34 PM EDT -----  Labs look good- no changes needed based on these labs

## 2023-11-15 DIAGNOSIS — G47.00 INSOMNIA, UNSPECIFIED TYPE: ICD-10-CM

## 2023-11-15 RX ORDER — TRAZODONE HYDROCHLORIDE 50 MG/1
TABLET ORAL
Qty: 60 TABLET | Refills: 2 | Status: SHIPPED | OUTPATIENT
Start: 2023-11-15

## 2023-11-15 RX ORDER — PYRIDOSTIGMINE BROMIDE 180 MG/1
180 TABLET, EXTENDED RELEASE ORAL 2 TIMES DAILY
Qty: 180 TABLET | Refills: 1 | Status: SHIPPED | OUTPATIENT
Start: 2023-11-15

## 2023-12-02 DIAGNOSIS — I47.10 PSVT (PAROXYSMAL SUPRAVENTRICULAR TACHYCARDIA): ICD-10-CM

## 2023-12-04 RX ORDER — SOTALOL HYDROCHLORIDE 80 MG/1
TABLET ORAL
Qty: 90 TABLET | Refills: 1 | Status: SHIPPED | OUTPATIENT
Start: 2023-12-04

## 2023-12-12 RX ORDER — AMLODIPINE BESYLATE 5 MG/1
5 TABLET ORAL DAILY
Qty: 90 TABLET | Refills: 1 | Status: SHIPPED | OUTPATIENT
Start: 2023-12-12

## 2023-12-12 RX ORDER — ATORVASTATIN CALCIUM 20 MG/1
20 TABLET, FILM COATED ORAL DAILY
Qty: 90 TABLET | Refills: 1 | Status: SHIPPED | OUTPATIENT
Start: 2023-12-12

## 2023-12-18 ENCOUNTER — APPOINTMENT (OUTPATIENT)
Dept: CT IMAGING | Facility: HOSPITAL | Age: 68
End: 2023-12-18
Payer: MEDICARE

## 2023-12-18 ENCOUNTER — HOSPITAL ENCOUNTER (EMERGENCY)
Facility: HOSPITAL | Age: 68
Discharge: OTHER INSTITUTION | End: 2023-12-18
Attending: EMERGENCY MEDICINE
Payer: MEDICARE

## 2023-12-18 VITALS
DIASTOLIC BLOOD PRESSURE: 76 MMHG | TEMPERATURE: 97.9 F | OXYGEN SATURATION: 96 % | BODY MASS INDEX: 31.58 KG/M2 | SYSTOLIC BLOOD PRESSURE: 149 MMHG | RESPIRATION RATE: 12 BRPM | WEIGHT: 184.97 LBS | HEIGHT: 64 IN | HEART RATE: 67 BPM

## 2023-12-18 DIAGNOSIS — S02.19XB: Primary | ICD-10-CM

## 2023-12-18 DIAGNOSIS — R04.0 EPISTAXIS: ICD-10-CM

## 2023-12-18 LAB
ABO GROUP BLD: NORMAL
ALBUMIN SERPL-MCNC: 3.7 G/DL (ref 3.5–5.2)
ALBUMIN/GLOB SERPL: 1.8 G/DL
ALP SERPL-CCNC: 72 U/L (ref 39–117)
ALT SERPL W P-5'-P-CCNC: 18 U/L (ref 1–41)
ANION GAP SERPL CALCULATED.3IONS-SCNC: 11 MMOL/L (ref 5–15)
ANION GAP SERPL CALCULATED.3IONS-SCNC: 13 MMOL/L (ref 10–20)
APTT PPP: 23.1 SECONDS (ref 61–76.5)
AST SERPL-CCNC: 21 U/L (ref 1–40)
BASOPHILS # BLD AUTO: 0.1 10*3/MM3 (ref 0–0.2)
BASOPHILS NFR BLD AUTO: 0.6 % (ref 0–1.5)
BILIRUB SERPL-MCNC: 0.5 MG/DL (ref 0–1.2)
BLD GP AB SCN SERPL QL: NEGATIVE
BUN BLDA-MCNC: 23 MG/DL (ref 8–26)
BUN SERPL-MCNC: 24 MG/DL (ref 8–23)
BUN/CREAT SERPL: 26.4 (ref 7–25)
CA-I BLDA-SCNC: 1.11 MMOL/L (ref 1.12–1.32)
CALCIUM SPEC-SCNC: 8.4 MG/DL (ref 8.6–10.5)
CHLORIDE BLDA-SCNC: 107 MMOL/L (ref 98–109)
CHLORIDE SERPL-SCNC: 106 MMOL/L (ref 98–107)
CO2 BLDA-SCNC: 23 MMOL/L (ref 24–29)
CO2 SERPL-SCNC: 24 MMOL/L (ref 22–29)
CREAT BLDA-MCNC: 1.1 MG/DL (ref 0.6–1.3)
CREAT SERPL-MCNC: 0.91 MG/DL (ref 0.76–1.27)
DEPRECATED RDW RBC AUTO: 42.9 FL (ref 37–54)
EGFRCR SERPLBLD CKD-EPI 2021: 73.1 ML/MIN/1.73
EGFRCR SERPLBLD CKD-EPI 2021: 91.8 ML/MIN/1.73
EOSINOPHIL # BLD AUTO: 0.1 10*3/MM3 (ref 0–0.4)
EOSINOPHIL NFR BLD AUTO: 0.5 % (ref 0.3–6.2)
ERYTHROCYTE [DISTWIDTH] IN BLOOD BY AUTOMATED COUNT: 14.1 % (ref 12.3–15.4)
GEN 5 2HR TROPONIN T REFLEX: 11 NG/L
GLOBULIN UR ELPH-MCNC: 2.1 GM/DL
GLUCOSE BLDC GLUCOMTR-MCNC: 216 MG/DL (ref 70–105)
GLUCOSE SERPL-MCNC: 236 MG/DL (ref 65–99)
HCT VFR BLD AUTO: 41 % (ref 37.5–51)
HCT VFR BLDA CALC: 38 % (ref 38–51)
HGB BLD-MCNC: 13.4 G/DL (ref 13–17.7)
HGB BLDA-MCNC: 12.9 G/DL (ref 12–17)
HOLD SPECIMEN: NORMAL
INR PPP: 1.04 (ref 0.93–1.1)
LYMPHOCYTES # BLD AUTO: 1.4 10*3/MM3 (ref 0.7–3.1)
LYMPHOCYTES NFR BLD AUTO: 8.7 % (ref 19.6–45.3)
MAGNESIUM SERPL-MCNC: 1.9 MG/DL (ref 1.6–2.4)
MCH RBC QN AUTO: 28.4 PG (ref 26.6–33)
MCHC RBC AUTO-ENTMCNC: 32.8 G/DL (ref 31.5–35.7)
MCV RBC AUTO: 86.7 FL (ref 79–97)
MONOCYTES # BLD AUTO: 0.8 10*3/MM3 (ref 0.1–0.9)
MONOCYTES NFR BLD AUTO: 5.1 % (ref 5–12)
MRSA DNA SPEC QL NAA+PROBE: NORMAL
NEUTROPHILS NFR BLD AUTO: 13.3 10*3/MM3 (ref 1.7–7)
NEUTROPHILS NFR BLD AUTO: 85.1 % (ref 42.7–76)
NRBC BLD AUTO-RTO: 0 /100 WBC (ref 0–0.2)
PLATELET # BLD AUTO: 242 10*3/MM3 (ref 140–450)
PMV BLD AUTO: 8.3 FL (ref 6–12)
POTASSIUM BLDA-SCNC: 4.2 MMOL/L (ref 3.5–4.9)
POTASSIUM SERPL-SCNC: 4.3 MMOL/L (ref 3.5–5.2)
PROT SERPL-MCNC: 5.8 G/DL (ref 6–8.5)
PROTHROMBIN TIME: 11.3 SECONDS (ref 9.6–11.7)
RBC # BLD AUTO: 4.73 10*6/MM3 (ref 4.14–5.8)
RH BLD: POSITIVE
SODIUM BLD-SCNC: 139 MMOL/L (ref 138–146)
SODIUM SERPL-SCNC: 141 MMOL/L (ref 136–145)
T&S EXPIRATION DATE: NORMAL
TROPONIN T DELTA: 1 NG/L
TROPONIN T SERPL HS-MCNC: 10 NG/L
WBC NRBC COR # BLD AUTO: 15.6 10*3/MM3 (ref 3.4–10.8)
WHOLE BLOOD HOLD COAG: NORMAL

## 2023-12-18 PROCEDURE — 72125 CT NECK SPINE W/O DYE: CPT

## 2023-12-18 PROCEDURE — 96375 TX/PRO/DX INJ NEW DRUG ADDON: CPT

## 2023-12-18 PROCEDURE — 85025 COMPLETE CBC W/AUTO DIFF WBC: CPT

## 2023-12-18 PROCEDURE — 80053 COMPREHEN METABOLIC PANEL: CPT

## 2023-12-18 PROCEDURE — 96365 THER/PROPH/DIAG IV INF INIT: CPT

## 2023-12-18 PROCEDURE — 86901 BLOOD TYPING SEROLOGIC RH(D): CPT

## 2023-12-18 PROCEDURE — 25010000002 ONDANSETRON PER 1 MG

## 2023-12-18 PROCEDURE — 86900 BLOOD TYPING SEROLOGIC ABO: CPT | Performed by: EMERGENCY MEDICINE

## 2023-12-18 PROCEDURE — 86901 BLOOD TYPING SEROLOGIC RH(D): CPT | Performed by: EMERGENCY MEDICINE

## 2023-12-18 PROCEDURE — 25810000003 SODIUM CHLORIDE 0.9 % SOLUTION

## 2023-12-18 PROCEDURE — 96367 TX/PROPH/DG ADDL SEQ IV INF: CPT

## 2023-12-18 PROCEDURE — 86850 RBC ANTIBODY SCREEN: CPT | Performed by: EMERGENCY MEDICINE

## 2023-12-18 PROCEDURE — 25010000002 CEFTRIAXONE PER 250 MG: Performed by: EMERGENCY MEDICINE

## 2023-12-18 PROCEDURE — 25810000003 SODIUM CHLORIDE 0.9 % SOLUTION: Performed by: EMERGENCY MEDICINE

## 2023-12-18 PROCEDURE — 84484 ASSAY OF TROPONIN QUANT: CPT | Performed by: EMERGENCY MEDICINE

## 2023-12-18 PROCEDURE — 36415 COLL VENOUS BLD VENIPUNCTURE: CPT

## 2023-12-18 PROCEDURE — 25010000002 VANCOMYCIN HCL IN NACL 1.75-0.9 GM/500ML-% SOLUTION: Performed by: EMERGENCY MEDICINE

## 2023-12-18 PROCEDURE — 70486 CT MAXILLOFACIAL W/O DYE: CPT

## 2023-12-18 PROCEDURE — 80047 BASIC METABLC PNL IONIZED CA: CPT

## 2023-12-18 PROCEDURE — 85730 THROMBOPLASTIN TIME PARTIAL: CPT | Performed by: EMERGENCY MEDICINE

## 2023-12-18 PROCEDURE — 93005 ELECTROCARDIOGRAM TRACING: CPT | Performed by: EMERGENCY MEDICINE

## 2023-12-18 PROCEDURE — 87641 MR-STAPH DNA AMP PROBE: CPT | Performed by: EMERGENCY MEDICINE

## 2023-12-18 PROCEDURE — 99284 EMERGENCY DEPT VISIT MOD MDM: CPT

## 2023-12-18 PROCEDURE — 85014 HEMATOCRIT: CPT

## 2023-12-18 PROCEDURE — 85610 PROTHROMBIN TIME: CPT | Performed by: EMERGENCY MEDICINE

## 2023-12-18 PROCEDURE — 86900 BLOOD TYPING SEROLOGIC ABO: CPT

## 2023-12-18 PROCEDURE — 83735 ASSAY OF MAGNESIUM: CPT | Performed by: EMERGENCY MEDICINE

## 2023-12-18 RX ORDER — ONDANSETRON 2 MG/ML
4 INJECTION INTRAMUSCULAR; INTRAVENOUS ONCE
Status: COMPLETED | OUTPATIENT
Start: 2023-12-18 | End: 2023-12-18

## 2023-12-18 RX ORDER — ONDANSETRON 2 MG/ML
INJECTION INTRAMUSCULAR; INTRAVENOUS
Status: COMPLETED
Start: 2023-12-18 | End: 2023-12-18

## 2023-12-18 RX ORDER — SODIUM CHLORIDE 0.9 % (FLUSH) 0.9 %
10 SYRINGE (ML) INJECTION AS NEEDED
Status: DISCONTINUED | OUTPATIENT
Start: 2023-12-18 | End: 2023-12-18 | Stop reason: HOSPADM

## 2023-12-18 RX ORDER — VANCOMYCIN 1.75 GRAM/500 ML IN 0.9 % SODIUM CHLORIDE INTRAVENOUS
20 ONCE
Status: COMPLETED | OUTPATIENT
Start: 2023-12-18 | End: 2023-12-18

## 2023-12-18 RX ADMIN — ONDANSETRON 4 MG: 2 INJECTION INTRAMUSCULAR; INTRAVENOUS at 09:50

## 2023-12-18 RX ADMIN — CEFTRIAXONE 2000 MG: 2 INJECTION, POWDER, FOR SOLUTION INTRAMUSCULAR; INTRAVENOUS at 11:59

## 2023-12-18 RX ADMIN — SODIUM CHLORIDE 1000 ML: 9 INJECTION, SOLUTION INTRAVENOUS at 09:49

## 2023-12-18 RX ADMIN — SODIUM CHLORIDE 1000 ML: 0.9 INJECTION, SOLUTION INTRAVENOUS at 09:55

## 2023-12-18 RX ADMIN — Medication 1750 MG: at 12:25

## 2023-12-18 NOTE — ED NOTES
Nursing report ED to floor  Enrrique Alejandre  68 y.o.  male    HPI:   Chief Complaint   Patient presents with    Nose Bleed       Admitting doctor:   No admitting provider for patient encounter.    Admitting diagnosis:   The primary encounter diagnosis was Open fracture of cribriform plate, initial encounter. A diagnosis of Epistaxis was also pertinent to this visit.    Code status:   Current Code Status       Date Active Code Status Order ID Comments User Context       Prior            Allergies:   Patient has no known allergies.    Isolation:  No active isolations     Fall Risk:  Fall Risk Assessment was completed, and patient is at high risk for falls.   Predictive Model Details         8 (Low) Factor Value    Calculated 12/18/2023 12:36 Age 68    Risk of Fall Model Musculoskeletal Assessment WDL     Active Peripheral IV Present     Imaging order in this encounter Present     Skin Assessment WDL     Magnesium 1.9 mg/dL     Number of Distinct Medication Classes administered 4     Financial Class Medicare     Calcium 8.4 mg/dL     Drug Use No     Genaro Scale not on file     Diastolic BP 76     Peripheral Vascular Assessment WDL     Albumin 3.7 g/dL     Clinically Relevant Sex Not Female     Cardiac Assessment WDL     Respiratory Rate 14     Total Bilirubin 0.5 mg/dL     Gastrointestinal Assessment X     Creatinine 1.1 mg/dL     Chloride 106 mmol/L     Days after Admission 0.136     Potassium 4.2 mmol/L     Total Protein 5.8 g/dL     ALT 18 U/L         Weight:       12/18/23  1234   Weight: 83.9 kg (184 lb 15.5 oz)       Intake and Output    Intake/Output Summary (Last 24 hours) at 12/18/2023 1236  Last data filed at 12/18/2023 1217  Gross per 24 hour   Intake 2100 ml   Output --   Net 2100 ml       Diet:        Most recent vitals:   Vitals:    12/18/23 1103 12/18/23 1133 12/18/23 1224 12/18/23 1234   BP: 123/67 144/74 142/79 139/76   BP Location:    Right arm   Patient Position:    Lying   Pulse: 64 61 76 65  "  Resp:    14   Temp:    98 °F (36.7 °C)   TempSrc:    Oral   SpO2: 100% 98% 98% 99%   Weight:    83.9 kg (184 lb 15.5 oz)   Height:    162.6 cm (64\")       Active LDAs/IV Access:   Lines, Drains & Airways       Active LDAs       Name Placement date Placement time Site Days    Peripheral IV 12/18/23 0949 Left Antecubital 12/18/23 0949  Antecubital  less than 1                    Skin Condition:   Skin Assessments (last day)       None             Labs (abnormal labs have a star):   Labs Reviewed   COMPREHENSIVE METABOLIC PANEL - Abnormal; Notable for the following components:       Result Value    Glucose 236 (*)     BUN 24 (*)     Calcium 8.4 (*)     Total Protein 5.8 (*)     BUN/Creatinine Ratio 26.4 (*)     All other components within normal limits    Narrative:     GFR Normal >60  Chronic Kidney Disease <60  Kidney Failure <15     CBC WITH AUTO DIFFERENTIAL - Abnormal; Notable for the following components:    WBC 15.60 (*)     Neutrophil % 85.1 (*)     Lymphocyte % 8.7 (*)     Neutrophils, Absolute 13.30 (*)     All other components within normal limits   APTT - Abnormal; Notable for the following components:    PTT 23.1 (*)     All other components within normal limits   POCT CHEM 8 - Abnormal; Notable for the following components:    Glucose 216 (*)     Total CO2 23 (*)     Ionized Calcium 1.11 (*)     All other components within normal limits   MAGNESIUM - Normal   TROPONIN - Normal    Narrative:     High Sensitive Troponin T Reference Range:  <14.0 ng/L- Negative Female for AMI  <22.0 ng/L- Negative Male for AMI  >=14 - Abnormal Female indicating possible myocardial injury.  >=22 - Abnormal Male indicating possible myocardial injury.   Clinicians would have to utilize clinical acumen, EKG, Troponin, and serial changes to determine if it is an Acute Myocardial Infarction or myocardial injury due to an underlying chronic condition.        PROTIME-INR - Normal   MRSA SCREEN, PCR   HIGH SENSITIVITIY TROPONIN T " 2HR   TYPE AND SCREEN   CBC AND DIFFERENTIAL    Narrative:     The following orders were created for panel order CBC & Differential.  Procedure                               Abnormality         Status                     ---------                               -----------         ------                     CBC Auto Differential[[id number]]        Abnormal            Final result                 Please view results for these tests on the individual orders.   EXTRA TUBES    Narrative:     The following orders were created for panel order Extra Tubes.  Procedure                               Abnormality         Status                     ---------                               -----------         ------                     Gold Top - [facility][[id number]]                                   Final result               Light Blue Top[[id number]]                                   Final result                 Please view results for these tests on the individual orders.   [facility]   LIGHT BLUE TOP       LOC: Person, Place, Time, and Situation    Telemetry:      Cardiac Monitoring Ordered: yes    EKG:   ECG 12 Lead Syncope   Preliminary Result   HEART RATE= 61  bpm   RR Interval= 988  ms   MT Interval= 164  ms   P Horizontal Axis= 17  deg   P Front Axis= 34  deg   QRSD Interval= 80  ms   QT Interval= 418  ms   QTcB= 421  ms   QRS Axis= -22  deg   T Wave Axis= 23  deg   - OTHERWISE NORMAL ECG -   Sinus rhythm   Borderline left axis deviation   Baseline wander in lead(s) V1,V3,V6   Electronically Signed By:    Date and Time of Study: 2023-12-18 10:13:10          Medications Given in the ED:   Medications   sodium chloride 0.9 % flush 10 mL (has no administration in time range)   vancomycin IVPB 1750 mg in 0.9% Sodium Chloride (premix) 500 mL (1,750 mg Intravenous New Bag 12/18/23 1225)   sodium chloride 0.9 % bolus 1,000 mL (0 mL Intravenous Stopped 12/18/23 1217)   ondansetron (ZOFRAN) injection 4 mg (4 mg Intravenous Given  12/18/23 0950)   sodium chloride 0.9 % bolus 1,000 mL (0 mL Intravenous Stopped 12/18/23 1217)   cefTRIAXone (ROCEPHIN) 2,000 mg in sodium chloride 0.9 % 100 mL IVPB (0 mg Intravenous Stopped 12/18/23 1217)       Imaging results:  CT Facial Bones Without Contrast    Result Date: 12/18/2023  Impression: 1. Findings compatible with a fracture along the medial posterior wall of the left maxillary sinus with a gas/fluid level in the left maxillary sinus. 2. Gas fluid levels within the right maxillary and right sphenoid sinus without additional site of fracture noted. Electronically Signed: Nora Shaw MD  12/18/2023 9:27 AM CST  Workstation ID: [id number]    CT Cervical Spine Without Contrast    Result Date: 12/18/2023  1.No evidence for displaced fracture or malalignment throughout the cervical spine. 2.No evidence for acute soft tissue abnormality. 3.Changes of cervical spondylosis are noted throughout the cervical spine. Associated hypertrophic posterior facet changes and uncovertebral changes are also observed. 4.Air-fluid levels are noted within the paranasal sinuses. The findings may relate to acute sinusitis. Facial trauma could also contribute to these findings. Recommend correlation with dedicated CT of the maxillofacial bones conducted the same day. Electronically Signed: Irvin Ho MD  12/18/2023 10:25 AM EST  Workstation ID: [id number]     Social issues:   Social History     Socioeconomic History    Marital status:    Tobacco Use    Smoking status: Never    Smokeless tobacco: Never   Vaping Use    Vaping Use: Never used   Substance and Sexual Activity    Alcohol use: Yes     Alcohol/week: 3.0 standard drinks of alcohol     Types: 1 Glasses of wine, 1 Cans of beer, 1 Drinks containing 0.5 oz of alcohol per week    Drug use: No    Sexual activity: Not Currently     Partners: Female     Birth control/protection: Condom, Vasectomy     Comment: Vasectomy       NIH Stroke Scale:  Interval: (not  recorded)  1a. Level of Consciousness: (not recorded)  1b. LOC Questions: (not recorded)  1c. LOC Commands: (not recorded)  2. Best Gaze: (not recorded)  3. Visual: (not recorded)  4. Facial Palsy: (not recorded)  5a. Motor Arm, Left: (not recorded)  5b. Motor Arm, Right: (not recorded)  6a. Motor Leg, Left: (not recorded)  6b. Motor Leg, Right: (not recorded)  7. Limb Ataxia: (not recorded)  8. Sensory: (not recorded)  9. Best Language: (not recorded)  10. Dysarthria: (not recorded)  11. Extinction and Inattention (formerly Neglect): (not recorded)    Total (NIH Stroke Scale): (not recorded)     Additional notable assessment information:     Nursing report ED to floor:  Nely Cali RN   12/18/23 12:36 EST

## 2023-12-18 NOTE — CASE MANAGEMENT/SOCIAL WORK
"Physicians Statement of Medical Necessity for  Ambulance Transportation    GENERAL INFORMATION     Name: Enrrique Alejandre  YOB: 1955  Medicare #: [id number]   Transport Date: 12/18/23 (Valid for round trips this date, or for scheduled repetitive trips for 60 days from the date signed below.)  Origin: [facility]    Destination: [facility]   Is the Patient's stay covered under Medicare Part A (PPS/DRG?)No   Closest appropriate facility? Yes  If this a hosp-hosp transfer? Yes, describe services needed at 2nd facility not available at 1st facility ENT   Is this a hospice patient? No    MEDICAL NECESSITY QUESTIONAIRE    Ambulance Transportation is medically necessary only if other means of transportation are contraindicated or would be potentially harmful to the patient.  To meet this requirement, the patient must be either \"bed confined\" or suffer from a condition such that transport by means other than an ambulance is contraindicated by the patient's condition.  The following questions must be answered by the healthcare professional signing below for this form to be valid:     1) Describe the MEDICAL CONDITION (physical and/or mental) of this patient AT THE TIME OF AMBULANCE TRANSPORT that requires the patient to be transported in an ambulance, and why transport by other means is contraindicated by the patient's condition: Cribriform plate injury   Past Medical History:   Diagnosis Date    Anxiety 2/2014    Arthritis     Arthritis of back 2014    Benign prostatic hyperplasia     Cancer Non invasive bladder    No treatment just observation    Erectile dysfunction     Fracture, clavicle 6/1975    High prostate specific antigen (PSA) 08/16/2017    History of echocardiogram     EF 55%. No significant valvular flow abnormalities.     History of stress test 11/2010    Stress Myoview: No ischemia 11/2010    HL (hearing loss)     HTN (hypertension) 06/25/2019    Kidney stone     Knee swelling 2012    Low " "back pain 2019    Myasthenia gravis     Pancreatitis     PSVT (paroxysmal supraventricular tachycardia) 06/25/2019    Rotator cuff syndrome 1989    Stroke 02/2014    Supraventricular tachycardia     Suspected COVID-19 virus infection 01/12/2021    Tear of meniscus of knee 2022    TIA (transient ischemic attack) 2008    Urinary tract infection 2021      Past Surgical History:   Procedure Laterality Date    ADENOIDECTOMY      CHOLECYSTECTOMY N/A 07/04/2020    Procedure: CHOLECYSTECTOMY LAPAROSCOPIC;  Surgeon: Yogesh Presley MD;  Location: [facility] MAIN OR;  Service: General;  Laterality: N/A;    ECHO - CONVERTED  01/20/2021    FINGER SURGERY      HAND SURGERY  2018    tumor removed non invasive cancer    HERNIA REPAIR  7/4/2020    PROSTATE BIOPSY  2019    [facility]    PROSTATE SURGERY  11/23/2021    TONSILLECTOMY      VASECTOMY        2) Is this patient \"bed confined\" as defined below?No    To be \"bed confined\" the patient must satisfy all three of the following criteria:  (1) unable to get up from bed without assistance; AND (2) unable to ambulate;  AND (3) unable to sit in a chair or wheelchair.  3) Can this patient safely be transported by car or wheelchair van (I.e., may safely sit during transport, without an attendant or monitoring?)No   4. In addition to completing questions 1-3 above, please check any of the following conditions that apply*:          *Note: supporting documentation for any boxes checked must be maintained in the patient's medical records Non-healed fractures and Medical attendant required      SIGNATURE OF PHYSICIAN OR OTHER AUTHORIZED HEALTHCARE PROFESSIONAL    I certify that the above information is true and correct based on my evaluation of this patient, and represent that the patient requires transport by ambulance and that other forms of transport are contraindicated.  I understand that this information will be used by the Centers for Medicare and Medicaid Services " (CMS) to support the determiniation of medical necessity for ambulance services, and I represent that I have personal knowledge of the patient's condition at the time of transport.       If this box is checked, I also certify that the patient is physically or mentally incapable of signing the ambulance service's claim form and that the institution with which I am affiliated has furnished care, services or assistance to the patient.  My signature below is made on behalf of the patient pursuant to 42 .36(b)(4). In accordance with 42 .37, the specific reason(s) that the patient is physically or mentally incapable of signing the claim for is as follows:     Signature of Physician or Healthcare Professional   Mary Ann Lee RN  Date/Time:   12/18/23 3051     (For Scheduled repetitive transport, this form is not valid for transports performed more than 60 days after this date).                                                                                                                                            --------------------------------------------------------------------------------------------  Printed Name and Credentials of Physician or Authorized Healthcare Professional   Dr Jacob Valentino Do  *Form must be signed by patient's attending physician for scheduled, repetitive transports,.  For non-repetitive ambulance transports, if unable to obtain the signature of the attending physician, any of the following may sign (please select below):     Physician  Clinical Nurse Specialist  Registered Nurse     Physician Assistant  Discharge Planner  Licensed Practical Nurse     Nurse Practitioner x

## 2023-12-18 NOTE — ED NOTES
Per spouse and patient reports pt was walking in woods more than 3 hours ago and attempted to cut off part of a branch that was in the way and the stick went up his right nostril. Pt reports his nose began to bleed. Pt states he thought the bleeding would stop but after just under 3 hours it was still bleeding, pt drove home and got his wife and they drove to hospital.pt denies taking blood thinners but does take daily aspirin. Pt c/o nausea and constant lightheadedness. Nasal clamp applied to nose upon arrival to room. Pt noted to vomit bloody emesis and have syncopal episode with any movement. Pt did have BM as well. Pt was suctioned orally per nurse. Pt placed on monitor, provider called to room for eval. Provider placed rhino rockets to both nostrils for bleeding control.

## 2023-12-18 NOTE — ED PROVIDER NOTES
Subjective   History of Present Illness  Chief complaint: Patient is a 68-year-old who was out hunting.  While he was hiking he moved to branch out of his way.  The branch snapped back and a piece of it went up into his nose resulting in trauma.  He bled profusely for approximately 3 hours before he was able to get back to his car drive home and have his wife bring him here.  He had multiple syncopal events here in the emergency department.  He currently hemorrhages bright red blood from both near.  He is lethargic and altered.  This was isolated trauma to his nose and face.  There is no chest or abdomen trauma.    Context:    Duration:    Timing:    Severity:    Associated Symptoms:        PCP:  LMP:      Review of Systems   Unable to perform ROS: Acuity of condition       Past Medical History:   Diagnosis Date    Anxiety 2/2014    Arthritis     Arthritis of back 2014    Benign prostatic hyperplasia     Cancer Non invasive bladder    No treatment just observation    Erectile dysfunction     Fracture, clavicle 6/1975    High prostate specific antigen (PSA) 08/16/2017    History of echocardiogram     EF 55%. No significant valvular flow abnormalities.     History of stress test 11/2010    Stress Myoview: No ischemia 11/2010    HL (hearing loss)     HTN (hypertension) 06/25/2019    Kidney stone     Knee swelling 2012    Low back pain 2019    Myasthenia gravis     Pancreatitis     PSVT (paroxysmal supraventricular tachycardia) 06/25/2019    Rotator cuff syndrome 1989    Stroke 02/2014    Supraventricular tachycardia     Suspected COVID-19 virus infection 01/12/2021    Tear of meniscus of knee 2022    TIA (transient ischemic attack) 2008    Urinary tract infection 2021       No Known Allergies    Past Surgical History:   Procedure Laterality Date    ADENOIDECTOMY      CHOLECYSTECTOMY N/A 07/04/2020    Procedure: CHOLECYSTECTOMY LAPAROSCOPIC;  Surgeon: Yogesh Presley MD;  Location: [facility] MAIN OR;  Service: General;   Laterality: N/A;    ECHO - CONVERTED  01/20/2021    FINGER SURGERY      HAND SURGERY  2018    tumor removed non invasive cancer    HERNIA REPAIR  7/4/2020    PROSTATE BIOPSY  2019     [facility] UROLOGY-BENIGN    PROSTATE SURGERY  11/23/2021    TONSILLECTOMY      VASECTOMY         Family History   Problem Relation Age of Onset    Hypertension Mother     Stroke Mother     Other Mother         Cerebral Bleed    Arthritis Mother     Hypertension Father     Other Father     Arthritis Father     Diabetes Maternal Grandmother        Social History     Socioeconomic History    Marital status:    Tobacco Use    Smoking status: Never    Smokeless tobacco: Never   Vaping Use    Vaping Use: Never used   Substance and Sexual Activity    Alcohol use: Yes     Alcohol/week: 3.0 standard drinks of alcohol     Types: 1 Glasses of wine, 1 Cans of beer, 1 Drinks containing 0.5 oz of alcohol per week    Drug use: No    Sexual activity: Not Currently     Partners: Female     Birth control/protection: Condom, Vasectomy     Comment: Vasectomy           Objective   Physical Exam  Vitals and nursing note reviewed.   Constitutional:       General: He is in acute distress.      Appearance: He is obese. He is ill-appearing.      Comments: Patient is lethargic and will follow commands but slow to answer   HENT:      Nose:      Comments: Patient hemorrhaging bright red blood from both nare upon removal of nasal clamp.     Mouth/Throat:      Comments: Patient with significant mount of blood and blood clots present in his oropharynx.  He is coughing and vomiting.  Cardiovascular:      Rate and Rhythm: Bradycardia present.      Heart sounds: Normal heart sounds.   Pulmonary:      Effort: Pulmonary effort is normal.      Breath sounds: Normal breath sounds.   Abdominal:      General: There is no distension.      Palpations: Abdomen is soft.   Skin:     Coloration: Skin is pale.   Neurological:      Comments: Patient very lethargic and  unable to cooperate with full exam at this point in time.         Epistaxis Management    Date/Time: 12/18/2023 9:58 AM    Performed by: Jacob Valentino DO  Authorized by: Jacob Valentino DO    Consent:     Consent obtained:  Emergent situation    Consent given by:  Patient    Risks, benefits, and alternatives were discussed: yes      Risks discussed:  Bleeding and nasal injury  Universal protocol:     Procedure explained and questions answered to patient or proxy's satisfaction: yes      Immediately prior to procedure, a time out was called: yes      Patient identity confirmed:  Verbally with patient  Procedure details:     Treatment method:  Nasal balloon (Bilateral nasal packing)    Treatment episode: initial    Post-procedure details:     Assessment:  Bleeding decreased    Procedure completion:  Tolerated well, no immediate complications             ED Course                                 Results for orders placed or performed during the hospital encounter of 12/18/23   Comprehensive Metabolic Panel    Specimen: Blood   Result Value Ref Range    Glucose 236 (H) 65 - 99 mg/dL    BUN 24 (H) 8 - 23 mg/dL    Creatinine 0.91 0.76 - 1.27 mg/dL    Sodium 141 136 - 145 mmol/L    Potassium 4.3 3.5 - 5.2 mmol/L    Chloride 106 98 - 107 mmol/L    CO2 24.0 22.0 - 29.0 mmol/L    Calcium 8.4 (L) 8.6 - 10.5 mg/dL    Total Protein 5.8 (L) 6.0 - 8.5 g/dL    Albumin 3.7 3.5 - 5.2 g/dL    ALT (SGPT) 18 1 - 41 U/L    AST (SGOT) 21 1 - 40 U/L    Alkaline Phosphatase 72 39 - 117 U/L    Total Bilirubin 0.5 0.0 - 1.2 mg/dL    Globulin 2.1 gm/dL    A/G Ratio 1.8 g/dL    BUN/Creatinine Ratio 26.4 (H) 7.0 - 25.0    Anion Gap 11.0 5.0 - 15.0 mmol/L    eGFR 91.8 >60.0 mL/min/1.73   CBC Auto Differential    Specimen: Blood   Result Value Ref Range    WBC 15.60 (H) 3.40 - 10.80 10*3/mm3    RBC 4.73 4.14 - 5.80 10*6/mm3    Hemoglobin 13.4 13.0 - 17.7 g/dL    Hematocrit 41.0 37.5 - 51.0 %    MCV 86.7 79.0 - 97.0 fL    MCH 28.4  26.6 - 33.0 pg    MCHC 32.8 31.5 - 35.7 g/dL    RDW 14.1 12.3 - 15.4 %    RDW-SD 42.9 37.0 - 54.0 fl    MPV 8.3 6.0 - 12.0 fL    Platelets 242 140 - 450 10*3/mm3    Neutrophil % 85.1 (H) 42.7 - 76.0 %    Lymphocyte % 8.7 (L) 19.6 - 45.3 %    Monocyte % 5.1 5.0 - 12.0 %    Eosinophil % 0.5 0.3 - 6.2 %    Basophil % 0.6 0.0 - 1.5 %    Neutrophils, Absolute 13.30 (H) 1.70 - 7.00 10*3/mm3    Lymphocytes, Absolute 1.40 0.70 - 3.10 10*3/mm3    Monocytes, Absolute 0.80 0.10 - 0.90 10*3/mm3    Eosinophils, Absolute 0.10 0.00 - 0.40 10*3/mm3    Basophils, Absolute 0.10 0.00 - 0.20 10*3/mm3    nRBC 0.0 0.0 - 0.2 /100 WBC   Magnesium    Specimen: Blood   Result Value Ref Range    Magnesium 1.9 1.6 - 2.4 mg/dL   High Sensitivity Troponin T    Specimen: Blood   Result Value Ref Range    HS Troponin T 10 <22 ng/L   aPTT    Specimen: Blood   Result Value Ref Range    PTT 23.1 (L) 61.0 - 76.5 seconds   Protime-INR    Specimen: Blood   Result Value Ref Range    Protime 11.3 9.6 - 11.7 Seconds    INR 1.04 0.93 - 1.10   POC CHEM 8    Specimen: Venous Blood   Result Value Ref Range    Glucose 216 (H) 70 - 105 mg/dL    BUN 23 8 - 26 mg/dL    Creatinine 1.10 0.60 - 1.30 mg/dL    Sodium 139 138 - 146 mmol/L    POC Potassium 4.2 3.5 - 4.9 mmol/L    Chloride 107 98 - 109 mmol/L    Total CO2 23 (L) 24 - 29 mmol/L    Anion Gap 13.0 10.0 - 20.0 mmol/L    Hemoglobin 12.9 12.0 - 17.0 g/dL    Hematocrit 38 38 - 51 %    Ionized Calcium 1.11 (L) 1.12 - 1.32 mmol/L    eGFR 73.1 >60.0 mL/min/1.73   ECG 12 Lead Syncope   Result Value Ref Range    QT Interval 418 ms    QTC Interval 421 ms   Type & Screen    Specimen: Blood   Result Value Ref Range    ABO Type A     RH type Positive     Antibody Screen Negative     T&S Expiration Date 12/21/2023 11:59:59 PM    Gold Top - SST   Result Value Ref Range    Extra Tube Hold for add-ons.    Light Blue Top   Result Value Ref Range    Extra Tube Hold for add-ons.        CT Facial Bones Without Contrast    Result  Date: 12/18/2023  Impression: 1. Findings compatible with a fracture along the medial posterior wall of the left maxillary sinus with a gas/fluid level in the left maxillary sinus. 2. Gas fluid levels within the right maxillary and right sphenoid sinus without additional site of fracture noted. Electronically Signed: Nora Shaw MD  12/18/2023 9:27 AM CST  Workstation ID: [id number]    CT Cervical Spine Without Contrast    Result Date: 12/18/2023  1.No evidence for displaced fracture or malalignment throughout the cervical spine. 2.No evidence for acute soft tissue abnormality. 3.Changes of cervical spondylosis are noted throughout the cervical spine. Associated hypertrophic posterior facet changes and uncovertebral changes are also observed. 4.Air-fluid levels are noted within the paranasal sinuses. The findings may relate to acute sinusitis. Facial trauma could also contribute to these findings. Recommend correlation with dedicated CT of the maxillofacial bones conducted the same day. Electronically Signed: Irvin Ho MD  12/18/2023 10:25 AM EST  Workstation ID: [id number]             Medical Decision Making  Patient was seen evaluate for the above problem    Differential diagnose includes but is not limited to syncope, facial fracture, intracerebral injury    Patient presented in extreme distress.  He was bleeding copious arterial blood bilaterally and vomiting blood as well.  He had 3 syncopal events here.  He required bilateral nasal packing.  This did alleviate the bleeding and he is much improved here.  His blood pressure is stable and heart rate is stable.  His CAT scan shows medial maxillary sinus fracture.  I discussed with the ENT doctorLul at [facility].  She is concerned about cribriform plate injury.  I discussed with neurosurgery and  recommend stat transfer to the [facility] emergency room.   did agree to take the patient in transfer to [facility]'s emergency department.  Discussed  with the patient and family.  They verbalized understanding.  Critical care time 43 minutes.    Problems Addressed:  Epistaxis: complicated acute illness or injury  Open fracture of cribriform plate, initial encounter: complicated acute illness or injury    Amount and/or Complexity of Data Reviewed  Labs: ordered. Decision-making details documented in ED Course.     Details: Labs reviewed by myself  Radiology: ordered and independent interpretation performed.     Details: CT is reviewed by myself  ECG/medicine tests: ordered and independent interpretation performed.     Details: EKG interpreted by myself sinus rhythm rate of 61    Risk  Prescription drug management.  Decision regarding hospitalization.    Critical Care  Total time providing critical care: 43 minutes        Final diagnoses:   None     Cribriform plate injury  Syncope  Epistaxis  ED Disposition  ED Disposition       None            No follow-up provider specified.       Medication List      No changes were made to your prescriptions during this visit.            Jacob Valentino, DO  12/18/23 1201       Jacob Valentino, DO  12/18/23 1201

## 2023-12-19 LAB
QT INTERVAL: 418 MS
QTC INTERVAL: 421 MS

## 2023-12-21 ENCOUNTER — OFFICE VISIT (OUTPATIENT)
Dept: FAMILY MEDICINE CLINIC | Facility: CLINIC | Age: 68
End: 2023-12-21
Payer: MEDICARE

## 2023-12-21 VITALS
HEIGHT: 64 IN | WEIGHT: 189.6 LBS | HEART RATE: 86 BPM | DIASTOLIC BLOOD PRESSURE: 56 MMHG | BODY MASS INDEX: 32.37 KG/M2 | RESPIRATION RATE: 16 BRPM | OXYGEN SATURATION: 98 % | SYSTOLIC BLOOD PRESSURE: 128 MMHG

## 2023-12-21 DIAGNOSIS — S09.92XD INJURY OF NOSE, SUBSEQUENT ENCOUNTER: ICD-10-CM

## 2023-12-21 DIAGNOSIS — S02.401S: ICD-10-CM

## 2023-12-21 DIAGNOSIS — R04.0 EPISTAXIS: Primary | ICD-10-CM

## 2023-12-21 RX ORDER — AMOXICILLIN AND CLAVULANATE POTASSIUM 875; 125 MG/1; MG/1
1 TABLET, FILM COATED ORAL 2 TIMES DAILY
COMMUNITY
Start: 2023-12-19

## 2023-12-21 RX ORDER — FINASTERIDE 5 MG/1
5 TABLET, FILM COATED ORAL DAILY
COMMUNITY
Start: 2023-12-09

## 2023-12-21 NOTE — PROGRESS NOTES
Chief Complaint  Nose Bleed (He is here to follow-up from [facility] er for a nosebleed. He had a tree limb that had stuck him up in his nose while trying to remove it out of the way while hunting.)    HPI:    Enrrique Alejandre presents to [facility]    Patient is a 68-year-old male with a history of hypertension, hyperlipidemia, paroxysmal SVT, hypothyroidism, GERD, chronic pancreatitis, urothelial carcinoma the bladder, anxiety/depression, myasthenia gravis presenting for ED follow-up.    Patient was seen in the emergency department on 12/18/2023 after suffering nose trauma when attempting to move a branch out of his way while hunting which subsequently snapped back and hit him in the nose.  Patient had bleeding 3 hours prior to ED evaluation and had multiple syncopal events in the emergency department.  Denied additional trauma other than to the nose.  CT facial bones with findings consistent with fracture along the medial aspect posterior wall of the left maxillary sinus.  CT cervical spine notable for air-fluid levels within the para nasal sinuses that could be consistent with acute sinusitis or facial trauma.  Patient eventually required bilateral nasal packing which alleviated bleeding.  Findings discussed with ENT doctor at [facility] and she was concerned with cribriform plate injury and patient was subsequently transferred to the [facility] emergency department.    Patient at the [facility] had repeat CT scan which overall looked good. Reportedly no concern for sphenoid sinus. He was also seen by plastic surgery who recommend consernative, observation. Since emergency department, patient overall has been doing well. He has not noticed recent bleeding. He was told to have rhino rocket removed in 2-3 days. He is not on anticoagulation and has not taken daily aspirin for the last several days. Currently on Augmentin which was prescribed by specialist at [facility] and still has a few days left.   Denies fever, chills, nausea, vomiting, or evidence of infection.      Review of Systems:  ROS negative unless otherwise noted in HPI above.    Past Medical History:   Diagnosis Date    Anxiety 2/2014    Arthritis     Arthritis of back 2014    Benign prostatic hyperplasia     Cancer Non invasive bladder    No treatment just observation    Erectile dysfunction     Fracture, clavicle 6/1975    High prostate specific antigen (PSA) 08/16/2017    History of echocardiogram     EF 55%. No significant valvular flow abnormalities.     History of stress test 11/2010    Stress Myoview: No ischemia 11/2010    HL (hearing loss)     HTN (hypertension) 06/25/2019    Kidney stone     Knee swelling 2012    Low back pain 2019    Myasthenia gravis     Pancreatitis     PSVT (paroxysmal supraventricular tachycardia) 06/25/2019    Rotator cuff syndrome 1989    Stroke 02/2014    Supraventricular tachycardia     Suspected COVID-19 virus infection 01/12/2021    Tear of meniscus of knee 2022    TIA (transient ischemic attack) 2008    Urinary tract infection 2021         Current Outpatient Medications:     amLODIPine (NORVASC) 5 MG tablet, TAKE 1 TABLET BY MOUTH DAILY, Disp: 90 tablet, Rfl: 1    amoxicillin-clavulanate (AUGMENTIN) 875-125 MG per tablet, Take 1 tablet by mouth 2 (Two) Times a Day., Disp: , Rfl:     aspirin 81 MG tablet, Take 1 tablet by mouth every night at bedtime., Disp: , Rfl:     atorvastatin (LIPITOR) 20 MG tablet, TAKE 1 TABLET BY MOUTH DAILY, Disp: 90 tablet, Rfl: 1    doxycycline (VIBRAMYCIN) 100 MG capsule, Take 1 capsule by mouth 2 (Two) Times a Day. Take twice daily for 1 month, then reduce to daily, Disp: 60 capsule, Rfl: 5    escitalopram (LEXAPRO) 10 MG tablet, Take 1 tablet by mouth Daily., Disp: 90 tablet, Rfl: 1    finasteride (PROSCAR) 5 MG tablet, Take 1 tablet by mouth Daily., Disp: , Rfl:     levothyroxine (SYNTHROID, LEVOTHROID) 50 MCG tablet, TAKE ONE TABLET BY MOUTH DAILY, Disp: 90 tablet, Rfl: 1     "mycophenolate (CELLCEPT) 500 MG tablet, TAKE ONE TABLET BY MOUTH TWICE A DAY, Disp: 180 tablet, Rfl: 1    omeprazole (priLOSEC) 40 MG capsule, TAKE ONE CAPSULE BY MOUTH DAILY, Disp: 90 capsule, Rfl: 1    pyridostigmine (MESTINON) 180 MG CR tablet, TAKE 1 TABLET BY MOUTH TWICE A DAY, Disp: 180 tablet, Rfl: 1    pyridostigmine (MESTINON) 60 MG tablet, Take 1 tablet by mouth 4 (Four) Times a Day As Needed (fatigue)., Disp: , Rfl:     sotalol (BETAPACE) 80 MG tablet, TAKE 1/2 TABLET BY MOUTH EVERY 12 HOURS, Disp: 90 tablet, Rfl: 1    tamsulosin (FLOMAX) 0.4 MG capsule 24 hr capsule, Take 2 capsules by mouth Daily., Disp: 180 capsule, Rfl: 3    traZODone (DESYREL) 50 MG tablet, TAKE 1-2 TABLETS BY MOUTH IN THE EVENING FOR SLEEP, Disp: 60 tablet, Rfl: 2    Social History     Socioeconomic History    Marital status:    Tobacco Use    Smoking status: Never    Smokeless tobacco: Never   Vaping Use    Vaping Use: Never used   Substance and Sexual Activity    Alcohol use: Yes     Alcohol/week: 3.0 standard drinks of alcohol     Types: 1 Glasses of wine, 1 Cans of beer, 1 Drinks containing 0.5 oz of alcohol per week    Drug use: No    Sexual activity: Not Currently     Partners: Female     Birth control/protection: Condom, Vasectomy     Comment: Vasectomy        Objective   Vital Signs:  /56   Pulse 86   Resp 16   Ht 162.6 cm (64\")   Wt 86 kg (189 lb 9.6 oz)   SpO2 98%   BMI 32.54 kg/m²   Estimated body mass index is 32.54 kg/m² as calculated from the following:    Height as of this encounter: 162.6 cm (64\").    Weight as of this encounter: 86 kg (189 lb 9.6 oz).    Physical Exam:  General: Well-appearing patient, no apparent distress  HEENT: No posterior pharynx erythema, no tonsillar erythema or exudates, normal external auditory canals, TM normal without bulging or erythema, minor visible bright red blood in right sinus without evidence of active bleeding, no significant tenderness to palpation or bruising " around eyes or facial sinuses  Cardiac: Regular rate and rhythm, normal S1/S2, no murmur, rubs or gallops, no lower extremity edema  Lungs: Clear to auscultation bilaterally, no crackles or wheezes  Skin: No significant rashes or lesions  MSK: Grossly normal tone and strength  Neuro: Alert and oriented x3, CN II-XII grossly intact  Psych: Appropriate mood and affect    Assessment and Plan:    (R04.0) Epistaxis  (S09.92XD) Injury of nose, subsequent encounter  Assessment: Patient overall doing well status post facial trauma from steak that resulted in significant epistaxis.  Epistaxis has resolved with Rhino Rocket, which was successfully removed in clinic without recurrent bleeding.  Discussed cares to prevent repeat epistaxis as well as signs and symptoms which should prompt reevaluation.  Plan:  - Avoid trauma, injury, blowing nose especially the next 2-3 days  - Afrin as needed  - Complete current antibiotics as prescribed  - Follow up with Emergency Department if recurrent bleeding; may need repeat rhino rocket placement    (S02.401S) Closed fracture of maxillary sinus, sequela   Assessment: Previously noted on recent CT imaging both at [facility] and at [facility].  Reportedly residual fracture from childhood injury.  No recent new facial/sinus fracture or concern of sphenoid involvement per specialist at [facility].  Plan:  - Monitor    Patient was given instructions and counseling regarding his condition or for health maintenance advice. Please see specific information pulled into the AVS if appropriate.       Dr Jake Schafer   Internal Medicine Physician  [facility]  [address], [address]  [place] [address]

## 2023-12-21 NOTE — PATIENT INSTRUCTIONS
Avoid trauma, injury, blowing nose especially the next 2-3 days    Afrin as needed    Hold on additional antibiotics for now; complete current antibiotics as prescribed    Follow up with Emergency Department if recurrent bleeding; may need repeat rhino rocket placement

## 2024-02-07 RX ORDER — OMEPRAZOLE 40 MG/1
40 CAPSULE, DELAYED RELEASE ORAL DAILY
Qty: 90 CAPSULE | Refills: 1 | Status: SHIPPED | OUTPATIENT
Start: 2024-02-07

## 2024-02-07 RX ORDER — MYCOPHENOLATE MOFETIL 500 MG/1
500 TABLET ORAL 2 TIMES DAILY
Qty: 180 TABLET | Refills: 1 | Status: SHIPPED | OUTPATIENT
Start: 2024-02-07

## 2024-02-21 DIAGNOSIS — G47.00 INSOMNIA, UNSPECIFIED TYPE: ICD-10-CM

## 2024-02-21 RX ORDER — TRAZODONE HYDROCHLORIDE 50 MG/1
TABLET ORAL
Qty: 60 TABLET | Refills: 2 | Status: SHIPPED | OUTPATIENT
Start: 2024-02-21

## 2024-03-13 DIAGNOSIS — F41.9 ANXIETY AND DEPRESSION: ICD-10-CM

## 2024-03-13 DIAGNOSIS — F32.A ANXIETY AND DEPRESSION: ICD-10-CM

## 2024-03-13 RX ORDER — ESCITALOPRAM OXALATE 10 MG/1
10 TABLET ORAL DAILY
Qty: 90 TABLET | Refills: 1 | Status: SHIPPED | OUTPATIENT
Start: 2024-03-13

## 2024-04-24 RX ORDER — LEVOTHYROXINE SODIUM 0.03 MG/1
TABLET ORAL
Qty: 90 TABLET | Refills: 1 | Status: SHIPPED | OUTPATIENT
Start: 2024-04-24

## 2024-05-08 ENCOUNTER — LAB (OUTPATIENT)
Dept: FAMILY MEDICINE CLINIC | Facility: CLINIC | Age: 69
End: 2024-05-08
Payer: MEDICARE

## 2024-05-08 ENCOUNTER — OFFICE VISIT (OUTPATIENT)
Dept: FAMILY MEDICINE CLINIC | Facility: CLINIC | Age: 69
End: 2024-05-08
Payer: MEDICARE

## 2024-05-08 VITALS
HEIGHT: 64 IN | SYSTOLIC BLOOD PRESSURE: 144 MMHG | BODY MASS INDEX: 33.97 KG/M2 | RESPIRATION RATE: 20 BRPM | OXYGEN SATURATION: 97 % | HEART RATE: 77 BPM | DIASTOLIC BLOOD PRESSURE: 72 MMHG | WEIGHT: 199 LBS

## 2024-05-08 DIAGNOSIS — E03.9 HYPOTHYROIDISM, UNSPECIFIED TYPE: ICD-10-CM

## 2024-05-08 DIAGNOSIS — I10 ESSENTIAL HYPERTENSION: Chronic | ICD-10-CM

## 2024-05-08 DIAGNOSIS — L71.1 RHINOPHYMA: ICD-10-CM

## 2024-05-08 DIAGNOSIS — G70.00 MYASTHENIA GRAVIS: ICD-10-CM

## 2024-05-08 DIAGNOSIS — E66.9 CLASS 1 OBESITY WITH BODY MASS INDEX (BMI) OF 34.0 TO 34.9 IN ADULT, UNSPECIFIED OBESITY TYPE, UNSPECIFIED WHETHER SERIOUS COMORBIDITY PRESENT: Primary | ICD-10-CM

## 2024-05-08 PROCEDURE — 84439 ASSAY OF FREE THYROXINE: CPT | Performed by: FAMILY MEDICINE

## 2024-05-08 PROCEDURE — 99214 OFFICE O/P EST MOD 30 MIN: CPT | Performed by: FAMILY MEDICINE

## 2024-05-08 PROCEDURE — 1160F RVW MEDS BY RX/DR IN RCRD: CPT | Performed by: FAMILY MEDICINE

## 2024-05-08 PROCEDURE — 1159F MED LIST DOCD IN RCRD: CPT | Performed by: FAMILY MEDICINE

## 2024-05-08 PROCEDURE — 3077F SYST BP >= 140 MM HG: CPT | Performed by: FAMILY MEDICINE

## 2024-05-08 PROCEDURE — 80048 BASIC METABOLIC PNL TOTAL CA: CPT | Performed by: FAMILY MEDICINE

## 2024-05-08 PROCEDURE — 1125F AMNT PAIN NOTED PAIN PRSNT: CPT | Performed by: FAMILY MEDICINE

## 2024-05-08 PROCEDURE — 84481 FREE ASSAY (FT-3): CPT | Performed by: FAMILY MEDICINE

## 2024-05-08 PROCEDURE — 84443 ASSAY THYROID STIM HORMONE: CPT | Performed by: FAMILY MEDICINE

## 2024-05-08 PROCEDURE — 3078F DIAST BP <80 MM HG: CPT | Performed by: FAMILY MEDICINE

## 2024-05-08 PROCEDURE — 36415 COLL VENOUS BLD VENIPUNCTURE: CPT | Performed by: FAMILY MEDICINE

## 2024-05-08 RX ORDER — LISINOPRIL 20 MG/1
20 TABLET ORAL DAILY
Qty: 90 TABLET | Refills: 1 | Status: SHIPPED | OUTPATIENT
Start: 2024-05-08

## 2024-05-09 LAB
ANION GAP SERPL CALCULATED.3IONS-SCNC: 6.8 MMOL/L (ref 5–15)
BUN SERPL-MCNC: 14 MG/DL (ref 8–23)
BUN/CREAT SERPL: 16.5 (ref 7–25)
CALCIUM SPEC-SCNC: 8.8 MG/DL (ref 8.6–10.5)
CHLORIDE SERPL-SCNC: 107 MMOL/L (ref 98–107)
CO2 SERPL-SCNC: 27.2 MMOL/L (ref 22–29)
CREAT SERPL-MCNC: 0.85 MG/DL (ref 0.76–1.27)
EGFRCR SERPLBLD CKD-EPI 2021: 94.1 ML/MIN/1.73
GLUCOSE SERPL-MCNC: 91 MG/DL (ref 65–99)
POTASSIUM SERPL-SCNC: 4 MMOL/L (ref 3.5–5.2)
SODIUM SERPL-SCNC: 141 MMOL/L (ref 136–145)
T3FREE SERPL-MCNC: 2.94 PG/ML (ref 2–4.4)
T4 FREE SERPL-MCNC: 0.79 NG/DL (ref 0.93–1.7)
TSH SERPL DL<=0.05 MIU/L-ACNC: 2.36 UIU/ML (ref 0.27–4.2)

## 2024-05-09 RX ORDER — LEVOTHYROXINE SODIUM 0.12 MG/1
62.5 TABLET ORAL DAILY
Qty: 45 TABLET | Refills: 1 | Status: SHIPPED | OUTPATIENT
Start: 2024-05-09

## 2024-05-13 RX ORDER — PYRIDOSTIGMINE BROMIDE 180 MG/1
180 TABLET, EXTENDED RELEASE ORAL 2 TIMES DAILY
Qty: 180 TABLET | Refills: 1 | Status: SHIPPED | OUTPATIENT
Start: 2024-05-13

## 2024-05-14 ENCOUNTER — TELEPHONE (OUTPATIENT)
Dept: FAMILY MEDICINE CLINIC | Facility: CLINIC | Age: 69
End: 2024-05-14

## 2024-05-19 DIAGNOSIS — G47.00 INSOMNIA, UNSPECIFIED TYPE: ICD-10-CM

## 2024-05-20 RX ORDER — TRAZODONE HYDROCHLORIDE 50 MG/1
TABLET ORAL
Qty: 60 TABLET | Refills: 2 | Status: SHIPPED | OUTPATIENT
Start: 2024-05-20

## 2024-05-31 DIAGNOSIS — I47.10 PSVT (PAROXYSMAL SUPRAVENTRICULAR TACHYCARDIA): ICD-10-CM

## 2024-05-31 RX ORDER — SOTALOL HYDROCHLORIDE 80 MG/1
TABLET ORAL
Qty: 90 TABLET | Refills: 1 | Status: SHIPPED | OUTPATIENT
Start: 2024-05-31

## 2024-06-04 ENCOUNTER — TELEPHONE (OUTPATIENT)
Dept: ORTHOPEDIC SURGERY | Facility: CLINIC | Age: 69
End: 2024-06-04

## 2024-06-04 NOTE — TELEPHONE ENCOUNTER
Caller: CRISTOBAL ANAYA    Relationship to patient: PATIENT     Best call back number: [phone]    Chief complaint: RIGHT KNEE     Type of visit: GEL INJECTION     Requested date: ASAP      If rescheduling, when is the original appointment: LAST INJECTION 06/30/2024     Additional notes:PLEASE CALL PATIENT TO SCHEDULE

## 2024-06-10 RX ORDER — ATORVASTATIN CALCIUM 20 MG/1
20 TABLET, FILM COATED ORAL DAILY
Qty: 90 TABLET | Refills: 1 | Status: SHIPPED | OUTPATIENT
Start: 2024-06-10

## 2024-06-10 RX ORDER — AMLODIPINE BESYLATE 5 MG/1
5 TABLET ORAL DAILY
Qty: 90 TABLET | Refills: 1 | Status: SHIPPED | OUTPATIENT
Start: 2024-06-10

## 2024-06-11 ENCOUNTER — OFFICE VISIT (OUTPATIENT)
Dept: ORTHOPEDIC SURGERY | Facility: CLINIC | Age: 69
End: 2024-06-11
Payer: MEDICARE

## 2024-06-11 VITALS — HEIGHT: 64 IN | BODY MASS INDEX: 31.58 KG/M2 | WEIGHT: 185 LBS | OXYGEN SATURATION: 97 % | HEART RATE: 71 BPM

## 2024-06-11 DIAGNOSIS — M17.11 PRIMARY OSTEOARTHRITIS OF RIGHT KNEE: ICD-10-CM

## 2024-06-11 DIAGNOSIS — M25.561 RIGHT KNEE PAIN, UNSPECIFIED CHRONICITY: Primary | ICD-10-CM

## 2024-06-11 PROCEDURE — 99213 OFFICE O/P EST LOW 20 MIN: CPT | Performed by: FAMILY MEDICINE

## 2024-06-11 PROCEDURE — 1159F MED LIST DOCD IN RCRD: CPT | Performed by: FAMILY MEDICINE

## 2024-06-11 PROCEDURE — 1160F RVW MEDS BY RX/DR IN RCRD: CPT | Performed by: FAMILY MEDICINE

## 2024-06-11 NOTE — PROGRESS NOTES
Primary Care Sports Medicine Office Visit Note    Patient ID: Enrrique Alejandre is a 69 y.o. male.    Chief Complaint:  Chief Complaint   Patient presents with    Right Knee - Follow-up, Pain       History of Present Illness  The patient presents for evaluation of knee pain.    The patient has been experiencing persistent knee pain for the past 2 to 3 weeks, which he perceives as progressively worsening as the effects of the medication diminish. His last consultation with me was approximately a year ago, during which he received a gel injection. On certain days, he experiences a catching sensation in his knee, which he suspects may be related to his meniscus. Post-exercise, he struggles to fully extend his leg, leading to a popping sensation.    Supplemental Information  He has myasthenia gravis.       Past Medical History:   Diagnosis Date    Anxiety 2/2014    Arthritis     Arthritis of back 2014    Benign prostatic hyperplasia     Cancer Non invasive bladder    No treatment just observation    Erectile dysfunction     Fracture, clavicle 6/1975    High prostate specific antigen (PSA) 08/16/2017    History of echocardiogram     EF 55%. No significant valvular flow abnormalities.     History of stress test 11/2010    Stress Myoview: No ischemia 11/2010    HL (hearing loss)     HTN (hypertension) 06/25/2019    Kidney stone     Knee swelling 2012    Low back pain 2019    Myasthenia gravis     Pancreatitis     PSVT (paroxysmal supraventricular tachycardia) 06/25/2019    Rotator cuff syndrome 1989    Stroke 02/2014    Supraventricular tachycardia     Suspected COVID-19 virus infection 01/12/2021    Tear of meniscus of knee 2022    TIA (transient ischemic attack) 2008    Urinary tract infection 2021       Past Surgical History:   Procedure Laterality Date    ADENOIDECTOMY      CHOLECYSTECTOMY N/A 07/04/2020    Procedure: CHOLECYSTECTOMY LAPAROSCOPIC;  Surgeon: Yogesh Presley MD;  Location: [facility] MAIN OR;  Service:  "General;  Laterality: N/A;    ECHO - CONVERTED  01/20/2021    FINGER SURGERY      HAND SURGERY  2018    tumor removed non invasive cancer    HERNIA REPAIR  7/4/2020    PROSTATE BIOPSY  2019    [facility]    PROSTATE SURGERY  11/23/2021    TONSILLECTOMY      VASECTOMY         Family History   Problem Relation Age of Onset    Hypertension Mother     Stroke Mother     Other Mother         Cerebral Bleed    Arthritis Mother     Hypertension Father     Other Father     Arthritis Father     Diabetes Maternal Grandmother      Social History     Occupational History    Not on file   Tobacco Use    Smoking status: Never    Smokeless tobacco: Never   Vaping Use    Vaping status: Never Used   Substance and Sexual Activity    Alcohol use: Yes     Alcohol/week: 3.0 standard drinks of alcohol     Types: 1 Glasses of wine, 1 Cans of beer, 1 Drinks containing 0.5 oz of alcohol per week    Drug use: No    Sexual activity: Not Currently     Partners: Female     Birth control/protection: Condom, Vasectomy     Comment: Vasectomy        Review of Systems:  Review of Systems   Constitutional:  Negative for activity change, fatigue and fever.   Musculoskeletal:  Positive for arthralgias.   Skin:  Negative for color change and rash.   Neurological:  Negative for numbness.     Objective:  Physical Exam  Pulse 71   Ht 162.6 cm (64\")   Wt 83.9 kg (185 lb)   SpO2 97%   BMI 31.76 kg/m²   Vitals and nursing note reviewed.   Constitutional:       General: he  is not in acute distress.     Appearance: he is well-developed. He is not diaphoretic.   HENT:      Head: Normocephalic and atraumatic.   Eyes:      Conjunctiva/sclera: Conjunctivae normal.   Pulmonary:      Effort: Pulmonary effort is normal. No respiratory distress.   Skin:     General: Skin is warm.      Capillary Refill: Capillary refill takes less than 2 seconds.   Neurological:      Mental Status: he is alert.     Ortho Exam:  Physical Exam  Right knee " "examination reveals near full range of motion 0 to 120 degrees.  There is tenderness palpation to the medial, and lateral joint lines.  Patellar grind test is positive.  Trace effusion.    Results  No new imaging today.    Assessment & Plan    1. Right knee pain, unspecified chronicity  - XR Knee 4+ View Right    2.  Primary osteoarthritis of the right knee    3.  Bucket-handle meniscus tear of the right knee    I discussed pathology and treatment options with the patient today.  He has previously failed corticosteroid, physical therapy, over-the-counter, and prescription strength medications for this knee.  Most recently he had hyaluronic acid which did incredibly well.  He would like to repeat HA if possible, will start hyaluronic acid prior Auth.  Patient can return if PA is achieved for injection only visit.    Tyler JAIMES. \"Chance\" Subhash REYNOLDS DO, CAQSM  06/11/24  16:31 EDT    Disclaimer: Please note that areas of this note were completed with computer voice recognition software.  Quite often unanticipated grammatical, syntax, homophones, and other interpretive errors are inadvertently transcribed by the computer software. Please excuse any errors that have escaped final proofreading.    Patient or patient representative verbalized consent for the use of Ambient Listening during the visit with  Tyler Cullen II, DO for chart documentation. 16:31 EDT 06/11/24   "

## 2024-07-02 ENCOUNTER — OFFICE VISIT (OUTPATIENT)
Dept: CARDIOLOGY | Facility: CLINIC | Age: 69
End: 2024-07-02
Payer: MEDICARE

## 2024-07-02 VITALS
WEIGHT: 196 LBS | DIASTOLIC BLOOD PRESSURE: 71 MMHG | RESPIRATION RATE: 18 BRPM | BODY MASS INDEX: 33.46 KG/M2 | HEART RATE: 60 BPM | SYSTOLIC BLOOD PRESSURE: 138 MMHG | HEIGHT: 64 IN

## 2024-07-02 DIAGNOSIS — I10 ESSENTIAL HYPERTENSION: Chronic | ICD-10-CM

## 2024-07-02 DIAGNOSIS — Z00.00 PREVENTATIVE HEALTH CARE: Primary | ICD-10-CM

## 2024-07-02 DIAGNOSIS — E78.5 HYPERLIPIDEMIA, UNSPECIFIED HYPERLIPIDEMIA TYPE: ICD-10-CM

## 2024-07-02 NOTE — PROGRESS NOTES
Cardiology Clinic Note  Roderick Johns MD, PhD    Subjective:     Encounter Date:07/02/2024      Patient ID: Enrrique Alejandre is a 69 y.o. male.    Chief Complaint:  Chief Complaint   Patient presents with    Follow-up       HPI:      At the pleasure to see this very pleasant 69-year-old gentleman back for 1 year follow-up with history of TIA remotely, hypertension hyperlipidemia and paroxysmal supraventricular tachycardia on low-dose sotalol, also with myasthenia gravis on pyridostigmine and CellCept.  EKG reviewed and interpreted me demonstrates sinus rhythm with normal conduction with normal QT interval again today.  He denies chest pain shortness of breath heart failure signs or symptoms palpitations presyncope or any other complaints.  .  He is largely exertional and highly functional with great activity with hunting fishing manual labor chopping wood mowing the yard.  He is otherwise doing very well, overall unchanged today doing well 1 year later after previous encounter, continues on sotalol with sinus rhythm, QT is normal, no unexplained syncope or palpitations.  He does tire out with some activity possibly secondary to myasthenia and he does limit himself somewhat to avoid overexertion.  No fevers chill sweats nausea vomiting diarrhea or other complaints     Review of systems otherwise negative x14 point review of systems except as mentioned above     Historical data copied forward from previous encounters in EMR including the history, exam, and assessment/plan has been reviewed and is unchanged unless noted otherwise.     Cardiac medicines reviewed with risk, benefits, and necessity of each discussed.     Risk and benefit of cardiac testing reviewed including death heart attack stroke pain bleeding infection need for vascular /cardiovascular surgery were discussed and the patient      Objective:         Vitals reviewed below Roderick Johns MD, PhD     Physical Exam  Regular rate and rhythm no rubs  "murmurs gallops  Or heave no lift  No clubbing cyanosis or edema  Normal CV exam  Assessment:         Independently manage medical conditions         Essential hypertension well-controlled  Hyperlipidemia continue statin therapy and aspirin  Paroxysmal SVT, sotalol low-dose, QT is normal, continue, remains in sinus rhythm  Primary prevention goals for CAD, calcium scoring this year has been ordered, correlate with lipid studies  Secondary prevention goals for stroke with goal blood pressure less than 135 systolic, goal A1c less than 7, goal LDL would be less than 70 optimally less than 55 on medical therapy     Continue amlodipine aspirin atorvastatin sotalol twice daily  QT normal    Myasthenia gravis, on pyridostigmine as well as CellCept  If becomes an issue we can stop amlodipine and lieu of other alternative antihypertensives     See him back in 1 year       Objective:         /71 (BP Location: Right arm, Patient Position: Sitting)   Pulse 60   Resp 18   Ht 162.6 cm (64\")   Wt 88.9 kg (196 lb)   BMI 33.64 kg/m²     Physical Exam    Assessment:         There are no diagnoses linked to this encounter.       Plan:              The pleasure to be involved in this patient's cardiovascular care.  Please call with any questions or concerns  Roderick Johns MD, PhD    Most recent EKG as reviewed and interpreted by me:    ECG 12 Lead    Date/Time: 7/2/2024 10:25 AM  Performed by: Roderick Johns MD    Authorized by: Roderick Johns MD  Comparison: not compared with previous ECG   Previous ECG: no previous ECG available  Rhythm: sinus rhythm  Rate: normal  Conduction: conduction normal  QRS axis: normal    Clinical impression: normal ECG           Most recent echo as reviewed and interpreted by me:  Results for orders placed during the hospital encounter of 01/19/21    Adult Transthoracic Echo Limited W/ Cont if Necessary Per Protocol    Interpretation Summary  · Left ventricular ejection " fraction appears to be 56 - 60%.  · No pericardial effusion noted      Most recent stress test as reviewed and interpreted by me:      Most recent cardiac catheterization as reviewed interpreted by me:  No results found for this or any previous visit.    The following portions of the patient's history were reviewed and updated as appropriate: allergies, current medications, past family history, past medical history, past social history, past surgical history, and problem list.      ROS:  14 point review of systems negative except as mentioned above    Current Outpatient Medications:     amLODIPine (NORVASC) 5 MG tablet, TAKE 1 TABLET BY MOUTH DAILY, Disp: 90 tablet, Rfl: 1    aspirin 81 MG tablet, Take 1 tablet by mouth every night at bedtime., Disp: , Rfl:     atorvastatin (LIPITOR) 20 MG tablet, TAKE 1 TABLET BY MOUTH DAILY, Disp: 90 tablet, Rfl: 1    doxycycline (VIBRAMYCIN) 100 MG capsule, Take 1 capsule by mouth 2 (Two) Times a Day. Take twice daily for 1 month, then reduce to daily (Patient taking differently: Take 1 capsule by mouth Daily. Take twice daily for 1 month, then reduce to daily), Disp: 60 capsule, Rfl: 5    escitalopram (LEXAPRO) 10 MG tablet, TAKE 1 TABLET BY MOUTH DAILY, Disp: 90 tablet, Rfl: 1    finasteride (PROSCAR) 5 MG tablet, Take 1 tablet by mouth Daily., Disp: , Rfl:     levothyroxine (SYNTHROID, LEVOTHROID) 125 MCG tablet, Take 0.5 tablets by mouth Daily., Disp: 45 tablet, Rfl: 1    lisinopril (PRINIVIL,ZESTRIL) 20 MG tablet, Take 1 tablet by mouth Daily., Disp: 90 tablet, Rfl: 1    mycophenolate (CELLCEPT) 500 MG tablet, TAKE 1 TABLET BY MOUTH TWICE A DAY, Disp: 180 tablet, Rfl: 1    omeprazole (priLOSEC) 40 MG capsule, TAKE 1 CAPSULE BY MOUTH DAILY, Disp: 90 capsule, Rfl: 1    pyridostigmine (MESTINON) 180 MG CR tablet, TAKE 1 TABLET BY MOUTH TWICE A DAY, Disp: 180 tablet, Rfl: 1    pyridostigmine (MESTINON) 60 MG tablet, Take 1 tablet by mouth 4 (Four) Times a Day As Needed  (fatigue)., Disp: , Rfl:     sotalol (BETAPACE) 80 MG tablet, TAKE 1/2 TABLET BY MOUTH EVERY 12 HOURS, Disp: 90 tablet, Rfl: 1    tamsulosin (FLOMAX) 0.4 MG capsule 24 hr capsule, Take 2 capsules by mouth Daily., Disp: 180 capsule, Rfl: 3    traZODone (DESYREL) 50 MG tablet, TAKE ONE TO TWO TABLETS BY MOUTH EVERY EVENING FOR SLEEP, Disp: 60 tablet, Rfl: 2    Problem List:  Patient Active Problem List   Diagnosis    Essential hypertension    PSVT (paroxysmal supraventricular tachycardia)    CVA (cerebral vascular accident)    Chronic pancreatitis    Hematuria    High prostate specific antigen (PSA)    Hypomagnesemia    Vitamin D deficiency    Myasthenia gravis    Radicular low back pain    Glucosuria    Anxiety and depression    Benign prostatic hyperplasia with nocturia    Cholecystitis    Syncope    Real time reverse transcriptase PCR positive for COVID-19 virus    UTI (urinary tract infection)    Urinary retention    Hyperlipidemia    Gastroesophageal reflux disease    Hypothyroidism    Urothelial carcinoma of bladder    Insomnia     Past Medical History:  Past Medical History:   Diagnosis Date    Anxiety 2/2014    Arthritis     Arthritis of back 2014    Benign prostatic hyperplasia     Cancer Non invasive bladder    No treatment just observation    Erectile dysfunction     Fracture, clavicle 6/1975    High prostate specific antigen (PSA) 08/16/2017    History of echocardiogram     EF 55%. No significant valvular flow abnormalities.     History of stress test 11/2010    Stress Myoview: No ischemia 11/2010    HL (hearing loss)     HTN (hypertension) 06/25/2019    Kidney stone     Knee swelling 2012    Low back pain 2019    Myasthenia gravis     Pancreatitis     PSVT (paroxysmal supraventricular tachycardia) 06/25/2019    Rotator cuff syndrome 1989    Stroke 02/2014    Supraventricular tachycardia     Suspected COVID-19 virus infection 01/12/2021    Tear of meniscus of knee 2022    TIA (transient ischemic attack)  2008    Urinary tract infection 2021     Past Surgical History:  Past Surgical History:   Procedure Laterality Date    ADENOIDECTOMY      CHOLECYSTECTOMY N/A 07/04/2020    Procedure: CHOLECYSTECTOMY LAPAROSCOPIC;  Surgeon: Yogesh Presley MD;  Location: [facility] MAIN OR;  Service: General;  Laterality: N/A;    ECHO - CONVERTED  01/20/2021    FINGER SURGERY      HAND SURGERY  2018    tumor removed non invasive cancer    HERNIA REPAIR  7/4/2020    PROSTATE BIOPSY  2019    [facility]    PROSTATE SURGERY  11/23/2021    TONSILLECTOMY      VASECTOMY       Social History:  Social History     Socioeconomic History    Marital status:    Tobacco Use    Smoking status: Never    Smokeless tobacco: Never   Vaping Use    Vaping status: Never Used   Substance and Sexual Activity    Alcohol use: Yes     Alcohol/week: 3.0 standard drinks of alcohol     Types: 1 Glasses of wine, 1 Cans of beer, 1 Drinks containing 0.5 oz of alcohol per week    Drug use: No    Sexual activity: Not Currently     Partners: Female     Birth control/protection: Condom, Vasectomy     Comment: Vasectomy     Allergies:  No Known Allergies  Immunizations:  Immunization History   Administered Date(s) Administered    COVID-19 (PFIZER) Purple Cap Monovalent 03/10/2021, 03/31/2021, 03/01/2022    Fluzone High Dose =>65 Years ([facility] ONLY) 11/09/2020    Influenza Quad Vaccine (Inpatient) 10/27/2014    Pneumococcal Conjugate 20-Valent (PCV20) 08/17/2022    Pneumococcal Polysaccharide (PPSV23) 08/10/2020    Tdap 01/01/2013            In-Office Procedure(s):  No orders to display        ASCVD RIsk Score::  The ASCVD Risk score (Mona DK, et al., 2019) failed to calculate for the following reasons:    The patient has a prior MI or stroke diagnosis    Imaging:    Results for orders placed in visit on 06/11/24    XR Knee 4+ View Right    Narrative  DATE OF EXAM:  6/11/2024    PROCEDURE:  XR knee 4+ view right    INDICATIONS:  Chronic right knee  pain    COMPARISON:  No Comparisons Available    FINDINGS:  No acute displaced fracture is seen. There is no joint dislocation.  There is considerable osteoarthritic disease with joint space narrowing of the medial compartment most obvious.  The lateral compartment and patellofemoral compartments are mildly well-preserved though tricompartmental osteoarthritis is noted.  No subcutaneous gas or retained radiopaque foreign body is identified.    Impression  Moderate tricompartmental osteoarthritis of the right knee, no acute findings.       Results for orders placed during the hospital encounter of 12/18/23    CT Facial Bones Without Contrast    Narrative  CT FACIAL BONES WO CONTRAST    Date of Exam: 12/18/2023 9:12 AM CST    Indication: trauma.    Comparison: CT head 1/19/2021    Technique: Axial CT images were obtained from the inferior aspect of the mandible through the frontal sinuses without contrast administration.  Sagittal and coronal reconstructions were performed.  Automated exposure control and iterative reconstruction  methods were used.      Findings:  Evaluation of facial bones demonstrates a fracture and injury along the medial posterior wall of the left maxillary sinus (image 39 of series 3 and image 39 of series 4 with debris noted within the ethmoid sinus. There is a gas/fluid level in the left  maxillary sinus.    Gas fluid levels are also present in the right maxillary sinus and right sphenoid sinus but without additional site of fracture noted. The remaining maxillary sinus walls appear intact. Pterygoid plates are intact.    The nasal septum deviates left of midline. Nasal bones appear intact without displacement.    Orbital rims are intact. The orbits are symmetric in appearance. No evidence for retrobulbar or fat stranding.    Visualized mandible and remaining facial bones are unremarkable.    Visualized soft tissues are unremarkable. Limited evaluation of brain parenchyma shows no  abnormality.    Impression  Impression:    1. Findings compatible with a fracture along the medial posterior wall of the left maxillary sinus with a gas/fluid level in the left maxillary sinus.    2. Gas fluid levels within the right maxillary and right sphenoid sinus without additional site of fracture noted.        Electronically Signed: Nora Shaw MD  12/18/2023 9:27 AM CST  Workstation ID: [id number]      Results for orders placed during the hospital encounter of 12/18/23    CT Facial Bones Without Contrast    Narrative  CT FACIAL BONES WO CONTRAST    Date of Exam: 12/18/2023 9:12 AM CST    Indication: trauma.    Comparison: CT head 1/19/2021    Technique: Axial CT images were obtained from the inferior aspect of the mandible through the frontal sinuses without contrast administration.  Sagittal and coronal reconstructions were performed.  Automated exposure control and iterative reconstruction  methods were used.      Findings:  Evaluation of facial bones demonstrates a fracture and injury along the medial posterior wall of the left maxillary sinus (image 39 of series 3 and image 39 of series 4 with debris noted within the ethmoid sinus. There is a gas/fluid level in the left  maxillary sinus.    Gas fluid levels are also present in the right maxillary sinus and right sphenoid sinus but without additional site of fracture noted. The remaining maxillary sinus walls appear intact. Pterygoid plates are intact.    The nasal septum deviates left of midline. Nasal bones appear intact without displacement.    Orbital rims are intact. The orbits are symmetric in appearance. No evidence for retrobulbar or fat stranding.    Visualized mandible and remaining facial bones are unremarkable.    Visualized soft tissues are unremarkable. Limited evaluation of brain parenchyma shows no abnormality.    Impression  Impression:    1. Findings compatible with a fracture along the medial posterior wall of the left maxillary  sinus with a gas/fluid level in the left maxillary sinus.    2. Gas fluid levels within the right maxillary and right sphenoid sinus without additional site of fracture noted.        Electronically Signed: Nora Shaw MD  12/18/2023 9:27 AM CST  Workstation ID: [id number]      Lab Review:   Office Visit on 05/08/2024   Component Date Value    Glucose 05/08/2024 91     BUN 05/08/2024 14     Creatinine 05/08/2024 0.85     Sodium 05/08/2024 141     Potassium 05/08/2024 4.0     Chloride 05/08/2024 107     CO2 05/08/2024 27.2     Calcium 05/08/2024 8.8     BUN/Creatinine Ratio 05/08/2024 16.5     Anion Gap 05/08/2024 6.8     eGFR 05/08/2024 94.1     TSH 05/08/2024 2.360     Free T4 05/08/2024 0.79 (L)     T3, Free 05/08/2024 2.94      Recent labs reviewed and interpreted for clinical significance and application            Level of Care:           Roderick Johns MD  07/02/24  .

## 2024-07-05 ENCOUNTER — TELEPHONE (OUTPATIENT)
Dept: CARDIOLOGY | Facility: CLINIC | Age: 69
End: 2024-07-05

## 2024-07-05 NOTE — TELEPHONE ENCOUNTER
Caller: NEO ANAYA    Relationship: Emergency Contact    Best call back number: [phone]    What is the best time to reach you: ANYTIME     Who are you requesting to speak with (clinical staff, provider,  specific staff member): TANYA    Do you know the name of the person who called:     What was the call regarding: GOT TXT ABOUT LAB WORK NEEDING DONE BY JULY 7TH     Is it okay if the provider responds through MyChart: YES

## 2024-07-08 ENCOUNTER — LAB (OUTPATIENT)
Dept: LAB | Facility: HOSPITAL | Age: 69
End: 2024-07-08
Payer: MEDICARE

## 2024-07-08 DIAGNOSIS — E78.5 HYPERLIPIDEMIA, UNSPECIFIED HYPERLIPIDEMIA TYPE: ICD-10-CM

## 2024-07-08 DIAGNOSIS — I10 ESSENTIAL HYPERTENSION: Chronic | ICD-10-CM

## 2024-07-08 LAB
ALBUMIN SERPL-MCNC: 4.2 G/DL (ref 3.5–5.2)
ALBUMIN/GLOB SERPL: 2 G/DL
ALP SERPL-CCNC: 75 U/L (ref 39–117)
ALT SERPL W P-5'-P-CCNC: 12 U/L (ref 1–41)
ANION GAP SERPL CALCULATED.3IONS-SCNC: 8.4 MMOL/L (ref 5–15)
AST SERPL-CCNC: 22 U/L (ref 1–40)
BILIRUB SERPL-MCNC: 0.2 MG/DL (ref 0–1.2)
BUN SERPL-MCNC: 23 MG/DL (ref 8–23)
BUN/CREAT SERPL: 27.1 (ref 7–25)
CALCIUM SPEC-SCNC: 8.8 MG/DL (ref 8.6–10.5)
CHLORIDE SERPL-SCNC: 109 MMOL/L (ref 98–107)
CHOLEST SERPL-MCNC: 168 MG/DL (ref 0–200)
CO2 SERPL-SCNC: 26.6 MMOL/L (ref 22–29)
CREAT SERPL-MCNC: 0.85 MG/DL (ref 0.76–1.27)
EGFRCR SERPLBLD CKD-EPI 2021: 94.1 ML/MIN/1.73
GLOBULIN UR ELPH-MCNC: 2.1 GM/DL
GLUCOSE SERPL-MCNC: 98 MG/DL (ref 65–99)
HDLC SERPL-MCNC: 37 MG/DL (ref 40–60)
LDLC SERPL CALC-MCNC: 94 MG/DL (ref 0–100)
LDLC/HDLC SERPL: 2.36 {RATIO}
POTASSIUM SERPL-SCNC: 4.2 MMOL/L (ref 3.5–5.2)
PROT SERPL-MCNC: 6.3 G/DL (ref 6–8.5)
SODIUM SERPL-SCNC: 144 MMOL/L (ref 136–145)
TRIGL SERPL-MCNC: 218 MG/DL (ref 0–150)
VLDLC SERPL-MCNC: 37 MG/DL (ref 5–40)

## 2024-07-08 PROCEDURE — 80061 LIPID PANEL: CPT

## 2024-07-08 PROCEDURE — 36415 COLL VENOUS BLD VENIPUNCTURE: CPT

## 2024-07-08 PROCEDURE — 80053 COMPREHEN METABOLIC PANEL: CPT

## 2024-07-12 NOTE — PROGRESS NOTES
Outreach attempt was made to schedule a Medicare Wellness Visit. This was the first attempt. Contact was made, [address] [facility] appointment scheduled.    Patient is on the recall list. Primary Care Sports Medicine Office Visit Note     Patient ID: Enrrique Alejandre is a 68 y.o. male.    Chief Complaint:  Chief Complaint   Patient presents with   • Right Knee - Pain, Follow-up     Injection      HPI:    Mr. Enrrique Alejander is a 68 y.o. male. The patient presents to the clinic for a follow-up evaluation for right knee corticosteroid injection.    The patient claims that for the past two weeks, his right knee has only mildly bothered him. His pain was reduced after receiving the corticosteroid injection on 12/28/2022. He can still stand on his right knee. A week ago, he visited the [employer], where he could play basketball. On his right knee, he claims to have walked just over 3 miles. He denies any new injuries. He claims that his pain is the same as it was at the beginning. The effects of the corticosteroid injection stopped about 2 weeks ago.    Past Medical History:   Diagnosis Date   • Anxiety 2/2014   • Arthritis    • Benign prostatic hyperplasia    • Cancer (HCC) Non invasive bladder    No treatment just observation   • Erectile dysfunction    • High prostate specific antigen (PSA) 08/16/2017   • History of echocardiogram     EF 55%. No significant valvular flow abnormalities.    • History of stress test 11/2010    Stress Myoview: No ischemia 11/2010   • HL (hearing loss)    • HTN (hypertension) 06/25/2019   • Kidney stone    • Myasthenia gravis (HCC)    • Pancreatitis    • PSVT (paroxysmal supraventricular tachycardia) ([facility]) 06/25/2019   • Stroke (HCC) 02/2014   • Supraventricular tachycardia (HCC)    • Suspected COVID-19 virus infection 01/12/2021   • TIA (transient ischemic attack) 2008   • Urinary tract infection 2021       Past Surgical History:   Procedure Laterality Date   • ADENOIDECTOMY     • CHOLECYSTECTOMY N/A 07/04/2020    Procedure: CHOLECYSTECTOMY LAPAROSCOPIC;  Surgeon: Yogesh Presley MD;  Location: [facility] MAIN OR;  Service: General;  Laterality: N/A;  "  • ECHO - CONVERTED  01/20/2021   • FINGER SURGERY     • HERNIA REPAIR  7/4/2020   • PROSTATE BIOPSY  2019    [facility]   • PROSTATE SURGERY  11/23/2021   • TONSILLECTOMY     • VASECTOMY         Family History   Problem Relation Age of Onset   • Hypertension Mother    • Stroke Mother    • Other Mother         Cerebral Bleed   • Hypertension Father    • Other Father    • Diabetes Maternal Grandmother      Social History     Occupational History   • Not on file   Tobacco Use   • Smoking status: Never   • Smokeless tobacco: Never   Vaping Use   • Vaping Use: Never used   Substance and Sexual Activity   • Alcohol use: Yes     Alcohol/week: 3.0 standard drinks     Types: 1 Glasses of wine, 1 Cans of beer, 1 Drinks containing 0.5 oz of alcohol per week   • Drug use: No   • Sexual activity: Not Currently     Partners: Female     Birth control/protection: Condom, Vasectomy     Comment: Vasectomy      Review of Systems   Constitutional: Negative for activity change, fatigue and fever.   Musculoskeletal: Positive for arthralgias.   Skin: Negative for color change and rash.   Neurological: Negative for numbness.     Objective:    Pulse 64   Ht 162.6 cm (64\")   Wt 87.5 kg (193 lb)   SpO2 96%   BMI 33.13 kg/m²     Physical Examination:  Physical Exam  Vitals and nursing note reviewed.   Constitutional:       General: He is not in acute distress.     Appearance: He is well-developed. He is not diaphoretic.   HENT:      Head: Normocephalic and atraumatic.   Eyes:      Conjunctiva/sclera: Conjunctivae normal.   Pulmonary:      Effort: Pulmonary effort is normal. No respiratory distress.   Musculoskeletal:      Right knee: No effusion.      Instability Tests: Medial Alex test negative and lateral Alex test negative.   Skin:     General: Skin is warm.      Capillary Refill: Capillary refill takes less than 2 seconds.   Neurological:      Mental Status: He is alert.       Right Knee Exam     Muscle " Strength   The patient has normal right knee strength.    Tenderness   The patient is experiencing tenderness in the medial joint line (Mild tenderness to palpation ).    Range of Motion   The patient has normal right knee ROM.  Extension: normal   Flexion: normal     Tests   Alex:  Medial - negative Lateral - negative  Varus: negative Valgus: negative  Lachman:  Anterior - negative      Drawer:  Anterior - negative    Posterior - negative    Other   Erythema: absent  Sensation: normal  Swelling: none  Effusion: no effusion present        Imaging and other tests:  No new imaging.    Assessment and Plan:    1. Bucket handle meniscus tear of the right medial meniscus, subsequent encounter.  2. Primary osteoarthritis of the right knee.    After discussion of risks and benefits, the patient elected to proceed with corticosteroid injection to the right knee. The patient tolerated this procedure well without any complaints or problems. I recommended continuation of conservative management as previous, Return to the clinic in 3 to 6 months or sooner if symptoms recur.    Transcribed from ambient dictation for Tyler Cullen II,  by Amy Kahn.  03/29/23   11:15 EDT    Patient or patient representative verbalized consent to the visit recording.  I have personally performed the services described in this document as transcribed by the above individual, and it is both accurate and complete.    Disclaimer: Please note that areas of this note were completed with computer voice recognition software.  Quite often unanticipated grammatical, syntax, homophones, and other interpretive errors are inadvertently transcribed by the computer software. Please excuse any errors that have escaped final proofreading.     This document is complete and the patient is ready for discharge.

## 2024-07-15 DIAGNOSIS — L71.1 RHINOPHYMA: ICD-10-CM

## 2024-07-15 RX ORDER — DOXYCYCLINE HYCLATE 100 MG/1
CAPSULE ORAL
Qty: 60 CAPSULE | Refills: 5 | Status: SHIPPED | OUTPATIENT
Start: 2024-07-15

## 2024-07-24 ENCOUNTER — OFFICE VISIT (OUTPATIENT)
Dept: FAMILY MEDICINE CLINIC | Facility: CLINIC | Age: 69
End: 2024-07-24
Payer: MEDICARE

## 2024-07-24 VITALS
WEIGHT: 195.8 LBS | OXYGEN SATURATION: 97 % | BODY MASS INDEX: 33.43 KG/M2 | RESPIRATION RATE: 18 BRPM | HEART RATE: 73 BPM | HEIGHT: 64 IN | DIASTOLIC BLOOD PRESSURE: 73 MMHG | SYSTOLIC BLOOD PRESSURE: 132 MMHG

## 2024-07-24 DIAGNOSIS — M79.662 PAIN IN LEFT LOWER LEG: Primary | ICD-10-CM

## 2024-07-24 PROCEDURE — 1125F AMNT PAIN NOTED PAIN PRSNT: CPT | Performed by: STUDENT IN AN ORGANIZED HEALTH CARE EDUCATION/TRAINING PROGRAM

## 2024-07-24 PROCEDURE — 99213 OFFICE O/P EST LOW 20 MIN: CPT | Performed by: STUDENT IN AN ORGANIZED HEALTH CARE EDUCATION/TRAINING PROGRAM

## 2024-07-24 PROCEDURE — 3078F DIAST BP <80 MM HG: CPT | Performed by: STUDENT IN AN ORGANIZED HEALTH CARE EDUCATION/TRAINING PROGRAM

## 2024-07-24 PROCEDURE — 3075F SYST BP GE 130 - 139MM HG: CPT | Performed by: STUDENT IN AN ORGANIZED HEALTH CARE EDUCATION/TRAINING PROGRAM

## 2024-07-24 NOTE — PROGRESS NOTES
"Chief Complaint  Chief Complaint   Patient presents with    Leg Pain     Left lower leg pain     Subjective        Enrrique Alejandre is a 69 y.o. male who presents to Psychiatric Medicine.    History of Present Illness  Here for acute visit.  L lower leg pain.  He has a 1 cm spot in the area where it hurts.   There was concern for lower extremity swelling in May when he saw Dr. Martinez.  They stopped his amlodipine which was helpful for the swelling.  No specific triggers.  No bug bites, falls, trauma.  He describes it as a burning ache.  It started about 1 wk ago.  It comes and goes, it is not constant.  Yesterday it hurt really bad.    Objective   /73   Pulse 73   Resp 18   Ht 162.6 cm (64\")   Wt 88.8 kg (195 lb 12.8 oz)   SpO2 97%   BMI 33.61 kg/m²     Estimated body mass index is 33.61 kg/m² as calculated from the following:    Height as of this encounter: 162.6 cm (64\").    Weight as of this encounter: 88.8 kg (195 lb 12.8 oz).     Physical Exam   GEN: In no acute distress, non toxic appearing  SKIN: L medial lower calf w/ palpable swelling underneath skin.  No skin redness.  + ttp.  Trace bilateral LE edema.     Result Review :              Assessment and Plan     Diagnoses and all orders for this visit:    1. Pain in left lower leg (Primary)    Based on history and physical I suspect this is superficial thrombophlebitis vs dermoid cyst.  Discussed obtaining an US but he would like to hold off for now, which is reasonable.  He will keep us updated if anything changes.  Discussed warning signs of DVT to monitor for: worsening unilateral swelling, skin erythema, worsening pain, shortness of breath.         Follow Up   Has appt w/ Dr. Martinez in 1 month.  He will let us know if any worsening sooner.      "

## 2024-07-26 ENCOUNTER — TELEPHONE (OUTPATIENT)
Dept: ORTHOPEDIC SURGERY | Facility: CLINIC | Age: 69
End: 2024-07-26
Payer: MEDICARE

## 2024-07-26 DIAGNOSIS — M17.11 PRIMARY OSTEOARTHRITIS OF RIGHT KNEE: Primary | ICD-10-CM

## 2024-07-26 NOTE — TELEPHONE ENCOUNTER
Patient's wife called to check status of gel injection.  I reviewed chart and let her know that the gel injection was not ordered. I advised her we will get that ordered and they will receive a call once approved by insurance.      Visco was ordered

## 2024-08-01 ENCOUNTER — TELEPHONE (OUTPATIENT)
Dept: ORTHOPEDIC SURGERY | Facility: CLINIC | Age: 69
End: 2024-08-01

## 2024-08-01 NOTE — TELEPHONE ENCOUNTER
Caller: NEO ANAYA    Relationship to patient: Emergency Contact    Best call back number: [phone] (home)  [phone]    Chief complaint: RIGHT KNEE PAIN     Type of visit: GEL INJECTION     Requested date: ANYDAY AFTER 2      If rescheduling, when is the original appointment: NA      Additional notes:NA

## 2024-08-01 NOTE — TELEPHONE ENCOUNTER
Patient [facility]'ed for righ tknee monovisc injection on 08/08/2024 @3;45. Called left voicemail with date and time of appt.

## 2024-08-05 RX ORDER — MYCOPHENOLATE MOFETIL 500 MG/1
500 TABLET ORAL 2 TIMES DAILY
Qty: 180 TABLET | Refills: 1 | Status: SHIPPED | OUTPATIENT
Start: 2024-08-05

## 2024-08-07 ENCOUNTER — TELEPHONE (OUTPATIENT)
Dept: FAMILY MEDICINE CLINIC | Facility: CLINIC | Age: 69
End: 2024-08-07

## 2024-08-07 NOTE — TELEPHONE ENCOUNTER
Caller: Cristobal Alejandre    Relationship: Self    Best call back number: [phone]     What is the best time to reach you: ANY    Who are you requesting to speak with (clinical staff, provider,  specific staff member): CLINICAL    Do you know the name of the person who called: CRISTOBAL    What was the call regarding: PATIENT HAS A QUESTION ABOUT HIS DOSAGE CHANGE FOR levothyroxine (SYNTHROID, LEVOTHROID) 125 MCG tablet,    Is it okay if the provider responds through [employer]: PLEASE CALL.

## 2024-08-08 ENCOUNTER — OFFICE VISIT (OUTPATIENT)
Dept: ORTHOPEDIC SURGERY | Facility: CLINIC | Age: 69
End: 2024-08-08
Payer: MEDICARE

## 2024-08-08 VITALS — BODY MASS INDEX: 31.58 KG/M2 | HEIGHT: 64 IN | WEIGHT: 185 LBS | OXYGEN SATURATION: 95 % | HEART RATE: 82 BPM

## 2024-08-08 DIAGNOSIS — M17.0 PRIMARY OSTEOARTHRITIS OF BOTH KNEES: Primary | ICD-10-CM

## 2024-08-08 RX ORDER — TRIAMCINOLONE ACETONIDE 40 MG/ML
80 INJECTION, SUSPENSION INTRA-ARTICULAR; INTRAMUSCULAR
Status: COMPLETED | OUTPATIENT
Start: 2024-08-08 | End: 2024-08-08

## 2024-08-08 RX ADMIN — TRIAMCINOLONE ACETONIDE 80 MG: 40 INJECTION, SUSPENSION INTRA-ARTICULAR; INTRAMUSCULAR at 17:24

## 2024-08-08 NOTE — PROGRESS NOTES
Primary Care Sports Medicine Office Visit Note    Patient ID: Enrrique Alejandre is a 69 y.o. male.    Chief Complaint:  Chief Complaint   Patient presents with    Right Knee - Follow-up, Pain     Monovisc inj        History of Present Illness  The patient presents for evaluation of right knee pain.    He has been experiencing pain in his right knee for the past few months. It has been a year since his last right knee replacement, but he forgot to reschedule the appointment. He noticed a significant improvement in his pain after a week of using the gel. Recently, he has been favoring his right knee slightly. He denies any new injuries or falls.       Past Medical History:   Diagnosis Date    Anxiety 2/2014    Arthritis     Arthritis of back 2014    Benign prostatic hyperplasia     Cancer Non invasive bladder    No treatment just observation    Erectile dysfunction     Fracture, clavicle 6/1975    High prostate specific antigen (PSA) 08/16/2017    History of echocardiogram     EF 55%. No significant valvular flow abnormalities.     History of stress test 11/2010    Stress Myoview: No ischemia 11/2010    HL (hearing loss)     HTN (hypertension) 06/25/2019    Kidney stone     Knee swelling 2012    Low back pain 2019    Myasthenia gravis     Pancreatitis     PSVT (paroxysmal supraventricular tachycardia) 06/25/2019    Rotator cuff syndrome 1989    Stroke 02/2014    Supraventricular tachycardia     Suspected COVID-19 virus infection 01/12/2021    Tear of meniscus of knee 2022    TIA (transient ischemic attack) 2008    Urinary tract infection 2021       Past Surgical History:   Procedure Laterality Date    ADENOIDECTOMY      CHOLECYSTECTOMY N/A 07/04/2020    Procedure: CHOLECYSTECTOMY LAPAROSCOPIC;  Surgeon: Yogesh Presley MD;  Location: [facility] MAIN OR;  Service: General;  Laterality: N/A;    ECHO - CONVERTED  01/20/2021    FINGER SURGERY      HAND SURGERY  2018    tumor removed non invasive cancer    HERNIA REPAIR   "7/4/2020    PROSTATE BIOPSY  2019    [facility]    PROSTATE SURGERY  11/23/2021    TONSILLECTOMY      VASECTOMY         Family History   Problem Relation Age of Onset    Hypertension Mother     Stroke Mother     Other Mother         Cerebral Bleed    Arthritis Mother     Hypertension Father     Other Father     Arthritis Father     Diabetes Maternal Grandmother      Social History     Occupational History    Not on file   Tobacco Use    Smoking status: Never    Smokeless tobacco: Never   Vaping Use    Vaping status: Never Used   Substance and Sexual Activity    Alcohol use: Yes     Alcohol/week: 3.0 standard drinks of alcohol     Types: 1 Glasses of wine, 1 Cans of beer, 1 Drinks containing 0.5 oz of alcohol per week    Drug use: No    Sexual activity: Not Currently     Partners: Female     Birth control/protection: Condom, Vasectomy     Comment: Vasectomy      Review of Systems  Objective:  Review of Systems:  Review of Systems   Constitutional:  Negative for activity change, fatigue and fever.   Musculoskeletal:  Positive for arthralgias.   Skin:  Negative for color change and rash.   Neurological:  Negative for numbness.     Physical Exam  Pulse 82   Ht 162.6 cm (64\")   Wt 83.9 kg (185 lb)   SpO2 95%   BMI 31.76 kg/m²   Vitals and nursing note reviewed.   Constitutional:       General: he is not in acute distress.     Appearance: he is well-developed. He is not diaphoretic.   HENT:      Head: Normocephalic and atraumatic.   Eyes:      Conjunctiva/sclera: Conjunctivae normal.   Pulmonary:      Effort: Pulmonary effort is normal. No respiratory distress.   Skin:     General: Skin is warm.      Capillary Refill: Capillary refill takes less than 2 seconds.   Neurological:      Mental Status: he  is alert.     Right Ankle Exam     Range of Motion   The patient has normal right ankle ROM.       Right Knee Exam     Muscle Strength   The patient has normal right knee strength.    Tenderness   The " "patient is experiencing tenderness in the lateral joint line, medial joint line and patella.    Range of Motion   Extension:  normal   Flexion:  normal     Tests   Alex:  Medial - negative Lateral - negative  Varus: negative Valgus: negative  Right knee patellar apprehension test: +patellar grind, +narciso masterson.    Other   Erythema: absent  Sensation: normal  Pulse: present (distal to the knee, DP and PT palpable)  Swelling: none  Effusion: no effusion present        Results  No new imaging today.     Assessment and Plan:    There are no diagnoses linked to this encounter.    After discussion of risks and benefits, the patient elected to proceed with hyaluronic acid injection to the R knee.  The patient tolerated this procedure well without any complaints or problems.  I recommended continuation of conservative management as previous, RTC in 3-6 months or sooner if symptoms recur.    Large Joint Arthrocentesis: R knee  Date/Time: 8/8/2024 5:24 PM  Consent given by: patient  Site marked: site marked  Timeout: Immediately prior to procedure a time out was called to verify the correct patient, procedure, equipment, support staff and site/side marked as required   Supporting Documentation  Indications: pain   Procedure Details  Location: knee - R knee  Preparation: Patient was prepped and draped in the usual sterile fashion  Needle size: 18 G  Approach: anteromedial (25ga needle was used to inject 3cc of 2% lidocaine without epinepherine to anesthetize the tract)  Medications administered: 80 mg triamcinolone acetonide 40 MG/ML  Patient tolerance: patient tolerated the procedure well with no immediate complications      Tyler CULLEN \"Joesph\" Subhash REYNOLDS DO CAQSM  08/08/24  17:23 EDT    Disclaimer: Please note that areas of this note were completed with computer voice recognition software.  Quite often unanticipated grammatical, syntax, homophones, and other interpretive errors are inadvertently transcribed by the " computer software. Please excuse any errors that have escaped final proofreading.    Patient or patient representative verbalized consent for the use of Ambient Listening during the visit with  Tyler Cullen II, DO for chart documentation. 08/08/24  17:23 EDT

## 2024-08-15 ENCOUNTER — HOSPITAL ENCOUNTER (OUTPATIENT)
Dept: CT IMAGING | Facility: HOSPITAL | Age: 69
Discharge: HOME | End: 2024-08-15
Admitting: INTERNAL MEDICINE

## 2024-08-15 DIAGNOSIS — E78.5 HYPERLIPIDEMIA, UNSPECIFIED HYPERLIPIDEMIA TYPE: ICD-10-CM

## 2024-08-15 PROCEDURE — 75571 CT HRT W/O DYE W/CA TEST: CPT

## 2024-08-16 DIAGNOSIS — G47.00 INSOMNIA, UNSPECIFIED TYPE: ICD-10-CM

## 2024-08-16 RX ORDER — OMEPRAZOLE 40 MG/1
40 CAPSULE, DELAYED RELEASE ORAL DAILY
Qty: 90 CAPSULE | Refills: 1 | Status: SHIPPED | OUTPATIENT
Start: 2024-08-16

## 2024-08-16 RX ORDER — TRAZODONE HYDROCHLORIDE 50 MG/1
TABLET ORAL
Qty: 120 TABLET | Refills: 1 | Status: SHIPPED | OUTPATIENT
Start: 2024-08-16

## 2024-08-23 DIAGNOSIS — K86.89 PANCREATIC MASS: Primary | ICD-10-CM

## 2024-08-23 DIAGNOSIS — I10 ESSENTIAL HYPERTENSION: Chronic | ICD-10-CM

## 2024-08-23 DIAGNOSIS — R93.1 ELEVATED CORONARY ARTERY CALCIUM SCORE: ICD-10-CM

## 2024-08-23 DIAGNOSIS — R06.09 DOE (DYSPNEA ON EXERTION): ICD-10-CM

## 2024-08-27 ENCOUNTER — LAB (OUTPATIENT)
Dept: FAMILY MEDICINE CLINIC | Facility: CLINIC | Age: 69
End: 2024-08-27
Payer: MEDICARE

## 2024-08-27 ENCOUNTER — OFFICE VISIT (OUTPATIENT)
Dept: FAMILY MEDICINE CLINIC | Facility: CLINIC | Age: 69
End: 2024-08-27
Payer: MEDICARE

## 2024-08-27 VITALS
SYSTOLIC BLOOD PRESSURE: 118 MMHG | OXYGEN SATURATION: 96 % | WEIGHT: 191.2 LBS | BODY MASS INDEX: 32.64 KG/M2 | DIASTOLIC BLOOD PRESSURE: 64 MMHG | HEART RATE: 87 BPM | RESPIRATION RATE: 18 BRPM | HEIGHT: 64 IN

## 2024-08-27 DIAGNOSIS — N40.1 BENIGN PROSTATIC HYPERPLASIA WITH NOCTURIA: ICD-10-CM

## 2024-08-27 DIAGNOSIS — Z00.00 MEDICARE ANNUAL WELLNESS VISIT, SUBSEQUENT: Primary | ICD-10-CM

## 2024-08-27 DIAGNOSIS — R35.1 BENIGN PROSTATIC HYPERPLASIA WITH NOCTURIA: ICD-10-CM

## 2024-08-27 DIAGNOSIS — D64.9 ANEMIA, UNSPECIFIED TYPE: ICD-10-CM

## 2024-08-27 DIAGNOSIS — Z12.5 ENCOUNTER FOR SCREENING FOR MALIGNANT NEOPLASM OF PROSTATE: ICD-10-CM

## 2024-08-27 DIAGNOSIS — K21.9 GASTROESOPHAGEAL REFLUX DISEASE, UNSPECIFIED WHETHER ESOPHAGITIS PRESENT: ICD-10-CM

## 2024-08-27 DIAGNOSIS — F32.A ANXIETY AND DEPRESSION: ICD-10-CM

## 2024-08-27 DIAGNOSIS — I47.10 PSVT (PAROXYSMAL SUPRAVENTRICULAR TACHYCARDIA): ICD-10-CM

## 2024-08-27 DIAGNOSIS — R93.1 ELEVATED CORONARY ARTERY CALCIUM SCORE: ICD-10-CM

## 2024-08-27 DIAGNOSIS — F41.9 ANXIETY AND DEPRESSION: ICD-10-CM

## 2024-08-27 DIAGNOSIS — G70.00 MYASTHENIA GRAVIS: ICD-10-CM

## 2024-08-27 DIAGNOSIS — C67.9 UROTHELIAL CARCINOMA OF BLADDER: ICD-10-CM

## 2024-08-27 DIAGNOSIS — L71.1 RHINOPHYMA: ICD-10-CM

## 2024-08-27 DIAGNOSIS — M79.662 TENDERNESS OF LEFT CALF: ICD-10-CM

## 2024-08-27 DIAGNOSIS — K86.89 PANCREATIC MASS: ICD-10-CM

## 2024-08-27 DIAGNOSIS — E03.9 HYPOTHYROIDISM, UNSPECIFIED TYPE: ICD-10-CM

## 2024-08-27 DIAGNOSIS — I10 ESSENTIAL HYPERTENSION: Chronic | ICD-10-CM

## 2024-08-27 DIAGNOSIS — G47.00 INSOMNIA, UNSPECIFIED TYPE: ICD-10-CM

## 2024-08-27 DIAGNOSIS — E55.9 VITAMIN D DEFICIENCY, UNSPECIFIED: ICD-10-CM

## 2024-08-27 DIAGNOSIS — E78.5 HYPERLIPIDEMIA, UNSPECIFIED HYPERLIPIDEMIA TYPE: ICD-10-CM

## 2024-08-27 LAB
25(OH)D3 SERPL-MCNC: 65 NG/ML (ref 30–100)
ALBUMIN SERPL-MCNC: 4.1 G/DL (ref 3.5–5.2)
ALBUMIN/GLOB SERPL: 2 G/DL
ALP SERPL-CCNC: 72 U/L (ref 39–117)
ALT SERPL W P-5'-P-CCNC: 15 U/L (ref 1–41)
ANION GAP SERPL CALCULATED.3IONS-SCNC: 9 MMOL/L (ref 5–15)
ANISOCYTOSIS BLD QL: ABNORMAL
AST SERPL-CCNC: 21 U/L (ref 1–40)
BASOPHILS # BLD MANUAL: 0.07 10*3/MM3 (ref 0–0.2)
BASOPHILS NFR BLD MANUAL: 1 % (ref 0–1.5)
BILIRUB SERPL-MCNC: 0.3 MG/DL (ref 0–1.2)
BUN SERPL-MCNC: 21 MG/DL (ref 8–23)
BUN/CREAT SERPL: 25 (ref 7–25)
CALCIUM SPEC-SCNC: 8.9 MG/DL (ref 8.6–10.5)
CHLORIDE SERPL-SCNC: 104 MMOL/L (ref 98–107)
CHOLEST SERPL-MCNC: 159 MG/DL (ref 0–200)
CO2 SERPL-SCNC: 26 MMOL/L (ref 22–29)
CREAT SERPL-MCNC: 0.84 MG/DL (ref 0.76–1.27)
DEPRECATED RDW RBC AUTO: 41.2 FL (ref 37–54)
EGFRCR SERPLBLD CKD-EPI 2021: 94.4 ML/MIN/1.73
EOSINOPHIL # BLD MANUAL: 0.07 10*3/MM3 (ref 0–0.4)
EOSINOPHIL NFR BLD MANUAL: 1 % (ref 0.3–6.2)
ERYTHROCYTE [DISTWIDTH] IN BLOOD BY AUTOMATED COUNT: 16 % (ref 12.3–15.4)
GLOBULIN UR ELPH-MCNC: 2.1 GM/DL
GLUCOSE SERPL-MCNC: 151 MG/DL (ref 65–99)
HBA1C MFR BLD: 5.9 % (ref 4.8–5.6)
HCT VFR BLD AUTO: 35.8 % (ref 37.5–51)
HDLC SERPL-MCNC: 34 MG/DL (ref 40–60)
HGB BLD-MCNC: 10.4 G/DL (ref 13–17.7)
LDLC SERPL CALC-MCNC: 98 MG/DL (ref 0–100)
LDLC/HDLC SERPL: 2.79 {RATIO}
LYMPHOCYTES # BLD MANUAL: 0.73 10*3/MM3 (ref 0.7–3.1)
LYMPHOCYTES NFR BLD MANUAL: 3 % (ref 5–12)
MCH RBC QN AUTO: 21.1 PG (ref 26.6–33)
MCHC RBC AUTO-ENTMCNC: 29.1 G/DL (ref 31.5–35.7)
MCV RBC AUTO: 72.6 FL (ref 79–97)
MICROCYTES BLD QL: ABNORMAL
MONOCYTES # BLD: 0.22 10*3/MM3 (ref 0.1–0.9)
NEUTROPHILS # BLD AUTO: 6.16 10*3/MM3 (ref 1.7–7)
NEUTROPHILS NFR BLD MANUAL: 84.8 % (ref 42.7–76)
PLAT MORPH BLD: NORMAL
PLATELET # BLD AUTO: 134 10*3/MM3 (ref 140–450)
PMV BLD AUTO: 9.2 FL (ref 6–12)
POIKILOCYTOSIS BLD QL SMEAR: ABNORMAL
POLYCHROMASIA BLD QL SMEAR: ABNORMAL
POTASSIUM SERPL-SCNC: 4.3 MMOL/L (ref 3.5–5.2)
PROT SERPL-MCNC: 6.2 G/DL (ref 6–8.5)
PSA SERPL-MCNC: 2.86 NG/ML (ref 0–4)
RBC # BLD AUTO: 4.93 10*6/MM3 (ref 4.14–5.8)
SODIUM SERPL-SCNC: 139 MMOL/L (ref 136–145)
T4 FREE SERPL-MCNC: 0.9 NG/DL (ref 0.92–1.68)
TRIGL SERPL-MCNC: 150 MG/DL (ref 0–150)
TSH SERPL DL<=0.05 MIU/L-ACNC: 0.97 UIU/ML (ref 0.27–4.2)
VARIANT LYMPHS NFR BLD MANUAL: 10.1 % (ref 19.6–45.3)
VIT B12 BLD-MCNC: 404 PG/ML (ref 211–946)
VLDLC SERPL-MCNC: 27 MG/DL (ref 5–40)
WBC MORPH BLD: NORMAL
WBC NRBC COR # BLD AUTO: 7.27 10*3/MM3 (ref 3.4–10.8)

## 2024-08-27 PROCEDURE — 82607 VITAMIN B-12: CPT | Performed by: FAMILY MEDICINE

## 2024-08-27 PROCEDURE — 85007 BL SMEAR W/DIFF WBC COUNT: CPT | Performed by: FAMILY MEDICINE

## 2024-08-27 PROCEDURE — 85025 COMPLETE CBC W/AUTO DIFF WBC: CPT | Performed by: FAMILY MEDICINE

## 2024-08-27 PROCEDURE — 82306 VITAMIN D 25 HYDROXY: CPT | Performed by: FAMILY MEDICINE

## 2024-08-27 PROCEDURE — 80053 COMPREHEN METABOLIC PANEL: CPT | Performed by: FAMILY MEDICINE

## 2024-08-27 PROCEDURE — 80061 LIPID PANEL: CPT | Performed by: FAMILY MEDICINE

## 2024-08-27 PROCEDURE — G0103 PSA SCREENING: HCPCS | Performed by: FAMILY MEDICINE

## 2024-08-27 PROCEDURE — 84443 ASSAY THYROID STIM HORMONE: CPT | Performed by: FAMILY MEDICINE

## 2024-08-27 PROCEDURE — 84466 ASSAY OF TRANSFERRIN: CPT | Performed by: FAMILY MEDICINE

## 2024-08-27 PROCEDURE — 36415 COLL VENOUS BLD VENIPUNCTURE: CPT | Performed by: FAMILY MEDICINE

## 2024-08-27 PROCEDURE — 83036 HEMOGLOBIN GLYCOSYLATED A1C: CPT | Performed by: FAMILY MEDICINE

## 2024-08-27 PROCEDURE — 83540 ASSAY OF IRON: CPT | Performed by: FAMILY MEDICINE

## 2024-08-27 PROCEDURE — 84439 ASSAY OF FREE THYROXINE: CPT | Performed by: FAMILY MEDICINE

## 2024-08-27 RX ORDER — LISINOPRIL AND HYDROCHLOROTHIAZIDE 12.5; 2 MG/1; MG/1
1 TABLET ORAL DAILY
Qty: 90 TABLET | Refills: 1 | Status: SHIPPED | OUTPATIENT
Start: 2024-08-27

## 2024-08-27 NOTE — PROGRESS NOTES
Subjective   The ABCs of the Annual Wellness Visit  Medicare Wellness Visit    Enrrique Alejandre is a 69 y.o. patient who presents for a Medicare Wellness Visit.    The following portions of the patient's history were reviewed and   updated as appropriate: allergies, current medications, past family history, past medical history, past social history, past surgical history, and problem list.    Compared to one year ago, the patient's physical   health is the same.  Compared to one year ago, the patient's mental   health is the same.    Recent Hospitalizations:  He was not admitted within the past 365 days at [facility].   Seen at ER for epistaxis and ultimately transferred to [facility] due to concern for basal skull fracture    Current Medical Providers:  Patient Care Team:  Sergio Martinez MD as PCP - General (Internal Medicine)    Outpatient Medications Prior to Visit   Medication Sig Dispense Refill    aspirin 81 MG tablet Take 1 tablet by mouth every night at bedtime.      doxycycline (VIBRAMYCIN) 100 MG capsule TAKE 1 CAPSULE BY MOUTH TWICE A DAY FOR ONE MONTH, THEN REDUCE TO DAILY 60 capsule 5    escitalopram (LEXAPRO) 10 MG tablet TAKE 1 TABLET BY MOUTH DAILY 90 tablet 1    finasteride (PROSCAR) 5 MG tablet Take 1 tablet by mouth Daily.      mycophenolate (CELLCEPT) 500 MG tablet TAKE 1 TABLET BY MOUTH TWICE A  tablet 1    omeprazole (priLOSEC) 40 MG capsule TAKE 1 CAPSULE BY MOUTH DAILY 90 capsule 1    pyridostigmine (MESTINON) 180 MG CR tablet TAKE 1 TABLET BY MOUTH TWICE A  tablet 1    pyridostigmine (MESTINON) 60 MG tablet Take 1 tablet by mouth 4 (Four) Times a Day As Needed (fatigue).      sotalol (BETAPACE) 80 MG tablet TAKE 1/2 TABLET BY MOUTH EVERY 12 HOURS 90 tablet 1    tamsulosin (FLOMAX) 0.4 MG capsule 24 hr capsule Take 2 capsules by mouth Daily. 180 capsule 3    traZODone (DESYREL) 50 MG tablet TAKE ONE TO TWO TABLETS BY MOUTH EVERY EVENING FOR SLEEP 120 tablet 1     "amLODIPine (NORVASC) 5 MG tablet TAKE 1 TABLET BY MOUTH DAILY 90 tablet 1    levothyroxine (SYNTHROID, LEVOTHROID) 125 MCG tablet Take 0.5 tablets by mouth Daily. 45 tablet 1    lisinopril (PRINIVIL,ZESTRIL) 20 MG tablet Take 1 tablet by mouth Daily. 90 tablet 1    atorvastatin (LIPITOR) 20 MG tablet TAKE 1 TABLET BY MOUTH DAILY 90 tablet 1     No facility-administered medications prior to visit.     No opioid medication identified on active medication list. I have reviewed chart for other potential  high risk medication/s and harmful drug interactions in the elderly.      Aspirin is on active medication list. Aspirin use is indicated based on review of current medical condition/s. Pros and cons of this therapy have been discussed today. Benefits of this medication outweigh potential harm.  Patient has been encouraged to continue taking this medication.  .    Patient Active Problem List   Diagnosis    Essential hypertension    PSVT (paroxysmal supraventricular tachycardia)    CVA (cerebral vascular accident)    Chronic pancreatitis    Hematuria    High prostate specific antigen (PSA)    Hypomagnesemia    Vitamin D deficiency    Myasthenia gravis    Radicular low back pain    Glucosuria    Anxiety and depression    Benign prostatic hyperplasia with nocturia    Cholecystitis    Syncope    Real time reverse transcriptase PCR positive for COVID-19 virus    UTI (urinary tract infection)    Urinary retention    Hyperlipidemia    Gastroesophageal reflux disease    Hypothyroidism    Urothelial carcinoma of bladder    Insomnia     Advance Care Planning Advance Directive is on file.  ACP discussion was held with the patient during this visit. Patient has an advance directive in EMR which is still valid.       Objective   Vitals:    08/27/24 0811   BP: 118/64   Pulse: 87   Resp: 18   SpO2: 96%   Weight: 86.7 kg (191 lb 3.2 oz)   Height: 162.6 cm (64\")     Estimated body mass index is 32.82 kg/m² as calculated from the " "following:    Height as of this encounter: 162.6 cm (64\").    Weight as of this encounter: 86.7 kg (191 lb 3.2 oz).    BMI is >= 30 and <35. (Class 1 Obesity). The following options were offered after discussion;: weight loss educational material (shared in after visit summary)    Does the patient have evidence of cognitive impairment? No  Lab Results   Component Value Date    TRIG 150 2024    HDL 34 (L) 2024    LDL 98 2024    VLDL 27 2024    HGBA1C 5.90 (H) 2024                                                                                           Health  Risk Assessment    Smoking Status:  Social History     Tobacco Use   Smoking Status Never   Smokeless Tobacco Never     Alcohol Consumption:  Social History     Substance and Sexual Activity   Alcohol Use Yes    Alcohol/week: 3.0 standard drinks of alcohol    Types: 1 Glasses of wine, 1 Cans of beer, 1 Drinks containing 0.5 oz of alcohol per week     Fall Risk Screen  STEADI Fall Risk Assessment was completed, and patient is at LOW risk for falls.Assessment completed on:2024    Depression Screenin/27/2024     8:06 AM   PHQ-2/PHQ-9 Depression Screening   Little Interest or Pleasure in Doing Things 0-->not at all   Feeling Down, Depressed or Hopeless 0-->not at all   PHQ-9: Brief Depression Severity Measure Score 0     Health Habits and Functional and Cognitive Screenin/27/2024     8:07 AM   Functional & Cognitive Status   Do you have difficulty preparing food and eating? No   Do you have difficulty bathing yourself, getting dressed or grooming yourself? No   Do you have difficulty using the toilet? No   Do you have difficulty moving around from place to place? No   Do you have trouble with steps or getting out of a bed or a chair? No   Current Diet Limited Junk Food   Dental Exam Up to date   Eye Exam Up to date   Exercise (times per week) 3 times per week   Current Exercises Include Walking;Other   Do you " need help using the phone?  No   Are you deaf or do you have serious difficulty hearing?  Yes   Do you need help to go to places out of walking distance? No   Do you need help shopping? No   Do you need help preparing meals?  No   Do you need help with housework?  No   Do you need help with laundry? No   Do you need help taking your medications? No   Do you need help managing money? No   Do you ever drive or ride in a car without wearing a seat belt? No   Have you felt unusual stress, anger or loneliness in the last month? Yes   Who do you live with? Spouse   If you need help, do you have trouble finding someone available to you? No   Have you been bothered in the last four weeks by sexual problems? No   Do you have difficulty concentrating, remembering or making decisions? No         Age-appropriate Screening Schedule:  Refer to the list below for future screening recommendations based on patient's age, sex and/or medical conditions. Orders for these recommended tests are listed in the plan section. The patient has been provided with a written plan.    Health Maintenance List  Health Maintenance   Topic Date Due    ZOSTER VACCINE (1 of 2) Never done    TDAP/TD VACCINES (2 - Td or Tdap) 01/01/2023    COLORECTAL CANCER SCREENING  02/05/2024    BMI FOLLOWUP  08/24/2024    INFLUENZA VACCINE  08/01/2024    COVID-19 Vaccine (4 - 2023-24 season) 08/27/2025 (Originally 9/1/2023)    ANNUAL WELLNESS VISIT  08/27/2025    LIPID PANEL  08/27/2025    HEPATITIS C SCREENING  Completed    Pneumococcal Vaccine 65+  Completed                                                                                                                                              CMS Preventative Services Quick Reference  Risk Factors Identified During Encounter  Immunizations Discussed/Encouraged: Influenza and Shingrix    The above risks/problems have been discussed with the patient.  Pertinent information has been shared with the patient in the  After Visit Summary.  An After Visit Summary and PPPS were made available to the patient.    Preventative:  Colonoscopy: 2/2019, due 2/2024. Ordered today  PSA: 4.45 (8/2023)- follows w/ urology (Jean). Ordered today  Shingles: Recommended  Pneumonia: Pneumovax 8/2020, PCV20 8/2022  Tdap: reported 2013  Influenza: 11/2020, Recommended  COVID: Completed 3 shots Pfizer (3/2022) and illness before shots    Follow Up:   Next Medicare Wellness visit to be scheduled in 1 year.    Additional E&M Note during same encounter follows:  Patient has additional, significant, and separately identifiable condition(s)/problem(s) that require work above and beyond the Medicare Wellness Visit     Chief Complaint  Medicare Wellness-subsequent    Subjective   HPI  Enrrique is also being seen today for additional medical problem/s.        L medial calf pain: patient reports TTP along L medial calf for the last 6 months. Concern for blood clot. No leg swelling    Insomnia: maintained on trazodone 100mg nightly and this works well. Sleeping well and may try without med     BPH: s/p TURP 12/2021. Maintained on Flomax 0.8mg daily and finasteride 5mg daily. No longer getting up at night to urinate. Denies incontinence. Following w/ urologyRosa Pena     Bladder cancer: 11/2021 biopsy w/ low-grade noninvasive papillary urothelial carcinoma. Occasional blood in urine- monitoring closely. Last cystoscopy 6/2024. Follows w/ urologyRosa Pena every 6 months     Pancreatic mass: incidental finding on 8/15/24 cardiac calcium score. Has designated CT abd/pel ordered but not scheduled yet    HTN: 118/64 today. Maintained on amlodipine 5 daily and lisinopril 20mg daily. Reports mild leg swelling. No LH/dizziness, CP or SOB.     HLD: no longer maintained on atorvastatin 20mg daily due to last LDL 30 and total cholesterol 98. No issues or concerns      NSVT: maintained on sotalol 40 BID for many years. Denies chest pain or palpitations. Well controlled. Follows  "w/ ROSENDO Johns.     Elevated cardiac calcium: noted on 8/15/24 scan. CARDS has ordered nuclear stress. No CP or SOB. Follows w/ ROSENDO Johns     Hypothyroidism: maintained on levothyroxine 62.5mcg daily. This has helped w/ his fatigue/tiredness. No complaints. Weight stable     GERD: maintained on omeprazole 40 daily. No heartburn/reflux or dysphagia on this medicine.      Anxiety: maintained on Lexapro 10 daily for stress/irritiability. No particular stressors but get easily agitated and medicine helped. Well controlled. No issues.      MG: maintained on pyridostigmine 180 BID and 60mg in the afternoon as needed as well as mycophenolate 500 BID. Energy and strength are appropriate. Happy w/ current control     Rhinophyma: maintained on doxycycline 100mg daily w/ good control. No complaints    Objective   Vital Signs:  /64   Pulse 87   Resp 18   Ht 162.6 cm (64\")   Wt 86.7 kg (191 lb 3.2 oz)   SpO2 96%   BMI 32.82 kg/m²     Physical Exam  Constitutional:       General: He is not in acute distress.     Appearance: He is well-developed. He is obese.   HENT:      Head: Normocephalic and atraumatic.      Right Ear: Tympanic membrane and external ear normal. There is no impacted cerumen.      Left Ear: Tympanic membrane and external ear normal. There is no impacted cerumen.      Nose: Nose normal.      Mouth/Throat:      Mouth: Mucous membranes are moist.      Pharynx: No oropharyngeal exudate or posterior oropharyngeal erythema.   Eyes:      General: No scleral icterus.        Right eye: No discharge.         Left eye: No discharge.      Extraocular Movements: Extraocular movements intact.      Conjunctiva/sclera: Conjunctivae normal.      Pupils: Pupils are equal, round, and reactive to light.   Neck:      Thyroid: No thyromegaly.      Vascular: No carotid bruit.   Cardiovascular:      Rate and Rhythm: Normal rate and regular rhythm.      Heart sounds: Normal heart sounds. No murmur heard.  Pulmonary:      " Effort: Pulmonary effort is normal. No respiratory distress.      Breath sounds: Normal breath sounds. No wheezing or rales.   Abdominal:      General: Bowel sounds are normal. There is no distension.      Palpations: Abdomen is soft.      Tenderness: There is no abdominal tenderness.   Musculoskeletal:         General: Swelling (trace LE edema) and tenderness (L medial calf) present. No deformity. Normal range of motion.      Cervical back: Normal range of motion and neck supple.   Lymphadenopathy:      Cervical: No cervical adenopathy.   Skin:     General: Skin is warm and dry.      Findings: No rash.   Neurological:      General: No focal deficit present.      Mental Status: He is alert and oriented to person, place, and time. Mental status is at baseline.      Cranial Nerves: No cranial nerve deficit.      Sensory: No sensory deficit.   Psychiatric:         Behavior: Behavior normal.         Thought Content: Thought content normal.           Assessment and Plan Additional age appropriate preventative wellness advice topics were discussed during today's preventative wellness exam(some topics already addressed during AWV portion of the note above):   Healthy Weight: Discussed current and goal BMI with patient. Steps to attain this goal discussed. Information shared in after visit summary.     Medicare annual wellness visit, subsequent  - Annual labs ordered  - Colonoscopy ordered  - Counseled regarding diet, exercise, weight loss, and preventative health maintenance items/immunizations below  Tenderness of left calf  Consider SVT of distal greater saphenous vein  - Duplex US ordered today  Insomnia, unspecified type  - Cont home trazodone 100mg nightly  Benign prostatic hyperplasia with nocturia  s/p TURP 12/2021  - Cont home Flomax 0.8mg daily and finasteride 5mg daily  - Cont urology f/u- Pena  Urothelial carcinoma of bladder  11/2021 biopsy w/ low-grade noninvasive papillary urothelial carcinoma. Last  cystoscopy 6/2024  - Cont urology f/u- Jean    -- Cystoscopy q6 months  Pancreatic mass  incidental finding on 8/15/24 cardiac calcium score  - Call today to schedule ordered CT abd/pel  Essential hypertension  118/64 today  - Cont home lisinopril/HCTZ 20/12.5 daily  Hyperlipidemia, unspecified hyperlipidemia type   - Monitor off statin pending lipid panel today  PSVT (paroxysmal supraventricular tachycardia)  - Cont home sotalol 40mg BID  - Cont CARDS f/u- Andreas  Elevated coronary artery calcium score  - Plan for nuclear stress  Hypothyroidism, unspecified type  **Increase levothyroxine to 75mcg daily based on today's labs  Gastroesophageal reflux disease, unspecified whether esophagitis present  - Cont home omeprazole 40mg daily  Anxiety and depression  - Cont home Lexapro 10mg daily  Myasthenia gravis  - Cont home pyridostigmine 180 BID and 60mg in the afternoon PRN  - Cont home mycophenolate 500 BID  Rhinophyma  - Cont home doxycycline 100mg daily  Vitamin D deficiency, unspecified    Encounter for screening for malignant neoplasm of prostate    Anemia, unspecified type      Orders Placed This Encounter   Procedures    Comprehensive Metabolic Panel     Order Specific Question:   Release to patient     Answer:   Routine Release [[id number]]    Hemoglobin A1c     Order Specific Question:   Release to patient     Answer:   Routine Release [[id number]]    Lipid Panel     Order Specific Question:   Release to patient     Answer:   Routine Release [[id number]]    TSH     Order Specific Question:   Release to patient     Answer:   Routine Release [[id number]]    T4, Free     Order Specific Question:   Release to patient     Answer:   Routine Release [[id number]]    Vitamin D,25-Hydroxy     Order Specific Question:   Release to patient     Answer:   Routine Release [[id number]]    Vitamin B12     Order Specific Question:   Release to patient     Answer:   Routine Release [[id number]]    PSA Screen     Order Specific  Question:   Release to patient     Answer:   Routine Release [[id number]]    CBC Auto Differential     Order Specific Question:   Release to patient     Answer:   Routine Release [[id number]]    Manual Differential     Order Specific Question:   Release to patient     Answer:   Routine Release [[id number]]    CBC Auto Differential     Order Specific Question:   Release to patient     Answer:   Routine Release [[id number]]    Ambulatory Referral For Screening Colonoscopy     Referral Priority:   Routine     Referral Type:   Diagnostic Medical     Referral Reason:   Specialty Services Required     Number of Visits Requested:   1    CBC & Differential     Order Specific Question:   Manual Differential     Answer:   No     Order Specific Question:   Release to patient     Answer:   Routine Release [[id number]]     New Medications Ordered This Visit   Medications    lisinopril-hydrochlorothiazide (Zestoretic) 20-12.5 MG per tablet     Sig: Take 1 tablet by mouth Daily.     Dispense:  90 tablet     Refill:  1          Follow Up   Return in about 6 months (around 2/27/2025) for Recheck- 30min.  Patient was given instructions and counseling regarding his condition or for health maintenance advice. Please see specific information pulled into the AVS if appropriate.

## 2024-08-29 DIAGNOSIS — D64.9 ANEMIA, UNSPECIFIED TYPE: Primary | ICD-10-CM

## 2024-08-29 LAB
BASOPHILS # BLD AUTO: 0.04 10*3/MM3 (ref 0–0.2)
BASOPHILS NFR BLD AUTO: 0.6 % (ref 0–1.5)
DEPRECATED RDW RBC AUTO: 45.6 FL (ref 37–54)
EOSINOPHIL # BLD AUTO: 0.07 10*3/MM3 (ref 0–0.4)
EOSINOPHIL NFR BLD AUTO: 1 % (ref 0.3–6.2)
ERYTHROCYTE [DISTWIDTH] IN BLOOD BY AUTOMATED COUNT: 17.3 % (ref 12.3–15.4)
HCT VFR BLD AUTO: 36.6 % (ref 37.5–51)
HGB BLD-MCNC: 10.6 G/DL (ref 13–17.7)
IMM GRANULOCYTES # BLD AUTO: 0.03 10*3/MM3 (ref 0–0.05)
IMM GRANULOCYTES NFR BLD AUTO: 0.4 % (ref 0–0.5)
IRON 24H UR-MRATE: 28 MCG/DL (ref 59–158)
IRON SATN MFR SERPL: 6 % (ref 20–50)
LAB AP CASE REPORT: NORMAL
LYMPHOCYTES # BLD AUTO: 0.75 10*3/MM3 (ref 0.7–3.1)
LYMPHOCYTES NFR BLD AUTO: 10.4 % (ref 19.6–45.3)
MCH RBC QN AUTO: 21.5 PG (ref 26.6–33)
MCHC RBC AUTO-ENTMCNC: 29 G/DL (ref 31.5–35.7)
MCV RBC AUTO: 74.4 FL (ref 79–97)
MONOCYTES # BLD AUTO: 0.57 10*3/MM3 (ref 0.1–0.9)
MONOCYTES NFR BLD AUTO: 7.9 % (ref 5–12)
NEUTROPHILS NFR BLD AUTO: 5.72 10*3/MM3 (ref 1.7–7)
NEUTROPHILS NFR BLD AUTO: 79.7 % (ref 42.7–76)
PATH REPORT.FINAL DX SPEC: NORMAL
PATHOLOGY REVIEW: YES
PLATELET # BLD AUTO: 147 10*3/MM3 (ref 140–450)
PMV BLD AUTO: 10.5 FL (ref 6–12)
RBC # BLD AUTO: 4.92 10*6/MM3 (ref 4.14–5.8)
TIBC SERPL-MCNC: 463 MCG/DL (ref 298–536)
TRANSFERRIN SERPL-MCNC: 311 MG/DL (ref 200–360)
WBC NRBC COR # BLD AUTO: 7.18 10*3/MM3 (ref 3.4–10.8)

## 2024-08-29 RX ORDER — LEVOTHYROXINE SODIUM 75 UG/1
75 TABLET ORAL DAILY
Qty: 90 TABLET | Refills: 1 | Status: SHIPPED | OUTPATIENT
Start: 2024-08-29

## 2024-08-29 RX ORDER — PNV NO.95/FERROUS FUM/FOLIC AC 28MG-0.8MG
325 TABLET ORAL
Qty: 90 TABLET | Refills: 1 | Status: SHIPPED | OUTPATIENT
Start: 2024-08-29

## 2024-08-29 NOTE — ASSESSMENT & PLAN NOTE
s/p TURP 12/2021  - Cont home Flomax 0.8mg daily and finasteride 5mg daily  - Cont urology f/u- Pena

## 2024-08-29 NOTE — ASSESSMENT & PLAN NOTE
11/2021 biopsy w/ low-grade noninvasive papillary urothelial carcinoma. Last cystoscopy 6/2024  - Cont urology f/uRosa Pena    -- Cystoscopy q6 months

## 2024-08-29 NOTE — ASSESSMENT & PLAN NOTE
- Cont home pyridostigmine 180 BID and 60mg in the afternoon PRN  - Cont home mycophenolate 500 BID

## 2024-09-03 ENCOUNTER — HOSPITAL ENCOUNTER (OUTPATIENT)
Dept: CARDIOLOGY | Facility: HOSPITAL | Age: 69
Discharge: HOME | End: 2024-09-03
Admitting: FAMILY MEDICINE
Payer: MEDICARE

## 2024-09-03 ENCOUNTER — HOSPITAL ENCOUNTER (OUTPATIENT)
Dept: NUCLEAR MEDICINE | Facility: HOSPITAL | Age: 69
Discharge: HOME | End: 2024-09-03
Payer: MEDICARE

## 2024-09-03 DIAGNOSIS — R93.1 ELEVATED CORONARY ARTERY CALCIUM SCORE: ICD-10-CM

## 2024-09-03 DIAGNOSIS — R06.09 DOE (DYSPNEA ON EXERTION): ICD-10-CM

## 2024-09-03 DIAGNOSIS — M79.662 TENDERNESS OF LEFT CALF: ICD-10-CM

## 2024-09-03 LAB
BH CV LOWER VASCULAR LEFT COMMON FEMORAL AUGMENT: NORMAL
BH CV LOWER VASCULAR LEFT COMMON FEMORAL COMPETENT: NORMAL
BH CV LOWER VASCULAR LEFT COMMON FEMORAL COMPRESS: NORMAL
BH CV LOWER VASCULAR LEFT COMMON FEMORAL PHASIC: NORMAL
BH CV LOWER VASCULAR LEFT COMMON FEMORAL SPONT: NORMAL
BH CV LOWER VASCULAR LEFT DISTAL FEMORAL COMPRESS: NORMAL
BH CV LOWER VASCULAR LEFT GASTRONEMIUS COMPRESS: NORMAL
BH CV LOWER VASCULAR LEFT GREATER SAPH AK COMPRESS: NORMAL
BH CV LOWER VASCULAR LEFT GREATER SAPH BK COMPRESS: NORMAL
BH CV LOWER VASCULAR LEFT LESSER SAPH COMPRESS: NORMAL
BH CV LOWER VASCULAR LEFT MID FEMORAL AUGMENT: NORMAL
BH CV LOWER VASCULAR LEFT MID FEMORAL COMPETENT: NORMAL
BH CV LOWER VASCULAR LEFT MID FEMORAL COMPRESS: NORMAL
BH CV LOWER VASCULAR LEFT MID FEMORAL PHASIC: NORMAL
BH CV LOWER VASCULAR LEFT MID FEMORAL SPONT: NORMAL
BH CV LOWER VASCULAR LEFT PERONEAL COMPRESS: NORMAL
BH CV LOWER VASCULAR LEFT POPLITEAL AUGMENT: NORMAL
BH CV LOWER VASCULAR LEFT POPLITEAL COMPETENT: NORMAL
BH CV LOWER VASCULAR LEFT POPLITEAL COMPRESS: NORMAL
BH CV LOWER VASCULAR LEFT POPLITEAL PHASIC: NORMAL
BH CV LOWER VASCULAR LEFT POPLITEAL SPONT: NORMAL
BH CV LOWER VASCULAR LEFT POSTERIOR TIBIAL COMPRESS: NORMAL
BH CV LOWER VASCULAR LEFT PROXIMAL FEMORAL COMPRESS: NORMAL
BH CV LOWER VASCULAR LEFT SAPHENOFEMORAL JUNCTION COMPRESS: NORMAL
BH CV LOWER VASCULAR RIGHT COMMON FEMORAL AUGMENT: NORMAL
BH CV LOWER VASCULAR RIGHT COMMON FEMORAL COMPETENT: NORMAL
BH CV LOWER VASCULAR RIGHT COMMON FEMORAL COMPRESS: NORMAL
BH CV LOWER VASCULAR RIGHT COMMON FEMORAL PHASIC: NORMAL
BH CV LOWER VASCULAR RIGHT COMMON FEMORAL SPONT: NORMAL
BH CV NUCLEAR PRIOR STUDY: 3
BH CV REST NUCLEAR ISOTOPE DOSE: 11 MCI
BH CV STRESS BP STAGE 1: NORMAL
BH CV STRESS BP STAGE 4: NORMAL
BH CV STRESS COMMENTS STAGE 1: NORMAL
BH CV STRESS COMMENTS STAGE 2: NORMAL
BH CV STRESS DOSE REGADENOSON STAGE 1: 0.4
BH CV STRESS DURATION MIN STAGE 1: 0
BH CV STRESS DURATION MIN STAGE 2: 4
BH CV STRESS DURATION SEC STAGE 1: 10
BH CV STRESS DURATION SEC STAGE 2: 0
BH CV STRESS HR STAGE 1: 56
BH CV STRESS HR STAGE 2: 75
BH CV STRESS HR STAGE 3: 91
BH CV STRESS HR STAGE 4: 93
BH CV STRESS NUCLEAR ISOTOPE DOSE: 33 MCI
BH CV STRESS PROTOCOL 1: NORMAL
BH CV STRESS RECOVERY BP: NORMAL MMHG
BH CV STRESS RECOVERY HR: 90 BPM
BH CV STRESS STAGE 1: 1
BH CV STRESS STAGE 2: 2
BH CV STRESS STAGE 3: 3
BH CV STRESS STAGE 4: 4
LV EF NUC BP: 69 %
MAXIMAL PREDICTED HEART RATE: 151 BPM
PERCENT MAX PREDICTED HR: 61.59 %
STRESS BASELINE BP: NORMAL MMHG
STRESS BASELINE HR: 56 BPM
STRESS PERCENT HR: 72 %
STRESS POST PEAK BP: NORMAL MMHG
STRESS POST PEAK HR: 93 BPM
STRESS TARGET HR: 128 BPM

## 2024-09-03 PROCEDURE — 93017 CV STRESS TEST TRACING ONLY: CPT

## 2024-09-03 PROCEDURE — 25010000002 REGADENOSON 0.4 MG/5ML SOLUTION: Performed by: INTERNAL MEDICINE

## 2024-09-03 PROCEDURE — A9502 TC99M TETROFOSMIN: HCPCS | Performed by: INTERNAL MEDICINE

## 2024-09-03 PROCEDURE — 78452 HT MUSCLE IMAGE SPECT MULT: CPT

## 2024-09-03 PROCEDURE — 93971 EXTREMITY STUDY: CPT

## 2024-09-03 PROCEDURE — 0 TECHNETIUM TETROFOSMIN KIT: Performed by: INTERNAL MEDICINE

## 2024-09-03 PROCEDURE — 93971 EXTREMITY STUDY: CPT | Performed by: SURGERY

## 2024-09-03 RX ORDER — REGADENOSON 0.08 MG/ML
0.4 INJECTION, SOLUTION INTRAVENOUS
Status: COMPLETED | OUTPATIENT
Start: 2024-09-03 | End: 2024-09-03

## 2024-09-03 RX ADMIN — REGADENOSON 0.4 MG: 0.08 INJECTION, SOLUTION INTRAVENOUS at 10:19

## 2024-09-03 RX ADMIN — TETROFOSMIN 1 DOSE: 1.38 INJECTION, POWDER, LYOPHILIZED, FOR SOLUTION INTRAVENOUS at 09:17

## 2024-09-03 RX ADMIN — TETROFOSMIN 1 DOSE: 1.38 INJECTION, POWDER, LYOPHILIZED, FOR SOLUTION INTRAVENOUS at 10:19

## 2024-09-06 ENCOUNTER — HOSPITAL ENCOUNTER (OUTPATIENT)
Dept: PET IMAGING | Facility: HOSPITAL | Age: 69
Discharge: HOME | End: 2024-09-06
Payer: MEDICARE

## 2024-09-06 DIAGNOSIS — K86.89 PANCREATIC MASS: ICD-10-CM

## 2024-09-06 PROCEDURE — 74170 CT ABD WO CNTRST FLWD CNTRST: CPT

## 2024-09-06 PROCEDURE — 25510000001 IOPAMIDOL PER 1 ML: Performed by: INTERNAL MEDICINE

## 2024-09-06 RX ORDER — IOPAMIDOL 755 MG/ML
100 INJECTION, SOLUTION INTRAVASCULAR
Status: COMPLETED | OUTPATIENT
Start: 2024-09-06 | End: 2024-09-06

## 2024-09-06 RX ADMIN — IOPAMIDOL 100 ML: 755 INJECTION, SOLUTION INTRAVENOUS at 10:10

## 2024-09-09 DIAGNOSIS — F32.A ANXIETY AND DEPRESSION: ICD-10-CM

## 2024-09-09 DIAGNOSIS — F41.9 ANXIETY AND DEPRESSION: ICD-10-CM

## 2024-09-09 RX ORDER — ESCITALOPRAM OXALATE 10 MG/1
10 TABLET ORAL DAILY
Qty: 90 TABLET | Refills: 1 | Status: SHIPPED | OUTPATIENT
Start: 2024-09-09

## 2024-10-21 RX ORDER — LEVOTHYROXINE SODIUM 25 UG/1
TABLET ORAL
Qty: 90 TABLET | Refills: 1 | Status: SHIPPED | OUTPATIENT
Start: 2024-10-21

## 2024-11-01 ENCOUNTER — TELEPHONE (OUTPATIENT)
Dept: ORTHOPEDIC SURGERY | Facility: CLINIC | Age: 69
End: 2024-11-01
Payer: MEDICARE

## 2024-11-01 NOTE — TELEPHONE ENCOUNTER
Caller: NEO ANAYA    Relationship to patient: Emergency Contact    Best call back number: [phone]    Chief complaint: RIGHT KNEE    Type of visit: INJECTION     Requested date: ASAP

## 2024-11-04 RX ORDER — LEVOTHYROXINE SODIUM 125 UG/1
62.5 TABLET ORAL DAILY
Qty: 45 TABLET | Refills: 1 | Status: SHIPPED | OUTPATIENT
Start: 2024-11-04

## 2024-11-05 ENCOUNTER — TELEPHONE (OUTPATIENT)
Dept: FAMILY MEDICINE CLINIC | Facility: CLINIC | Age: 69
End: 2024-11-05
Payer: MEDICARE

## 2024-11-05 NOTE — TELEPHONE ENCOUNTER
HUB TO RELAY:  I called patient and lvm for patient letting him know this referral for GI was sent it.

## 2024-11-07 ENCOUNTER — OFFICE VISIT (OUTPATIENT)
Dept: ORTHOPEDIC SURGERY | Facility: CLINIC | Age: 69
End: 2024-11-07
Payer: MEDICARE

## 2024-11-07 VITALS
HEART RATE: 61 BPM | HEIGHT: 64 IN | OXYGEN SATURATION: 98 % | WEIGHT: 191 LBS | BODY MASS INDEX: 32.61 KG/M2 | RESPIRATION RATE: 18 BRPM

## 2024-11-07 DIAGNOSIS — M17.11 PRIMARY OSTEOARTHRITIS OF RIGHT KNEE: Primary | ICD-10-CM

## 2024-11-07 PROCEDURE — 99213 OFFICE O/P EST LOW 20 MIN: CPT | Performed by: FAMILY MEDICINE

## 2024-11-07 NOTE — PROGRESS NOTES
Primary Care Sports Medicine Office Visit Note    Patient ID: Enrrique Alejandre is a 69 y.o. male.    Chief Complaint:  Chief Complaint   Patient presents with    Right Knee - Follow-up     Pain 5/10       History of Present Illness  The patient is a 70-year-old male who presents for evaluation of knee pain.    He reports a recurrence of his knee pain, which has worsened and now extends down the inside of his knee. He also mentions a decrease in stability, particularly when leaning forward or applying pressure, leading to a sensation of his knee giving way. He reports no recent injuries or falls.    His last Monovisc injection was administered in 06/2024, and a steroid injection was given 2 months ago. He has been using a pain cream, but it has not provided any relief. The pain is present with every step he takes and is impacting his work, as he is unable to kneel. He is also cautious about his body positioning when lifting objects.    He is considering knee replacement surgery but is currently unable to proceed due to his wife's recent fall and subsequent leg fracture, which required surgical repair with screws.       Past Medical History:   Diagnosis Date    Anxiety 2/2014    Arthritis     Arthritis of back 2014    Benign prostatic hyperplasia     Cancer Non invasive bladder    No treatment just observation    Erectile dysfunction     Fracture, clavicle 6/1975    High prostate specific antigen (PSA) 08/16/2017    History of echocardiogram     EF 55%. No significant valvular flow abnormalities.     History of stress test 11/2010    Stress Myoview: No ischemia 11/2010    HL (hearing loss)     HTN (hypertension) 06/25/2019    Kidney stone     Knee swelling 2012    Low back pain 2019    Myasthenia gravis     Pancreatitis     PSVT (paroxysmal supraventricular tachycardia) 06/25/2019    Rotator cuff syndrome 1989    Stroke 02/2014    Supraventricular tachycardia     Suspected COVID-19 virus infection 01/12/2021    Tear  "of meniscus of knee 2022    TIA (transient ischemic attack) 2008    Urinary tract infection 2021       Past Surgical History:   Procedure Laterality Date    ADENOIDECTOMY      CHOLECYSTECTOMY N/A 07/04/2020    Procedure: CHOLECYSTECTOMY LAPAROSCOPIC;  Surgeon: Yogesh Presley MD;  Location: [facility] MAIN OR;  Service: General;  Laterality: N/A;    ECHO - CONVERTED  01/20/2021    FINGER SURGERY      HAND SURGERY  2018    tumor removed non invasive cancer    HERNIA REPAIR  7/4/2020    PROSTATE BIOPSY  2019    [facility]    PROSTATE SURGERY  11/23/2021    TONSILLECTOMY      VASECTOMY         Family History   Problem Relation Age of Onset    Hypertension Mother     Stroke Mother     Arthritis Mother     Hypertension Father     Arthritis Father     Diabetes Maternal Grandmother      Social History     Occupational History    Not on file   Tobacco Use    Smoking status: Never    Smokeless tobacco: Never   Vaping Use    Vaping status: Never Used   Substance and Sexual Activity    Alcohol use: Yes     Alcohol/week: 3.0 standard drinks of alcohol     Types: 1 Glasses of wine, 1 Cans of beer, 1 Drinks containing 0.5 oz of alcohol per week    Drug use: No    Sexual activity: Not Currently     Partners: Female     Birth control/protection: Condom, Vasectomy     Comment: Vasectomy        Review of Systems:  Review of Systems   Constitutional:  Negative for activity change, fatigue and fever.   Musculoskeletal:  Positive for arthralgias.   Skin:  Negative for color change and rash.   Neurological:  Negative for numbness.     Objective:  Physical Exam  Pulse 61   Resp 18   Ht 162.6 cm (64\")   Wt 86.6 kg (191 lb)   SpO2 98%   BMI 32.79 kg/m²   Vitals and nursing note reviewed.   Constitutional:       General: he  is not in acute distress.     Appearance: he is well-developed. he is not diaphoretic.   HENT:      Head: Normocephalic and atraumatic.   Eyes:      Conjunctiva/sclera: Conjunctivae normal. " "  Pulmonary:      Effort: Pulmonary effort is normal. No respiratory distress.   Skin:     General: Skin is warm.      Capillary Refill: Capillary refill takes less than 2 seconds.   Neurological:      Mental Status: he is alert.   Right Ankle Exam     Range of Motion   The patient has normal right ankle ROM.       Right Knee Exam     Muscle Strength   The patient has normal right knee strength.    Tenderness   The patient is experiencing tenderness in the lateral joint line, medial joint line and patella.    Range of Motion   Extension:  normal   Flexion:  normal     Tests   Alex:  Medial - negative Lateral - negative  Varus: negative Valgus: negative  Right knee patellar apprehension test: +patellar grind, +narciso masterson.    Other   Erythema: absent  Sensation: normal  Pulse: present (distal to the knee, DP and PT palpable)  Swelling: none  Effusion: no effusion present          Results  No new imaging today.     Assessment & Plan  Primary osteoarthritis of the R knee  The knee pain has worsened, with pain radiating down and decreased stability. No new injuries or falls have been reported. Previous treatments included Monovisc in June and a steroid injection 2 months ago, which only provided temporary relief. He reports that the pain affects his daily activities and work. A gel injection will be administered today. He is advised to inform the provider of his preferred surgeon for the knee replacement, and an appointment will be facilitated. He is also advised to consider knee replacement surgery, especially given the impact on his quality of life and the limited effectiveness of current treatments. The risks and benefits of knee replacement surgery were discussed, including the potential for long-term relief and the need for postoperative rehabilitation.      Tyler CULLEN \"Chance\" Subhash REYNOLDS DO, CAQSM  11/07/24  15:23 EST    Disclaimer: Please note that areas of this note were completed with computer voice " recognition software.  Quite often unanticipated grammatical, syntax, homophones, and other interpretive errors are inadvertently transcribed by the computer software. Please excuse any errors that have escaped final proofreading.    Patient or patient representative verbalized consent for the use of Ambient Listening during the visit with  Tyler Cullen II, DO for chart documentation. 15:23 EST 11/07/24

## 2024-11-15 RX ORDER — PYRIDOSTIGMINE BROMIDE 180 MG/1
180 TABLET, EXTENDED RELEASE ORAL 2 TIMES DAILY
Qty: 180 TABLET | Refills: 1 | Status: SHIPPED | OUTPATIENT
Start: 2024-11-15

## 2024-11-26 ENCOUNTER — PREP FOR SURGERY (OUTPATIENT)
Dept: OTHER | Facility: HOSPITAL | Age: 69
End: 2024-11-26
Payer: MEDICARE

## 2024-11-26 ENCOUNTER — OFFICE VISIT (OUTPATIENT)
Dept: ORTHOPEDIC SURGERY | Facility: CLINIC | Age: 69
End: 2024-11-26
Payer: MEDICARE

## 2024-11-26 VITALS — BODY MASS INDEX: 32.37 KG/M2 | TEMPERATURE: 97.1 F | WEIGHT: 189.6 LBS | HEIGHT: 64 IN

## 2024-11-26 DIAGNOSIS — M17.11 PRIMARY OSTEOARTHRITIS OF RIGHT KNEE: ICD-10-CM

## 2024-11-26 DIAGNOSIS — M17.11 PRIMARY OSTEOARTHRITIS OF RIGHT KNEE: Primary | ICD-10-CM

## 2024-11-26 DIAGNOSIS — R52 PAIN: Primary | ICD-10-CM

## 2024-11-26 RX ORDER — PREGABALIN 75 MG/1
150 CAPSULE ORAL ONCE
OUTPATIENT
Start: 2024-11-26 | End: 2024-11-26

## 2024-11-26 RX ORDER — CHLORHEXIDINE GLUCONATE 500 MG/1
CLOTH TOPICAL 2 TIMES DAILY
OUTPATIENT
Start: 2024-11-26

## 2024-11-26 NOTE — PROGRESS NOTES
Patient: Enrrique Alejandre  YOB: 1955 69 y.o. male  Medical Record Number: [id number]    Chief Complaints:   Chief Complaint   Patient presents with    Right Knee - Pain, Initial Evaluation       History of Present Illness:Enrrique Alejandre is a 69 y.o. male who presents as a new patient both myself as well as to the practice with complaints of worsening in right knee pain.  The patient has had pain off and on for years and unfortunately has progressively gotten worse.  He has seen sports medicine previously they have done injections physical therapy medications, unfortunately his symptoms have worsened.  He would like to discuss surgical options today    Allergies: No Known Allergies    Medications:   Current Outpatient Medications   Medication Sig Dispense Refill    doxycycline (VIBRAMYCIN) 100 MG capsule TAKE 1 CAPSULE BY MOUTH TWICE A DAY FOR ONE MONTH, THEN REDUCE TO DAILY 60 capsule 5    escitalopram (LEXAPRO) 10 MG tablet TAKE 1 TABLET BY MOUTH DAILY 90 tablet 1    ferrous sulfate 325 (65 Fe) MG tablet Take 1 tablet by mouth Daily With Breakfast. 90 tablet 1    finasteride (PROSCAR) 5 MG tablet Take 1 tablet by mouth Daily.      levothyroxine (SYNTHROID, LEVOTHROID) 125 MCG tablet TAKE 1/2 TABLET BY MOUTH DAILY 45 tablet 1    lisinopril-hydrochlorothiazide (Zestoretic) 20-12.5 MG per tablet Take 1 tablet by mouth Daily. 90 tablet 1    mycophenolate (CELLCEPT) 500 MG tablet TAKE 1 TABLET BY MOUTH TWICE A  tablet 1    omeprazole (priLOSEC) 40 MG capsule TAKE 1 CAPSULE BY MOUTH DAILY 90 capsule 1    pyridostigmine (MESTINON) 180 MG CR tablet TAKE 1 TABLET BY MOUTH TWICE A  tablet 1    pyridostigmine (MESTINON) 60 MG tablet Take 1 tablet by mouth 4 (Four) Times a Day As Needed (fatigue).      sotalol (BETAPACE) 80 MG tablet TAKE 1/2 TABLET BY MOUTH EVERY 12 HOURS 90 tablet 1    tamsulosin (FLOMAX) 0.4 MG capsule 24 hr capsule Take 2 capsules by mouth Daily. 180 capsule 3    traZODone  "(DESYREL) 50 MG tablet TAKE ONE TO TWO TABLETS BY MOUTH EVERY EVENING FOR SLEEP 120 tablet 1     No current facility-administered medications for this visit.         The following portions of the patient's history were reviewed and updated as appropriate: allergies, current medications, past family history, past medical history, past social history, past surgical history and problem list.    Review of Systems:   14 point review of systems was performed. All systems negative except pertinent positives/negatives listed in HPI above    Physical Exam:   Vitals:    11/26/24 1355   Temp: 97.1 °F (36.2 °C)   Weight: 86 kg (189 lb 9.6 oz)   Height: 162.6 cm (64\")   PainSc:   4   PainLoc: Knee       General: A and O x 3, ASA, NAD    Skin clear no unusual lesions noted  Right knee the patient has trace amount of effusion noted with 116 degrees flexion neutral in extension positive Alex negative Lockman calf soft and nontender      Radiology:  Xrays 3views (ap,lateral, sunrise) right knee were ordered and reviewed today secondary to increased pain show bone-on-bone end-stage osteoarthritis of the medial and patellofemoral compartment.  No compared to views available    Assessment/Plan: End-stage osteoarthritis right knee with increased pain    Patient and I discussed options including continuing with conservative measures versus total knee replacement.  Patient would like to proceed with right total knee replacement same day home health.  He does have a history of myasthenia gravis followed by his PCP.  He will contact PCP to make sure he does have medical clearance  Continuation of conservative management vs. TKA discussed.  The patient wishes to proceed with total knee replacement.  At this point the patient has failed the full compliment of conservative treatment and stating complete understanding of the risks/benefits/ anternatives wishes to proceed with surgical treatment.    Risk and benefits of surgery were " reviewed.  Including, but not limited to, blood clots or pulmonary embolism, anesthesia risk, infection, fracture, skin/leg numbness, persistent pain/crepitance/popping/catching, failure of the implant, need for future surgeries, hematoma, possible nerve or blood vessel injury, need for transfusion, and potential risk of stroke,heart attack or death, among others.  The patient understands and wishes to proceed.     It was explained that if tissue has been repaired or reconstructed, there is also an increased chance of failure which may require further management.  Following the completion of the discussion, the patient expressed understanding of this planned course of care, all their questions were answered and consent will be obtained preoperatively.    Operative Plan: [facility] and Nephvamshi Oxinium Total Knee Replacement performing the procedure on an outpatient basis with home health rehab       Ann Chavez, APRN  11/26/2024

## 2024-11-29 DIAGNOSIS — I47.10 PSVT (PAROXYSMAL SUPRAVENTRICULAR TACHYCARDIA): ICD-10-CM

## 2024-12-02 RX ORDER — SOTALOL HYDROCHLORIDE 80 MG/1
TABLET ORAL
Qty: 90 TABLET | Refills: 1 | Status: SHIPPED | OUTPATIENT
Start: 2024-12-02

## 2024-12-04 ENCOUNTER — TELEPHONE (OUTPATIENT)
Dept: FAMILY MEDICINE CLINIC | Facility: CLINIC | Age: 69
End: 2024-12-04
Payer: MEDICARE

## 2024-12-04 NOTE — TELEPHONE ENCOUNTER
PT HAS UPCOMING KNEE REPLACEMENT, ASKS IF MEB NEEDS TO SEE HIM IN OFFICE OR IF RELEASE IS PAPERWORK ONLY.

## 2024-12-12 DIAGNOSIS — G47.00 INSOMNIA, UNSPECIFIED TYPE: ICD-10-CM

## 2024-12-12 RX ORDER — TRAZODONE HYDROCHLORIDE 50 MG/1
TABLET, FILM COATED ORAL
Qty: 120 TABLET | Refills: 1 | Status: SHIPPED | OUTPATIENT
Start: 2024-12-12

## 2024-12-16 ENCOUNTER — TELEPHONE (OUTPATIENT)
Dept: ORTHOPEDIC SURGERY | Facility: CLINIC | Age: 69
End: 2024-12-16

## 2024-12-16 NOTE — TELEPHONE ENCOUNTER
Provider: DR RUTHERFORD     Caller: NEO BOSWELL  VERBAL     Relationship to Patient: WIFE       Phone Number: [phone]    Reason for Call: PATIENT WILL NOT NEED ANYMORE GEL INJECTIONS. PATIENT SCHEDULED FOR  RIGHT KNEE SX WITH DR DEX SADLER 04/11/20/25    When was the patient last seen: 11/07/2024

## 2025-01-09 ENCOUNTER — OFFICE VISIT (OUTPATIENT)
Dept: FAMILY MEDICINE CLINIC | Facility: CLINIC | Age: 70
End: 2025-01-09
Payer: MEDICARE

## 2025-01-09 VITALS
HEIGHT: 64 IN | HEART RATE: 70 BPM | BODY MASS INDEX: 32.98 KG/M2 | WEIGHT: 193.2 LBS | OXYGEN SATURATION: 96 % | SYSTOLIC BLOOD PRESSURE: 154 MMHG | DIASTOLIC BLOOD PRESSURE: 76 MMHG

## 2025-01-09 DIAGNOSIS — D64.9 ANEMIA, UNSPECIFIED TYPE: ICD-10-CM

## 2025-01-09 DIAGNOSIS — M17.11 PRIMARY OSTEOARTHRITIS OF RIGHT KNEE: Primary | ICD-10-CM

## 2025-01-09 DIAGNOSIS — I10 ESSENTIAL HYPERTENSION: Chronic | ICD-10-CM

## 2025-01-09 PROBLEM — N39.0 UTI (URINARY TRACT INFECTION): Status: RESOLVED | Noted: 2021-11-27 | Resolved: 2025-01-09

## 2025-01-09 PROCEDURE — 1125F AMNT PAIN NOTED PAIN PRSNT: CPT | Performed by: FAMILY MEDICINE

## 2025-01-09 PROCEDURE — 1159F MED LIST DOCD IN RCRD: CPT | Performed by: FAMILY MEDICINE

## 2025-01-09 PROCEDURE — 3078F DIAST BP <80 MM HG: CPT | Performed by: FAMILY MEDICINE

## 2025-01-09 PROCEDURE — 3077F SYST BP >= 140 MM HG: CPT | Performed by: FAMILY MEDICINE

## 2025-01-09 PROCEDURE — 1160F RVW MEDS BY RX/DR IN RCRD: CPT | Performed by: FAMILY MEDICINE

## 2025-01-09 PROCEDURE — 99214 OFFICE O/P EST MOD 30 MIN: CPT | Performed by: FAMILY MEDICINE

## 2025-01-09 RX ORDER — FLUTICASONE PROPIONATE 50 MCG
1 SPRAY, SUSPENSION (ML) NASAL DAILY
COMMUNITY
Start: 2024-12-03

## 2025-01-09 NOTE — PROGRESS NOTES
Chief Complaint   Patient presents with    Knee Injury     Servando for knee surgery right side.      HPI  Enrrique Alejandre is a 70 y.o. male that presents for   Chief Complaint   Patient presents with    Knee Injury     Servando for knee surgery right side.      R knee pain: patient reports R knee pain for the last 2 years. 12/2022 MRI revealed medial meniscus tear as well as degenerative changes. 6/2024 XR w/ moderate tricompartmental OA of the R knee. He has tried and now failing steroid and Monovisc injections. Knee pain is negatively impacting daily living. Patient was subsequently evaluated by ortho and Dr Ant Presley is planning R knee replacement 4/11/25. He presents today for surgical clearance.     Patient denies CP or SOB. Able to split wood w/ sustained HR 120s w/ symptoms or issues. Able to climb 2 flights of stairs. Patient underwent nuclear stress test 9/2024 that revealed mild perfusion defect in stress in the basal inferolateral wall (intermediate risk). No further evaluation pursued by [facility] and now asymptomatic    HTN: 154/76 today. 145/72 on repeat. Maintained on lisinopril-HCTZ 20-12.5 daily. No CP or SOB    Anemia: last hgb 10.6. Patient taking Fe supplement daily    Review of Systems  Pertinent positives of ROS documented in HPI    The following portions of the patient's history were reviewed and updated as appropriate: problem list, past medical history, past surgical history, allergies, current medication    Problem List Tab  Patient History Tab  Immunizations Tab  Medications Tab  Chart Review Tab  Care Everywhere Tab  Synopsis Tab    PE  Vitals:    01/09/25 1523   BP: 154/76   Pulse: 70   SpO2: 96%     Body mass index is 33.16 kg/m².  General: Obese, NAD  Head: AT/NC  Eyes: EOMI, anicteric sclera  Resp: CTAB, SCR, BS equal  CV: RRR w/o m/r/g; 2+ pulses  GI: Soft, NT/ND, +BS  MSK: FROM, no deformity, no edema  Skin: Warm, dry, intact  Neuro: Alert and oriented. No focal deficits  Psych:  Appropriate mood and affect    Imaging  XR Knee 3 View Right    Result Date: 11/26/2024  Ordering physician's impression is located in the Encounter Note dated 11/26/24. X-ray performed in the DR room.      Assessment & Plan   Enrrique Alejandre is a 70 y.o. male that presents for   Chief Complaint   Patient presents with    Knee Injury     Servando for knee surgery right side.      Diagnoses and all orders for this visit:    1. Primary osteoarthritis of right knee (Primary): should not be any issue proceeding w/ knee replacement. Will work to optimize HTN and anemia as below. RCRI score 1 w/ recent intermediate nuclear stress but patient asymptomatic.   - Cont ortho f/u- Brown    2. Essential hypertension: 154/76 today. 144/72 on repeat   - Cont home lisinopril-HCTZ 20/12.5 daily for now    3. Anemia, unspecified type: last hgb 10.6  -     CBC (No Diff)  -     Comprehensive Metabolic Panel  - Cont Fe supplement daily         Return in about 8 weeks (around 3/6/2025).

## 2025-01-10 ENCOUNTER — LAB (OUTPATIENT)
Dept: FAMILY MEDICINE CLINIC | Facility: CLINIC | Age: 70
End: 2025-01-10
Payer: MEDICARE

## 2025-01-10 PROCEDURE — 85027 COMPLETE CBC AUTOMATED: CPT | Performed by: FAMILY MEDICINE

## 2025-01-10 PROCEDURE — 36415 COLL VENOUS BLD VENIPUNCTURE: CPT | Performed by: FAMILY MEDICINE

## 2025-01-10 PROCEDURE — 80053 COMPREHEN METABOLIC PANEL: CPT | Performed by: FAMILY MEDICINE

## 2025-01-11 LAB
ALBUMIN SERPL-MCNC: 3.7 G/DL (ref 3.5–5.2)
ALBUMIN/GLOB SERPL: 1.4 G/DL
ALP SERPL-CCNC: 68 U/L (ref 39–117)
ALT SERPL W P-5'-P-CCNC: 18 U/L (ref 1–41)
ANION GAP SERPL CALCULATED.3IONS-SCNC: 9.8 MMOL/L (ref 5–15)
AST SERPL-CCNC: 28 U/L (ref 1–40)
BILIRUB SERPL-MCNC: 0.5 MG/DL (ref 0–1.2)
BUN SERPL-MCNC: 21 MG/DL (ref 8–23)
BUN/CREAT SERPL: 19.6 (ref 7–25)
CALCIUM SPEC-SCNC: 8.7 MG/DL (ref 8.6–10.5)
CHLORIDE SERPL-SCNC: 104 MMOL/L (ref 98–107)
CO2 SERPL-SCNC: 29.2 MMOL/L (ref 22–29)
CREAT SERPL-MCNC: 1.07 MG/DL (ref 0.76–1.27)
DEPRECATED RDW RBC AUTO: 45.8 FL (ref 37–54)
EGFRCR SERPLBLD CKD-EPI 2021: 74.7 ML/MIN/1.73
ERYTHROCYTE [DISTWIDTH] IN BLOOD BY AUTOMATED COUNT: 14.8 % (ref 12.3–15.4)
GLOBULIN UR ELPH-MCNC: 2.6 GM/DL
GLUCOSE SERPL-MCNC: 96 MG/DL (ref 65–99)
HCT VFR BLD AUTO: 45.1 % (ref 37.5–51)
HGB BLD-MCNC: 14.4 G/DL (ref 13–17.7)
MCH RBC QN AUTO: 27.2 PG (ref 26.6–33)
MCHC RBC AUTO-ENTMCNC: 31.9 G/DL (ref 31.5–35.7)
MCV RBC AUTO: 85.1 FL (ref 79–97)
PLATELET # BLD AUTO: 161 10*3/MM3 (ref 140–450)
PMV BLD AUTO: 10.5 FL (ref 6–12)
POTASSIUM SERPL-SCNC: 3.9 MMOL/L (ref 3.5–5.2)
PROT SERPL-MCNC: 6.3 G/DL (ref 6–8.5)
RBC # BLD AUTO: 5.3 10*6/MM3 (ref 4.14–5.8)
SODIUM SERPL-SCNC: 143 MMOL/L (ref 136–145)
WBC NRBC COR # BLD AUTO: 7.8 10*3/MM3 (ref 3.4–10.8)

## 2025-02-07 RX ORDER — MYCOPHENOLATE MOFETIL 500 MG/1
500 TABLET ORAL 2 TIMES DAILY
Qty: 180 TABLET | Refills: 1 | Status: SHIPPED | OUTPATIENT
Start: 2025-02-07

## 2025-02-11 RX ORDER — OMEPRAZOLE 40 MG/1
40 CAPSULE, DELAYED RELEASE ORAL DAILY
Qty: 90 CAPSULE | Refills: 1 | Status: SHIPPED | OUTPATIENT
Start: 2025-02-11

## 2025-02-18 ENCOUNTER — TELEPHONE (OUTPATIENT)
Dept: ORTHOPEDIC SURGERY | Facility: CLINIC | Age: 70
End: 2025-02-18
Payer: MEDICARE

## 2025-02-18 NOTE — TELEPHONE ENCOUNTER
Caller: Enrrique Alejandre    Relationship: Self    Best call back number: [phone]     What medication are you requesting: ANTIBIOTIC PRIOR TO DENTAL PROCEDURE    What are your current symptoms: DENTAL PROCEDURE    How long have you been experiencing symptoms: NA     Have you had these symptoms before: NA   [] Yes  [] No    Have you been treated for these symptoms before:  NA  [] Yes  [] No    If a prescription is needed, what is your preferred pharmacy and phone number:      Additional notes:  [id number] - [place], [place] - [address] - [phone]  - [phone]  [phone]       PATIENT IS HAVING A DENTAL PROCEDURE (CLEANING) 03/05/2025   PATIENT THEN IS HAVING A KNEE REPLACEMENT DONE WITH DR SADLER 04/11/2025.  PATIENT WANTS TO KNOW IF HE WILL NEED ANTIBIOTICS FOR HIS DENTAL PROCEDURE.

## 2025-02-20 DIAGNOSIS — I10 ESSENTIAL HYPERTENSION: Chronic | ICD-10-CM

## 2025-02-20 RX ORDER — LISINOPRIL AND HYDROCHLOROTHIAZIDE 12.5; 2 MG/1; MG/1
1 TABLET ORAL DAILY
Qty: 90 TABLET | Refills: 1 | Status: SHIPPED | OUTPATIENT
Start: 2025-02-20

## 2025-02-27 ENCOUNTER — LAB (OUTPATIENT)
Dept: FAMILY MEDICINE CLINIC | Facility: CLINIC | Age: 70
End: 2025-02-27
Payer: MEDICARE

## 2025-02-27 ENCOUNTER — OFFICE VISIT (OUTPATIENT)
Dept: FAMILY MEDICINE CLINIC | Facility: CLINIC | Age: 70
End: 2025-02-27
Payer: MEDICARE

## 2025-02-27 VITALS
DIASTOLIC BLOOD PRESSURE: 68 MMHG | HEIGHT: 64 IN | WEIGHT: 188.8 LBS | OXYGEN SATURATION: 97 % | SYSTOLIC BLOOD PRESSURE: 134 MMHG | HEART RATE: 62 BPM | RESPIRATION RATE: 16 BRPM | BODY MASS INDEX: 32.23 KG/M2

## 2025-02-27 DIAGNOSIS — K86.89 PANCREATIC MASS: ICD-10-CM

## 2025-02-27 DIAGNOSIS — I10 ESSENTIAL HYPERTENSION: Primary | Chronic | ICD-10-CM

## 2025-02-27 DIAGNOSIS — E03.9 HYPOTHYROIDISM, UNSPECIFIED TYPE: ICD-10-CM

## 2025-02-27 DIAGNOSIS — M17.11 PRIMARY OSTEOARTHRITIS OF RIGHT KNEE: ICD-10-CM

## 2025-02-27 PROCEDURE — 3078F DIAST BP <80 MM HG: CPT | Performed by: FAMILY MEDICINE

## 2025-02-27 PROCEDURE — 1125F AMNT PAIN NOTED PAIN PRSNT: CPT | Performed by: FAMILY MEDICINE

## 2025-02-27 PROCEDURE — 99214 OFFICE O/P EST MOD 30 MIN: CPT | Performed by: FAMILY MEDICINE

## 2025-02-27 PROCEDURE — 84481 FREE ASSAY (FT-3): CPT | Performed by: FAMILY MEDICINE

## 2025-02-27 PROCEDURE — 80053 COMPREHEN METABOLIC PANEL: CPT | Performed by: FAMILY MEDICINE

## 2025-02-27 PROCEDURE — 1159F MED LIST DOCD IN RCRD: CPT | Performed by: FAMILY MEDICINE

## 2025-02-27 PROCEDURE — 84439 ASSAY OF FREE THYROXINE: CPT | Performed by: FAMILY MEDICINE

## 2025-02-27 PROCEDURE — 84443 ASSAY THYROID STIM HORMONE: CPT | Performed by: FAMILY MEDICINE

## 2025-02-27 PROCEDURE — 36415 COLL VENOUS BLD VENIPUNCTURE: CPT | Performed by: FAMILY MEDICINE

## 2025-02-27 PROCEDURE — 1160F RVW MEDS BY RX/DR IN RCRD: CPT | Performed by: FAMILY MEDICINE

## 2025-02-27 PROCEDURE — 3075F SYST BP GE 130 - 139MM HG: CPT | Performed by: FAMILY MEDICINE

## 2025-02-27 NOTE — PROGRESS NOTES
Chief Complaint   Patient presents with    Hypertension    Hyperlipidemia     HPI  Enrrique Alejandre is a 70 y.o. male that presents for   Chief Complaint   Patient presents with    Hypertension    Hyperlipidemia     HTN: 134/68 today. Maintained on lisinopril-HCTZ 20/12.5mg daily. No LH/dizziness, CP or SOB    Pancreatic mass: noted on cardiac calcium score. Dedicated CT abd/pel negative for pancreatic mass    Hypothyroidism: levothyroxine increased to 75mcg daily but it appears that was not changed. He continues to take levothyroxine 62.5mcg daily. 8/2024 free T4 0.9    Planning R knee replacement 4/11/25 w/ Ant Presley. Reports increasing instability    Review of Systems  Pertinent positives of ROS documented in HPI    The following portions of the patient's history were reviewed and updated as appropriate: problem list, past medical history, past surgical history, allergies, current medications, past social history and past family history.    Problem List Tab  Patient History Tab  Immunizations Tab  Medications Tab  Chart Review Tab  Care Everywhere Tab  Synopsis Tab    PE  Vitals:    02/27/25 1444   BP: 134/68   Pulse: 62   Resp: 16   SpO2: 97%     Body mass index is 32.41 kg/m².  General: Well nourished, NAD  Head: AT/NC  Eyes: EOMI, anicteric sclera  ENT: MMM w/o erythema. TM clear bilaterally  Neck: Supple, no thyromegaly or LAD. No carotid bruit  Resp: CTAB, SCR, BS equal  CV: RRR w/o m/r/g; 2+ pulses  GI: Soft, NT/ND, +BS  MSK: FROM, no deformity, no edema  Skin: Warm, dry, intact  Neuro: Alert and oriented. No focal deficits  Psych: Appropriate mood and affect    Imaging  XR Knee 3 View Right    Result Date: 11/26/2024  Ordering physician's impression is located in the Encounter Note dated 11/26/24. X-ray performed in the DR room.    Assessment & Plan   Enrrique Alejandre is a 70 y.o. male that presents for   Chief Complaint   Patient presents with    Hypertension    Hyperlipidemia     Diagnoses and all  orders for this visit:    1. Essential hypertension (Primary): 134/68 today. Optimized for surgery   - Cont home Prinzide 20/12.5 daily    2. Pancreatic mass: CT abd/pel reassuring w/ resolution of mass   - No further work-up    3. Hypothyroidism, unspecified type  -     TSH  -     T4, Free  -     T3, Free  -     Comprehensive Metabolic Panel  - Cont home levothyroxine 62.5mcg daily pending labs today    4. Primary osteoarthritis of right knee: ok for knee replacement   - Cont ortho f/u- Fernandez    -- Plan for upcoming knee replacement     Return in about 26 weeks (around 8/28/2025) for Medicare Wellness.

## 2025-02-28 LAB
ALBUMIN SERPL-MCNC: 4 G/DL (ref 3.5–5.2)
ALBUMIN/GLOB SERPL: 1.7 G/DL
ALP SERPL-CCNC: 68 U/L (ref 39–117)
ALT SERPL W P-5'-P-CCNC: 20 U/L (ref 1–41)
ANION GAP SERPL CALCULATED.3IONS-SCNC: 7.2 MMOL/L (ref 5–15)
AST SERPL-CCNC: 31 U/L (ref 1–40)
BILIRUB SERPL-MCNC: 0.5 MG/DL (ref 0–1.2)
BUN SERPL-MCNC: 20 MG/DL (ref 8–23)
BUN/CREAT SERPL: 19.6 (ref 7–25)
CALCIUM SPEC-SCNC: 8.8 MG/DL (ref 8.6–10.5)
CHLORIDE SERPL-SCNC: 104 MMOL/L (ref 98–107)
CO2 SERPL-SCNC: 29.8 MMOL/L (ref 22–29)
CREAT SERPL-MCNC: 1.02 MG/DL (ref 0.76–1.27)
EGFRCR SERPLBLD CKD-EPI 2021: 79.1 ML/MIN/1.73
GLOBULIN UR ELPH-MCNC: 2.4 GM/DL
GLUCOSE SERPL-MCNC: 85 MG/DL (ref 65–99)
POTASSIUM SERPL-SCNC: 4.3 MMOL/L (ref 3.5–5.2)
PROT SERPL-MCNC: 6.4 G/DL (ref 6–8.5)
SODIUM SERPL-SCNC: 141 MMOL/L (ref 136–145)
T3FREE SERPL-MCNC: 2.58 PG/ML (ref 2–4.4)
T4 FREE SERPL-MCNC: 1.07 NG/DL (ref 0.92–1.68)
TSH SERPL DL<=0.05 MIU/L-ACNC: 1.03 UIU/ML (ref 0.27–4.2)

## 2025-03-07 DIAGNOSIS — F32.A ANXIETY AND DEPRESSION: ICD-10-CM

## 2025-03-07 DIAGNOSIS — F41.9 ANXIETY AND DEPRESSION: ICD-10-CM

## 2025-03-07 RX ORDER — ESCITALOPRAM OXALATE 10 MG/1
10 TABLET ORAL DAILY
Qty: 90 TABLET | Refills: 1 | Status: SHIPPED | OUTPATIENT
Start: 2025-03-07

## 2025-03-28 ENCOUNTER — PRE-ADMISSION TESTING (OUTPATIENT)
Dept: PREADMISSION TESTING | Facility: HOSPITAL | Age: 70
End: 2025-03-28
Payer: MEDICARE

## 2025-03-28 VITALS
TEMPERATURE: 98.1 F | HEART RATE: 61 BPM | HEIGHT: 65 IN | WEIGHT: 186 LBS | DIASTOLIC BLOOD PRESSURE: 83 MMHG | OXYGEN SATURATION: 97 % | BODY MASS INDEX: 30.99 KG/M2 | SYSTOLIC BLOOD PRESSURE: 140 MMHG | RESPIRATION RATE: 20 BRPM

## 2025-03-28 LAB
ANION GAP SERPL CALCULATED.3IONS-SCNC: 8 MMOL/L (ref 5–15)
BASOPHILS # BLD AUTO: 0.04 10*3/MM3 (ref 0–0.2)
BASOPHILS NFR BLD AUTO: 0.6 % (ref 0–1.5)
BUN SERPL-MCNC: 13 MG/DL (ref 8–23)
BUN/CREAT SERPL: 16.3 (ref 7–25)
CALCIUM SPEC-SCNC: 8.6 MG/DL (ref 8.6–10.5)
CHLORIDE SERPL-SCNC: 104 MMOL/L (ref 98–107)
CO2 SERPL-SCNC: 25 MMOL/L (ref 22–29)
CREAT SERPL-MCNC: 0.8 MG/DL (ref 0.76–1.27)
DEPRECATED RDW RBC AUTO: 41.2 FL (ref 37–54)
EGFRCR SERPLBLD CKD-EPI 2021: 95.2 ML/MIN/1.73
EOSINOPHIL # BLD AUTO: 0.09 10*3/MM3 (ref 0–0.4)
EOSINOPHIL NFR BLD AUTO: 1.3 % (ref 0.3–6.2)
ERYTHROCYTE [DISTWIDTH] IN BLOOD BY AUTOMATED COUNT: 13.3 % (ref 12.3–15.4)
GLUCOSE SERPL-MCNC: 106 MG/DL (ref 65–99)
HCT VFR BLD AUTO: 46.1 % (ref 37.5–51)
HGB BLD-MCNC: 15.8 G/DL (ref 13–17.7)
IMM GRANULOCYTES # BLD AUTO: 0.04 10*3/MM3 (ref 0–0.05)
IMM GRANULOCYTES NFR BLD AUTO: 0.6 % (ref 0–0.5)
LYMPHOCYTES # BLD AUTO: 0.7 10*3/MM3 (ref 0.7–3.1)
LYMPHOCYTES NFR BLD AUTO: 10.2 % (ref 19.6–45.3)
MCH RBC QN AUTO: 29.3 PG (ref 26.6–33)
MCHC RBC AUTO-ENTMCNC: 34.3 G/DL (ref 31.5–35.7)
MCV RBC AUTO: 85.4 FL (ref 79–97)
MONOCYTES # BLD AUTO: 0.43 10*3/MM3 (ref 0.1–0.9)
MONOCYTES NFR BLD AUTO: 6.3 % (ref 5–12)
NEUTROPHILS NFR BLD AUTO: 5.54 10*3/MM3 (ref 1.7–7)
NEUTROPHILS NFR BLD AUTO: 81 % (ref 42.7–76)
NRBC BLD AUTO-RTO: 0 /100 WBC (ref 0–0.2)
PLATELET # BLD AUTO: 149 10*3/MM3 (ref 140–450)
PMV BLD AUTO: 9.6 FL (ref 6–12)
POTASSIUM SERPL-SCNC: 4.4 MMOL/L (ref 3.5–5.2)
QT INTERVAL: 458 MS
QTC INTERVAL: 427 MS
RBC # BLD AUTO: 5.4 10*6/MM3 (ref 4.14–5.8)
SODIUM SERPL-SCNC: 137 MMOL/L (ref 136–145)
WBC NRBC COR # BLD AUTO: 6.84 10*3/MM3 (ref 3.4–10.8)

## 2025-03-28 PROCEDURE — 36415 COLL VENOUS BLD VENIPUNCTURE: CPT

## 2025-03-28 PROCEDURE — 80048 BASIC METABOLIC PNL TOTAL CA: CPT

## 2025-03-28 PROCEDURE — 85025 COMPLETE CBC W/AUTO DIFF WBC: CPT

## 2025-03-28 PROCEDURE — 93005 ELECTROCARDIOGRAM TRACING: CPT

## 2025-03-28 PROCEDURE — 93010 ELECTROCARDIOGRAM REPORT: CPT | Performed by: STUDENT IN AN ORGANIZED HEALTH CARE EDUCATION/TRAINING PROGRAM

## 2025-03-28 RX ORDER — ACETAMINOPHEN 500 MG
1000 TABLET ORAL EVERY 6 HOURS PRN
COMMUNITY

## 2025-03-28 NOTE — DISCHARGE INSTRUCTIONS
Take the following medications the morning of surgery:    Cellcept  levothyroxine  sotalol  flomax  vibramycin  lexapro  mestinon  finasteride    If you are on prescription narcotic pain medication to control your pain you may also take that medication the morning of surgery.      General Instructions:     Do not eat solid food after midnight the night before surgery.  Clear liquids day of surgery are allowed but must be stopped at least two hours before your hospital arrival time.       Allowed clear liquids      Water, sodas, and tea or coffee with no cream or milk added.       12 to 20 ounces of a clear liquid that contains carbohydrates is recommended.  If non-diabetic, have Gatorade or Powerade.  If diabetic, have G2 or Powerade Zero.     Do not have liquids red in color.  Do not consume chicken, beef, pork or vegetable broth or bouillon cubes of any variety as they are not considered clear liquids and are not allowed.      Infants may have breast milk up to four hours before surgery.  Infants drinking formula may drink formula up to six hours before surgery.   Patients who avoid smoking, chewing tobacco and alcohol for 4 weeks prior to surgery have a reduced risk of post-operative complications.  Quit smoking as many days before surgery as you can.  Do not smoke, use chewing tobacco or drink alcohol the day of surgery.   If applicable bring your C-PAP/ BI-PAP machine in with you to preop day of surgery.  Bring any papers given to you in the doctor’s office.  Wear clean comfortable clothes.  Do not wear contact lenses, false eyelashes or make-up.  Bring a case for your glasses.   Bring crutches or walker if applicable.  Remove all piercings.  Leave jewelry and any other valuables at home.  Hair extensions with metal clips must be removed prior to surgery.  The Pre-Admission Testing nurse will instruct you to bring medications if unable to obtain an accurate list in Pre-Admission Testing.    Day of surgery you  will need to let the preoperative nurse know the last time you took each of your medications.  To ensure a safe environment for patients and staff, we kindly ask that children under the age of 16 not accompany patients.  If you must bring a dependent child or dependent adult please ensure a responsible adult, other than yourself, is present to supervise them.      If you were given a blood bank ID arm band remember to bring it with you the day of surgery.    Preventing a Surgical Site Infection:  For 2 to 3 days before surgery, avoid shaving with a razor because the razor can irritate skin and make it easier to develop an infection.    Any areas of open skin can increase the risk of a post-operative wound infection by allowing bacteria to enter and travel throughout the body.  Notify your surgeon if you have any skin wounds / rashes even if it is not near the expected surgical site.  The area will need assessed to determine if surgery should be delayed until it is healed.  The night prior to surgery shower using a fresh bar of anti-bacterial soap (such as Dial) and clean washcloth.  Sleep in a clean bed with clean clothing.  Do not allow pets to sleep with you.  Shower on the morning of surgery using a fresh bar of anti-bacterial soap (such as Dial) and clean washcloth.  Dry with a clean towel and dress in clean clothing.  Ask your surgeon if you will be receiving antibiotics prior to surgery.  Make sure you, your family, and all healthcare providers clean their hands with soap and water or an alcohol based hand  before caring for you or your wound.    Day of surgery:4/11/2025  Your arrival time is approximately two hours before your scheduled surgery time.  Please note if you have an early arrival time the surgery doors do not open before 5:00 AM.  Upon arrival, a Pre-op nurse and Anesthesiologist will review your health history, obtain vital signs, and answer questions you may have.  The only belongings  needed at this time will be a list of your home medications and if applicable your C-PAP/BI-PAP machine.  A Pre-op nurse will start an IV and you may receive medication in preparation for surgery, including something to help you relax.     Please be aware that surgery does come with discomfort.  We want to make every effort to control your discomfort so please discuss any uncontrolled symptoms with your nurse.   Your doctor will most likely have prescribed pain medications.      If you are going home after surgery you will receive individualized written care instructions before being discharged.  A responsible adult must drive you to and from the hospital on the day of your surgery and ideally stay with you through the night.   .  Discharge prescriptions can be filled by the hospital pharmacy during regular pharmacy hours.  If you are having surgery late in the day/evening your prescription may be e-prescribed to your pharmacy.  Please verify your pharmacy hours or chose a 24 hour pharmacy to avoid not having access to your prescription because your pharmacy has closed for the day.    If you are staying overnight following surgery, you will be transported to your hospital room following the recovery period.  [facility] has all private rooms.    If you have any questions please call Pre-Admission Testing at [phone].  Deductibles and co-payments are collected on the day of service. Please be prepared to pay the required co-pay, deductible or deposit on the day of service as defined by your plan.    Call your surgeon immediately if you experience any of the following symptoms:  Sore Throat  Shortness of Breath or difficulty breathing  Cough  Chills  Body soreness or muscle pain  Headache  Fever  New loss of taste or smell  Do not arrive for your surgery ill.  Your procedure will need to be rescheduled to another time.  You will need to call your physician before the day of surgery to avoid any  unnecessary exposure to hospital staff as well as other patients.  CHLORHEXIDINE CLOTH INSTRUCTIONS  The morning of surgery follow these instructions using the Chlorhexidine cloths you've been given.  These steps reduce bacteria on the body.  Do not use the cloths near your eyes, ears mouth, genitalia or on open wounds.  Throw the cloths away after use but do not try to flush them down a toilet.      Open and remove one cloth at a time from the package.    Leave the cloth unfolded and begin the bathing.  Massage the skin with the cloths using gentle pressure to remove bacteria.  Do not scrub harshly.   Follow the steps below with one 2% CHG cloth per area (6 total cloths).  One cloth for neck, shoulders and chest.  One cloth for both arms, hands, fingers and underarms (do underarms last).  One cloth for the abdomen followed by groin.  One cloth for right leg and foot including between the toes.  One cloth for left leg and foot including between the toes.  The last cloth is to be used for the back of the neck, back and buttocks.    Allow the CHG to air dry 3 minutes on the skin which will give it time to work and decrease the chance of irritation.  The skin may feel sticky until it is dry.  Do not rinse with water or any other liquid or you will lose the beneficial effects of the CHG.  If mild skin irritation occurs, do rinse the skin to remove the CHG.  Report this to the nurse at time of admission.  Do not apply lotions, creams, ointments, deodorants or perfumes after using the clothes. Dress in clean clothes before coming to the hospital.

## 2025-04-03 ENCOUNTER — OFFICE VISIT (OUTPATIENT)
Dept: ORTHOPEDIC SURGERY | Facility: CLINIC | Age: 70
End: 2025-04-03
Payer: MEDICARE

## 2025-04-03 VITALS
OXYGEN SATURATION: 95 % | BODY MASS INDEX: 32.17 KG/M2 | WEIGHT: 188.4 LBS | DIASTOLIC BLOOD PRESSURE: 79 MMHG | HEIGHT: 64 IN | HEART RATE: 63 BPM | SYSTOLIC BLOOD PRESSURE: 135 MMHG | TEMPERATURE: 98 F

## 2025-04-03 DIAGNOSIS — R52 PAIN: Primary | ICD-10-CM

## 2025-04-03 NOTE — H&P
Patient: Enrrique Alejandre    Date of Admission: 4/11/2025    YOB: 1955    Medical Record Number: [id number]    Admitting Physician: Dr. Andrea Presley    Reason for Admission: End Stage Right Knee OA    History of Present Illness: 70 y.o. male presents with severe end stage knee osteoarthritis which has not been responsive to the full compliment of conservative measures. Despite conservative attempts, there is still severe, constant activity-limiting pain. Given the severity of the pain, the patient has elected to proceed with knee replacement.    Allergies: No Known Allergies      Current Medications:  Home Medications:    Current Outpatient Medications on File Prior to Visit   Medication Sig    acetaminophen (TYLENOL) 500 MG tablet Take 2 tablets by mouth Every 6 (Six) Hours As Needed for Mild Pain.    ASPIRIN 81 PO Take 81 mg by mouth Daily.    doxycycline (VIBRAMYCIN) 100 MG capsule TAKE 1 CAPSULE BY MOUTH TWICE A DAY FOR ONE MONTH, THEN REDUCE TO DAILY (Patient taking differently: Take 1 capsule by mouth Daily.)    escitalopram (LEXAPRO) 10 MG tablet TAKE 1 TABLET BY MOUTH DAILY (Patient taking differently: Take 1 tablet by mouth Every Night.)    finasteride (PROSCAR) 5 MG tablet Take 1 tablet by mouth Daily.    levothyroxine (SYNTHROID, LEVOTHROID) 125 MCG tablet TAKE 1/2 TABLET BY MOUTH DAILY (Patient taking differently: Take 0.5 tablets by mouth Every Morning.)    lisinopril-hydrochlorothiazide (PRINZIDE,ZESTORETIC) 20-12.5 MG per tablet TAKE 1 TABLET BY MOUTH DAILY    mycophenolate (CELLCEPT) 500 MG tablet TAKE 1 TABLET BY MOUTH TWICE A DAY    omeprazole (priLOSEC) 40 MG capsule TAKE 1 CAPSULE BY MOUTH DAILY (Patient taking differently: Take 1 capsule by mouth Every Evening.)    pyridostigmine (MESTINON) 180 MG CR tablet TAKE 1 TABLET BY MOUTH TWICE A DAY    pyridostigmine (MESTINON) 60 MG tablet Take 1 tablet by mouth 4 (Four) Times a Day As Needed (fatigue).    sotalol (BETAPACE) 80 MG tablet  TAKE 1/2 TABLET BY MOUTH EVERY 12 HOURS (Patient taking differently: Take 0.5 tablets by mouth 2 (Two) Times a Day.)    tamsulosin (FLOMAX) 0.4 MG capsule 24 hr capsule Take 2 capsules by mouth Daily. (Patient taking differently: Take 1 capsule by mouth 2 (Two) Times a Day.)    traZODone (DESYREL) 50 MG tablet TAKE ONE TO TWO TABLETS BY MOUTH EVERY EVENING FOR SLEEP (Patient taking differently: Take 1-2 tablets by mouth Every Night. TAKE ONE TO TWO TABLETS BY MOUTH EVERY EVENING FOR SLEEP)     No current facility-administered medications on file prior to visit.     PRN Meds:.    PMH:     Past Medical History:   Diagnosis Date    Anemia     Anxiety 2/2014    Arthritis     Arthritis of back 2014    Atrial fibrillation 1/1/1999    Benign prostatic hyperplasia     Cancer Non invasive bladder    No treatment just observation    Disease of thyroid gland     Erectile dysfunction     Fracture, clavicle 6/1975    GERD (gastroesophageal reflux disease)     Hemorrhoid     High prostate specific antigen (PSA) 08/16/2017    History of bleeding peptic ulcer 2014    History of bronchitis     History of echocardiogram     EF 55%. No significant valvular flow abnormalities.     History of sinusitis     History of stress test 11/2010    Stress Myoview: No ischemia 11/2010    HL (hearing loss)     HTN (hypertension) 06/25/2019    Injury of back 1980    Kidney stone     Kidney stone     Knee swelling 2012    Low back pain 2019    Myasthenia gravis     Pancreatitis     Prostatitis 2020    PSVT (paroxysmal supraventricular tachycardia) 06/25/2019    Rotator cuff syndrome 1989    Sinusitis 1985    Skin abrasion     Stroke 02/2014    Supraventricular tachycardia     Suspected COVID-19 virus infection 01/12/2021    Tear of meniscus of knee 2022    TIA (transient ischemic attack) 2008    Urinary tract infection 2021       PF/Surg/Soc Hx:     Past Surgical History:   Procedure Laterality Date    ADENOIDECTOMY      BLADDER TUMOR EXCISION  2021  "   CHOLECYSTECTOMY N/A 07/04/2020    Procedure: CHOLECYSTECTOMY LAPAROSCOPIC;  Surgeon: Yogesh Presley MD;  Location: [facility] MAIN OR;  Service: General;  Laterality: N/A;    COLONOSCOPY      ECHO - CONVERTED  01/20/2021    ENDOSCOPY      FINGER SURGERY Left 2018    index    HAND SURGERY  2018    tumor removed  non cancerous    HERNIA REPAIR  7/4/2020    umbilical    PEG TUBE INSERTION  2014    PEG TUBE REMOVAL  2024    PROSTATE BIOPSY  2019    DR[facility]-BENIGN    PROSTATE SURGERY  11/23/2021    turp    TONSILLECTOMY      VASECTOMY          Social History     Occupational History    Not on file   Tobacco Use    Smoking status: Never    Smokeless tobacco: Never   Vaping Use    Vaping status: Never Used   Substance and Sexual Activity    Alcohol use: Yes     Alcohol/week: 3.0 standard drinks of alcohol     Types: 1 Glasses of wine, 1 Cans of beer, 1 Drinks containing 0.5 oz of alcohol per week    Drug use: No    Sexual activity: Not Currently     Partners: Female     Birth control/protection: Condom, Vasectomy     Comment: Vasectomy      Social History     Social History Narrative    Not on file        Family History   Problem Relation Age of Onset    Hypertension Mother     Stroke Mother     Arthritis Mother     Diabetes Mother     Hypertension Father     Arthritis Father     Diabetes Maternal Grandmother     Malig Hyperthermia Neg Hx          Review of Systems:   A 14 point review of systems was performed, pertinent positives discussed above, all other systems are negative    Physical Exam: 70 y.o. male  Vital Signs :   Vitals:    04/03/25 1400   BP: 135/79   Pulse: 63   Temp: 98 °F (36.7 °C)   SpO2: 95%   Weight: 85.5 kg (188 lb 6.4 oz)   Height: 162.6 cm (64\")   PainSc: 3      General: Alert and Oriented x 3, No acute distress.  Psych: mood and affect appropriate; recent and remote memory intact  Eyes: conjunctivae clear; pupils equally round and reactive, sclerae anicteric  CV: RRR  Resp: normal " respiratory effort  Skin: no rashes or wounds; normal turgor    Xrays:  -3 views (AP, lateral, and sunrise) were reviewed demonstrating end-stage OA with bone on bone articulation.  -A full length AP xray was ordered and reviewed today for purposes of operative alignment demonstrating end stage arthritic findings. There are no previous full length films for review    Assessment:  End-stage Right knee osteoarthritis. Conservative measures have failed.      Plan:  The plan is to proceed with Right Total Knee Replacement. The patient voiced understanding of the risks, benefits, and alternative forms of treatment that were discussed with Dr Presley at the time of scheduling.  Same day [facility]    Ann Chavez, APRN  4/3/2025

## 2025-04-03 NOTE — H&P (VIEW-ONLY)
Patient: Enrrique Alejandre    Date of Admission: 4/11/2025    YOB: 1955    Medical Record Number: [id number]    Admitting Physician: Dr. Andrea Presley    Reason for Admission: End Stage Right Knee OA    History of Present Illness: 70 y.o. male presents with severe end stage knee osteoarthritis which has not been responsive to the full compliment of conservative measures. Despite conservative attempts, there is still severe, constant activity-limiting pain. Given the severity of the pain, the patient has elected to proceed with knee replacement.    Allergies: No Known Allergies      Current Medications:  Home Medications:    Current Outpatient Medications on File Prior to Visit   Medication Sig    acetaminophen (TYLENOL) 500 MG tablet Take 2 tablets by mouth Every 6 (Six) Hours As Needed for Mild Pain.    ASPIRIN 81 PO Take 81 mg by mouth Daily.    doxycycline (VIBRAMYCIN) 100 MG capsule TAKE 1 CAPSULE BY MOUTH TWICE A DAY FOR ONE MONTH, THEN REDUCE TO DAILY (Patient taking differently: Take 1 capsule by mouth Daily.)    escitalopram (LEXAPRO) 10 MG tablet TAKE 1 TABLET BY MOUTH DAILY (Patient taking differently: Take 1 tablet by mouth Every Night.)    finasteride (PROSCAR) 5 MG tablet Take 1 tablet by mouth Daily.    levothyroxine (SYNTHROID, LEVOTHROID) 125 MCG tablet TAKE 1/2 TABLET BY MOUTH DAILY (Patient taking differently: Take 0.5 tablets by mouth Every Morning.)    lisinopril-hydrochlorothiazide (PRINZIDE,ZESTORETIC) 20-12.5 MG per tablet TAKE 1 TABLET BY MOUTH DAILY    mycophenolate (CELLCEPT) 500 MG tablet TAKE 1 TABLET BY MOUTH TWICE A DAY    omeprazole (priLOSEC) 40 MG capsule TAKE 1 CAPSULE BY MOUTH DAILY (Patient taking differently: Take 1 capsule by mouth Every Evening.)    pyridostigmine (MESTINON) 180 MG CR tablet TAKE 1 TABLET BY MOUTH TWICE A DAY    pyridostigmine (MESTINON) 60 MG tablet Take 1 tablet by mouth 4 (Four) Times a Day As Needed (fatigue).    sotalol (BETAPACE) 80 MG tablet  TAKE 1/2 TABLET BY MOUTH EVERY 12 HOURS (Patient taking differently: Take 0.5 tablets by mouth 2 (Two) Times a Day.)    tamsulosin (FLOMAX) 0.4 MG capsule 24 hr capsule Take 2 capsules by mouth Daily. (Patient taking differently: Take 1 capsule by mouth 2 (Two) Times a Day.)    traZODone (DESYREL) 50 MG tablet TAKE ONE TO TWO TABLETS BY MOUTH EVERY EVENING FOR SLEEP (Patient taking differently: Take 1-2 tablets by mouth Every Night. TAKE ONE TO TWO TABLETS BY MOUTH EVERY EVENING FOR SLEEP)     No current facility-administered medications on file prior to visit.     PRN Meds:.    PMH:     Past Medical History:   Diagnosis Date    Anemia     Anxiety 2/2014    Arthritis     Arthritis of back 2014    Atrial fibrillation 1/1/1999    Benign prostatic hyperplasia     Cancer Non invasive bladder    No treatment just observation    Disease of thyroid gland     Erectile dysfunction     Fracture, clavicle 6/1975    GERD (gastroesophageal reflux disease)     Hemorrhoid     High prostate specific antigen (PSA) 08/16/2017    History of bleeding peptic ulcer 2014    History of bronchitis     History of echocardiogram     EF 55%. No significant valvular flow abnormalities.     History of sinusitis     History of stress test 11/2010    Stress Myoview: No ischemia 11/2010    HL (hearing loss)     HTN (hypertension) 06/25/2019    Injury of back 1980    Kidney stone     Kidney stone     Knee swelling 2012    Low back pain 2019    Myasthenia gravis     Pancreatitis     Prostatitis 2020    PSVT (paroxysmal supraventricular tachycardia) 06/25/2019    Rotator cuff syndrome 1989    Sinusitis 1985    Skin abrasion     Stroke 02/2014    Supraventricular tachycardia     Suspected COVID-19 virus infection 01/12/2021    Tear of meniscus of knee 2022    TIA (transient ischemic attack) 2008    Urinary tract infection 2021       PF/Surg/Soc Hx:     Past Surgical History:   Procedure Laterality Date    ADENOIDECTOMY      BLADDER TUMOR EXCISION  2021  "   CHOLECYSTECTOMY N/A 07/04/2020    Procedure: CHOLECYSTECTOMY LAPAROSCOPIC;  Surgeon: Yogesh Presley MD;  Location: [facility] MAIN OR;  Service: General;  Laterality: N/A;    COLONOSCOPY      ECHO - CONVERTED  01/20/2021    ENDOSCOPY      FINGER SURGERY Left 2018    index    HAND SURGERY  2018    tumor removed  non cancerous    HERNIA REPAIR  7/4/2020    umbilical    PEG TUBE INSERTION  2014    PEG TUBE REMOVAL  2024    PROSTATE BIOPSY  2019    DR[facility]-BENIGN    PROSTATE SURGERY  11/23/2021    turp    TONSILLECTOMY      VASECTOMY          Social History     Occupational History    Not on file   Tobacco Use    Smoking status: Never    Smokeless tobacco: Never   Vaping Use    Vaping status: Never Used   Substance and Sexual Activity    Alcohol use: Yes     Alcohol/week: 3.0 standard drinks of alcohol     Types: 1 Glasses of wine, 1 Cans of beer, 1 Drinks containing 0.5 oz of alcohol per week    Drug use: No    Sexual activity: Not Currently     Partners: Female     Birth control/protection: Condom, Vasectomy     Comment: Vasectomy      Social History     Social History Narrative    Not on file        Family History   Problem Relation Age of Onset    Hypertension Mother     Stroke Mother     Arthritis Mother     Diabetes Mother     Hypertension Father     Arthritis Father     Diabetes Maternal Grandmother     Malig Hyperthermia Neg Hx          Review of Systems:   A 14 point review of systems was performed, pertinent positives discussed above, all other systems are negative    Physical Exam: 70 y.o. male  Vital Signs :   Vitals:    04/03/25 1400   BP: 135/79   Pulse: 63   Temp: 98 °F (36.7 °C)   SpO2: 95%   Weight: 85.5 kg (188 lb 6.4 oz)   Height: 162.6 cm (64\")   PainSc: 3      General: Alert and Oriented x 3, No acute distress.  Psych: mood and affect appropriate; recent and remote memory intact  Eyes: conjunctivae clear; pupils equally round and reactive, sclerae anicteric  CV: RRR  Resp: normal " respiratory effort  Skin: no rashes or wounds; normal turgor    Xrays:  -3 views (AP, lateral, and sunrise) were reviewed demonstrating end-stage OA with bone on bone articulation.  -A full length AP xray was ordered and reviewed today for purposes of operative alignment demonstrating end stage arthritic findings. There are no previous full length films for review    Assessment:  End-stage Right knee osteoarthritis. Conservative measures have failed.      Plan:  The plan is to proceed with Right Total Knee Replacement. The patient voiced understanding of the risks, benefits, and alternative forms of treatment that were discussed with Dr Presley at the time of scheduling.  Same day [facility]    Ann Chavez, APRN  4/3/2025

## 2025-04-04 ENCOUNTER — TELEPHONE (OUTPATIENT)
Dept: ORTHOPEDIC SURGERY | Facility: HOSPITAL | Age: 70
End: 2025-04-04
Payer: MEDICARE

## 2025-04-04 NOTE — TELEPHONE ENCOUNTER
Risk Factor yes no   Age >75  X   BMI <20 >40  X   Patient History     Chronic Pain (2 or more meds/Pain Management)  X   ETOH (more than 3 drinks Daily)  X   Uncontrolled Depression/Bipolar/Schizoaffective Disorder  X   Arrhythmias-- X    Stent placement/MI  X   DVT/PE  X   Pacemaker  X   HTN (uncontrolled or requiring more than 2 medications) X    CHF/Retained fluids/Edema  X   Stroke with Residual   X   COPD/Asthma  X   SUSAN--Non-compliant with CPAP  X   Diabetes (on insulin or more than 2 meds)         A1C:  X   BPH/Urinary retention (on medication) X    CKD  X   Home environment and support     Current ambulation status (use of cane, walker, W/C, Multiple falls/weakness)  X   Stairs to enter and throughout home  X   Lives Alone X    Doesn't have support at home  X   Outpatient Screening Assessment    Home needs: (Walker/BSC):  Has walker  ? Steps has a ramp wants to practice.   Caregiver 24-48hrs post-discharge: Wife    Discharge Plan:   [facility]    Prescriptions: Meds to bed    Home medications:   ? Blood thinner/anti-coag therapy--ASA   ? BPH or diuretic-- Proscar, Flomax  ? BP meds-- Lisinopril/HCTZ, Betapace   [] Pain/Anti-inflammatories--    Pre-op Education:  Educate patient on spinal anesthesia/pain control:  ? patient verbalize understanding    Educate patient on hospital course/timeline:  ?  patient verbalize understanding    Joint Care Class:  ?  yes [] no  Notes:

## 2025-04-07 DIAGNOSIS — G47.00 INSOMNIA, UNSPECIFIED TYPE: ICD-10-CM

## 2025-04-07 RX ORDER — TRAZODONE HYDROCHLORIDE 50 MG/1
TABLET ORAL
Qty: 120 TABLET | Refills: 1 | Status: SHIPPED | OUTPATIENT
Start: 2025-04-07

## 2025-04-10 ENCOUNTER — TELEPHONE (OUTPATIENT)
Dept: ORTHOPEDIC SURGERY | Facility: CLINIC | Age: 70
End: 2025-04-10
Payer: MEDICARE

## 2025-04-10 NOTE — DISCHARGE PLACEMENT REQUEST
"Cristobal Anaya (70 y.o. Male)       Date of Birth   1955    Social Security Number       Address   [address] [place] [address] [address] [place] [address]    Home Phone   [phone]    MRN   [id number]       Congregation   [employer]    Marital Status                               Admission Date       Admission Type   Elective    Admitting Provider   Andrea Presley MD    Attending Provider   Andrea Presley MD    Department, Room/Bed   [facility], --/--       Discharge Date       Discharge Disposition       Discharge Destination                                 Attending Provider: Andrea Presley MD    Allergies: No Known Allergies    Isolation: None   Infection: None   Code Status: Prior    Ht: 162.6 cm (64\")   Wt: 85.5 kg (188 lb 6.4 oz)    Admission Cmt: None   Principal Problem: Primary osteoarthritis of right knee [M17.11]                   Active Insurance as of 4/11/2025       Primary Coverage       Payor Plan Insurance Group Employer/Plan Group    MEDICARE MEDICARE A & B        Payor Plan Address Payor Plan Phone Number Payor Plan Fax Number Effective Dates    [address] [phone]  1/1/2020 - None Entered    [facility] [id number]         Subscriber Name Subscriber Birth Date Member ID       CRISTOBAL ANAYA 1955 [id number]               Secondary Coverage       Payor Plan Insurance Group Employer/Plan Group    MISC  SUP   MISC  SUP              PLAN G       Coverage Address Coverage Phone Number Coverage Fax Number Effective Dates    [address] [phone]  2/1/2024 - None Entered    [place] [address]         Subscriber Name Subscriber Birth Date Member ID       CRISTOBAL ANAYA 1955 [phone]                     Emergency Contacts        (Rel.) Home Phone Work Phone Mobile Phone    NEO ANAYA (Spouse) [phone] -- [phone]            "

## 2025-04-10 NOTE — CASE MANAGEMENT/SOCIAL WORK
Continued Stay Note  [facility]     Patient Name: Enrrique Alejandre  MRN: [id number]  Today's Date: 4/10/2025    Admit Date: (Not on file)    Plan: Home with Saeid Campos   Discharge Plan       Row Name 04/10/25 1714       Plan    Plan Home with Saeid Campos    Patient/Family in Agreement with Plan yes    Provided Post Acute Provider List? Yes    Post Acute Provider List Home Health    Delivered To Patient    Method of Delivery Telephone                   Discharge Codes    No documentation.                       Shannon Epley, RN

## 2025-04-11 ENCOUNTER — APPOINTMENT (OUTPATIENT)
Dept: GENERAL RADIOLOGY | Facility: HOSPITAL | Age: 70
End: 2025-04-11
Payer: MEDICARE

## 2025-04-11 ENCOUNTER — HOSPITAL ENCOUNTER (OUTPATIENT)
Facility: HOSPITAL | Age: 70
Setting detail: HOSPITAL OUTPATIENT SURGERY
Discharge: HOME HEALTH | End: 2025-04-11
Attending: ORTHOPAEDIC SURGERY | Admitting: ORTHOPAEDIC SURGERY
Payer: MEDICARE

## 2025-04-11 ENCOUNTER — ANESTHESIA (OUTPATIENT)
Dept: PERIOP | Facility: HOSPITAL | Age: 70
End: 2025-04-11
Payer: MEDICARE

## 2025-04-11 ENCOUNTER — ANESTHESIA EVENT (OUTPATIENT)
Dept: PERIOP | Facility: HOSPITAL | Age: 70
End: 2025-04-11
Payer: MEDICARE

## 2025-04-11 VITALS
HEART RATE: 66 BPM | RESPIRATION RATE: 18 BRPM | TEMPERATURE: 98.4 F | OXYGEN SATURATION: 94 % | SYSTOLIC BLOOD PRESSURE: 159 MMHG | DIASTOLIC BLOOD PRESSURE: 75 MMHG

## 2025-04-11 DIAGNOSIS — Z96.651 S/P TOTAL KNEE REPLACEMENT, RIGHT: Primary | ICD-10-CM

## 2025-04-11 DIAGNOSIS — M17.11 PRIMARY OSTEOARTHRITIS OF RIGHT KNEE: ICD-10-CM

## 2025-04-11 PROCEDURE — 73560 X-RAY EXAM OF KNEE 1 OR 2: CPT

## 2025-04-11 PROCEDURE — 25810000003 LACTATED RINGERS PER 1000 ML: Performed by: STUDENT IN AN ORGANIZED HEALTH CARE EDUCATION/TRAINING PROGRAM

## 2025-04-11 PROCEDURE — 97530 THERAPEUTIC ACTIVITIES: CPT

## 2025-04-11 PROCEDURE — C1713 ANCHOR/SCREW BN/BN,TIS/BN: HCPCS | Performed by: ORTHOPAEDIC SURGERY

## 2025-04-11 PROCEDURE — 25010000002 EPINEPHRINE 1 MG/ML SOLUTION 30 ML VIAL: Performed by: ORTHOPAEDIC SURGERY

## 2025-04-11 PROCEDURE — C1776 JOINT DEVICE (IMPLANTABLE): HCPCS | Performed by: ORTHOPAEDIC SURGERY

## 2025-04-11 PROCEDURE — 27447 TOTAL KNEE ARTHROPLASTY: CPT | Performed by: ORTHOPAEDIC SURGERY

## 2025-04-11 PROCEDURE — 25010000002 MAGNESIUM SULFATE PER 500 MG OF MAGNESIUM: Performed by: NURSE ANESTHETIST, CERTIFIED REGISTERED

## 2025-04-11 PROCEDURE — 25810000003 SODIUM CHLORIDE 0.9 % SOLUTION 250 ML FLEX CONT: Performed by: NURSE PRACTITIONER

## 2025-04-11 PROCEDURE — 25010000002 DEXAMETHASONE SODIUM PHOSPHATE 20 MG/5ML SOLUTION: Performed by: NURSE ANESTHETIST, CERTIFIED REGISTERED

## 2025-04-11 PROCEDURE — 25010000002 ACETAMINOPHEN 10 MG/ML SOLUTION: Performed by: NURSE ANESTHETIST, CERTIFIED REGISTERED

## 2025-04-11 PROCEDURE — 25010000002 FENTANYL CITRATE (PF) 50 MCG/ML SOLUTION: Performed by: STUDENT IN AN ORGANIZED HEALTH CARE EDUCATION/TRAINING PROGRAM

## 2025-04-11 PROCEDURE — 25010000002 ROPIVACAINE PER 1 MG: Performed by: STUDENT IN AN ORGANIZED HEALTH CARE EDUCATION/TRAINING PROGRAM

## 2025-04-11 PROCEDURE — 25810000003 LACTATED RINGERS SOLUTION: Performed by: NURSE PRACTITIONER

## 2025-04-11 PROCEDURE — 25010000002 LIDOCAINE PF 2% 2 % SOLUTION: Performed by: NURSE ANESTHETIST, CERTIFIED REGISTERED

## 2025-04-11 PROCEDURE — 25010000002 KETOROLAC TROMETHAMINE PER 15 MG: Performed by: ORTHOPAEDIC SURGERY

## 2025-04-11 PROCEDURE — 25010000002 ONDANSETRON PER 1 MG: Performed by: NURSE ANESTHETIST, CERTIFIED REGISTERED

## 2025-04-11 PROCEDURE — 25010000002 CEFAZOLIN PER 500 MG: Performed by: NURSE PRACTITIONER

## 2025-04-11 PROCEDURE — 25010000002 DEXAMETHASONE PER 1 MG: Performed by: STUDENT IN AN ORGANIZED HEALTH CARE EDUCATION/TRAINING PROGRAM

## 2025-04-11 PROCEDURE — 25010000002 FENTANYL CITRATE (PF) 50 MCG/ML SOLUTION: Performed by: NURSE ANESTHETIST, CERTIFIED REGISTERED

## 2025-04-11 PROCEDURE — 25010000002 MORPHINE PER 10 MG: Performed by: ORTHOPAEDIC SURGERY

## 2025-04-11 PROCEDURE — 25010000002 PROPOFOL 200 MG/20ML EMULSION: Performed by: NURSE ANESTHETIST, CERTIFIED REGISTERED

## 2025-04-11 PROCEDURE — 25010000002 SUGAMMADEX 200 MG/2ML SOLUTION: Performed by: NURSE ANESTHETIST, CERTIFIED REGISTERED

## 2025-04-11 PROCEDURE — 25010000002 VANCOMYCIN HCL 1.25 G RECONSTITUTED SOLUTION 1 EACH VIAL: Performed by: NURSE PRACTITIONER

## 2025-04-11 PROCEDURE — 25010000002 ROPIVACAINE PER 1 MG: Performed by: ORTHOPAEDIC SURGERY

## 2025-04-11 PROCEDURE — 25010000002 PROPOFOL 10 MG/ML EMULSION: Performed by: NURSE ANESTHETIST, CERTIFIED REGISTERED

## 2025-04-11 PROCEDURE — 97161 PT EVAL LOW COMPLEX 20 MIN: CPT

## 2025-04-11 PROCEDURE — 25010000002 GLYCOPYRROLATE 0.2 MG/ML SOLUTION: Performed by: NURSE ANESTHETIST, CERTIFIED REGISTERED

## 2025-04-11 DEVICE — GENESIS II NON-POROUS TIBIAL                                    BASEPLATE SIZE 5 RIGHT
Type: IMPLANTABLE DEVICE | Site: KNEE | Status: FUNCTIONAL
Brand: GENESIS II

## 2025-04-11 DEVICE — IMPLANTABLE DEVICE: Type: IMPLANTABLE DEVICE | Status: FUNCTIONAL

## 2025-04-11 DEVICE — KNOTLESS TISSUE CONTROL DEVICE, UNDYED UNIDIRECTIONAL (ANTIBACTERIAL) SYNTHETIC ABSORBABLE DEVICE
Type: IMPLANTABLE DEVICE | Site: KNEE | Status: FUNCTIONAL
Brand: STRATAFIX

## 2025-04-11 DEVICE — VIOLET ANTIBACTERIAL POLYDIOXANONE, KNOTLESS TISSUE CONTROL DEVICE
Type: IMPLANTABLE DEVICE | Site: KNEE | Status: FUNCTIONAL
Brand: STRATAFIX

## 2025-04-11 DEVICE — LEGION CRUCIATE RETAINING OXINIUM                                    FEMORAL SIZE 5 RIGHT
Type: IMPLANTABLE DEVICE | Site: KNEE | Status: FUNCTIONAL
Brand: LEGION

## 2025-04-11 DEVICE — PALACOS® R IS A FAST-CURING, RADIOPAQUE, POLY(METHYL METHACRYLATE)-BASED BONE CEMENT.PALACOS ® R CONTAINS THE X-RAY CONTRAST MEDIUM ZIRCONIUM DIOXIDE. TO IMPROVE VISIBILITY IN THE SURGICAL FIELD PALACOS ® R HAS BEEN COLOURED WITH CHLOROPHYLL (E141). THE BONE CEMENT IS PREPARED DIRECTLY BEFORE USE BY MIXING A POLYMER POWDER COMPONENT WITH A LIQUID MONOMER COMPONENT. A DUCTILE DOUGH FORMS WHICH CURES WITHIN A FEW MINUTES.
Type: IMPLANTABLE DEVICE | Site: KNEE | Status: FUNCTIONAL
Brand: PALACOS®

## 2025-04-11 DEVICE — GENESIS II BICONVEX PATELLA 29MM
Type: IMPLANTABLE DEVICE | Site: KNEE | Status: FUNCTIONAL
Brand: GENESIS II

## 2025-04-11 DEVICE — LEGION HIGHLY CROSS LINKED                                    POLYETHYLENE DISHED INSERT SIZE 5-6 11MM
Type: IMPLANTABLE DEVICE | Site: KNEE | Status: FUNCTIONAL
Brand: LEGION

## 2025-04-11 RX ORDER — DEXAMETHASONE SODIUM PHOSPHATE 4 MG/ML
INJECTION, SOLUTION INTRA-ARTICULAR; INTRALESIONAL; INTRAMUSCULAR; INTRAVENOUS; SOFT TISSUE
Status: COMPLETED | OUTPATIENT
Start: 2025-04-11 | End: 2025-04-11

## 2025-04-11 RX ORDER — SODIUM CHLORIDE 0.9 % (FLUSH) 0.9 %
3 SYRINGE (ML) INJECTION EVERY 12 HOURS SCHEDULED
Status: DISCONTINUED | OUTPATIENT
Start: 2025-04-11 | End: 2025-04-11 | Stop reason: HOSPADM

## 2025-04-11 RX ORDER — SODIUM CHLORIDE 0.9 % (FLUSH) 0.9 %
3-10 SYRINGE (ML) INJECTION AS NEEDED
Status: DISCONTINUED | OUTPATIENT
Start: 2025-04-11 | End: 2025-04-11 | Stop reason: HOSPADM

## 2025-04-11 RX ORDER — ONDANSETRON 4 MG/1
4 TABLET, FILM COATED ORAL EVERY 8 HOURS PRN
Qty: 10 TABLET | Refills: 0 | Status: SHIPPED | OUTPATIENT
Start: 2025-04-11

## 2025-04-11 RX ORDER — HYDROCODONE BITARTRATE AND ACETAMINOPHEN 7.5; 325 MG/1; MG/1
1 TABLET ORAL EVERY 4 HOURS PRN
Qty: 40 TABLET | Refills: 0 | Status: SHIPPED | OUTPATIENT
Start: 2025-04-11

## 2025-04-11 RX ORDER — DEXAMETHASONE SODIUM PHOSPHATE 4 MG/ML
INJECTION, SOLUTION INTRA-ARTICULAR; INTRALESIONAL; INTRAMUSCULAR; INTRAVENOUS; SOFT TISSUE AS NEEDED
Status: DISCONTINUED | OUTPATIENT
Start: 2025-04-11 | End: 2025-04-11 | Stop reason: SURG

## 2025-04-11 RX ORDER — HYDRALAZINE HYDROCHLORIDE 20 MG/ML
5 INJECTION INTRAMUSCULAR; INTRAVENOUS
Status: DISCONTINUED | OUTPATIENT
Start: 2025-04-11 | End: 2025-04-11 | Stop reason: HOSPADM

## 2025-04-11 RX ORDER — MAGNESIUM HYDROXIDE 1200 MG/15ML
LIQUID ORAL AS NEEDED
Status: DISCONTINUED | OUTPATIENT
Start: 2025-04-11 | End: 2025-04-11 | Stop reason: HOSPADM

## 2025-04-11 RX ORDER — PROMETHAZINE HYDROCHLORIDE 25 MG/1
25 SUPPOSITORY RECTAL ONCE AS NEEDED
Status: DISCONTINUED | OUTPATIENT
Start: 2025-04-11 | End: 2025-04-11 | Stop reason: HOSPADM

## 2025-04-11 RX ORDER — DIPHENHYDRAMINE HYDROCHLORIDE 50 MG/ML
12.5 INJECTION, SOLUTION INTRAMUSCULAR; INTRAVENOUS
Status: DISCONTINUED | OUTPATIENT
Start: 2025-04-11 | End: 2025-04-11 | Stop reason: HOSPADM

## 2025-04-11 RX ORDER — ATROPINE SULFATE 0.4 MG/ML
0.4 INJECTION, SOLUTION INTRAMUSCULAR; INTRAVENOUS; SUBCUTANEOUS ONCE AS NEEDED
Status: DISCONTINUED | OUTPATIENT
Start: 2025-04-11 | End: 2025-04-11 | Stop reason: HOSPADM

## 2025-04-11 RX ORDER — ONDANSETRON 2 MG/ML
INJECTION INTRAMUSCULAR; INTRAVENOUS AS NEEDED
Status: DISCONTINUED | OUTPATIENT
Start: 2025-04-11 | End: 2025-04-11 | Stop reason: SURG

## 2025-04-11 RX ORDER — ONDANSETRON 2 MG/ML
4 INJECTION INTRAMUSCULAR; INTRAVENOUS ONCE AS NEEDED
Status: DISCONTINUED | OUTPATIENT
Start: 2025-04-11 | End: 2025-04-11 | Stop reason: HOSPADM

## 2025-04-11 RX ORDER — FENTANYL CITRATE 50 UG/ML
50 INJECTION, SOLUTION INTRAMUSCULAR; INTRAVENOUS ONCE AS NEEDED
Status: COMPLETED | OUTPATIENT
Start: 2025-04-11 | End: 2025-04-11

## 2025-04-11 RX ORDER — PREGABALIN 75 MG/1
150 CAPSULE ORAL ONCE
Status: COMPLETED | OUTPATIENT
Start: 2025-04-11 | End: 2025-04-11

## 2025-04-11 RX ORDER — HYDROCODONE BITARTRATE AND ACETAMINOPHEN 7.5; 325 MG/1; MG/1
1 TABLET ORAL EVERY 4 HOURS PRN
Refills: 0 | Status: CANCELLED | OUTPATIENT
Start: 2025-04-11 | End: 2025-04-18

## 2025-04-11 RX ORDER — ASPIRIN 81 MG/1
TABLET ORAL
Qty: 60 TABLET | Refills: 0 | Status: SHIPPED | OUTPATIENT
Start: 2025-04-11 | End: 2025-05-23

## 2025-04-11 RX ORDER — FLUMAZENIL 0.1 MG/ML
0.2 INJECTION INTRAVENOUS AS NEEDED
Status: DISCONTINUED | OUTPATIENT
Start: 2025-04-11 | End: 2025-04-11 | Stop reason: HOSPADM

## 2025-04-11 RX ORDER — DROPERIDOL 2.5 MG/ML
0.62 INJECTION, SOLUTION INTRAMUSCULAR; INTRAVENOUS
Status: DISCONTINUED | OUTPATIENT
Start: 2025-04-11 | End: 2025-04-11 | Stop reason: HOSPADM

## 2025-04-11 RX ORDER — PROMETHAZINE HYDROCHLORIDE 25 MG/1
12.5 TABLET ORAL EVERY 4 HOURS PRN
Status: CANCELLED | OUTPATIENT
Start: 2025-04-11

## 2025-04-11 RX ORDER — HYDROMORPHONE HYDROCHLORIDE 1 MG/ML
0.5 INJECTION, SOLUTION INTRAMUSCULAR; INTRAVENOUS; SUBCUTANEOUS
Status: DISCONTINUED | OUTPATIENT
Start: 2025-04-11 | End: 2025-04-11 | Stop reason: HOSPADM

## 2025-04-11 RX ORDER — PROMETHAZINE HYDROCHLORIDE 25 MG/1
25 TABLET ORAL ONCE AS NEEDED
Status: DISCONTINUED | OUTPATIENT
Start: 2025-04-11 | End: 2025-04-11 | Stop reason: HOSPADM

## 2025-04-11 RX ORDER — CHLORHEXIDINE GLUCONATE 500 MG/1
CLOTH TOPICAL 2 TIMES DAILY
Status: DISCONTINUED | OUTPATIENT
Start: 2025-04-11 | End: 2025-04-11 | Stop reason: HOSPADM

## 2025-04-11 RX ORDER — TRANEXAMIC ACID 100 MG/ML
INJECTION, SOLUTION INTRAVENOUS AS NEEDED
Status: DISCONTINUED | OUTPATIENT
Start: 2025-04-11 | End: 2025-04-11 | Stop reason: SURG

## 2025-04-11 RX ORDER — PROPOFOL 10 MG/ML
INJECTION, EMULSION INTRAVENOUS AS NEEDED
Status: DISCONTINUED | OUTPATIENT
Start: 2025-04-11 | End: 2025-04-11 | Stop reason: SURG

## 2025-04-11 RX ORDER — ACETAMINOPHEN 10 MG/ML
INJECTION, SOLUTION INTRAVENOUS AS NEEDED
Status: DISCONTINUED | OUTPATIENT
Start: 2025-04-11 | End: 2025-04-11 | Stop reason: SURG

## 2025-04-11 RX ORDER — LABETALOL HYDROCHLORIDE 5 MG/ML
5 INJECTION, SOLUTION INTRAVENOUS
Status: DISCONTINUED | OUTPATIENT
Start: 2025-04-11 | End: 2025-04-11 | Stop reason: HOSPADM

## 2025-04-11 RX ORDER — NALOXONE HCL 0.4 MG/ML
0.2 VIAL (ML) INJECTION AS NEEDED
Status: DISCONTINUED | OUTPATIENT
Start: 2025-04-11 | End: 2025-04-11 | Stop reason: HOSPADM

## 2025-04-11 RX ORDER — SODIUM CHLORIDE, SODIUM LACTATE, POTASSIUM CHLORIDE, CALCIUM CHLORIDE 600; 310; 30; 20 MG/100ML; MG/100ML; MG/100ML; MG/100ML
9 INJECTION, SOLUTION INTRAVENOUS CONTINUOUS
Status: DISCONTINUED | OUTPATIENT
Start: 2025-04-11 | End: 2025-04-11 | Stop reason: HOSPADM

## 2025-04-11 RX ORDER — FENTANYL CITRATE 50 UG/ML
50 INJECTION, SOLUTION INTRAMUSCULAR; INTRAVENOUS
Status: DISCONTINUED | OUTPATIENT
Start: 2025-04-11 | End: 2025-04-11 | Stop reason: HOSPADM

## 2025-04-11 RX ORDER — MAGNESIUM SULFATE HEPTAHYDRATE 500 MG/ML
INJECTION, SOLUTION INTRAMUSCULAR; INTRAVENOUS AS NEEDED
Status: DISCONTINUED | OUTPATIENT
Start: 2025-04-11 | End: 2025-04-11 | Stop reason: SURG

## 2025-04-11 RX ORDER — OXYCODONE AND ACETAMINOPHEN 7.5; 325 MG/1; MG/1
1 TABLET ORAL EVERY 4 HOURS PRN
Status: DISCONTINUED | OUTPATIENT
Start: 2025-04-11 | End: 2025-04-11 | Stop reason: HOSPADM

## 2025-04-11 RX ORDER — ROCURONIUM BROMIDE 10 MG/ML
INJECTION, SOLUTION INTRAVENOUS AS NEEDED
Status: DISCONTINUED | OUTPATIENT
Start: 2025-04-11 | End: 2025-04-11 | Stop reason: SURG

## 2025-04-11 RX ORDER — LIDOCAINE HYDROCHLORIDE 20 MG/ML
INJECTION, SOLUTION EPIDURAL; INFILTRATION; INTRACAUDAL; PERINEURAL AS NEEDED
Status: DISCONTINUED | OUTPATIENT
Start: 2025-04-11 | End: 2025-04-11 | Stop reason: SURG

## 2025-04-11 RX ORDER — ONDANSETRON 4 MG/1
4 TABLET, ORALLY DISINTEGRATING ORAL EVERY 6 HOURS PRN
Status: CANCELLED | OUTPATIENT
Start: 2025-04-11

## 2025-04-11 RX ORDER — ASPIRIN 81 MG/1
81 TABLET ORAL EVERY 12 HOURS SCHEDULED
Status: CANCELLED | OUTPATIENT
Start: 2025-04-12

## 2025-04-11 RX ORDER — HYDROCODONE BITARTRATE AND ACETAMINOPHEN 7.5; 325 MG/1; MG/1
2 TABLET ORAL EVERY 4 HOURS PRN
Refills: 0 | Status: CANCELLED | OUTPATIENT
Start: 2025-04-11 | End: 2025-04-18

## 2025-04-11 RX ORDER — FENTANYL CITRATE 50 UG/ML
INJECTION, SOLUTION INTRAMUSCULAR; INTRAVENOUS AS NEEDED
Status: DISCONTINUED | OUTPATIENT
Start: 2025-04-11 | End: 2025-04-11 | Stop reason: SURG

## 2025-04-11 RX ORDER — ROPIVACAINE HYDROCHLORIDE 5 MG/ML
INJECTION, SOLUTION EPIDURAL; INFILTRATION; PERINEURAL
Status: COMPLETED | OUTPATIENT
Start: 2025-04-11 | End: 2025-04-11

## 2025-04-11 RX ORDER — GLYCOPYRROLATE 0.2 MG/ML
INJECTION INTRAMUSCULAR; INTRAVENOUS AS NEEDED
Status: DISCONTINUED | OUTPATIENT
Start: 2025-04-11 | End: 2025-04-11 | Stop reason: SURG

## 2025-04-11 RX ORDER — LIDOCAINE HYDROCHLORIDE 10 MG/ML
0.5 INJECTION, SOLUTION INFILTRATION; PERINEURAL ONCE AS NEEDED
Status: DISCONTINUED | OUTPATIENT
Start: 2025-04-11 | End: 2025-04-11 | Stop reason: HOSPADM

## 2025-04-11 RX ORDER — MELOXICAM 15 MG/1
15 TABLET ORAL DAILY
Status: DISCONTINUED | OUTPATIENT
Start: 2025-04-11 | End: 2025-04-11 | Stop reason: HOSPADM

## 2025-04-11 RX ORDER — EPHEDRINE SULFATE 50 MG/ML
5 INJECTION, SOLUTION INTRAVENOUS ONCE AS NEEDED
Status: DISCONTINUED | OUTPATIENT
Start: 2025-04-11 | End: 2025-04-11 | Stop reason: HOSPADM

## 2025-04-11 RX ORDER — ONDANSETRON 2 MG/ML
4 INJECTION INTRAMUSCULAR; INTRAVENOUS ONCE AS NEEDED
Status: CANCELLED | OUTPATIENT
Start: 2025-04-11

## 2025-04-11 RX ORDER — ACETAMINOPHEN 325 MG/1
650 TABLET ORAL EVERY 6 HOURS PRN
Status: CANCELLED | OUTPATIENT
Start: 2025-04-11 | End: 2025-04-14

## 2025-04-11 RX ORDER — IPRATROPIUM BROMIDE AND ALBUTEROL SULFATE 2.5; .5 MG/3ML; MG/3ML
3 SOLUTION RESPIRATORY (INHALATION) ONCE AS NEEDED
Status: DISCONTINUED | OUTPATIENT
Start: 2025-04-11 | End: 2025-04-11 | Stop reason: HOSPADM

## 2025-04-11 RX ORDER — POLYETHYLENE GLYCOL 3350 17 G/17G
17 POWDER, FOR SOLUTION ORAL 2 TIMES DAILY
Qty: 238 G | Refills: 0 | Status: SHIPPED | OUTPATIENT
Start: 2025-04-11 | End: 2025-04-18

## 2025-04-11 RX ORDER — MELOXICAM 15 MG/1
15 TABLET ORAL DAILY
Qty: 14 TABLET | Refills: 0 | Status: SHIPPED | OUTPATIENT
Start: 2025-04-11

## 2025-04-11 RX ORDER — HYDROCODONE BITARTRATE AND ACETAMINOPHEN 5; 325 MG/1; MG/1
1 TABLET ORAL ONCE AS NEEDED
Status: COMPLETED | OUTPATIENT
Start: 2025-04-11 | End: 2025-04-11

## 2025-04-11 RX ADMIN — Medication 3 ML: at 08:35

## 2025-04-11 RX ADMIN — FENTANYL CITRATE 50 MCG: 50 INJECTION, SOLUTION INTRAMUSCULAR; INTRAVENOUS at 08:57

## 2025-04-11 RX ADMIN — ACETAMINOPHEN 1000 MG: 1000 INJECTION INTRAVENOUS at 09:20

## 2025-04-11 RX ADMIN — GLYCOPYRROLATE 0.2 MG: 0.2 INJECTION INTRAMUSCULAR; INTRAVENOUS at 09:35

## 2025-04-11 RX ADMIN — HYDROCODONE BITARTRATE AND ACETAMINOPHEN 1 TABLET: 5; 325 TABLET ORAL at 12:07

## 2025-04-11 RX ADMIN — SODIUM CHLORIDE, POTASSIUM CHLORIDE, SODIUM LACTATE AND CALCIUM CHLORIDE: 600; 310; 30; 20 INJECTION, SOLUTION INTRAVENOUS at 09:35

## 2025-04-11 RX ADMIN — MELOXICAM 15 MG: 15 TABLET ORAL at 08:12

## 2025-04-11 RX ADMIN — SODIUM CHLORIDE, POTASSIUM CHLORIDE, SODIUM LACTATE AND CALCIUM CHLORIDE 500 ML: 600; 310; 30; 20 INJECTION, SOLUTION INTRAVENOUS at 07:16

## 2025-04-11 RX ADMIN — SODIUM CHLORIDE 1250 MG: 9 INJECTION, SOLUTION INTRAVENOUS at 08:15

## 2025-04-11 RX ADMIN — FENTANYL CITRATE 50 MCG: 50 INJECTION, SOLUTION INTRAMUSCULAR; INTRAVENOUS at 09:11

## 2025-04-11 RX ADMIN — DEXAMETHASONE SODIUM PHOSPHATE 10 MG: 4 INJECTION, SOLUTION INTRAMUSCULAR; INTRAVENOUS at 09:20

## 2025-04-11 RX ADMIN — SUGAMMADEX 400 MG: 100 INJECTION, SOLUTION INTRAVENOUS at 10:23

## 2025-04-11 RX ADMIN — SODIUM CHLORIDE 2 G: 900 INJECTION INTRAVENOUS at 09:00

## 2025-04-11 RX ADMIN — MAGNESIUM SULFATE HEPTAHYDRATE 2 G: 500 INJECTION, SOLUTION INTRAMUSCULAR; INTRAVENOUS at 09:20

## 2025-04-11 RX ADMIN — PROPOFOL 150 MG: 10 INJECTION, EMULSION INTRAVENOUS at 09:11

## 2025-04-11 RX ADMIN — PROPOFOL 25 MCG/KG/MIN: 10 INJECTION, EMULSION INTRAVENOUS at 09:12

## 2025-04-11 RX ADMIN — PREGABALIN 150 MG: 75 CAPSULE ORAL at 07:16

## 2025-04-11 RX ADMIN — ROCURONIUM 30 MG: 50 INJECTION, SOLUTION INTRAVENOUS at 09:11

## 2025-04-11 RX ADMIN — DEXAMETHASONE SODIUM PHOSPHATE 4 MG: 4 INJECTION, SOLUTION INTRA-ARTICULAR; INTRALESIONAL; INTRAMUSCULAR; INTRAVENOUS; SOFT TISSUE at 09:00

## 2025-04-11 RX ADMIN — ROPIVACAINE HYDROCHLORIDE 15 ML: 5 INJECTION EPIDURAL; INFILTRATION; PERINEURAL at 09:00

## 2025-04-11 RX ADMIN — LIDOCAINE HYDROCHLORIDE 100 MG: 20 SOLUTION INTRAVENOUS at 09:11

## 2025-04-11 RX ADMIN — ONDANSETRON 4 MG: 2 INJECTION, SOLUTION INTRAMUSCULAR; INTRAVENOUS at 10:15

## 2025-04-11 RX ADMIN — SODIUM CHLORIDE, POTASSIUM CHLORIDE, SODIUM LACTATE AND CALCIUM CHLORIDE 9 ML/HR: 600; 310; 30; 20 INJECTION, SOLUTION INTRAVENOUS at 08:30

## 2025-04-11 RX ADMIN — ROCURONIUM 20 MG: 50 INJECTION, SOLUTION INTRAVENOUS at 09:20

## 2025-04-11 RX ADMIN — TRANEXAMIC ACID 1000 MG: 100 INJECTION INTRAVENOUS at 09:49

## 2025-04-11 NOTE — OP NOTE
Name: Enrrique Alejandre    YOB: 1955    DATE OF SURGERY: 4/11/2025    PREOPERATIVE DIAGNOSIS: Right knee end-stage osteoarthritis    POSTOPERATIVE DIAGNOSIS: Right knee end-stage osteoarthritis    PROCEDURE PERFORMED: Right total knee replacement     SURGEON: Andrea Presley M.D.    ASSISTANT: SANDRO MARQUEZ    A surgical assistant was integral in ensuring a successful outcome with this procedure.  The assistant was utilized to assist in positioning the patient, draping the patient, was used throughout the case to provide with retraction of tissues, suctioning of blood and body fluids for visualization, positioning of the extremity to allow for proper exposure so that I could perform the procedure.  Without the use of a surgical assistant during this procedure I feel that the outcome may have been compromised or would have been suboptimal or at risk for complications.    IMPLANTS: [employer]:     Implant Name Type Inv. Item Serial No.  Lot No. LRB No. Used Action   CMT BONE PALACOS R HI/VISC 1X40 - [id number] Implant CMT BONE PALACOS R HI/VISC 1X40  [facility] [id number] Right 1 Implanted   DEV CONTRL TISS STRATAFIX SYMM PDS PLUS MARY CT-1 60CM - [id number] Implant DEV CONTRL TISS STRATAFIX SYMM PDS PLUS MARY CT-1 60CM  [employer] [id number] Right 1 Implanted   DEV CONTRL TISS STRATAFIXSPIRALMNCRYL PLSPS2 REV3/0 45CM - [id number] Implant DEV CONTRL TISS STRATAFIXSPIRALMNCRYL PLSPS2 REV3/0 45CM  [employer]  [employer] J [employer] J [id number] Right 1 Implanted   CMT BONE PALACOS R HI/VISC 1X40 - [id number] Implant CMT BONE PALACOS R HI/VISC 1X40  [facility] [id number] Right 1 Implanted   COMP FEM LEGION OXINIUM CR SZ5 RT - [id number] Implant COMP FEM LEGION OXINIUM CR SZ5 RT  [employer] [id number] Right 1 Implanted   BASE TIB/KN GEN2 NONPOR TI SZ5 RT - [id number] Implant BASE TIB/KN GEN2 NONPOR TI SZ5 RT  [employer] [id number] Right 1 Implanted   PAT GEN2 BICONVEX [id number]  - [id number] Implant PAT GEN2 BICONVEX [id number]  [employer] [id number] Right 1 Implanted   INSRT ART/KN LEGION CR DEEP DISH XLPE SZ5TO6 11MM - [id number] Implant INSRT ART/KN LEGION CR DEEP DISH XLPE SZ5TO6 11MM  [employer] [id number] Right 1 Implanted       Estimated Blood Loss: 200cc  Specimens : none  Complications: none    DESCRIPTION OF PROCEDURE: The patient was taken to the operating room and placed in the supine position. A sequential compression device was carefully placed on the non-operative leg. Preoperative antibiotics were administered. Surgical time out was performed. After adequate induction of anesthesia, the leg was prepped and draped in the usual sterile fashion, exsanguinated with an Esmarch bandage and the tourniquet inflated to 250 mmHg. A midline incision was performed followed by a medial parapatellar arthrotomy. The patella was subluxed laterally.  A portion of the fat pad, ACL, and anterior horns of the meniscus were excised.  A drill hole was then placed in the center of the femoral canal in line with the canal.  It was irrigated and suctioned.  The intramedullary antonio was then placed and a 5 degree distal valgus cut was performed after the block pinned in place appropriately.  Cut surface was then removed.  The sizing and rotation guide was then placed and seated appropriately.  It was sized and then the drill holes for the 4-in-1 cutting guide were placed in 3 degrees of external rotation based off of the posterior condyles.  The 4-in-1 cutting block was then placed and the femoral cuts were performed.  The excess bone was removed and the cut surfaces looked good.  At this point we placed the retractors around the proximal tibia and a slight release of the PCL fibers off of the posterior proximal tibia was performed.  We used the extramedullary tibial alignment guide and it was aligned appropriately and then the depth was set and the block pinned in place.  The tibial cut was  then performed and the alignment guide was removed.  The tibial cut was removed and the cut surface looked good.  The posterior horns of the menisci were then removed as well as the posterior osteophytes.  Flexion extension blocks were then used to check the balance of the knee. The tibial cut surface was then sized with the sizing templates and the tibial and femoral trial were then placed. The knee was placed in full extension and then the tibial tray rotation was then matched to the femoral rotation and marked.    Attention was then placed to the patella. The patella was noted to track centrally through range of motion. The patella was then sized with the trials. The thickness of the patella was then measured. The patella was resurfaced and the surrounding osteophytes were removed. The preoperative thickness was reproduced. The patella tracked centrally through range of motion.  We then checked the balance with the trial implants in place and there was excellent medial lateral and flexion-extension balance.   At this point all trial components were removed, the knee was copiously irrigated with pulsed lavage, and the knee was injected with anesthetic cocktail solution. The cut surfaces were then dried with clean lap sponges, and the components were cemented tibia, followed by femur, then patella. The knee was held in full extension and all excess cement was removed. The knee was held still until the cement had completely hardened. We then placed the trial polyethylene spacer which resulted in full extension and excellent flexion-extension balance. We placed the final polyethylene spacer.   The knee was then copiously irrigated. The tourniquet was then released. There was excellent hemostasis. We placed a one-eighth inch Hemovac drain. We closed the knee in multiple layers in standard fashion. Sterile dressing were applied. At the end of the case, the sponge and needle counts were reported as being correct. There  were no known complications. The patient was then transported to the recovery room.      Andrea Presley M.D.

## 2025-04-11 NOTE — PROGRESS NOTES
Start PACC Note    Home Health Referral    Evaluated patient and wife on Home Care and services available. Patient offered choice of available HHC and agreeable to PT services with [facility].    Care Types:   Isolation Precautions: No active isolations    Social Determinants of Health:  Tobacco Use: Low Risk  (4/11/2025)    Patient History     Smoking Tobacco Use: Never     Smokeless Tobacco Use: Never     Passive Exposure: Not on file     Social History     Substance and Sexual Activity   Alcohol Use Yes    Alcohol/week: 3.0 standard drinks of alcohol    Types: 1 Glasses of wine, 1 Cans of beer, 1 Drinks containing 0.5 oz of alcohol per week     Social History     Substance and Sexual Activity   Drug Use No       Does the patient have any financial resource strain?   Does the patient have any food insecurities?   Does the patient have any housing instabilities?     If any of the above is noted as yes - consider a MSW evaluation once the patient returns home.    START PATIENT REGISTRATION INFORMATION  Order Information  Order Signing Physician: Andrea Presley MD  Service Ordered RN?: No  Service Ordered PT?: Yes  Service Ordered OT?: No  Service Ordered ST?: No  Service Ordered MSW?: No  Service Ordered HHA?: No  Following Physician: Andrea Presley MD  Following Physician Phone: [phone]  Overseeing Physician: Sergio Martinez MD  (Required for Residents Only)  Agreeable to Follow? Yes  Date/Time of Call 04/11/25 10:36 EDT, Spoke with: per epic order from Dr Brown    Care Coordination  Same Day SOC?: No  Primary Care Physician: Sergio Martinez MD  Primary Care Physician Phone: [phone]  Primary Care Physician Address: [address] [place] [address]  Visit Instructions: N/A  Service Discharge Location Type: Home  Service Facility Name: N/A  Service Floor Facility: N/A  Service Room No: N/A    Demographics  Patient Last Name: Hill  Patient First Name:  Enrrique  Language/Communication Barrier: none  Service Address: [address]   Service City: [place]  Service State: [place]  Service Zip: [address]  Service Home Phone: [phone]  Other Phone Numbers:   Telephone Information:   Mobile [phone]     Emergency Contact:   Extended Emergency Contact Information  Primary Emergency Contact: NEO ANAYA  Home Phone: [phone]  Mobile Phone: [phone]  Relation: Spouse  Hearing or visual needs: None  Other needs: None  Preferred language: English   needed? No    Admission Information  Admit Date: 4/11/2025  Patient Status at Discharge: Hospital outpatient surgery  Admitting Diagnosis: Primary osteoarthritis of right knee [M17.11]    Caregiver Information  Caregiver First Name:   Caregiver Last Name:   Caregiver Relationship to Patient:   Caregiver Phone Number:   Caregiver Notes:     HITECH  [employer] List  HIGHTECH: HI TECH - FRESH ORTHO  Procedure: RTK  Date of Procedure: 04/11/2025  Precautions: None  Surgeon: Andrea Presley MD      END PATIENT REGISTRATION INFORMATION    Start PACC Summary    Additional Comments: n/a    END PACC Summary    Discharge Date: Pending    [facility]    Signed By: Yaritza Alonzo RN, 4/11/2025, 10:36 EDT     Date/Time: 04/11/25 10:36 EDT    End PACC Note

## 2025-04-11 NOTE — ANESTHESIA PROCEDURE NOTES
Peripheral Block      Patient reassessed immediately prior to procedure    Patient location during procedure: pre-op  Start time: 4/11/2025 8:55 AM  Stop time: 4/11/2025 9:00 AM  Reason for block: at surgeon's request and post-op pain management  Performed by  Anesthesiologist: Hudson Mina MD  Preanesthetic Checklist  Completed: patient identified, IV checked, site marked, risks and benefits discussed, surgical consent, monitors and equipment checked, pre-op evaluation and timeout performed  Prep:  Pt Position: supine  Sterile barriers:cap, mask and washed/disinfected hands  Prep: ChloraPrep  Patient monitoring: blood pressure monitoring, continuous pulse oximetry and EKG  Procedure    Sedation: yes  Performed under: local infiltration  Guidance:ultrasound guided    ULTRASOUND INTERPRETATION.  Using ultrasound guidance a gauge needle was placed in close proximity to the femoral nerve, at which point, under ultrasound guidance anesthetic was injected in the area of the nerve and spread of the anesthesia was seen on ultrasound in close proximity thereto.  There were no abnormalities seen on ultrasound; a digital image was taken; and the patient tolerated the procedure with no complications. Images:still images obtained, printed/placed on chart    Block Type:adductor canal block  Injection Technique:single-shot  Needle Type:echogenic  Needle Gauge:21 G  Resistance on Injection: none    Medications Used: dexamethasone (DECADRON) injection - Injection   4 mg - 4/11/2025 9:00:00 AM  ropivacaine (NAROPIN) 0.5 % injection - Injection   15 mL - 4/11/2025 9:00:00 AM      Post Assessment  Injection Assessment: negative aspiration for heme, no paresthesia on injection and incremental injection  Patient Tolerance:comfortable throughout block  Complications:no  Additional Notes  Ultrasound guidance used to visualize nerve anatomy, guide needle placement and verify local anesthetic disbursement.       Performed by: Keeley  MD Hudson

## 2025-04-11 NOTE — PLAN OF CARE
Goal Outcome Evaluation:  Plan of Care Reviewed With: patient, spouse           Outcome Evaluation: Enrrique Alejandre is a 70 year old male seen for physical therapy evaluation POD0 R TKA. He notes that he lives with his spouse in a single level home with 2STE, and a ramp in the back. He denies falls, denies use of RW, however he does own one. He is indep at [facility]. He performs STS from bedside chair with standby A and RW, ambulates 120 ft with RW and standby A, and goes up/down 3 steps with standby A. He denies any concern with returning home safely. Pt to follow up with HH at d/c.    Anticipated Discharge Disposition (PT): home with home health

## 2025-04-11 NOTE — NURSING NOTE
Home dose ofMestinon 180mg given to patient with crackers and water per patient request.   ALIYAH Leon ordered

## 2025-04-11 NOTE — ANESTHESIA PREPROCEDURE EVALUATION
Anesthesia Evaluation     Patient summary reviewed and Nursing notes reviewed   no history of anesthetic complications:   NPO Solid Status: > 8 hours  NPO Liquid Status: > 2 hours           Airway   Mallampati: II  TM distance: >3 FB  Neck ROM: full  Dental      Pulmonary    Cardiovascular     ECG reviewed    (+) hypertension    ROS comment: Stress test equivocal. Perfusion defect noted in basolateral area but no WMA noted    Neuro/Psych  (+) TIA, CVA  GI/Hepatic/Renal/Endo    (+) obesity, GERD, thyroid problem hypothyroidism    ROS Comment: Myasthenia gravis- takes pyridostigmine ER BID. Has a 60 mg short acting dose that he takes prn (has not needed this in 2-3 years). Patient mentions he has missed/been late on doses in the past and has not had issues    Musculoskeletal     Abdominal   (+) obese   Substance History      OB/GYN          Other                      Anesthesia Plan    ASA 3     general with block   total IV anesthesia  intravenous induction     Anesthetic plan, risks, benefits, and alternatives have been provided, discussed and informed consent has been obtained with: patient.      CODE STATUS:

## 2025-04-11 NOTE — ANESTHESIA PROCEDURE NOTES
Airway  Reason: elective    Date/Time: 4/11/2025 9:15 AM  Airway not difficult    General Information and Staff    Patient location during procedure: OR  Anesthesiologist: Hudson Mina MD  CRNA/CAA: Laura Beck CRNA    Indications and Patient Condition  Indications for airway management: airway protection    Preoxygenated: yes    Mask difficulty assessment: 2 - vent by mask + OA or adjuvant +/- NMBA    Final Airway Details    Final airway type: endotracheal airway      Successful airway: ETT  Cuffed: yes   Successful intubation technique: direct laryngoscopy  Adjuncts used in placement: intubating stylet and anterior pressure/BURP  Endotracheal tube insertion site: oral  Blade: Wood  Blade size: 2  ETT size (mm): 7.5  Cormack-Lehane Classification: grade IIa - partial view of glottis  Placement verified by: chest auscultation and capnometry   Measured from: lips  ETT/EBT  to lips (cm): 22  Number of attempts at approach: 1  Assessment: lips, teeth, and gum same as pre-op and atraumatic intubation    Additional Comments  Atraumatic, MOP to cuff, BSBE, no change to dentition, secured with tape

## 2025-04-11 NOTE — NURSING NOTE
Patient spoke with spouse. After speaking with Angela, his wife, patient will go home. Called into OR 11 and spoke with OR RN to inform Dr Presley patient will be going home today

## 2025-04-11 NOTE — ANESTHESIA POSTPROCEDURE EVALUATION
Patient: Enrrique Alejandre    Procedure Summary       Date: 04/11/25 Room / Location:  [facility]  /  [facility]    Anesthesia Start: 0906 Anesthesia Stop: 1041    Procedure: TOTAL KNEE ARTHROPLASTY (Right: Knee) Diagnosis:       Primary osteoarthritis of right knee      (Primary osteoarthritis of right knee [M17.11])    Surgeons: Andrea Presley MD Provider: Hudson Mina MD    Anesthesia Type: general with block ASA Status: 3            Anesthesia Type: general with block    Vitals  Vitals Value Taken Time   /78 04/11/25 11:15   Temp 36.9 °C (98.4 °F) 04/11/25 10:38   Pulse 70 04/11/25 11:27   Resp 14 04/11/25 11:15   SpO2 92 % 04/11/25 11:27   Vitals shown include unfiled device data.        Anesthesia Post Evaluation

## 2025-04-11 NOTE — THERAPY EVALUATION
Patient Name: Enrrique Alejandre  : 1955    MRN: [id number]                              Today's Date: 2025       Admit Date: 2025    Visit Dx:     ICD-10-CM ICD-9-CM   1. S/P total knee replacement, right  Z96.651 V43.65   2. Primary osteoarthritis of right knee  M17.11 715.16     Patient Active Problem List   Diagnosis    Essential hypertension    PSVT (paroxysmal supraventricular tachycardia)    CVA (cerebral vascular accident)    Chronic pancreatitis    Hematuria    High prostate specific antigen (PSA)    Hypomagnesemia    Vitamin D deficiency    Myasthenia gravis    Radicular low back pain    Glucosuria    Anxiety and depression    Benign prostatic hyperplasia with nocturia    Cholecystitis    Syncope    Real time reverse transcriptase PCR positive for COVID-19 virus    Urinary retention    Hyperlipidemia    Gastroesophageal reflux disease    Hypothyroidism    Urothelial carcinoma of bladder    Insomnia    Primary osteoarthritis of right knee     Past Medical History:   Diagnosis Date    Anemia     Anxiety 2014    Arthritis     Arthritis of back     Atrial fibrillation 1999    Benign prostatic hyperplasia     Cancer Non invasive bladder    No treatment just observation    Disease of thyroid gland     Erectile dysfunction     Fracture, clavicle 1975    GERD (gastroesophageal reflux disease)     Hemorrhoid     High prostate specific antigen (PSA) 2017    History of bleeding peptic ulcer     History of bronchitis     History of echocardiogram     EF 55%. No significant valvular flow abnormalities.     History of sinusitis     History of stress test 2010    Stress Myoview: No ischemia 2010    HL (hearing loss)     HTN (hypertension) 2019    Injury of back     Kidney stone     Kidney stone     Knee swelling     Low back pain     Myasthenia gravis     Pancreatitis     Prostatitis     PSVT (paroxysmal supraventricular tachycardia) 2019    Rotator  cuff syndrome 1989    Sinusitis 1985    Skin abrasion     Stroke 02/2014    Supraventricular tachycardia     Suspected COVID-19 virus infection 01/12/2021    Tear of meniscus of knee 2022    TIA (transient ischemic attack) 2008    Urinary tract infection 2021     Past Surgical History:   Procedure Laterality Date    ADENOIDECTOMY      BLADDER TUMOR EXCISION  2021    CHOLECYSTECTOMY N/A 07/04/2020    Procedure: CHOLECYSTECTOMY LAPAROSCOPIC;  Surgeon: Yogesh Presley MD;  Location: [facility] MAIN OR;  Service: General;  Laterality: N/A;    COLONOSCOPY      ECHO - CONVERTED  01/20/2021    ENDOSCOPY      FINGER SURGERY Left 2018    index    HAND SURGERY  2018    tumor removed  non cancerous    HERNIA REPAIR  7/4/2020    umbilical    PEG TUBE INSERTION  2014    PEG TUBE REMOVAL  2024    PROSTATE BIOPSY  2019    DR[facility]    PROSTATE SURGERY  11/23/2021    turp    TONSILLECTOMY      VASECTOMY        General Information       Row Name 04/11/25 1514          Physical Therapy Time and Intention    Document Type evaluation  -ER     Mode of Treatment individual therapy;physical therapy  -ER       Row Name 04/11/25 1514          General Information    Patient Profile Reviewed yes  -ER     Prior Level of Function independent:  -ER     Existing Precautions/Restrictions no known precautions/restrictions  -ER     Barriers to Rehab previous functional deficit  -ER       Row Name 04/11/25 1514          Living Environment    Current Living Arrangements home  -ER     People in Home spouse  -ER       Row Name 04/11/25 1514          Home Main Entrance    Number of Stairs, Main Entrance two  Also has ramp  -ER       Row Name 04/11/25 1514          Stairs Within Home, Primary    Number of Stairs, Within Home, Primary none  -ER       Row Name 04/11/25 1514          Cognition    Orientation Status (Cognition) oriented x 4  -ER       Row Name 04/11/25 1514          Safety Issues/Impairments Affecting Functional Mobility     Safety Issues Affecting Function (Mobility) positioning of assistive device  -ER     Impairments Affecting Function (Mobility) balance;endurance/activity tolerance;pain;range of motion (ROM);strength  -ER               User Key  (r) = Recorded By, (t) = Taken By, (c) = Cosigned By      Initials Name Provider Type    ER Tanya Squires PT Physical Therapist                   Mobility       Row Name 04/11/25 1517          Bed Mobility    Comment, (Bed Mobility) UIC at arrival  -ER       Row Name 04/11/25 1517          Sit-Stand Transfer    Sit-Stand Taylor (Transfers) standby assist  -ER     Assistive Device (Sit-Stand Transfers) walker, front-wheeled  -ER       Row Name 04/11/25 1517          Gait/Stairs (Locomotion)    Taylor Level (Gait) standby assist  -ER     Assistive Device (Gait) walker, front-wheeled  -ER     Patient was able to Ambulate yes  -ER     Distance in Feet (Gait) 150  -ER     Deviations/Abnormal Patterns (Gait) bilateral deviations;gait speed decreased  -ER     Taylor Level (Stairs) stand by assist  -ER     Handrail Location (Stairs) both sides  -ER     Number of Steps (Stairs) 3  -ER     Ascending Technique (Stairs) step-to-step  -ER     Descending Technique (Stairs) step-to-step  -ER     Comment, (Gait/Stairs) Cues to remain close to RW for safety  -ER       Row Name 04/11/25 1517          Mobility    Extremity Weight-bearing Status right lower extremity  -ER     Right Lower Extremity (Weight-bearing Status) weight-bearing as tolerated (WBAT)  -ER               User Key  (r) = Recorded By, (t) = Taken By, (c) = Cosigned By      Initials Name Provider Type    ER Tanya Squires PT Physical Therapist                   Obj/Interventions       Row Name 04/11/25 1519          Range of Motion Comprehensive    Comment, General Range of Motion Pt. able to demo heel slide, able to achieve 90 in seated on RLE  -ER       Row Name 04/11/25 1519          Strength Comprehensive (MMT)    Comment,  General Manual Muscle Testing (MMT) Assessment Generalized post op weakness  -ER       Row Name 04/11/25 1519          Balance    Balance Assessment sitting static balance;sitting dynamic balance;standing static balance;standing dynamic balance  -ER     Static Sitting Balance standby assist  -ER     Dynamic Sitting Balance standby assist  -ER     Position, Sitting Balance sitting in chair;unsupported  -ER     Static Standing Balance standby assist  -ER     Dynamic Standing Balance standby assist  -ER     Position/Device Used, Standing Balance supported;walker, front-wheeled  -ER     Balance Interventions sitting;standing;sit to stand;supported;static;dynamic  -ER       Row Name 04/11/25 1519          Sensory Assessment (Somatosensory)    Sensory Assessment (Somatosensory) sensation intact  -ER               User Key  (r) = Recorded By, (t) = Taken By, (c) = Cosigned By      Initials Name Provider Type    ER Tanya Squires PT Physical Therapist                   Goals/Plan    No documentation.                  Clinical Impression       Row Name 04/11/25 1522          Pain    Pretreatment Pain Rating 1/10  -ER     Posttreatment Pain Rating 1/10  -ER     Pain Location knee  -ER     Pain Side/Orientation right  -ER     Pain Management Interventions activity modification encouraged  -ER     Response to Pain Interventions activity participation with tolerable pain  -ER       Row Name 04/11/25 1522          Plan of Care Review    Plan of Care Reviewed With patient;spouse  -ER     Outcome Evaluation Enrrique Alejandre is a 70 year old male seen for physical therapy evaluation POD0 R TKA. He notes that he lives with his spouse in a single level home with 2STE, and a ramp in the back. He denies falls, denies use of RW, however he does own one. [facility] is indep at [facility]. He performs STS from bedside chair with standby A and RW, ambulates 120 ft with RW and standby A, and goes up/down 3 steps with standby A. He denies any concern with  returning home safely. Pt to follow up with HH at d/c.  -ER       Row Name 04/11/25 1522          Therapy Assessment/Plan (PT)    Criteria for Skilled Interventions Met (PT) no;no problems identified which require skilled intervention;does not meet criteria for skilled intervention  -ER     Therapy Frequency (PT) evaluation only  -ER       Row Name 04/11/25 1522          Positioning and Restraints    Pre-Treatment Position sitting in chair/recliner  -ER     Post Treatment Position chair  -ER     In Chair notified nsg;call light within reach;encouraged to call for assist;with family/caregiver;with nsg  -ER               User Key  (r) = Recorded By, (t) = Taken By, (c) = Cosigned By      Initials Name Provider Type    ER Tanya Squires PT Physical Therapist                   Outcome Measures       Row Name 04/11/25 1526          How much help from another person do you currently need...    Turning from your back to your side while in flat bed without using bedrails? 3  -ER     Moving from lying on back to sitting on the side of a flat bed without bedrails? 3  -ER     Moving to and from a bed to a chair (including a wheelchair)? 3  -ER     Standing up from a chair using your arms (e.g., wheelchair, bedside chair)? 3  -ER     Climbing 3-5 steps with a railing? 3  -ER     To walk in hospital room? 3  -ER     AM-PAC 6 Clicks Score (PT) 18  -ER     Highest Level of Mobility Goal 6 --> Walk 10 steps or more  -ER       Row Name 04/11/25 1526          Functional Assessment    Outcome Measure Options AM-PAC 6 Clicks Basic Mobility (PT)  -ER               User Key  (r) = Recorded By, (t) = Taken By, (c) = Cosigned By      Initials Name Provider Type    ER Tanya Squires PT Physical Therapist                                 Physical Therapy Education       Title: PT OT SLP Therapies (Done)       Topic: Physical Therapy (Done)       Point: Mobility training (Done)       Learning Progress Summary            Patient Acceptance, E, VU  by ER at 4/11/2025 1527                      Point: Home exercise program (Done)       Learning Progress Summary            Patient Acceptance, E, VU by ER at 4/11/2025 1527                      Point: Body mechanics (Done)       Learning Progress Summary            Patient Acceptance, E, VU by ER at 4/11/2025 1527                      Point: Precautions (Done)       Learning Progress Summary            Patient Acceptance, E, VU by ER at 4/11/2025 1527                                      User Key       Initials Effective Dates Name Provider Type Discipline    ER 10/15/23 -  Tanya Squires, PT Physical Therapist PT                  PT Recommendation and Plan     Outcome Evaluation: Enrrique Alejandre is a 70 year old male seen for physical therapy evaluation POD0 R TKA. He notes that he lives with his spouse in a single level home with 2STE, and a ramp in the back. He denies falls, denies use of RW, however he does own one. He is indep at [facility]. He performs STS from bedside chair with standby A and RW, ambulates 120 ft with RW and standby A, and goes up/down 3 steps with standby A. He denies any concern with returning home safely. Pt to follow up with  at d/c.     Time Calculation:         PT Charges       Row Name 04/11/25 1527             Time Calculation    Start Time 1412  -ER      Stop Time 1425  -ER      Time Calculation (min) 13 min  -ER      PT Received On 04/11/25  -ER         Time Calculation- PT    Total Timed Code Minutes- PT 10 minute(s)  -ER         Timed Charges    28223 - PT Therapeutic Activity Minutes 10  -ER         Total Minutes    Timed Charges Total Minutes 10  -ER       Total Minutes 10  -ER                User Key  (r) = Recorded By, (t) = Taken By, (c) = Cosigned By      Initials Name Provider Type    ER Tanya Squires, PT Physical Therapist                  Therapy Charges for Today       Code Description Service Date Service Provider Modifiers Qty    54740763231 HC PT THERAPEUTIC ACT EA 15 MIN  4/11/2025 Tanya Squires, PT GP 1    47565292026 HC PT EVAL LOW COMPLEXITY 3 4/11/2025 Tanya Squires, PT GP 1            PT G-Codes  Outcome Measure Options: AM-PAC 6 Clicks Basic Mobility (PT)  AM-PAC 6 Clicks Score (PT): 18  PT Discharge Summary  Anticipated Discharge Disposition (PT): home with home health    Tanya Squires, PT  4/11/2025

## 2025-04-14 ENCOUNTER — TELEPHONE (OUTPATIENT)
Dept: ORTHOPEDIC SURGERY | Facility: CLINIC | Age: 70
End: 2025-04-14
Payer: MEDICARE

## 2025-04-14 NOTE — TELEPHONE ENCOUNTER
Shun with home health PT stated he is currently with patient and his incision is bleeding through to the bandage. He stated he will change it if the doctor gives the ok.    I asked the patient to upload a picture of it through his mychart and he stated he will do that now

## 2025-04-15 ENCOUNTER — TELEPHONE (OUTPATIENT)
Dept: ORTHOPEDIC SURGERY | Facility: HOSPITAL | Age: 70
End: 2025-04-15
Payer: MEDICARE

## 2025-04-15 NOTE — TELEPHONE ENCOUNTER
Post op day 4  Discharge Instructions:  Ask patient about his or her discharge instructions  ?  Patient confirmed understanding   []  Further instruction needed   What, if any, recommendations, teaching, or interventions did you provide? Click or tap here to enter text.  Health status:  Pain controlled Yes   Pain is well controlled with the medication. He is taking it about once a day.   Recommended interventions:  Yes  incision/dressing status   ?  Clean without redness, drainage, odor  []  Redness    []  Drainage - color Click or tap here to enter text.  []  Odor  URSULA - Green light blinking Yes  Had quite a bit of bleeding. PT changed the dressing. Still has some bleeding but nothing like it was. Green light blinking.   Difficulties urination No  Last BM 4/15/2025 (if no BM by day 3-recommend OTC suppository or fleets enema)  Has had one daily since surgery.   Medications:  ?Medications reviewed with patient/family/caregiver  Patient taking medications as prescribed?   Yes  If not taking medications as prescribed, note specific medicine(s) and reason for each:  Click or tap here to enter text.  Hospital Follow Up Plan:  Follow up Appointment with Orthopedic surgeon:  ?Has f/u appointment                []Scheduled f/u appointment  Home Care ordered at discharge?    Yes        Home Care started, or contact made?    Yes   If no, action taken:   DME obtained/used in home?         Yes   Using IS  Choose an item.   Other information: Mr. Alejandre said he is doing well. PT has been out to see him and the session went well. He had quite a bit of bleeding. MD office was contacted and PT changed the dressing. He said he has had a little more bleeding but nothing like it was and the green light is blinking. He is up and walking around. He is icing and elevating. He is doing the exercises. Pain get a little better every day. He is pleased with how well things are going. Mr. Alejandre doesn't have any questions for me at this  time. He has my contact information should he need anything.

## 2025-04-28 ENCOUNTER — TREATMENT (OUTPATIENT)
Dept: PHYSICAL THERAPY | Facility: CLINIC | Age: 70
End: 2025-04-28
Payer: MEDICARE

## 2025-04-28 DIAGNOSIS — R26.9 GAIT DISTURBANCE: ICD-10-CM

## 2025-04-28 DIAGNOSIS — M25.561 ACUTE PAIN OF RIGHT KNEE: Primary | ICD-10-CM

## 2025-04-28 DIAGNOSIS — Z47.1 AFTERCARE FOLLOWING RIGHT KNEE JOINT REPLACEMENT SURGERY: ICD-10-CM

## 2025-04-28 DIAGNOSIS — Z96.651 AFTERCARE FOLLOWING RIGHT KNEE JOINT REPLACEMENT SURGERY: ICD-10-CM

## 2025-04-28 PROCEDURE — 97110 THERAPEUTIC EXERCISES: CPT | Performed by: PHYSICAL THERAPIST

## 2025-04-28 PROCEDURE — 97140 MANUAL THERAPY 1/> REGIONS: CPT | Performed by: PHYSICAL THERAPIST

## 2025-04-28 PROCEDURE — 97162 PT EVAL MOD COMPLEX 30 MIN: CPT | Performed by: PHYSICAL THERAPIST

## 2025-04-28 NOTE — PROGRESS NOTES
Physical Therapy Initial Evaluation and Plan of Care    Patient: Enrrique Alejandre   : 1955  Diagnosis/ICD-10 Code:  Acute pain of right knee [M25.561]  Referring practitioner: LUIZ Gaffney  Date of initial visit : 2025  Today's Date: 2025  Patient seen for 1  session    Visit Diagnoses:    ICD-10-CM ICD-9-CM   1. Acute pain of right knee  M25.561 719.46   2. Aftercare following right knee joint replacement surgery  Z47.1 V54.81    Z96.651 V43.65   3. Gait disturbance  R26.9 781.2        Subjective Evaluation    History of Present Illness  Date of surgery: 2025  Mechanism of injury: Patient is a 70 year old male who presents with a diagnosis of right knee pain status post right total knee replacement on 25.  He reports continued swelling and pain in his right knee which limit his activity and ability to move his knee. Reports has had home health PT and continued with regular HEP after discharge until visit today.  Goals for return to prior function without limits.     Subjective comment: Knee Outcome Survey - 50%  Patient Occupation:  Quality of life: good    Pain  Current pain ratin  At best pain ratin  At worst pain ratin  Location: Right knee  Quality: dull ache and sharp  Relieving factors: ice, medications and rest  Aggravating factors: squatting, prolonged positioning, stairs and standing  Progression: improved    Social Support  Lives in: multiple-level home  Lives with: spouse    Treatments  Previous treatment: physical therapy (Surgery)  Current treatment: physical therapy  Discharged from (in last 30 days): home health care  Patient Goals  Patient goals for therapy: decreased edema, decreased pain, improved balance, increased motion, increased strength, independence with ADLs/IADLs, return to sport/leisure activities and return to work           Objective          Active Range of Motion     Right Knee   Flexion: 92 degrees   Extensor la  degrees     Additional Active Range of Motion Details  Incision healing well - no evidence of infection R knee    Patellar Mobility     Right Knee Hypomobile in the medial, lateral, superior and inferior patellar tendon(s).     Strength/Myotome Testing     Left Knee   Flexion: 5  Extension: 5  Quadriceps contraction: good    Right Knee   Flexion: 4  Extension: 2+  Quadriceps contraction: fair    Ambulation     Comments   Ambulate with straight cane in L UE and decreased stance time/push off and knee FL R LE    Functional Assessment     Comments  TU.5 seconds  FTSST: 21.3 seconds           Assessment & Plan       Assessment  Impairments: abnormal gait, abnormal or restricted ROM, impaired balance, impaired physical strength, lacks appropriate home exercise program and pain with function   Functional limitations: lifting, walking, uncomfortable because of pain and standing (Squats/stairs)  Assessment details: Presents with limits in R knee ROM, strength, patellar mobility, altered gait and decreased ability for squats/stairs status post right total knee replacement. He would benefit from skilled PT to address the above and restore to the highest level of prior function.   Prognosis: good    Goals  Plan Goals: ST. R knee AROM 5-110 over 2 weeks.  2. R knee pain decreased 20% or more with walking/standing over 2 weeks.   3. Independent and compliant with HEP over 2 weeks.     LT. R knee AROM 0-120 to allow good gait mechanics and ability for reciprocal stair ascent/descent over 12 weeks.   2. TUG and Five Time Sit to Stand time at or below age adjusted limits over 12 weeks.   3. R knee strength 5/5 both FL/EXT over 12 weeks.   4. Gait mechanics normal w/o use of assistive device over 12 weeks.     Plan  Therapy options: will be seen for skilled therapy services  Planned modality interventions: cryotherapy, electrical stimulation/Russian stimulation, TENS, microcurrent electrical stimulation and  thermotherapy (hydrocollator packs)  Planned therapy interventions: manual therapy, neuromuscular re-education, soft tissue mobilization, functional ROM exercises, strengthening, flexibility, gait training, stretching, therapeutic activities, home exercise program, joint mobilization and transfer training  Frequency: 2x week  Duration in weeks: 12  Treatment plan discussed with: patient    History # of Personal Factors and/or Comorbidities: MODERATE (1-2)  Examination of Body System(s): # of elements: MODERATE (3)  Clinical Presentation: EVOLVING  Clinical Decision Making: MODERATE       Timed:         Manual Therapy:    8     mins  18945;     Therapeutic Exercise:    16     mins  63808;         Un-Timed:  Mod Eval     20     Mins  57318      Timed Treatment:   24   mins   Total Treatment:     20   mins          PT SIGNATURE: Zach Mendiola PT   [place] Lic #[id number]    DATE TREATMENT INITIATED: 4/28/2025    Medicare Initial Certification  Certification Period: 7/27/2025  I certify that the therapy services are furnished while this patient is under my care.  The services outlined above are required by this patient, and will be reviewed every 90 days.     PHYSICIAN: John Peres APRN      DATE:     Please sign and return via fax to [phone].. Thank you, [facility].

## 2025-05-01 ENCOUNTER — TREATMENT (OUTPATIENT)
Dept: PHYSICAL THERAPY | Facility: CLINIC | Age: 70
End: 2025-05-01
Payer: MEDICARE

## 2025-05-01 ENCOUNTER — OFFICE VISIT (OUTPATIENT)
Dept: ORTHOPEDIC SURGERY | Facility: CLINIC | Age: 70
End: 2025-05-01
Payer: MEDICARE

## 2025-05-01 VITALS — HEIGHT: 64 IN | BODY MASS INDEX: 31.58 KG/M2 | WEIGHT: 185 LBS | TEMPERATURE: 96.9 F

## 2025-05-01 DIAGNOSIS — Z96.651 AFTERCARE FOLLOWING RIGHT KNEE JOINT REPLACEMENT SURGERY: ICD-10-CM

## 2025-05-01 DIAGNOSIS — M25.561 ACUTE PAIN OF RIGHT KNEE: Primary | ICD-10-CM

## 2025-05-01 DIAGNOSIS — Z96.651 S/P TOTAL KNEE REPLACEMENT, RIGHT: ICD-10-CM

## 2025-05-01 DIAGNOSIS — Z47.1 AFTERCARE FOLLOWING RIGHT KNEE JOINT REPLACEMENT SURGERY: ICD-10-CM

## 2025-05-01 DIAGNOSIS — R26.9 GAIT DISTURBANCE: ICD-10-CM

## 2025-05-01 RX ORDER — LEVOTHYROXINE SODIUM 125 UG/1
62.5 TABLET ORAL DAILY
Qty: 45 TABLET | Refills: 1 | Status: SHIPPED | OUTPATIENT
Start: 2025-05-01

## 2025-05-01 RX ORDER — ACETAMINOPHEN 500 MG
1000 TABLET ORAL EVERY 6 HOURS PRN
COMMUNITY

## 2025-05-01 RX ORDER — HYDROCODONE BITARTRATE AND ACETAMINOPHEN 7.5; 325 MG/1; MG/1
1 TABLET ORAL EVERY 4 HOURS PRN
Qty: 20 TABLET | Refills: 0 | Status: SHIPPED | OUTPATIENT
Start: 2025-05-01

## 2025-05-01 NOTE — PROGRESS NOTES
Physical Therapy Daily Progress Note      Patient: Enrrique Alejandre   : 1955  Diagnosis/ICD-10 Code:  Acute pain of right knee [M25.561]  Referring practitioner: No ref. provider found  Date of Initial Visit: Type: THERAPY  Noted: 2025  Today's Date: 2025  Patient seen for 2 sessions             Subjective Nothing new to report today.    Objective   See Exercise, Manual, and Modality Logs for complete treatment.       Assessment/Plan  Pt did have slight c/o soreness with progression and addition of exercises.  But, overall he tolerated session well.    Progress per Plan of Care           Timed:         Manual Therapy:    10     mins  10556;     Therapeutic Exercise:    30     mins  76258;         Timed Treatment:   40   mins   Total Treatment:     40   mins        Brian Tomas PTA  Physical Therapist Assistant

## 2025-05-01 NOTE — PROGRESS NOTES
Enrrique Alejandre : 1955 MRN: [id number] DATE: 2025    DIAGNOSIS: 2 week follow up right total knee      SUBJECTIVE:Patient returns today for 2 week follow up of right total knee replacement. Patient reports doing well with no unusual complaints. Appears to be progressing appropriately.    OBJECTIVE:   Exam:. The incision is healing appropriately. No sign of infection. Range of motion is progressing as expected. The calf is soft and nontender with a negative Homans sign.    ASSESSMENT: 2 week status post right knee replacement.    PLAN: 1) Staples removed and steri strips applied   2) Order given for PT   3) Discontinue GIL hose   4) Continue ice PRN   5) aspirin 81 mg orally every day for 1 month   6) Follow up in 6 weeks with repeat Xrays of right knee (3views)    Andrea Presley MD  2025

## 2025-05-02 ENCOUNTER — TELEPHONE (OUTPATIENT)
Dept: ORTHOPEDIC SURGERY | Facility: CLINIC | Age: 70
End: 2025-05-02

## 2025-05-02 NOTE — TELEPHONE ENCOUNTER
Caller: Enrrique Alejandre    Relationship: Self    Best call back number:     What is the best time to reach you: ANYTIME    Who are you requesting to speak with (clinical staff, provider,  specific staff member): DR. SADLER    What was the call regarding: PATIENT WANTED TO NOTIFY DR. SADLER THAT HIS EMPLOYER IS FAXING OVER A EXTENDED LEAVE OF ABSENCE REQUEST. PATIENT IS REQUESTING THAT HIS LEAVE BE EXTENDED TO MAY 23RD SO HE IS CAPABLE OF DRIVING. PLEASE ADVISE PATIENT ONCE THAT PAPERWORK IS FILLED OUT AND RETURNED.    Is it okay if the provider responds through MyChart: CALL

## 2025-05-06 ENCOUNTER — TREATMENT (OUTPATIENT)
Dept: PHYSICAL THERAPY | Facility: CLINIC | Age: 70
End: 2025-05-06
Payer: MEDICARE

## 2025-05-06 DIAGNOSIS — Z96.651 AFTERCARE FOLLOWING RIGHT KNEE JOINT REPLACEMENT SURGERY: ICD-10-CM

## 2025-05-06 DIAGNOSIS — Z47.1 AFTERCARE FOLLOWING RIGHT KNEE JOINT REPLACEMENT SURGERY: ICD-10-CM

## 2025-05-06 DIAGNOSIS — R26.9 GAIT DISTURBANCE: ICD-10-CM

## 2025-05-06 DIAGNOSIS — M25.561 ACUTE PAIN OF RIGHT KNEE: Primary | ICD-10-CM

## 2025-05-06 NOTE — PROGRESS NOTES
Physical Therapy Daily Treatment Note  Visit: 3    Enrrique Alejandre reports: no new issues today.   YOB: 1955  Referring practitioner : LUIZ Gaffney  Date of Initial: Type: THERAPY  Noted: 4/28/2025  Today's date: 5/6/2025  Patient seen for 3 sessions    Visit Diagnoses:    ICD-10-CM ICD-9-CM   1. Acute pain of right knee  M25.561 719.46   2. Aftercare following right knee joint replacement surgery  Z47.1 V54.81    Z96.651 V43.65   3. Gait disturbance  R26.9 781.2       Subjective       Objective    R knee AROM 6-105 end of visit  See Exercise, Manual, and Modality Logs for complete treatment.       Assessment/Plan    Improving R knee ROM, gait mechanics, patellar mobility.     Plan:    Continue           Timed:         Manual Therapy:    8     mins  42839;     Therapeutic Exercise:    20     mins  20620;     Therapeutic Activity:     10     mins  63871;           Timed Treatment:   38   mins   Total Treatment:     38   mins         Zach Mendiola PT, DPT  Physical Therapist  [place] Lic #[id number]

## 2025-05-08 ENCOUNTER — TREATMENT (OUTPATIENT)
Dept: PHYSICAL THERAPY | Facility: CLINIC | Age: 70
End: 2025-05-08
Payer: MEDICARE

## 2025-05-08 DIAGNOSIS — M25.561 ACUTE PAIN OF RIGHT KNEE: Primary | ICD-10-CM

## 2025-05-08 DIAGNOSIS — Z47.1 AFTERCARE FOLLOWING RIGHT KNEE JOINT REPLACEMENT SURGERY: ICD-10-CM

## 2025-05-08 DIAGNOSIS — Z96.651 AFTERCARE FOLLOWING RIGHT KNEE JOINT REPLACEMENT SURGERY: ICD-10-CM

## 2025-05-08 DIAGNOSIS — R26.9 GAIT DISTURBANCE: ICD-10-CM

## 2025-05-08 NOTE — PROGRESS NOTES
Physical Therapy Daily Treatment Note  Visit: 4    Enrrique Alejandre reports: right knee has been slightly more swollen/sore the past couple days.   YOB: 1955  Referring practitioner : LUIZ Gaffney  Date of Initial: Type: THERAPY  Noted: 4/28/2025  Today's date: 5/8/2025  Patient seen for 4 sessions    Visit Diagnoses:    ICD-10-CM ICD-9-CM   1. Acute pain of right knee  M25.561 719.46   2. Aftercare following right knee joint replacement surgery  Z47.1 V54.81    Z96.651 V43.65   3. Gait disturbance  R26.9 781.2       Subjective       Objective   See Exercise, Manual, and Modality Logs for complete treatment.       Assessment/Plan    Right knee ROM more limited initially but improved to and past prior level.  Improving quad/hip strength R LE.     Plan:    Continue           Timed:         Manual Therapy:    8     mins  77044;     Therapeutic Exercise:    10     mins  90457;     Neuromuscular Andrew:    9    mins  35415;    Therapeutic Activity:     11     mins  48902;           Timed Treatment:   38   mins   Total Treatment:     38   mins         Zach Mendiola PT, DPT  Physical Therapist  [place] Lic #[id number]

## 2025-05-09 ENCOUNTER — TELEPHONE (OUTPATIENT)
Dept: ORTHOPEDIC SURGERY | Facility: HOSPITAL | Age: 70
End: 2025-05-09
Payer: MEDICARE

## 2025-05-09 NOTE — TELEPHONE ENCOUNTER
Called and spoke with Mr. Alejandre to see how he has been doing since his RTK 4/11. He said he is doing really well. He has some tightness and moderate pain in his knee especially after therapy but for the most part he is off the pain medication and taking tylenol. He is starting to wean off the cane now as well. Incision looks good and is healing well. He is working with PT and they are focusing on strengthening exercises now. Mr. Alejandre doesn't have any questions for me at this time. He has my contact information should he need anything.

## 2025-05-12 ENCOUNTER — TREATMENT (OUTPATIENT)
Dept: PHYSICAL THERAPY | Facility: CLINIC | Age: 70
End: 2025-05-12
Payer: MEDICARE

## 2025-05-12 DIAGNOSIS — Z47.1 AFTERCARE FOLLOWING RIGHT KNEE JOINT REPLACEMENT SURGERY: ICD-10-CM

## 2025-05-12 DIAGNOSIS — R26.9 GAIT DISTURBANCE: ICD-10-CM

## 2025-05-12 DIAGNOSIS — M25.561 ACUTE PAIN OF RIGHT KNEE: Primary | ICD-10-CM

## 2025-05-12 DIAGNOSIS — Z96.651 AFTERCARE FOLLOWING RIGHT KNEE JOINT REPLACEMENT SURGERY: ICD-10-CM

## 2025-05-12 PROCEDURE — 97530 THERAPEUTIC ACTIVITIES: CPT | Performed by: PHYSICAL THERAPIST

## 2025-05-12 PROCEDURE — 97110 THERAPEUTIC EXERCISES: CPT | Performed by: PHYSICAL THERAPIST

## 2025-05-12 PROCEDURE — 97140 MANUAL THERAPY 1/> REGIONS: CPT | Performed by: PHYSICAL THERAPIST

## 2025-05-12 NOTE — PROGRESS NOTES
Physical Therapy Daily Treatment Note  Visit: 5    Enrrique Alejandre reports: can tell greater ease in bending and straightening his right knee.   YOB: 1955  Referring practitioner : LUIZ Gaffney  Date of Initial: Type: THERAPY  Noted: 4/28/2025  Today's date: 5/12/2025  Patient seen for 5 sessions    Visit Diagnoses:    ICD-10-CM ICD-9-CM   1. Acute pain of right knee  M25.561 719.46   2. Aftercare following right knee joint replacement surgery  Z47.1 V54.81    Z96.651 V43.65   3. Gait disturbance  R26.9 781.2       Subjective       Objective   R knee AROM 5-110 end of visit  See Exercise, Manual, and Modality Logs for complete treatment.       Assessment/Plan    Improving R knee ROM, gait mechanics, R quad strength.     Plan:    Continue           Timed:         Manual Therapy:    8     mins  26672;     Therapeutic Exercise:    20     mins  34255;     Therapeutic Activity:     10     mins  13511;         Timed Treatment:   38   mins   Total Treatment:     38   mins         Zach Mendiola PT, DPT  Physical Therapist  [place] Lic #[id number]

## 2025-05-15 ENCOUNTER — TREATMENT (OUTPATIENT)
Dept: PHYSICAL THERAPY | Facility: CLINIC | Age: 70
End: 2025-05-15
Payer: MEDICARE

## 2025-05-15 DIAGNOSIS — Z96.651 AFTERCARE FOLLOWING RIGHT KNEE JOINT REPLACEMENT SURGERY: ICD-10-CM

## 2025-05-15 DIAGNOSIS — R26.9 GAIT DISTURBANCE: ICD-10-CM

## 2025-05-15 DIAGNOSIS — M25.561 ACUTE PAIN OF RIGHT KNEE: Primary | ICD-10-CM

## 2025-05-15 DIAGNOSIS — Z47.1 AFTERCARE FOLLOWING RIGHT KNEE JOINT REPLACEMENT SURGERY: ICD-10-CM

## 2025-05-15 NOTE — PROGRESS NOTES
Physical Therapy Daily Treatment Note  Visit: 6    Enrrique Alejandre reports: can tell improvement with less pain and stiffness in his right knee.   YOB: 1955  Referring practitioner : LUIZ Gaffney  Date of Initial: Type: THERAPY  Noted: 2025  Today's date: 5/15/2025  Patient seen for 6 sessions    Visit Diagnoses:    ICD-10-CM ICD-9-CM   1. Acute pain of right knee  M25.561 719.46   2. Aftercare following right knee joint replacement surgery  Z47.1 V54.81    Z96.651 V43.65   3. Gait disturbance  R26.9 781.2       Subjective       Objective   ST. R knee AROM 5-110 over 2 weeks. - Met  2. R knee pain decreased 20% or more with walking/standing over 2 weeks. - Met  3. Independent and compliant with HEP over 2 weeks. - Met      See Exercise, Manual, and Modality Logs for complete treatment.       Assessment/Plan    Improving R knee ROM, quad strength, squatting and ability for stairs and gait.     Plan:    Continue           Timed:         Manual Therapy:    9     mins  13500;     Therapeutic Exercise:    25     mins  34642;     Neuromuscular Andrew:    10    mins  04969;    Therapeutic Activity:     10     mins  47651;           Timed Treatment:   54   mins   Total Treatment:     54   mins         Zach Mendiola PT, DPT  Physical Therapist  [place] Lic #[id number]

## 2025-05-19 RX ORDER — PYRIDOSTIGMINE BROMIDE 180 MG/1
180 TABLET, EXTENDED RELEASE ORAL 2 TIMES DAILY
Qty: 180 TABLET | Refills: 1 | Status: SHIPPED | OUTPATIENT
Start: 2025-05-19

## 2025-05-20 ENCOUNTER — TREATMENT (OUTPATIENT)
Dept: PHYSICAL THERAPY | Facility: CLINIC | Age: 70
End: 2025-05-20
Payer: MEDICARE

## 2025-05-20 DIAGNOSIS — Z96.651 AFTERCARE FOLLOWING RIGHT KNEE JOINT REPLACEMENT SURGERY: ICD-10-CM

## 2025-05-20 DIAGNOSIS — M25.561 ACUTE PAIN OF RIGHT KNEE: Primary | ICD-10-CM

## 2025-05-20 DIAGNOSIS — Z47.1 AFTERCARE FOLLOWING RIGHT KNEE JOINT REPLACEMENT SURGERY: ICD-10-CM

## 2025-05-20 DIAGNOSIS — R26.9 GAIT DISTURBANCE: ICD-10-CM

## 2025-05-20 NOTE — PROGRESS NOTES
Physical Therapy Daily Progress Note      Patient: Enrrique Alejandre   : 1955  Diagnosis/ICD-10 Code:  Acute pain of right knee [M25.561]  Referring practitioner: LUIZ Gaffney  Date of Initial Visit: Type: THERAPY  Noted: 2025  Today's Date: 2025  Patient seen for 7 sessions             Subjective Pt reports his knee has been hurting the past week or so mainly when he walks.  He can bend his knee when sitting and sliding his heel under him and even lunging into a step to stretch.  However, he is unable to bend his knee when he walks.  He feels pressure in his knee joint that builds up when walking.  He feels that if his knee would pop, then the knee would bend.    Objective   See Exercise, Manual, and Modality Logs for complete treatment.       Assessment/Plan  Knee flexion PROM is progressing very well.  However, knee flexion with gait is restricted.  Pt responded well to addition of gait on treadmill (left foot on side rail), with his right LE over accentuating heel off/knee flexion/hip hiking technique.  Also responded well to manual LE distraction in sitting (gait belt around his ankle/foot).    Progress per Plan of Care           Timed:         Manual Therapy:    12     mins  29243;     Therapeutic Exercise:    25     mins  04595;     Neuromuscular Andrew:    10    mins  51377;       Gait Trainin     mins  94423;         Timed Treatment:   55   mins   Total Treatment:     55   mins        Brian Tomas PTA  Physical Therapist Assistant

## 2025-05-22 ENCOUNTER — TREATMENT (OUTPATIENT)
Dept: PHYSICAL THERAPY | Facility: CLINIC | Age: 70
End: 2025-05-22
Payer: MEDICARE

## 2025-05-22 DIAGNOSIS — M25.561 ACUTE PAIN OF RIGHT KNEE: Primary | ICD-10-CM

## 2025-05-22 DIAGNOSIS — Z96.651 AFTERCARE FOLLOWING RIGHT KNEE JOINT REPLACEMENT SURGERY: ICD-10-CM

## 2025-05-22 DIAGNOSIS — R26.9 GAIT DISTURBANCE: ICD-10-CM

## 2025-05-22 DIAGNOSIS — Z47.1 AFTERCARE FOLLOWING RIGHT KNEE JOINT REPLACEMENT SURGERY: ICD-10-CM

## 2025-05-22 NOTE — PROGRESS NOTES
Physical Therapy Daily Treatment Note  Visit: 8    Enrrique Aleajndre reports: stretching off R knee was helpful last visit.   YOB: 1955  Referring practitioner : LUIZ Gaffney  Date of Initial: Type: THERAPY  Noted: 2025  Today's date: 2025  Patient seen for 8 sessions    Visit Diagnoses:    ICD-10-CM ICD-9-CM   1. Acute pain of right knee  M25.561 719.46   2. Aftercare following right knee joint replacement surgery  Z47.1 V54.81    Z96.651 V43.65   3. Gait disturbance  R26.9 781.2       Subjective       Objective   ST. R knee AROM 5-110 over 2 weeks. - Met  2. R knee pain decreased 20% or more with walking/standing over 2 weeks. - Met  3. Independent and compliant with HEP over 2 weeks. - Met      See Exercise, Manual, and Modality Logs for complete treatment.       Assessment/Plan    Improved R knee ROM, strength, gait mechanics and activity tolerance.     Plan:    Continue           Timed:         Manual Therapy:    15     mins  60393;     Therapeutic Exercise:    15     mins  80319;      Therapeutic Activity:     10     mins  79170;           Timed Treatment:   40   mins   Total Treatment:     40   mins         Zach Mendiola PT, DPT  Physical Therapist  [place] Lic #[id number]

## 2025-05-27 ENCOUNTER — TELEPHONE (OUTPATIENT)
Dept: FAMILY MEDICINE CLINIC | Facility: CLINIC | Age: 70
End: 2025-05-27
Payer: MEDICARE

## 2025-05-27 RX ORDER — OMEPRAZOLE 40 MG/1
40 CAPSULE, DELAYED RELEASE ORAL DAILY
Qty: 90 CAPSULE | Refills: 1 | Status: SHIPPED | OUTPATIENT
Start: 2025-05-27

## 2025-05-27 NOTE — TELEPHONE ENCOUNTER
Caller: NEO ANAYA    Relationship to patient: Emergency Contact    Best call back number: [phone]     Patient is needing: CANNOT FIND PATIENT'S   omeprazole (priLOSEC) 40 MG capsule   PATIENT IS NEEDING HIS MEDICATION, BUT WIFE IS UNSURE IF INSURANCE WILL COVER IT AND THEY ARE UNABLE TO PAY MUCH OUT OF POCKET AT CURRENT. INWUIRING IF PRACTICE HAS ANY SAMPLES, OR CAN DIRECT THEM IN ANY DIRECTION TO ASSISTANCE. PLEASE CALL TO DISCUSS.    [id number] - [place], [place] - [address] - [phone]  - [phone] FX

## 2025-05-28 ENCOUNTER — TREATMENT (OUTPATIENT)
Dept: PHYSICAL THERAPY | Facility: CLINIC | Age: 70
End: 2025-05-28
Payer: MEDICARE

## 2025-05-28 DIAGNOSIS — M25.561 ACUTE PAIN OF RIGHT KNEE: Primary | ICD-10-CM

## 2025-05-28 DIAGNOSIS — I47.10 PSVT (PAROXYSMAL SUPRAVENTRICULAR TACHYCARDIA): ICD-10-CM

## 2025-05-28 DIAGNOSIS — R26.9 GAIT DISTURBANCE: ICD-10-CM

## 2025-05-28 DIAGNOSIS — Z47.1 AFTERCARE FOLLOWING RIGHT KNEE JOINT REPLACEMENT SURGERY: ICD-10-CM

## 2025-05-28 DIAGNOSIS — Z96.651 AFTERCARE FOLLOWING RIGHT KNEE JOINT REPLACEMENT SURGERY: ICD-10-CM

## 2025-05-28 RX ORDER — SOTALOL HYDROCHLORIDE 80 MG/1
40 TABLET ORAL EVERY 12 HOURS
Qty: 90 TABLET | Refills: 1 | Status: SHIPPED | OUTPATIENT
Start: 2025-05-28

## 2025-05-28 NOTE — PROGRESS NOTES
Physical Therapy Daily Treatment Note  Visit: 9    Enrrique Alejandre reports: sore after standing/sitting with return to work. Reports he still feels stiffness in his right knee until stretching moving but some improvement afterwards for a period of time which is increasing.   YOB: 1955  Referring practitioner : LUIZ Gaffney  Date of Initial: Type: THERAPY  Noted: 4/28/2025  Today's date: 5/28/2025  Patient seen for 9 sessions    Visit Diagnoses:    ICD-10-CM ICD-9-CM   1. Acute pain of right knee  M25.561 719.46   2. Aftercare following right knee joint replacement surgery  Z47.1 V54.81    Z96.651 V43.65   3. Gait disturbance  R26.9 781.2       Subjective       Objective   See Exercise, Manual, and Modality Logs for complete treatment.       Assessment/Plan    Improving R knee ROM though still limited FL, knee EXT strength, standing tolerance.  Educated on frequent ROM exercise for HEP.       Plan:    Continue           Timed:         Manual Therapy:    12     mins  20887;     Therapeutic Exercise:    15     mins  33412;     Therapeutic Activity:     12     mins  55020;         Timed Treatment:   39   mins   Total Treatment:     39   mins         Zach Mendiola, PT, DPT  Physical Therapist  [place] Lic #[id number]

## 2025-05-30 ENCOUNTER — TREATMENT (OUTPATIENT)
Dept: PHYSICAL THERAPY | Facility: CLINIC | Age: 70
End: 2025-05-30
Payer: MEDICARE

## 2025-05-30 ENCOUNTER — OFFICE VISIT (OUTPATIENT)
Dept: FAMILY MEDICINE CLINIC | Facility: CLINIC | Age: 70
End: 2025-05-30
Payer: MEDICARE

## 2025-05-30 VITALS
SYSTOLIC BLOOD PRESSURE: 146 MMHG | HEART RATE: 65 BPM | WEIGHT: 187 LBS | BODY MASS INDEX: 31.92 KG/M2 | HEIGHT: 64 IN | DIASTOLIC BLOOD PRESSURE: 82 MMHG | OXYGEN SATURATION: 98 % | RESPIRATION RATE: 20 BRPM

## 2025-05-30 DIAGNOSIS — Z12.11 SCREEN FOR COLON CANCER: ICD-10-CM

## 2025-05-30 DIAGNOSIS — Z47.1 AFTERCARE FOLLOWING RIGHT KNEE JOINT REPLACEMENT SURGERY: ICD-10-CM

## 2025-05-30 DIAGNOSIS — R26.9 GAIT DISTURBANCE: ICD-10-CM

## 2025-05-30 DIAGNOSIS — K63.5 POLYP OF COLON, UNSPECIFIED PART OF COLON, UNSPECIFIED TYPE: ICD-10-CM

## 2025-05-30 DIAGNOSIS — M25.561 ACUTE PAIN OF RIGHT KNEE: Primary | ICD-10-CM

## 2025-05-30 DIAGNOSIS — Z96.651 AFTERCARE FOLLOWING RIGHT KNEE JOINT REPLACEMENT SURGERY: ICD-10-CM

## 2025-05-30 DIAGNOSIS — K62.5 BRIGHT RED RECTAL BLEEDING: Primary | ICD-10-CM

## 2025-05-30 RX ORDER — ERGOCALCIFEROL 1.25 MG/1
CAPSULE, LIQUID FILLED ORAL
COMMUNITY

## 2025-05-30 RX ORDER — AMLODIPINE BESYLATE AND ATORVASTATIN CALCIUM 5; 20 MG/1; MG/1
1 TABLET, FILM COATED ORAL DAILY
COMMUNITY

## 2025-05-30 NOTE — PROGRESS NOTES
"Chief Complaint  Chief Complaint   Patient presents with    Rectal Bleeding       Subjective        Enrrique Alejandre is a 70 y.o. male who presents to [facility].  History of Present Illness  Presents today for concerns of rectal bleeding for about the last week.    First noticed when he had diarrhea last week, diarrhea consisted of mostly blood with some stool.  This has happened 3-4 times intermittently.   Has also been having normal soft bowel movements without blood in them.  Has about 3 bowel movements per day, normally has 2 per day.   When has bright red blood in stool, has bright red blood on toilet paper after wiping.  Has had lower abdominal pain over the last week as well.   Occasionally has to strain to have bowel movement.  Stool is normally soft.   Denies dark tarry stools, fever, fatigue, decreased appetite, rectal pain, rectal itching, or unintended weight loss.     Most recent colonoscopy in 2/2019, polyp and grade 2 internal/external hemorrhoids, due in 2024 but has not yet had as he just had a total knee replacement.     Objective   /82 (BP Location: Left arm)   Pulse 65   Resp 20   Ht 162.6 cm (64.02\")   Wt 84.8 kg (187 lb)   SpO2 98%   BMI 32.08 kg/m²     Estimated body mass index is 32.08 kg/m² as calculated from the following:    Height as of this encounter: 162.6 cm (64.02\").    Weight as of this encounter: 84.8 kg (187 lb).     Physical Exam   GEN: In no acute distress, non toxic appearing  CV: Regular rate and rhythm, no murmurs, 2+ peripheral pulses, No extremity edema.   RESP: Lungs clear to auscultation anteriorly and posteriorly in all lung fields bilaterally.  ABD: Soft, nontender, nondistended, normal bowel sounds.  PSYCH: Affect normal, insight fair         Result Review :              Assessment and Plan     Assessment & Plan  Bright red rectal bleeding  Discussed as he has had bright red blood with occasional bowel movements, bright red " blood on toilet paper after wiping, and has history of internal and external hemorrhoids, this is most likely due to hemorrhoids.  However, as he has had bowel movement changes and a polyp was found on his most recent colonoscopy for which he has not yet repeated, recommended further evaluation with a colonoscopy, referral has been made.  Requests referral to Dr. Lora, if he cannot get into see him soon enough, then he will call and another referral will be made.  Discussed he can use topical hemorrhoid cream as needed.  Encouraged increasing fiber in diet and ensuring he drinks enough water.  Orders:    Ambulatory Referral For Screening Colonoscopy    Polyp of colon, unspecified part of colon, unspecified type    Orders:    Ambulatory Referral For Screening Colonoscopy    Screen for colon cancer    Orders:    Ambulatory Referral For Screening Colonoscopy    He is in agreement with this plan and will call should his symptoms worsen or not improve.       Follow Up     Return if symptoms worsen or fail to improve.

## 2025-06-05 ENCOUNTER — OFFICE VISIT (OUTPATIENT)
Dept: ORTHOPEDIC SURGERY | Facility: CLINIC | Age: 70
End: 2025-06-05
Payer: MEDICARE

## 2025-06-05 VITALS — TEMPERATURE: 96.9 F | WEIGHT: 183.4 LBS | BODY MASS INDEX: 31.31 KG/M2 | HEIGHT: 64 IN

## 2025-06-05 DIAGNOSIS — R52 PAIN: Primary | ICD-10-CM

## 2025-06-05 DIAGNOSIS — Z96.651 S/P TOTAL KNEE REPLACEMENT, RIGHT: ICD-10-CM

## 2025-06-05 NOTE — PROGRESS NOTES
Enrrique Alejandre : 1955 MRN: [id number] DATE: 2025    DIAGNOSIS: 8 week follow up right total knee      SUBJECTIVE:Patient returns today for 8 week follow up of right total knee replacement. Patient reports doing well with no unusual complaints.  Reports he does have a moderate amount of stiffness and swelling still.  States his pain level is tolerable and rates it as a 3 on a scale of 10.  Still going to outpatient physical therapy with [facility] and [facility] as well as doing home exercises.  Appears to be progressing appropriately, as he is ambulating full weightbearing walks unassisted in clinic today. He denies any instability or buckling issues.  Denies any sign or symptoms of infection, and is without any other significant complaints today.    OBJECTIVE:   Exam:. The incision is well healed. No sign of infection. Range of motion is measured at 5 to 110 AROM. The calf is soft and nontender with a negative Homans sign. Strength is progressing and the patient is ambulating appropriately.    DIAGNOSTIC STUDIES  Xrays: 3 views of the right knee (AP, lateral, and sunrise) were ordered and reviewed for evaluation of recent knee replacement. They demonstrate a well positioned, well aligned knee replacement without complicating factors noted. In comparison with previous films there has been no change.    ASSESSMENT: 8 week status post right knee replacement.    PLAN: 1) Continue with PT exercises as prescribed   2) Follow up 10 months for annual visit    LUIZ Gaffney  2025

## 2025-06-10 ENCOUNTER — TREATMENT (OUTPATIENT)
Dept: PHYSICAL THERAPY | Facility: CLINIC | Age: 70
End: 2025-06-10
Payer: MEDICARE

## 2025-06-10 DIAGNOSIS — Z96.651 AFTERCARE FOLLOWING RIGHT KNEE JOINT REPLACEMENT SURGERY: ICD-10-CM

## 2025-06-10 DIAGNOSIS — R26.9 GAIT DISTURBANCE: ICD-10-CM

## 2025-06-10 DIAGNOSIS — Z47.1 AFTERCARE FOLLOWING RIGHT KNEE JOINT REPLACEMENT SURGERY: ICD-10-CM

## 2025-06-10 DIAGNOSIS — M25.561 ACUTE PAIN OF RIGHT KNEE: Primary | ICD-10-CM

## 2025-06-10 PROCEDURE — 97110 THERAPEUTIC EXERCISES: CPT | Performed by: PHYSICAL THERAPIST

## 2025-06-10 PROCEDURE — 97530 THERAPEUTIC ACTIVITIES: CPT | Performed by: PHYSICAL THERAPIST

## 2025-06-10 PROCEDURE — 97140 MANUAL THERAPY 1/> REGIONS: CPT | Performed by: PHYSICAL THERAPIST

## 2025-06-10 NOTE — PROGRESS NOTES
Physical Therapy Daily Progress Note      Patient: Enrrique Alejandre   : 1955  Diagnosis/ICD-10 Code:  Acute pain of right knee [M25.561]  Referring practitioner: LUIZ Gaffney  Date of Initial Visit: Type: THERAPY  Noted: 2025  Today's Date: 6/10/2025  Patient seen for 11 sessions             Subjective Pt had MD follow up.  MD wants to give pt a steroid and have therapy focus on improving his ROM.    Objective   See Exercise, Manual, and Modality Logs for complete treatment.       Assessment/Plan  Pt seemed to respond fairly well to exercises and addition of prone knee flexion stretch.    Progress per Plan of Care           Timed:         Manual Therapy:    15     mins  81264;     Therapeutic Exercise:    23     mins  78273;     Therapeutic Activity:     15     mins  54104;         Timed Treatment:   53   mins   Total Treatment:     53   mins        Brian Tomas PTA  Physical Therapist Assistant

## 2025-06-11 RX ORDER — PREDNISONE 10 MG/1
TABLET ORAL
Qty: 30 TABLET | Refills: 0 | Status: SHIPPED | OUTPATIENT
Start: 2025-06-11

## 2025-06-12 ENCOUNTER — TREATMENT (OUTPATIENT)
Dept: PHYSICAL THERAPY | Facility: CLINIC | Age: 70
End: 2025-06-12
Payer: MEDICARE

## 2025-06-12 DIAGNOSIS — R26.9 GAIT DISTURBANCE: ICD-10-CM

## 2025-06-12 DIAGNOSIS — Z47.1 AFTERCARE FOLLOWING RIGHT KNEE JOINT REPLACEMENT SURGERY: ICD-10-CM

## 2025-06-12 DIAGNOSIS — Z96.651 AFTERCARE FOLLOWING RIGHT KNEE JOINT REPLACEMENT SURGERY: ICD-10-CM

## 2025-06-12 DIAGNOSIS — M25.561 ACUTE PAIN OF RIGHT KNEE: Primary | ICD-10-CM

## 2025-06-12 NOTE — PROGRESS NOTES
Physical Therapy Daily Progress Note      Patient: Enrrique Alejandre   : 1955  Diagnosis/ICD-10 Code:  Acute pain of right knee [M25.561]  Referring practitioner: LUIZ Gaffney  Date of Initial Visit: Type: THERAPY  Noted: 2025  Today's Date: 2025  Patient seen for 12 sessions             Subjective Knee felt like it had better ROM after last visit.    Objective   See Exercise, Manual, and Modality Logs for complete treatment.       Assessment/Plan  ROM and strength continue to progress.  Quad tightness noted with modified Dane Test quad stretch/knee PROM manual technique.    Progress per Plan of Care           Timed:         Manual Therapy:    15     mins  41338;     Therapeutic Exercise:    23     mins  63037;     Therapeutic Activity:     15     mins  96668;         Timed Treatment:   53   mins   Total Treatment:     53   mins        Brian Tomas PTA  Physical Therapist Assistant

## 2025-06-17 ENCOUNTER — TREATMENT (OUTPATIENT)
Dept: PHYSICAL THERAPY | Facility: CLINIC | Age: 70
End: 2025-06-17
Payer: MEDICARE

## 2025-06-17 DIAGNOSIS — M25.561 ACUTE PAIN OF RIGHT KNEE: Primary | ICD-10-CM

## 2025-06-17 DIAGNOSIS — Z96.651 AFTERCARE FOLLOWING RIGHT KNEE JOINT REPLACEMENT SURGERY: ICD-10-CM

## 2025-06-17 DIAGNOSIS — R26.9 GAIT DISTURBANCE: ICD-10-CM

## 2025-06-17 DIAGNOSIS — Z47.1 AFTERCARE FOLLOWING RIGHT KNEE JOINT REPLACEMENT SURGERY: ICD-10-CM

## 2025-06-19 ENCOUNTER — TREATMENT (OUTPATIENT)
Dept: PHYSICAL THERAPY | Facility: CLINIC | Age: 70
End: 2025-06-19
Payer: MEDICARE

## 2025-06-19 DIAGNOSIS — M25.561 ACUTE PAIN OF RIGHT KNEE: Primary | ICD-10-CM

## 2025-06-19 DIAGNOSIS — Z96.651 AFTERCARE FOLLOWING RIGHT KNEE JOINT REPLACEMENT SURGERY: ICD-10-CM

## 2025-06-19 DIAGNOSIS — R26.9 GAIT DISTURBANCE: ICD-10-CM

## 2025-06-19 DIAGNOSIS — Z47.1 AFTERCARE FOLLOWING RIGHT KNEE JOINT REPLACEMENT SURGERY: ICD-10-CM

## 2025-06-19 NOTE — PROGRESS NOTES
Physical Therapy Daily Treatment Note  Visit: 14    Enrrique Alejandre reports: still stiff in AM and with some swelling time on his feet in standing past a certain duration.   YOB: 1955  Referring practitioner : LUIZ Gaffney  Date of Initial: Type: THERAPY  Noted: 4/28/2025  Today's date: 6/19/2025  Patient seen for 14 sessions    Visit Diagnoses:    ICD-10-CM ICD-9-CM   1. Acute pain of right knee  M25.561 719.46   2. Aftercare following right knee joint replacement surgery  Z47.1 V54.81    Z96.651 V43.65   3. Gait disturbance  R26.9 781.2       Subjective       Objective   R knee AROM 4-113 degrees, 0-120 degrees end of visit  See Exercise, Manual, and Modality Logs for complete treatment.       Assessment/Plan    R knee full ROM end of visit.  Educated on timeframe for full ROM and activity tolerance status post TKR.     Plan:    Continue           Timed:         Manual Therapy:    8     mins  55960;     Therapeutic Exercise:    18     mins  20208;     Therapeutic Activity:     12     mins  29156;           Timed Treatment:   38   mins   Total Treatment:     38   mins         Zach Mendiola, PT, DPT  Physical Therapist  [place] Lic #[id number]

## 2025-07-02 ENCOUNTER — OFFICE VISIT (OUTPATIENT)
Dept: CARDIOLOGY | Facility: CLINIC | Age: 70
End: 2025-07-02
Payer: MEDICARE

## 2025-07-02 VITALS
SYSTOLIC BLOOD PRESSURE: 154 MMHG | OXYGEN SATURATION: 95 % | HEIGHT: 64 IN | DIASTOLIC BLOOD PRESSURE: 84 MMHG | WEIGHT: 190 LBS | RESPIRATION RATE: 18 BRPM | HEART RATE: 64 BPM | BODY MASS INDEX: 32.44 KG/M2

## 2025-07-02 DIAGNOSIS — I63.9 CEREBROVASCULAR ACCIDENT (CVA), UNSPECIFIED MECHANISM: ICD-10-CM

## 2025-07-02 DIAGNOSIS — Z00.00 PREVENTATIVE HEALTH CARE: ICD-10-CM

## 2025-07-02 DIAGNOSIS — R06.09 DOE (DYSPNEA ON EXERTION): ICD-10-CM

## 2025-07-02 DIAGNOSIS — R93.1 ELEVATED CORONARY ARTERY CALCIUM SCORE: Primary | ICD-10-CM

## 2025-07-02 DIAGNOSIS — I10 ESSENTIAL HYPERTENSION: ICD-10-CM

## 2025-07-02 RX ORDER — EZETIMIBE 10 MG/1
10 TABLET ORAL DAILY
Qty: 90 TABLET | Refills: 3 | Status: SHIPPED | OUTPATIENT
Start: 2025-07-02

## 2025-07-02 RX ORDER — AMLODIPINE BESYLATE AND ATORVASTATIN CALCIUM 5; 40 MG/1; MG/1
1 TABLET, FILM COATED ORAL DAILY
Qty: 90 TABLET | Refills: 3 | Status: SHIPPED | OUTPATIENT
Start: 2025-07-02

## 2025-07-02 RX ORDER — ASPIRIN 81 MG/1
81 TABLET ORAL DAILY
Qty: 90 TABLET | Refills: 3 | Status: SHIPPED | OUTPATIENT
Start: 2025-07-02

## 2025-07-02 NOTE — PROGRESS NOTES
Cardiology Clinic Note  Roderick Johns MD, PhD    Subjective:     Encounter Date:07/02/2025      Patient ID: Enrrique Alejandre is a 70 y.o. male.    Chief Complaint:  Chief Complaint   Patient presents with    Follow-up       HPI:        At the pleasure to see this very pleasant 69-year-old gentleman back for 1 year follow-up with history of TIA remotely, hypertension hyperlipidemia and paroxysmal supraventricular tachycardia on low-dose sotalol, also with myasthenia gravis on pyridostigmine and CellCept.  EKG reviewed and interpreted me demonstrates sinus rhythm with normal conduction with normal QT interval again today.  He denies chest pain shortness of breath heart failure signs or symptoms palpitations presyncope or any other complaints.  .  He is largely exertional and highly functional with great activity with hunting fishing manual labor chopping wood mowing the yard.  He is otherwise doing very well, overall unchanged today doing well 1 year later after previous encounter, continues on sotalol with sinus rhythm, QT is normal, no unexplained syncope or palpitations.    Today on repeat encounter no new issues, calcium score was over thousand, 2024 he had an unremarkable stress test, he continues to be asymptomatic with no chest pain shortness of breath heart failure signs or symptoms unexplained syncope or other complaints.    Review of systems otherwise negative x14 point review of systems except as mentioned above     Historical data copied forward from previous encounters in EMR including the history, exam, and assessment/plan has been reviewed and is unchanged unless noted otherwise.     Cardiac medicines reviewed with risk, benefits, and necessity of each discussed.     Risk and benefit of cardiac testing reviewed including death heart attack stroke pain bleeding infection need for vascular /cardiovascular surgery were discussed and the patient      Objective:         Vitals reviewed below Roderick Johns,  "MD, PhD     Physical Exam  Regular rate and rhythm no rubs murmurs gallops  Or heave no lift  No clubbing cyanosis or edema  Normal CV exam  Assessment:         Independently manage medical conditions         Essential hypertension well-controlled  Hyperlipidemia continue statin therapy and aspirin  Goal LDL less than 55 with high risk calcium score greater than 1000  Increase atorvastatin 40 daily  Add Zetia  Daily aspirin    Paroxysmal SVT, sotalol low-dose, QT is normal, continue, remains in sinus rhythm  Primary prevention goals for CAD, calcium scoring this year has been ordered, correlate with lipid studies  Secondary prevention goals for stroke with goal blood pressure less than 135 systolic, goal A1c less than 7, goal LDL would be less than 70 optimally less than 55 on medical therapy     Continue amlodipine, hypertension controlled  Increase lisinopril HCTZ if blood pressure greater than 140 systolic presently on 20/12.5, home logs recommended and can call therapy if changed  QT normal     Myasthenia gravis, on pyridostigmine as well as CellCept  If becomes an issue we can stop amlodipine and lieu of other alternative antihypertensives     See him back in 1 year       Objective:         /84 (BP Location: Right arm, Patient Position: Sitting)   Pulse 64   Resp 18   Ht 162.6 cm (64\")   Wt 86.2 kg (190 lb)   SpO2 95%   BMI 32.61 kg/m²     Physical Exam    Assessment:         There are no diagnoses linked to this encounter.       Plan:              The pleasure to be involved in this patient's cardiovascular care.  Please call with any questions or concerns  Roderick Johns MD, PhD    Most recent EKG as reviewed and interpreted by me:  Procedures     Most recent echo as reviewed and interpreted by me:  Results for orders placed during the hospital encounter of 01/19/21    Adult Transthoracic Echo Limited W/ Cont if Necessary Per Protocol 01/20/2021  5:15 PM    Interpretation Summary  · Left " ventricular ejection fraction appears to be 56 - 60%.  · No pericardial effusion noted      Most recent stress test as reviewed and interpreted by me:  Results for orders placed during the hospital encounter of 09/03/24    Stress Test With Myocardial Perfusion One Day 09/03/2024 10:40 PM    Interpretation Summary    Impressions are consistent with an intermediate risk study.    Left ventricular ejection fraction is normal (Calculated EF = 69%).    Stress and rest images compared, mild perfusion defect in stress in the basal inferolateral wall, summed difference score of only for Normal EF 69% with normal LV size and function, normal regional and global wall motion  Intermediate risk study, correlate with clinical symptomatology recommended.    Findings consistent with an equivocal ECG stress test.      Most recent cardiac catheterization as reviewed interpreted by me:  No results found for this or any previous visit.    The following portions of the patient's history were reviewed and updated as appropriate: allergies, current medications, past family history, past medical history, past social history, past surgical history, and problem list.      ROS:  14 point review of systems negative except as mentioned above    Current Outpatient Medications:     acetaminophen (TYLENOL) 500 MG tablet, Take 2 tablets by mouth Every 6 (Six) Hours As Needed for Mild Pain., Disp: , Rfl:     amLODIPine-atorvastatin (CADUET) 5-20 MG per tablet, Take 1 tablet by mouth Daily., Disp: , Rfl:     doxycycline (VIBRAMYCIN) 100 MG capsule, TAKE 1 CAPSULE BY MOUTH TWICE A DAY FOR ONE MONTH, THEN REDUCE TO DAILY (Patient taking differently: Take 1 capsule by mouth Daily.), Disp: 60 capsule, Rfl: 5    escitalopram (LEXAPRO) 10 MG tablet, TAKE 1 TABLET BY MOUTH DAILY (Patient taking differently: Take 1 tablet by mouth Every Night.), Disp: 90 tablet, Rfl: 1    finasteride (PROSCAR) 5 MG tablet, Take 1 tablet by mouth Daily., Disp: , Rfl:      levothyroxine (SYNTHROID, LEVOTHROID) 125 MCG tablet, TAKE 1/2 TABLET BY MOUTH DAILY, Disp: 45 tablet, Rfl: 1    lisinopril-hydrochlorothiazide (PRINZIDE,ZESTORETIC) 20-12.5 MG per tablet, TAKE 1 TABLET BY MOUTH DAILY, Disp: 90 tablet, Rfl: 1    mycophenolate (CELLCEPT) 500 MG tablet, TAKE 1 TABLET BY MOUTH TWICE A DAY, Disp: 180 tablet, Rfl: 1    omeprazole (priLOSEC) 40 MG capsule, Take 1 capsule by mouth Daily., Disp: 90 capsule, Rfl: 1    pyridostigmine (MESTINON) 180 MG CR tablet, TAKE 1 TABLET BY MOUTH TWICE A DAY, Disp: 180 tablet, Rfl: 1    pyridostigmine (MESTINON) 60 MG tablet, Take 1 tablet by mouth 4 (Four) Times a Day As Needed (fatigue)., Disp: , Rfl:     sotalol (BETAPACE) 80 MG tablet, TAKE 1/2 TABLET BY MOUTH EVERY 12 HOURS, Disp: 90 tablet, Rfl: 1    tamsulosin (FLOMAX) 0.4 MG capsule 24 hr capsule, Take 2 capsules by mouth Daily. (Patient taking differently: Take 1 capsule by mouth 2 (Two) Times a Day.), Disp: 180 capsule, Rfl: 3    traZODone (DESYREL) 50 MG tablet, TAKE ONE TO TWO TABLETS BY MOUTH EVERY EVENING FOR SLEEP, Disp: 120 tablet, Rfl: 1    vitamin D (ERGOCALCIFEROL) 1.25 MG (67349 UT) capsule capsule, 1 cap(s) orally once a week, Disp: , Rfl:     Problem List:  Patient Active Problem List   Diagnosis    Essential hypertension    PSVT (paroxysmal supraventricular tachycardia)    CVA (cerebral vascular accident)    Chronic pancreatitis    Hematuria    High prostate specific antigen (PSA)    Hypomagnesemia    Vitamin D deficiency    Myasthenia gravis    Radicular low back pain    Glucosuria    Anxiety and depression    Benign prostatic hyperplasia with nocturia    Cholecystitis    Syncope    Real time reverse transcriptase PCR positive for COVID-19 virus    Urinary retention    Hyperlipidemia    Gastroesophageal reflux disease    Hypothyroidism    Urothelial carcinoma of bladder    Insomnia    Primary osteoarthritis of right knee     Past Medical History:  Past Medical History:   Diagnosis  Date    Anemia     Anxiety 2/2014    Arthritis     Arthritis of back 2014    Atrial fibrillation 1/1/1999    Benign prostatic hyperplasia     Cancer Non invasive bladder    No treatment just observation    Disease of thyroid gland     Erectile dysfunction     Fracture, clavicle 6/1975    GERD (gastroesophageal reflux disease)     Hemorrhoid     High prostate specific antigen (PSA) 08/16/2017    History of bleeding peptic ulcer 2014    History of bronchitis     History of echocardiogram     EF 55%. No significant valvular flow abnormalities.     History of sinusitis     History of stress test 11/2010    Stress Myoview: No ischemia 11/2010    HL (hearing loss)     HTN (hypertension) 06/25/2019    Hypothyroidism     Injury of back 1980    Kidney stone     Kidney stone     Knee swelling 2012    Low back pain 2019    Myasthenia gravis     Pancreatitis     Prostatitis 2020    PSVT (paroxysmal supraventricular tachycardia) 06/25/2019    Rotator cuff syndrome 1989    Sinusitis 1985    Skin abrasion     Stroke 02/2014    Supraventricular tachycardia     Suspected COVID-19 virus infection 01/12/2021    Tear of meniscus of knee 2022    TIA (transient ischemic attack) 2008    Urinary tract infection 2021     Past Surgical History:  Past Surgical History:   Procedure Laterality Date    ADENOIDECTOMY      BLADDER TUMOR EXCISION  2021    CHOLECYSTECTOMY N/A 07/04/2020    Procedure: CHOLECYSTECTOMY LAPAROSCOPIC;  Surgeon: Yogesh Presley MD;  Location: [facility] OR;  Service: General;  Laterality: N/A;    COLONOSCOPY      ECHO - CONVERTED  01/20/2021    ENDOSCOPY      FINGER SURGERY Left 2018    index    HAND SURGERY  2018    tumor removed  non cancerous    HERNIA REPAIR  7/4/2020    umbilical    JOINT REPLACEMENT  4/11/2025    Right knee - total    PEG TUBE INSERTION  2014    PEG TUBE REMOVAL  2024    PROSTATE BIOPSY  2019    DR[facility]    PROSTATE SURGERY  11/23/2021    turp    TONSILLECTOMY      TOTAL KNEE  ARTHROPLASTY Right 04/11/2025    Procedure: TOTAL KNEE ARTHROPLASTY;  Surgeon: Andrea Presley MD;  Location: [facility] OR [facility] – [place], [place];  Service: Orthopedics;  Laterality: Right;    VASECTOMY       Social History:  Social History     Socioeconomic History    Marital status:    Tobacco Use    Smoking status: Never     Passive exposure: Never    Smokeless tobacco: Never   Vaping Use    Vaping status: Never Used   Substance and Sexual Activity    Alcohol use: Yes     Alcohol/week: 3.0 standard drinks of alcohol     Types: 1 Glasses of wine, 1 Cans of beer, 1 Drinks containing 0.5 oz of alcohol per week    Drug use: No    Sexual activity: Not Currently     Partners: Female     Birth control/protection: Condom, Vasectomy     Comment: Vasectomy     Allergies:  No Known Allergies  Immunizations:  Immunization History   Administered Date(s) Administered    COVID-19 (PFIZER) Purple Cap Monovalent 03/10/2021, 03/31/2021, 03/01/2022    Fluzone  >6mos 10/27/2014    Fluzone High-Dose 65+YRS 11/09/2020, 12/03/2024    Pneumococcal Conjugate 20-Valent (PCV20) 08/17/2022    Pneumococcal Polysaccharide (PPSV23) 08/10/2020    Tdap 01/01/2013            In-Office Procedure(s):  No orders to display        ASCVD RIsk Score::  The ASCVD Risk score (Mona DK, et al., 2019) failed to calculate for the following reasons:    Risk score cannot be calculated because patient has a medical history suggesting prior/existing ASCVD    Imaging:    Results for orders placed in visit on 06/05/25    XR Knee 3 View Right     Impression  Ordering physician's impression is located in the Encounter Note dated 06/05/25. X-ray performed in the DR room.       Results for orders placed during the hospital encounter of 09/06/24    CT Abdomen With & Without Contrast 09/09/2024 12:12 PM    Narrative  CT ABDOMEN W WO CONTRAST    Date of Exam: 9/6/2024 10:08 AM EDT    Indication: questionable mass on at pancreatic head/body.    Comparison: CT abdomen and pelvis  12/18/2021, 12/9/2021, 7/4/2020, 8/28/2014. CT coronary calcium score study 8/15/2024.    Technique: Axial CT images were obtained of the abdomen before and after the uneventful intravenous administration of iodinated contrast. Sagittal and coronal reconstructions were performed.  Automated exposure control and iterative reconstruction  methods were used.      Findings:  No suspicious pancreatic mass is identified, with particular attention to the body-neck junction. The pancreas maintains normal homogeneous enhancement without cystic or solid lesion. No peripancreatic inflammation is seen.    Hepatic cysts are present. Spleen size is upper limits normal, 14 cm, unchanged, without focal abnormality. Adrenals are normal. There are 2 nonobstructing right renal stones, dominant measuring 5 mm. A single nonobstructing stone in the left mid kidney  measures 5 mm. Small bilateral renal parapelvic cysts are present. Cholecystectomy. No abnormal biliary ductal dilation.    The stomach, and the large and small bowel, are within normal limits. No adenopathy. Mild abdominal aortic calcific atherosclerosis.    Heart size is normal. Coronary artery calcifications are present. Benign calcified nodule is present in the right lower lobe. Lung bases are clear.    No acute or suspicious osseous abnormalities are identified.    Impression  1. No pancreatic mass or acute pancreatic findings.  2. Nonobstructing bilateral renal stones.  3. Cholecystectomy.  4. Hepatic cyst. Bilateral renal cysts.      Electronically Signed: Radha Yates MD  9/9/2024 12:12 PM EDT  Workstation ID: [id number]      Results for orders placed during the hospital encounter of 09/06/24    CT Abdomen With & Without Contrast 09/09/2024 12:12 PM    Narrative  CT ABDOMEN W WO CONTRAST    Date of Exam: 9/6/2024 10:08 AM EDT    Indication: questionable mass on at pancreatic head/body.    Comparison: CT abdomen and pelvis 12/18/2021, 12/9/2021, 7/4/2020, 8/28/2014. CT  coronary calcium score study 8/15/2024.    Technique: Axial CT images were obtained of the abdomen before and after the uneventful intravenous administration of iodinated contrast. Sagittal and coronal reconstructions were performed.  Automated exposure control and iterative reconstruction  methods were used.      Findings:  No suspicious pancreatic mass is identified, with particular attention to the body-neck junction. The pancreas maintains normal homogeneous enhancement without cystic or solid lesion. No peripancreatic inflammation is seen.    Hepatic cysts are present. Spleen size is upper limits normal, 14 cm, unchanged, without focal abnormality. Adrenals are normal. There are 2 nonobstructing right renal stones, dominant measuring 5 mm. A single nonobstructing stone in the left mid kidney  measures 5 mm. Small bilateral renal parapelvic cysts are present. Cholecystectomy. No abnormal biliary ductal dilation.    The stomach, and the large and small bowel, are within normal limits. No adenopathy. Mild abdominal aortic calcific atherosclerosis.    Heart size is normal. Coronary artery calcifications are present. Benign calcified nodule is present in the right lower lobe. Lung bases are clear.    No acute or suspicious osseous abnormalities are identified.    Impression  1. No pancreatic mass or acute pancreatic findings.  2. Nonobstructing bilateral renal stones.  3. Cholecystectomy.  4. Hepatic cyst. Bilateral renal cysts.      Electronically Signed: Radha Yates MD  9/9/2024 12:12 PM EDT  Workstation ID: [id number]      Lab Review:   Pre-Admission Testing on 03/28/2025   Component Date Value    Glucose 03/28/2025 106 (H)     BUN 03/28/2025 13     Creatinine 03/28/2025 0.80     Sodium 03/28/2025 137     Potassium 03/28/2025 4.4     Chloride 03/28/2025 104     CO2 03/28/2025 25.0     Calcium 03/28/2025 8.6     BUN/Creatinine Ratio 03/28/2025 16.3     Anion Gap 03/28/2025 8.0     eGFR 03/28/2025 95.2     QT  Interval 03/28/2025 458     QTC Interval 03/28/2025 427     WBC 03/28/2025 6.84     RBC 03/28/2025 5.40     Hemoglobin 03/28/2025 15.8     Hematocrit 03/28/2025 46.1     MCV 03/28/2025 85.4     MCH 03/28/2025 29.3     MCHC 03/28/2025 34.3     RDW 03/28/2025 13.3     RDW-SD 03/28/2025 41.2     MPV 03/28/2025 9.6     Platelets 03/28/2025 149     Neutrophil % 03/28/2025 81.0 (H)     Lymphocyte % 03/28/2025 10.2 (L)     Monocyte % 03/28/2025 6.3     Eosinophil % 03/28/2025 1.3     Basophil % 03/28/2025 0.6     Immature Grans % 03/28/2025 0.6 (H)     Neutrophils, Absolute 03/28/2025 5.54     Lymphocytes, Absolute 03/28/2025 0.70     Monocytes, Absolute 03/28/2025 0.43     Eosinophils, Absolute 03/28/2025 0.09     Basophils, Absolute 03/28/2025 0.04     Immature Grans, Absolute 03/28/2025 0.04     nRBC 03/28/2025 0.0    Office Visit on 02/27/2025   Component Date Value    TSH 02/27/2025 1.030     Free T4 02/27/2025 1.07     T3, Free 02/27/2025 2.58     Glucose 02/27/2025 85     BUN 02/27/2025 20     Creatinine 02/27/2025 1.02     Sodium 02/27/2025 141     Potassium 02/27/2025 4.3     Chloride 02/27/2025 104     CO2 02/27/2025 29.8 (H)     Calcium 02/27/2025 8.8     Total Protein 02/27/2025 6.4     Albumin 02/27/2025 4.0     ALT (SGPT) 02/27/2025 20     AST (SGOT) 02/27/2025 31     Alkaline Phosphatase 02/27/2025 68     Total Bilirubin 02/27/2025 0.5     Globulin 02/27/2025 2.4     A/G Ratio 02/27/2025 1.7     BUN/Creatinine Ratio 02/27/2025 19.6     Anion Gap 02/27/2025 7.2     eGFR 02/27/2025 79.1    Office Visit on 01/09/2025   Component Date Value    WBC 01/10/2025 7.80     RBC 01/10/2025 5.30     Hemoglobin 01/10/2025 14.4     Hematocrit 01/10/2025 45.1     MCV 01/10/2025 85.1     MCH 01/10/2025 27.2     MCHC 01/10/2025 31.9     RDW 01/10/2025 14.8     RDW-SD 01/10/2025 45.8     MPV 01/10/2025 10.5     Platelets 01/10/2025 161     Glucose 01/10/2025 96     BUN 01/10/2025 21     Creatinine 01/10/2025 1.07     Sodium  01/10/2025 143     Potassium 01/10/2025 3.9     Chloride 01/10/2025 104     CO2 01/10/2025 29.2 (H)     Calcium 01/10/2025 8.7     Total Protein 01/10/2025 6.3     Albumin 01/10/2025 3.7     ALT (SGPT) 01/10/2025 18     AST (SGOT) 01/10/2025 28     Alkaline Phosphatase 01/10/2025 68     Total Bilirubin 01/10/2025 0.5     Globulin 01/10/2025 2.6     A/G Ratio 01/10/2025 1.4     BUN/Creatinine Ratio 01/10/2025 19.6     Anion Gap 01/10/2025 9.8     eGFR 01/10/2025 74.7      Recent labs reviewed and interpreted for clinical significance and application            Level of Care:           Roderick Johns MD  07/02/25  .

## 2025-07-18 DIAGNOSIS — L71.1 RHINOPHYMA: ICD-10-CM

## 2025-07-18 RX ORDER — DOXYCYCLINE 100 MG/1
CAPSULE ORAL
Qty: 30 CAPSULE | Refills: 0 | Status: SHIPPED | OUTPATIENT
Start: 2025-07-18

## 2025-07-28 ENCOUNTER — TELEPHONE (OUTPATIENT)
Dept: ORTHOPEDIC SURGERY | Facility: CLINIC | Age: 70
End: 2025-07-28

## 2025-07-28 DIAGNOSIS — Z96.651 S/P TOTAL KNEE REPLACEMENT, RIGHT: Primary | ICD-10-CM

## 2025-07-28 RX ORDER — CEPHALEXIN 500 MG/1
500 CAPSULE ORAL ONCE
Qty: 1 CAPSULE | Refills: 0 | Status: CANCELLED | OUTPATIENT
Start: 2025-07-28 | End: 2025-07-28

## 2025-07-28 RX ORDER — CEPHALEXIN 500 MG/1
2000 CAPSULE ORAL ONCE
COMMUNITY
End: 2025-08-19 | Stop reason: SDUPTHER

## 2025-08-15 ENCOUNTER — OFFICE (AMBULATORY)
Dept: URBAN - METROPOLITAN AREA PATHOLOGY 19 | Facility: PATHOLOGY | Age: 70
End: 2025-08-15
Payer: MEDICARE

## 2025-08-15 ENCOUNTER — ON CAMPUS - OUTPATIENT (AMBULATORY)
Dept: URBAN - METROPOLITAN AREA HOSPITAL 2 | Facility: HOSPITAL | Age: 70
End: 2025-08-15
Payer: MEDICARE

## 2025-08-15 VITALS
OXYGEN SATURATION: 95 % | DIASTOLIC BLOOD PRESSURE: 61 MMHG | HEART RATE: 74 BPM | RESPIRATION RATE: 16 BRPM | SYSTOLIC BLOOD PRESSURE: 114 MMHG | OXYGEN SATURATION: 98 % | HEART RATE: 79 BPM | HEART RATE: 64 BPM | DIASTOLIC BLOOD PRESSURE: 68 MMHG | OXYGEN SATURATION: 97 % | SYSTOLIC BLOOD PRESSURE: 120 MMHG | DIASTOLIC BLOOD PRESSURE: 82 MMHG | RESPIRATION RATE: 17 BRPM | DIASTOLIC BLOOD PRESSURE: 60 MMHG | SYSTOLIC BLOOD PRESSURE: 106 MMHG | HEIGHT: 64 IN | SYSTOLIC BLOOD PRESSURE: 137 MMHG | HEART RATE: 70 BPM | HEART RATE: 75 BPM | HEART RATE: 65 BPM | SYSTOLIC BLOOD PRESSURE: 93 MMHG | SYSTOLIC BLOOD PRESSURE: 127 MMHG | RESPIRATION RATE: 18 BRPM | DIASTOLIC BLOOD PRESSURE: 62 MMHG | WEIGHT: 182 LBS | TEMPERATURE: 97.8 F | DIASTOLIC BLOOD PRESSURE: 64 MMHG | SYSTOLIC BLOOD PRESSURE: 94 MMHG

## 2025-08-15 DIAGNOSIS — Z86.0101 PERSONAL HISTORY OF ADENOMATOUS AND SERRATED COLON POLYPS: ICD-10-CM

## 2025-08-15 DIAGNOSIS — D12.4 BENIGN NEOPLASM OF DESCENDING COLON: ICD-10-CM

## 2025-08-15 DIAGNOSIS — K64.1 SECOND DEGREE HEMORRHOIDS: ICD-10-CM

## 2025-08-15 DIAGNOSIS — K57.30 DIVERTICULOSIS OF LARGE INTESTINE WITHOUT PERFORATION OR ABS: ICD-10-CM

## 2025-08-15 DIAGNOSIS — K63.5 POLYP OF COLON: ICD-10-CM

## 2025-08-15 DIAGNOSIS — Z09 ENCOUNTER FOR FOLLOW-UP EXAMINATION AFTER COMPLETED TREATMEN: ICD-10-CM

## 2025-08-15 DIAGNOSIS — L71.1 RHINOPHYMA: ICD-10-CM

## 2025-08-15 PROBLEM — Z86.010 SURVEILLANCE DUE TO PRIOR COLONIC NEOPLASIA: Status: ACTIVE | Noted: 2025-08-15

## 2025-08-15 LAB
GI HISTOLOGY: A. CECUM: (no result)
GI HISTOLOGY: B. DESCENDING COLON: (no result)
GI HISTOLOGY: PDF REPORT: (no result)

## 2025-08-15 PROCEDURE — 45380 COLONOSCOPY AND BIOPSY: CPT | Mod: 59,PT | Performed by: INTERNAL MEDICINE

## 2025-08-15 PROCEDURE — 88305 TISSUE EXAM BY PATHOLOGIST: CPT | Mod: 26 | Performed by: PATHOLOGY

## 2025-08-15 PROCEDURE — 45385 COLONOSCOPY W/LESION REMOVAL: CPT | Mod: PT | Performed by: INTERNAL MEDICINE

## 2025-08-15 RX ORDER — DOXYCYCLINE 100 MG/1
100 CAPSULE ORAL DAILY
Qty: 30 CAPSULE | Refills: 0 | Status: SHIPPED | OUTPATIENT
Start: 2025-08-15

## 2025-08-19 RX ORDER — CEPHALEXIN 500 MG/1
2000 CAPSULE ORAL ONCE
Qty: 4 CAPSULE | Refills: 3 | Status: SHIPPED | OUTPATIENT
Start: 2025-08-19 | End: 2025-08-19

## 2025-08-23 DIAGNOSIS — I10 ESSENTIAL HYPERTENSION: Chronic | ICD-10-CM

## 2025-08-25 RX ORDER — LISINOPRIL AND HYDROCHLOROTHIAZIDE 12.5; 2 MG/1; MG/1
1 TABLET ORAL DAILY
Qty: 90 TABLET | Refills: 1 | Status: SHIPPED | OUTPATIENT
Start: 2025-08-25

## 2025-08-26 RX ORDER — PYRIDOSTIGMINE BROMIDE 180 MG/1
180 TABLET, EXTENDED RELEASE ORAL 2 TIMES DAILY
Qty: 180 TABLET | Refills: 1 | Status: SHIPPED | OUTPATIENT
Start: 2025-08-26

## 2025-08-28 ENCOUNTER — OFFICE VISIT (OUTPATIENT)
Dept: FAMILY MEDICINE CLINIC | Facility: CLINIC | Age: 70
End: 2025-08-28
Payer: MEDICARE

## 2025-08-28 VITALS
WEIGHT: 185.02 LBS | HEIGHT: 64 IN | HEART RATE: 61 BPM | RESPIRATION RATE: 18 BRPM | DIASTOLIC BLOOD PRESSURE: 64 MMHG | OXYGEN SATURATION: 96 % | BODY MASS INDEX: 31.59 KG/M2 | SYSTOLIC BLOOD PRESSURE: 123 MMHG

## 2025-08-28 DIAGNOSIS — K43.9 HERNIA OF ABDOMINAL WALL: Primary | ICD-10-CM

## 2025-08-28 PROCEDURE — 1125F AMNT PAIN NOTED PAIN PRSNT: CPT | Performed by: NURSE PRACTITIONER

## 2025-08-28 PROCEDURE — 3078F DIAST BP <80 MM HG: CPT | Performed by: NURSE PRACTITIONER

## 2025-08-28 PROCEDURE — 1159F MED LIST DOCD IN RCRD: CPT | Performed by: NURSE PRACTITIONER

## 2025-08-28 PROCEDURE — 99213 OFFICE O/P EST LOW 20 MIN: CPT | Performed by: NURSE PRACTITIONER

## 2025-08-28 PROCEDURE — 3074F SYST BP LT 130 MM HG: CPT | Performed by: NURSE PRACTITIONER

## 2025-08-28 PROCEDURE — 1170F FXNL STATUS ASSESSED: CPT | Performed by: NURSE PRACTITIONER

## 2025-08-28 PROCEDURE — 1160F RVW MEDS BY RX/DR IN RCRD: CPT | Performed by: NURSE PRACTITIONER

## (undated) DEVICE — GLV SURG BIOGEL LTX PF 7

## (undated) DEVICE — SOL NACL 0.9PCT 1000ML

## (undated) DEVICE — DRSNG SURESITE WNDW 2.38X2.75

## (undated) DEVICE — PK PROC TURNOVER

## (undated) DEVICE — ENDOPATH XCEL BLUNT TIP TROCARS WITH SMOOTH SLEEVES: Brand: ENDOPATH XCEL

## (undated) DEVICE — LAPAROSCOPIC GAS CONDITIONING DEVICE.: Brand: INSUFLOW

## (undated) DEVICE — KT DRN EVAC WND PVC PCH WTROC RND 10F400

## (undated) DEVICE — LIGAMAX 5 MM ENDOSCOPIC MULTIPLE CLIP APPLIER
Type: IMPLANTABLE DEVICE | Site: ABDOMEN | Status: NON-FUNCTIONAL
Brand: LIGAMAX

## (undated) DEVICE — ENDOPATH 5MM CURVED SCISSORS WITH MONOPOLAR CAUTERY: Brand: ENDOPATH

## (undated) DEVICE — 3M™ IOBAN™ 2 ANTIMICROBIAL INCISE DRAPE 6640EZ: Brand: IOBAN™ 2

## (undated) DEVICE — DEV CONTRL TISS STRATAFIXSPIRALMNCRYL PLSPS2 REV3/0 45CM

## (undated) DEVICE — SUT VIC 1 CT1 36IN J947H

## (undated) DEVICE — SUT VIC 0/0 UR6 27IN DYED J603H

## (undated) DEVICE — APPL HEMO SURG ARISTA/AH/FLEXITIP 14CM

## (undated) DEVICE — THE STERILE LIGHT HANDLE COVER IS USED WITH STERIS SURGICAL LIGHTING AND VISUALIZATION SYSTEMS.

## (undated) DEVICE — PK KN TOTL 40

## (undated) DEVICE — UNDERGLV SURG BIOGEL INDICATOR LTX PF 7

## (undated) DEVICE — 2, DISPOSABLE SUCTION/IRRIGATOR WITH DISPOSABLE TIP: Brand: STRYKEFLOW

## (undated) DEVICE — CUFF SCD HEMOFORCE SEQ CALF STD MD

## (undated) DEVICE — PREP SOL POVIDONE/IODINE BT 4OZ

## (undated) DEVICE — UNDERCAST PADDING: Brand: DEROYAL

## (undated) DEVICE — 3M™ WARMING BLANKET, UPPER BODY, 10 PER CASE, 42268: Brand: BAIR HUGGER™

## (undated) DEVICE — NEEDLE, QUINCKE 22GX3.5": Brand: MEDLINE INDUSTRIES, INC.

## (undated) DEVICE — SYS SKIN EXOFIN WND CLS 4X22CM

## (undated) DEVICE — ANTIBACTERIAL UNDYED BRAIDED (POLYGLACTIN 910), SYNTHETIC ABSORBABLE SUTURE: Brand: COATED VICRYL

## (undated) DEVICE — SOL IRR H2O BTL 1000ML STRL

## (undated) DEVICE — SKIN PREP TRAY 4 COMPARTM TRAY: Brand: MEDLINE INDUSTRIES, INC.

## (undated) DEVICE — CMT BONE PALACOS R HI/VISC 1X40

## (undated) DEVICE — YANKAUER,BULB TIP,W/O VENT,RIGID,STERILE: Brand: MEDLINE

## (undated) DEVICE — DUAL CUT SAGITTAL BLADE

## (undated) DEVICE — GLV SURG SENSICARE W/ALOE PF LF 8 STRL

## (undated) DEVICE — APPL DURAPREP IODOPHOR APL 26ML

## (undated) DEVICE — UNDYED BRAIDED (POLYGLACTIN 910), SYNTHETIC ABSORBABLE SUTURE: Brand: COATED VICRYL

## (undated) DEVICE — SKIN AFFIX SURG ADHESIVE 72/CS 0.55ML: Brand: MEDLINE

## (undated) DEVICE — SOL IRR NACL 0.9PCT 3000ML

## (undated) DEVICE — INTENDED FOR TISSUE SEPARATION, AND OTHER PROCEDURES THAT REQUIRE A SHARP SURGICAL BLADE TO PUNCTURE OR CUT.: Brand: BARD-PARKER ® CARBON RIB-BACK BLADES

## (undated) DEVICE — ENDOPATH XCEL WITH OPTIVIEW TECHNOLOGY UNIVERSAL TROCAR STABILITY SLEEVES: Brand: ENDOPATH XCEL OPTIVIEW

## (undated) DEVICE — INTENDED TO SUPPORT AND MAINTAIN THE POSITION OF AN ANESTHETIZED PATIENT DURING SURGERY: Brand: HERMANTOR XL PINK KNEE POSITIONING PAD

## (undated) DEVICE — GENERAL LAPAROSCOPY CDS: Brand: MEDLINE INDUSTRIES, INC.

## (undated) DEVICE — ENDOPATH XCEL WITH OPTIVIEW TECHNOLOGY BLADELESS TROCARS WITH STABILITY SLEEVES: Brand: ENDOPATH XCEL OPTIVIEW

## (undated) DEVICE — TRAP FLD MINIVAC MEGADYNE 100ML

## (undated) DEVICE — GLV SURG SENSICARE PI PF LF 7 GRN STRL

## (undated) DEVICE — ENDOPOUCH RETRIEVER SPECIMEN RETRIEVAL BAGS: Brand: ENDOPOUCH RETRIEVER

## (undated) DEVICE — BNDG,ELSTC,MATRIX,STRL,6"X5YD,LF,HOOK&LP: Brand: MEDLINE

## (undated) DEVICE — PDS II VLT 0 107CM AG ST3: Brand: ENDOLOOP

## (undated) DEVICE — GLV SURG SENSICARE PI MIC PF SZ7 LF STRL

## (undated) DEVICE — SUT ETHIB 0/0 MO6 I8IN CX45D

## (undated) DEVICE — UNDERGLV SURG BIOGEL INDICATOR LF PF 7.5

## (undated) DEVICE — SYR LL TP 10ML STRL

## (undated) DEVICE — DEV CONTRL TISS STRATAFIX SYMM PDS PLUS VIL CT-1 60CM

## (undated) DEVICE — GLV SURG BIOGEL M LTX PF 7 1/2

## (undated) DEVICE — 450 ML BOTTLE OF 0.05% CHLORHEXIDINE GLUCONATE IN 99.95% STERILE WATER FOR IRRIGATION, USP AND APPLICATOR.: Brand: IRRISEPT ANTIMICROBIAL WOUND LAVAGE

## (undated) DEVICE — GLV SURG SENSICARE W/ALOE PF LF 7.5 STRL

## (undated) DEVICE — PATIENT RETURN ELECTRODE, SINGLE-USE, CONTACT QUALITY MONITORING, ADULT, WITH 9FT CORD, FOR PATIENTS WEIGING OVER 33LBS. (15KG): Brand: MEGADYNE